# Patient Record
Sex: FEMALE | Race: WHITE | HISPANIC OR LATINO | Employment: UNEMPLOYED | URBAN - METROPOLITAN AREA
[De-identification: names, ages, dates, MRNs, and addresses within clinical notes are randomized per-mention and may not be internally consistent; named-entity substitution may affect disease eponyms.]

---

## 2017-04-26 ENCOUNTER — OFFICE VISIT (OUTPATIENT)
Dept: MEDICAL GROUP | Facility: CLINIC | Age: 32
End: 2017-04-26
Payer: COMMERCIAL

## 2017-04-26 VITALS
HEART RATE: 58 BPM | TEMPERATURE: 99.1 F | BODY MASS INDEX: 18.83 KG/M2 | HEIGHT: 65 IN | DIASTOLIC BLOOD PRESSURE: 56 MMHG | SYSTOLIC BLOOD PRESSURE: 98 MMHG | WEIGHT: 113 LBS | RESPIRATION RATE: 14 BRPM | OXYGEN SATURATION: 95 %

## 2017-04-26 DIAGNOSIS — M79.604 PAIN IN BOTH LOWER EXTREMITIES: ICD-10-CM

## 2017-04-26 DIAGNOSIS — J06.9 VIRAL URI WITH COUGH: ICD-10-CM

## 2017-04-26 DIAGNOSIS — M79.605 PAIN IN BOTH LOWER EXTREMITIES: ICD-10-CM

## 2017-04-26 PROCEDURE — 99214 OFFICE O/P EST MOD 30 MIN: CPT | Performed by: NURSE PRACTITIONER

## 2017-04-26 RX ORDER — CODEINE PHOSPHATE AND GUAIFENESIN 10; 100 MG/5ML; MG/5ML
5 SOLUTION ORAL EVERY 4 HOURS PRN
Qty: 75 ML | Refills: 0 | Status: SHIPPED
Start: 2017-04-26 | End: 2018-04-06

## 2017-04-26 RX ORDER — BENZONATATE 100 MG/1
100 CAPSULE ORAL 3 TIMES DAILY PRN
Qty: 60 CAP | Refills: 0 | Status: SHIPPED | OUTPATIENT
Start: 2017-04-26 | End: 2018-04-06

## 2017-04-26 NOTE — MR AVS SNAPSHOT
"        Umu RizviManuelMaynor   2017 1:40 PM   Office Visit   MRN: [id number]    Department:  [facility]   Dept Phone:  [phone]    Description:  Female : 1985   Provider:  JUAN M Becerril           Reason for Visit     Cough Cough x6 day and fever; feels like she cant talk without cough      Allergies as of 2017     Allergen Noted Reactions    Nkda [No Known Drug Allergy] 2009         You were diagnosed with     Viral URI with cough   [089695]         Vital Signs     Blood Pressure Pulse Temperature Respirations Height Weight    98/56 mmHg 58 37.3 °C (99.1 °F) 14 1.651 m (5' 5\") 51.256 kg (113 lb)    Body Mass Index Oxygen Saturation Last Menstrual Period Breastfeeding? Smoking Status       18.80 kg/m2 95% 2017 No Never Smoker        Basic Information     Date Of Birth Sex Race Ethnicity Preferred Language    1985 Female  or   Origin (Sao Tomean,Mauritian,Iranian,Kameron, etc) [place]      Problem List              ICD-10-CM Priority Class Noted - Resolved    Epilepsy (CMS-HCC) G40.909   2009 - Present    Vitamin d deficiency    2012 - Present      Health Maintenance        Date Due Completion Dates    IMM DTaP/Tdap/Td Vaccine (1 - Tdap) 2003 ---    PAP SMEAR 2005 ---            Current Immunizations     Influenza Vaccine Pediatric 12/10/2009      Below and/or attached are the medications your provider expects you to take. Review all of your home medications and newly ordered medications with your provider and/or pharmacist. Follow medication instructions as directed by your provider and/or pharmacist. Please keep your medication list with you and share with your provider. Update the information when medications are discontinued, doses are changed, or new medications (including over-the-counter products) are added; and carry medication information at all times in the event of emergency situations     Allergies:  NKDA - " (reactions not documented)               Medications  Valid as of: April 26, 2017 -  2:21 PM    Generic Name Brand Name Tablet Size Instructions for use    Benzonatate (Cap) TESSALON 100 MG Take 1 Cap by mouth 3 times a day as needed for Cough.        Divalproex Sodium (Tablet Delayed Response) DEPAKOTE 500 MG Take 500mg po qam and 1000mg po qhs.        Folic Acid (Tab) FOLVITE 1 MG TAKE ONE TABLET BY MOUTH ONCE DAILY        Guaifenesin-Codeine (Solution) ROBITUSSIN -10 mg/5mL Take 5 mL by mouth every four hours as needed.        LevETIRAcetam (Tab) KEPPRA 500 MG Take one in the am and two at night        Norethindrone Acet-Ethinyl Est (Tab) MICROGESTIN 1/20 1-20 MG-MCG Take 1 Tab by mouth every day.        .                 Medicines prescribed today were sent to:     [facility] [address] [place] [address] [address] [facility]    [address] [place] [address]    Phone: [phone] Fax: [phone]    Open 24 Hours?: No      Medication refill instructions:       If your prescription bottle indicates you have medication refills left, it is not necessary to call your provider’s office. Please contact your pharmacy and they will refill your medication.    If your prescription bottle indicates you do not have any refills left, you may request refills at any time through one of the following ways: The online [employer] system (except Urgent Care), by calling your provider’s office, or by asking your pharmacy to contact your provider’s office with a refill request. Medication refills are processed only during regular business hours and may not be available until the next business day. Your provider may request additional information or to have a follow-up visit with you prior to refilling your medication.   *Please Note: Medication refills are assigned a new Rx number when refilled electronically. Your pharmacy may indicate that no refills were authorized even though a new prescription for the same medication is available at the  pharmacy. Please request the medicine by name with the pharmacy before contacting your provider for a refill.           MyChart Status: Patient Declined

## 2017-04-27 NOTE — PROGRESS NOTES
"CC: Cough        HPI:     Umu presents today for the followin. Viral URI with cough  Here today complaining of 5 days with a cough that is mostly dry. She states she's had intermittent fever because his high as 100. Frequent cough she feels was precipitated by talking.  Worse when laying down  Hasn't tried any over-the-counter cough supplements/remedies    2. Pain in both lower extremities  Has no other associated complaints stating that she gets some pain in her leg sometimes. She describes the lateral aspects of her upper thighs and hip area. She just thinks is related to laying down on her side for long amounts of time. She describes herself as fairly inactive    Current Outpatient Prescriptions   Medication Sig Dispense Refill   • benzonatate (TESSALON) 100 MG Cap Take 1 Cap by mouth 3 times a day as needed for Cough. 60 Cap 0   • guaifenesin-codeine (ROBITUSSIN AC) Solution oral solution Take 5 mL by mouth every four hours as needed. 75 mL 0   • norethindrone-ethinyl estradiol (MICROGESTIN ) 1-20 MG-MCG per tablet Take 1 Tab by mouth every day. 84 Tab 3   • folic acid (FOLVITE) 1 MG Tab TAKE ONE TABLET BY MOUTH ONCE DAILY 30 Tab 11   • divalproex (DEPAKOTE) 500 MG Tablet Delayed Response Take 500mg po qam and 1000mg po qhs. 90 Tab 11   • levetiracetam (KEPPRA) 500 MG Tab Take one in the am and two at night 90 Tab 11     No current facility-administered medications for this visit.     Social History   Substance Use Topics   • Smoking status: Never Smoker    • Smokeless tobacco: Never Used   • Alcohol Use: No     I reviewed patients allergies, problem list and medications today in [facility].    ROS: Any/all pertinent positives listed in the HPI, otherwise all others reviewed are negative today.      BP 98/56 mmHg  Pulse 58  Temp(Src) 37.3 °C (99.1 °F)  Resp 14  Ht 1.651 m (5' 5\")  Wt 51.256 kg (113 lb)  BMI 18.80 kg/m2  SpO2 95%  LMP 2017  Breastfeeding? No    Exam:    Gen: Alert and " oriented, No apparent distress. WDWN  Psych: A+Ox3, normal affect and mood  Skin: Warm, dry and intact. Good turgor   No rashes in visible areas.  Eye: Conjunctiva clear, lids normal  ENMT: Lips without lesions, good dentition   Oropharynx clear.  TMs are unremarkable bilaterally. No erythema bogginess of turbinates. No pain with palpation over the frontal or maxillary sinuses bilaterally  Neck: No Lymphadenopathy, Thyromegaly, Bruits.   Trachea midline, no masses  Lungs: Clear to auscultation bilaterally, no rales or rhonchi   Unlabored respiratory effort.  Dry cough noted in the room  CV: Regular rate and rhythm, S1, S2. No murmurs.   No Edema  Negative straight leg raise  DTRs patellar and Achilles 2+, symmetrical bilaterally  Strength lower extremities 5 out of 5 and symmetrical bilaterally  Gait normal  No tenderness or deformity with palpation of the legs    Assessment and Plan.   31 y.o. female with the following issues.    1. Viral URI with cough  Discussed viral versus bacterial, importance of fluids, rest and hand hygiene.  May use over-the-counter anti-pyuretics and/or antitussives as needed.  Return to the office if necessary temperature, symptoms aren't resolving or new symptoms.  Reviewed cough syrup only at night, cautioned for sedation   reviewed from state pharmacy database-Medications found to be medically necessary/appropriate-nothing under her ID  - benzonatate (TESSALON) 100 MG Cap; Take 1 Cap by mouth 3 times a day as needed for Cough.  Dispense: 60 Cap; Refill: 0  - guaifenesin-codeine (ROBITUSSIN AC) Solution oral solution; Take 5 mL by mouth every four hours as needed.  Dispense: 75 mL; Refill: 0    2. Pain in both lower extremities  Stable. Normal exam. Normal strength. Was given exercises to do for stretching and strengthening at home

## 2017-08-22 ENCOUNTER — OFFICE VISIT (OUTPATIENT)
Dept: NEUROLOGY | Facility: MEDICAL CENTER | Age: 32
End: 2017-08-22
Payer: COMMERCIAL

## 2017-08-22 VITALS
BODY MASS INDEX: 19.49 KG/M2 | HEART RATE: 82 BPM | SYSTOLIC BLOOD PRESSURE: 110 MMHG | OXYGEN SATURATION: 98 % | HEIGHT: 65 IN | TEMPERATURE: 98.2 F | RESPIRATION RATE: 16 BRPM | WEIGHT: 117 LBS | DIASTOLIC BLOOD PRESSURE: 66 MMHG

## 2017-08-22 DIAGNOSIS — G40.219 PARTIAL SYMPTOMATIC EPILEPSY WITH COMPLEX PARTIAL SEIZURES, INTRACTABLE, WITHOUT STATUS EPILEPTICUS (HCC): ICD-10-CM

## 2017-08-22 PROCEDURE — 99214 OFFICE O/P EST MOD 30 MIN: CPT | Performed by: PSYCHIATRY & NEUROLOGY

## 2017-08-22 RX ORDER — LEVETIRACETAM 500 MG/1
TABLET ORAL
Qty: 90 TAB | Refills: 11 | Status: SHIPPED | OUTPATIENT
Start: 2017-08-22 | End: 2018-02-27

## 2017-08-22 RX ORDER — ERGOCALCIFEROL 1.25 MG/1
50000 CAPSULE ORAL
Qty: 4 CAP | Refills: 11 | Status: SHIPPED | OUTPATIENT
Start: 2017-08-22 | End: 2018-02-27 | Stop reason: SDUPTHER

## 2017-08-22 RX ORDER — DIVALPROEX SODIUM 500 MG/1
TABLET, DELAYED RELEASE ORAL
Qty: 90 TAB | Refills: 11 | Status: SHIPPED | OUTPATIENT
Start: 2017-08-22 | End: 2018-02-27 | Stop reason: SDUPTHER

## 2017-08-22 RX ORDER — LEVETIRACETAM 1000 MG/1
1000 TABLET ORAL 2 TIMES DAILY
Qty: 60 TAB | Refills: 11 | Status: SHIPPED | OUTPATIENT
Start: 2017-08-22 | End: 2018-04-06

## 2017-08-22 NOTE — MR AVS SNAPSHOT
"        Umu Sapp   2017 4:20 PM   Office Visit   MRN: [id number]    Department:  [facility]   Dept Phone:  [phone]    Description:  Female : 1985   Provider:  Melissa P Bloch, M.D.           Reason for Visit     Follow-Up Partial idiopathic epilepsy with seizures of localized onset, intractable, without status epilepticus      Allergies as of 2017     Allergen Noted Reactions    Nkda [No Known Drug Allergy] 2009         You were diagnosed with     Partial symptomatic epilepsy with complex partial seizures, intractable, without status epilepticus (CMS-HCC)   [[id number]]         Vital Signs     Blood Pressure Pulse Temperature Respirations Height Weight    110/66 mmHg 82 36.8 °C (98.2 °F) 16 1.651 m (5' 5\") 53.071 kg (117 lb)    Body Mass Index Oxygen Saturation Smoking Status             19.47 kg/m2 98% Never Smoker          Basic Information     Date Of Birth Sex Race Ethnicity Preferred Language    1985 Female  or   Origin (Ivorian,Bermudian,Luxembourger,Malagasy, etc) Ivorian      Your appointments     2018  4:00 PM   Follow Up Visit with Melissa P Bloch, M.D.   [facility] (--)    [address] [place] [address], [address]  [facility] [address] [phone]           You will be receiving a confirmation call a few days before your appointment from our automated call confirmation system.              Problem List              ICD-10-CM Priority Class Noted - Resolved    Epilepsy (CMS-HCC) G40.909   2009 - Present    Vitamin d deficiency    2012 - Present      Health Maintenance        Date Due Completion Dates    IMM DTaP/Tdap/Td Vaccine (1 - Tdap) 2003 ---    PAP SMEAR 2005 ---    IMM INFLUENZA (1) 2017 12/10/2009            Current Immunizations     Influenza Vaccine Pediatric 12/10/2009      Below and/or attached are the medications your provider expects you to take. Review all of your home medications and " newly ordered medications with your provider and/or pharmacist. Follow medication instructions as directed by your provider and/or pharmacist. Please keep your medication list with you and share with your provider. Update the information when medications are discontinued, doses are changed, or new medications (including over-the-counter products) are added; and carry medication information at all times in the event of emergency situations     Allergies:  NKDA - (reactions not documented)               Medications  Valid as of: August 22, 2017 -  4:42 PM    Generic Name Brand Name Tablet Size Instructions for use    Benzonatate (Cap) TESSALON 100 MG Take 1 Cap by mouth 3 times a day as needed for Cough.        Divalproex Sodium (Tablet Delayed Response) DEPAKOTE 500 MG Take 500mg po qam and 1000mg po qhs.        Ergocalciferol (Cap) DRISDOL 35286 UNIT Take 1 Cap by mouth every 7 days.        Folic Acid (Tab) FOLVITE 1 MG TAKE ONE TABLET BY MOUTH ONCE DAILY        Guaifenesin-Codeine (Solution) ROBITUSSIN -10 mg/5mL Take 5 mL by mouth every four hours as needed.        LevETIRAcetam (Tab) KEPPRA 500 MG Take one in the am and two at night        LevETIRAcetam (Tab) KEPPRA 1000 MG Take 1 Tab by mouth 2 Times a Day.        Norethindrone Acet-Ethinyl Est (Tab) MICROGESTIN 1/20 1-20 MG-MCG Take 1 Tab by mouth every day.        .                 Medicines prescribed today were sent to:     [facility] [address] [place] [address] [address] [address]    Phone: [phone] Fax: [phone]    Open 24 Hours?: No      Medication refill instructions:       If your prescription bottle indicates you have medication refills left, it is not necessary to call your provider’s office. Please contact your pharmacy and they will refill your medication.    If your prescription bottle indicates you do not have any refills left, you may request refills at any time through one of the following  ways: The online [employer] system (except Urgent Care), by calling your provider’s office, or by asking your pharmacy to contact your provider’s office with a refill request. Medication refills are processed only during regular business hours and may not be available until the next business day. Your provider may request additional information or to have a follow-up visit with you prior to refilling your medication.   *Please Note: Medication refills are assigned a new Rx number when refilled electronically. Your pharmacy may indicate that no refills were authorized even though a new prescription for the same medication is available at the pharmacy. Please request the medicine by name with the pharmacy before contacting your provider for a refill.        Your To Do List     Future Labs/Procedures Complete By Expires    CBC WITH DIFFERENTIAL  As directed 8/22/2018    COMP METABOLIC PANEL  As directed 8/22/2018    VALPROIC ACID  As directed 8/22/2018    VITAMIN D,25 HYDROXY  As directed 8/22/2018         [employer] Status: Patient Declined

## 2017-08-26 NOTE — PROGRESS NOTES
CHIEF COMPLAINT  Chief Complaint   Patient presents with   • Follow-Up     Partial idiopathic epilepsy with seizures of localized onset, intractable, without status epilepticus       HPI  Umu Sapp is a 30 y.o. female who presents for treatment of her refractory epilepsy with questions about treatment options and cause of her seizures. Patient also complaining of muscle pain in her arms. Patient is not currently sexually active. Patient seizure pattern has been catamenial.  No problem-specific Assessment & Plan notes found for this encounter.    REVIEW OF SYSTEMS  Pertinent Positives:occasional headaches, fatigue, memory loss, anxiety, intermittent back pain.   All other systems are negative.     PAST MEDICAL HISTORY  Past Medical History:   Diagnosis Date   • DUB (dysfunctional uterine bleeding)    • Epilepsy (CMS-HCC) 11/4/2009    since infant.  sees Wilfrido/Codey at [facility].  Keppra/depakote.  absence siezures 1x/month-thinks iwth menstruation.   • GERD (gastroesophageal reflux disease)     gastritis-only with spicy foods   • Seizure disorder (CMS-HCC)        SOCIAL HISTORY  Social History     Social History   • Marital status: Single     Spouse name: N/A   • Number of children: N/A   • Years of education: N/A     Occupational History   • Not on file.     Social History Main Topics   • Smoking status: Never Smoker   • Smokeless tobacco: Never Used   • Alcohol use No   • Drug use: No   • Sexual activity: Not Currently      Comment: virginal     Other Topics Concern   • Not on file     Social History Narrative   • No narrative on file       SURGICAL HISTORY  No past surgical history on file.    CURRENT MEDICATIONS  Current Outpatient Prescriptions   Medication Sig Dispense Refill   • divalproex (DEPAKOTE) 500 MG Tablet Delayed Response Take 500mg po qam and 1000mg po qhs. 90 Tab 11   • levetiracetam (KEPPRA) 500 MG Tab Take one in the am and two at night 90 Tab 11   • levetiracetam (KEPPRA) 1000 MG tablet  "Take 1 Tab by mouth 2 Times a Day. 60 Tab 11   • ergocalciferol (DRISDOL) 51412 UNIT capsule Take 1 Cap by mouth every 7 days. 4 Cap 11   • benzonatate (TESSALON) 100 MG Cap Take 1 Cap by mouth 3 times a day as needed for Cough. 60 Cap 0   • guaifenesin-codeine (ROBITUSSIN AC) Solution oral solution Take 5 mL by mouth every four hours as needed. 75 mL 0   • norethindrone-ethinyl estradiol (MICROGESTIN 1/20) 1-20 MG-MCG per tablet Take 1 Tab by mouth every day. 84 Tab 3   • folic acid (FOLVITE) 1 MG Tab TAKE ONE TABLET BY MOUTH ONCE DAILY 30 Tab 11     No current facility-administered medications for this visit.        ALLERGIES  Allergies   Allergen Reactions   • Nkda [No Known Drug Allergy]        PHYSICAL EXAM  VITAL SIGNS: /66   Pulse 82   Temp 36.8 °C (98.2 °F)   Resp 16   Ht 1.651 m (5' 5\")   Wt 53.1 kg (117 lb)   SpO2 98%   BMI 19.47 kg/m²   Constitutional: Well developed, Well nourished, No acute distress, Non-toxic appearance.   HENT: normocephalic, atraumatic  Eyes: fundi-disc sharp, perrl  Neck: Normal range of motion, No tenderness, Supple, No stridor.   Cardiovascular: Normal heart rate, Normal rhythm, No murmurs, No rubs, No gallops.   Thorax & Lungs: Normal breath sounds, No respiratory distress, No wheezing, No chest tenderness.   Abdomen: Bowel sounds normal, Soft, No tenderness, No masses, No pulsatile masses.   Skin: Warm, Dry, No erythema, No rash.   Back: No tenderness, No CVA tenderness.   Extremities: Intact distal pulses, No edema, No tenderness, No cyanosis, No clubbing.   Neurologic: A&Ox3, CN:2-12 intact, motor: normal strength tone and bulk, sensory intact, DTR symmetric and 2+, gait and CB exam intact   Psychiatric: Affect normal, Judgment normal, Mood normal.   Lab: Reviewed with patient in detail and as noted in results.    EEG  Temporal lobe epilepsy    ASSESSMENT AND PLAN  1. Partial symptomatic epilepsy with complex partial seizures, intractable, without status " epilepticus  Plan to add carnitor because of the depakote and counseled on contraception optiond for catamenial seizure and to avoid pregnancy.  - levocarnitine (CARNITOR) 330 MG Tab; Take 1 Tab by mouth 2 times a day.  Dispense: 60 Tab; Refill: 11  - divalproex (DEPAKOTE) 500 MG Tablet Delayed Response; Take 500mg po qam and 1000mg po qhs.  Dispense: 90 Tab; Refill: 11  - levetiracetam (KEPPRA) 500 MG Tab; Take one in the am and two at night  Dispense: 90 Tab; Refill: 11    Patient was given form for laboratory evaluation at [facility].         I spent 30 minutes with this patient and her mother,over fifty percent was spent counseling patient on their condition, best management practices, reviewing test results and risks and benefits of treatment.

## 2017-09-02 ENCOUNTER — HOSPITAL ENCOUNTER (OUTPATIENT)
Dept: LAB | Facility: MEDICAL CENTER | Age: 32
End: 2017-09-02
Attending: PSYCHIATRY & NEUROLOGY
Payer: COMMERCIAL

## 2017-09-02 DIAGNOSIS — G40.219 PARTIAL SYMPTOMATIC EPILEPSY WITH COMPLEX PARTIAL SEIZURES, INTRACTABLE, WITHOUT STATUS EPILEPTICUS (HCC): ICD-10-CM

## 2017-09-02 LAB
25(OH)D3 SERPL-MCNC: 18 NG/ML (ref 30–100)
ALBUMIN SERPL BCP-MCNC: 4 G/DL (ref 3.2–4.9)
ALBUMIN/GLOB SERPL: 1.1 G/DL
ALP SERPL-CCNC: 46 U/L (ref 30–99)
ALT SERPL-CCNC: 12 U/L (ref 2–50)
ANION GAP SERPL CALC-SCNC: 9 MMOL/L (ref 0–11.9)
AST SERPL-CCNC: 17 U/L (ref 12–45)
BASOPHILS # BLD AUTO: 0.5 % (ref 0–1.8)
BASOPHILS # BLD: 0.03 K/UL (ref 0–0.12)
BILIRUB SERPL-MCNC: 0.3 MG/DL (ref 0.1–1.5)
BUN SERPL-MCNC: 12 MG/DL (ref 8–22)
CALCIUM SERPL-MCNC: 9.3 MG/DL (ref 8.5–10.5)
CHLORIDE SERPL-SCNC: 106 MMOL/L (ref 96–112)
CO2 SERPL-SCNC: 23 MMOL/L (ref 20–33)
CREAT SERPL-MCNC: 0.49 MG/DL (ref 0.5–1.4)
EOSINOPHIL # BLD AUTO: 0.13 K/UL (ref 0–0.51)
EOSINOPHIL NFR BLD: 2.3 % (ref 0–6.9)
ERYTHROCYTE [DISTWIDTH] IN BLOOD BY AUTOMATED COUNT: 47.2 FL (ref 35.9–50)
GFR SERPL CREATININE-BSD FRML MDRD: >60 ML/MIN/1.73 M 2
GLOBULIN SER CALC-MCNC: 3.5 G/DL (ref 1.9–3.5)
GLUCOSE SERPL-MCNC: 73 MG/DL (ref 65–99)
HCT VFR BLD AUTO: 36.3 % (ref 37–47)
HGB BLD-MCNC: 11.8 G/DL (ref 12–16)
IMM GRANULOCYTES # BLD AUTO: 0.02 K/UL (ref 0–0.11)
IMM GRANULOCYTES NFR BLD AUTO: 0.3 % (ref 0–0.9)
LYMPHOCYTES # BLD AUTO: 2.06 K/UL (ref 1–4.8)
LYMPHOCYTES NFR BLD: 36 % (ref 22–41)
MCH RBC QN AUTO: 28.3 PG (ref 27–33)
MCHC RBC AUTO-ENTMCNC: 32.5 G/DL (ref 33.6–35)
MCV RBC AUTO: 87.1 FL (ref 81.4–97.8)
MONOCYTES # BLD AUTO: 0.51 K/UL (ref 0–0.85)
MONOCYTES NFR BLD AUTO: 8.9 % (ref 0–13.4)
NEUTROPHILS # BLD AUTO: 2.98 K/UL (ref 2–7.15)
NEUTROPHILS NFR BLD: 52 % (ref 44–72)
NRBC # BLD AUTO: 0 K/UL
NRBC BLD AUTO-RTO: 0 /100 WBC
PLATELET # BLD AUTO: 221 K/UL (ref 164–446)
PMV BLD AUTO: 12.1 FL (ref 9–12.9)
POTASSIUM SERPL-SCNC: 4.2 MMOL/L (ref 3.6–5.5)
PROT SERPL-MCNC: 7.5 G/DL (ref 6–8.2)
RBC # BLD AUTO: 4.17 M/UL (ref 4.2–5.4)
SODIUM SERPL-SCNC: 138 MMOL/L (ref 135–145)
VALPROATE SERPL-MCNC: 32.9 UG/ML (ref 50–100)
WBC # BLD AUTO: 5.7 K/UL (ref 4.8–10.8)

## 2017-09-02 PROCEDURE — 36415 COLL VENOUS BLD VENIPUNCTURE: CPT

## 2017-09-02 PROCEDURE — 80053 COMPREHEN METABOLIC PANEL: CPT

## 2017-09-02 PROCEDURE — 85025 COMPLETE CBC W/AUTO DIFF WBC: CPT

## 2017-09-02 PROCEDURE — 80164 ASSAY DIPROPYLACETIC ACD TOT: CPT

## 2017-09-02 PROCEDURE — 82306 VITAMIN D 25 HYDROXY: CPT

## 2018-02-27 ENCOUNTER — OFFICE VISIT (OUTPATIENT)
Dept: NEUROLOGY | Facility: MEDICAL CENTER | Age: 33
End: 2018-02-27
Payer: COMMERCIAL

## 2018-02-27 VITALS
BODY MASS INDEX: 21.64 KG/M2 | WEIGHT: 110.23 LBS | HEIGHT: 60 IN | SYSTOLIC BLOOD PRESSURE: 96 MMHG | TEMPERATURE: 98.2 F | DIASTOLIC BLOOD PRESSURE: 62 MMHG | OXYGEN SATURATION: 98 % | HEART RATE: 92 BPM

## 2018-02-27 DIAGNOSIS — F32.A ANXIETY AND DEPRESSION: ICD-10-CM

## 2018-02-27 DIAGNOSIS — G40.219 PARTIAL SYMPTOMATIC EPILEPSY WITH COMPLEX PARTIAL SEIZURES, INTRACTABLE, WITHOUT STATUS EPILEPTICUS (HCC): ICD-10-CM

## 2018-02-27 DIAGNOSIS — F41.9 ANXIETY AND DEPRESSION: ICD-10-CM

## 2018-02-27 PROCEDURE — 99214 OFFICE O/P EST MOD 30 MIN: CPT | Performed by: PSYCHIATRY & NEUROLOGY

## 2018-02-27 RX ORDER — ERGOCALCIFEROL 1.25 MG/1
50000 CAPSULE ORAL
Qty: 4 CAP | Refills: 11 | Status: SHIPPED | OUTPATIENT
Start: 2018-02-27 | End: 2019-01-22 | Stop reason: SDUPTHER

## 2018-02-27 RX ORDER — DIVALPROEX SODIUM 500 MG/1
TABLET, DELAYED RELEASE ORAL
Qty: 90 TAB | Refills: 11 | Status: SHIPPED | OUTPATIENT
Start: 2018-02-27 | End: 2018-04-06

## 2018-02-28 NOTE — ASSESSMENT & PLAN NOTE
Patient is very tearful and feels very socially withdrawn. She feels depressed and anxious. She is asking for referral to psychology.

## 2018-02-28 NOTE — PROGRESS NOTES
CHIEF COMPLAINT  Chief Complaint   Patient presents with   • Follow-Up     partial simptomatic epilepsy       HPI  Umu Sapp is a 30 y.o. female who presents for treatment of her refractory epilepsy with questions about treatment options and cause of her seizures. Patient also complaining of muscle pain in her arms. Patient is not currently sexually active. Patient seizure pattern has been catamenial.  Vitamin d deficiency  Pt needs more vitamin D    Epilepsy  Pt with breakthrough seizure with her periods.    Anxiety and depression  Patient is very tearful and feels very socially withdrawn. She feels depressed and anxious. She is asking for referral to psychology.    REVIEW OF SYSTEMS  Pertinent Positives:occasional headaches, fatigue, memory loss, anxiety, intermittent back pain.   All other systems are negative.     PAST MEDICAL HISTORY  Past Medical History:   Diagnosis Date   • DUB (dysfunctional uterine bleeding)    • Epilepsy (CMS-HCC) 11/4/2009    since infant.  sees Wilfrido/Codey at [facility].  Keppra/depakote.  absence siezures 1x/month-thinks iwth menstruation.   • GERD (gastroesophageal reflux disease)     gastritis-only with spicy foods   • Seizure disorder (CMS-HCC)        SOCIAL HISTORY  Social History     Social History   • Marital status: Single     Spouse name: N/A   • Number of children: N/A   • Years of education: N/A     Occupational History   • Not on file.     Social History Main Topics   • Smoking status: Never Smoker   • Smokeless tobacco: Never Used   • Alcohol use No   • Drug use: No   • Sexual activity: Not Currently      Comment: virginal     Other Topics Concern   • Not on file     Social History Narrative   • No narrative on file       SURGICAL HISTORY  No past surgical history on file.    CURRENT MEDICATIONS  Current Outpatient Prescriptions   Medication Sig Dispense Refill   • divalproex (DEPAKOTE) 500 MG Tablet Delayed Response Take 500mg po qam and 1000mg po qhs. 90 Tab 11    • ergocalciferol (DRISDOL) 87649 UNIT capsule Take 1 Cap by mouth every 7 days. 4 Cap 11   • levetiracetam (KEPPRA) 1000 MG tablet Take 1 Tab by mouth 2 Times a Day. 60 Tab 11   • benzonatate (TESSALON) 100 MG Cap Take 1 Cap by mouth 3 times a day as needed for Cough. 60 Cap 0   • norethindrone-ethinyl estradiol (MICROGESTIN 1/20) 1-20 MG-MCG per tablet Take 1 Tab by mouth every day. 84 Tab 3   • folic acid (FOLVITE) 1 MG Tab TAKE ONE TABLET BY MOUTH ONCE DAILY 30 Tab 11   • guaifenesin-codeine (ROBITUSSIN AC) Solution oral solution Take 5 mL by mouth every four hours as needed. 75 mL 0     No current facility-administered medications for this visit.        ALLERGIES  Allergies   Allergen Reactions   • Nkda [No Known Drug Allergy]        PHYSICAL EXAM  VITAL SIGNS: BP (!) 96/62   Pulse 92   Temp 36.8 °C (98.2 °F)   Ht 1.524 m (5')   Wt 50 kg (110 lb 3.7 oz)   SpO2 98%   BMI 21.53 kg/m²   Constitutional: Well developed, Well nourished, No acute distress, Non-toxic appearance.   HENT: normocephalic, atraumatic  Eyes: fundi-disc sharp, perrl  Neck: Normal range of motion, No tenderness, Supple, No stridor.   Cardiovascular: Normal heart rate, Normal rhythm, No murmurs, No rubs, No gallops.   Thorax & Lungs: Normal breath sounds, No respiratory distress, No wheezing, No chest tenderness.   Abdomen: Bowel sounds normal, Soft, No tenderness, No masses, No pulsatile masses.   Skin: Warm, Dry, No erythema, No rash.   Back: No tenderness, No CVA tenderness.   Extremities: Intact distal pulses, No edema, No tenderness, No cyanosis, No clubbing.   Neurologic: A&Ox3, CN:2-12 intact, motor: normal strength tone and bulk, sensory intact, DTR symmetric and 2+, gait and CB exam intact   Psychiatric: Affect normal, Judgment normal, Mood normal.   Lab: Reviewed with patient in detail and as noted in results.    EEG  Temporal lobe epilepsy    ASSESSMENT AND PLAN  1. Partial symptomatic epilepsy with complex partial seizures,  intractable, without status epilepticus  Plan to add carnitor because of the depakote and counseled on contraception optiond for catamenial seizure and to avoid pregnancy.  - levocarnitine (CARNITOR) 330 MG Tab; Take 1 Tab by mouth 2 times a day.  Dispense: 60 Tab; Refill: 11  - divalproex (DEPAKOTE) 500 MG Tablet Delayed Response; Take 500mg po qam and 1000mg po qhs.  Dispense: 90 Tab; Refill: 11  - levetiracetam (KEPPRA) 500 MG Tab; Take one in the am and two at night  Dispense: 90 Tab; Refill: 11    Patient was given form for laboratory evaluation at [facility].    2. Anxiety and depression    Referred to psychology. Patient does not want to try any other medications at this point is she feels like she is on enough medications. I request a South African-speaking female.    3. Vitamin D deficiency    I discussed the importance of taking vitamin D supplementation for bone health and immune health which I instructed her how to do today.     I spent 30 minutes with this patient and her mother,over fifty percent was spent counseling patient on their condition, best management practices, reviewing test results and risks and benefits of treatment.

## 2018-03-27 ENCOUNTER — OFFICE VISIT (OUTPATIENT)
Dept: MEDICAL GROUP | Facility: CLINIC | Age: 33
End: 2018-03-27
Payer: COMMERCIAL

## 2018-03-27 ENCOUNTER — HOSPITAL ENCOUNTER (OUTPATIENT)
Dept: LAB | Facility: MEDICAL CENTER | Age: 33
End: 2018-03-27
Attending: NURSE PRACTITIONER
Payer: COMMERCIAL

## 2018-03-27 VITALS
WEIGHT: 109 LBS | HEART RATE: 68 BPM | HEIGHT: 65 IN | BODY MASS INDEX: 18.16 KG/M2 | TEMPERATURE: 98.2 F | DIASTOLIC BLOOD PRESSURE: 64 MMHG | RESPIRATION RATE: 14 BRPM | OXYGEN SATURATION: 98 % | SYSTOLIC BLOOD PRESSURE: 102 MMHG

## 2018-03-27 DIAGNOSIS — R10.13 EPIGASTRIC PAIN: ICD-10-CM

## 2018-03-27 DIAGNOSIS — D64.9 ANEMIA, UNSPECIFIED TYPE: ICD-10-CM

## 2018-03-27 LAB
FERRITIN SERPL-MCNC: 7.5 NG/ML (ref 10–291)
IRON SATN MFR SERPL: 3 % (ref 15–55)
IRON SERPL-MCNC: 16 UG/DL (ref 40–170)
TIBC SERPL-MCNC: 536 UG/DL (ref 250–450)
VIT B12 SERPL-MCNC: 788 PG/ML (ref 211–911)

## 2018-03-27 PROCEDURE — 99214 OFFICE O/P EST MOD 30 MIN: CPT | Performed by: NURSE PRACTITIONER

## 2018-03-27 PROCEDURE — 83550 IRON BINDING TEST: CPT

## 2018-03-27 PROCEDURE — 82728 ASSAY OF FERRITIN: CPT

## 2018-03-27 PROCEDURE — 83540 ASSAY OF IRON: CPT

## 2018-03-27 PROCEDURE — 36415 COLL VENOUS BLD VENIPUNCTURE: CPT

## 2018-03-27 PROCEDURE — 82607 VITAMIN B-12: CPT

## 2018-03-27 RX ORDER — OMEPRAZOLE 40 MG/1
40 CAPSULE, DELAYED RELEASE ORAL EVERY MORNING
Qty: 90 CAP | Refills: 1 | Status: SHIPPED | OUTPATIENT
Start: 2018-03-27 | End: 2018-04-06

## 2018-03-27 ASSESSMENT — PATIENT HEALTH QUESTIONNAIRE - PHQ9: CLINICAL INTERPRETATION OF PHQ2 SCORE: 0

## 2018-03-27 NOTE — PROGRESS NOTES
"CC: Abdominal Pain (Upper middle constant abdominal pain; tried pepto and teas but no relief.)        HPI:     Umu presents today for the followin. Epigastric pain  Here with her mom, c/o epigastric abd pain x 1 week.  Waxes and wanes but always present for last week.  Yesterday was the worse, awoke with it.  No worse after meals, doesn't radiate/wrap around to back    No BM changes.  No nausea.    2. Anemia, unspecified type  Concerned related to mild anemia las month by neuro's labs.   Period was heavy-now back to normal.    Current Outpatient Prescriptions   Medication Sig Dispense Refill   • omeprazole (PRILOSEC) 40 MG delayed-release capsule Take 1 Cap by mouth every morning. 90 Cap 1   • divalproex (DEPAKOTE) 500 MG Tablet Delayed Response Take 500mg po qam and 1000mg po qhs. 90 Tab 11   • ergocalciferol (DRISDOL) 95726 UNIT capsule Take 1 Cap by mouth every 7 days. 4 Cap 11   • levetiracetam (KEPPRA) 1000 MG tablet Take 1 Tab by mouth 2 Times a Day. 60 Tab 11   • benzonatate (TESSALON) 100 MG Cap Take 1 Cap by mouth 3 times a day as needed for Cough. 60 Cap 0   • guaifenesin-codeine (ROBITUSSIN AC) Solution oral solution Take 5 mL by mouth every four hours as needed. 75 mL 0   • norethindrone-ethinyl estradiol (MICROGESTIN 1/20) 1-20 MG-MCG per tablet Take 1 Tab by mouth every day. 84 Tab 3   • folic acid (FOLVITE) 1 MG Tab TAKE ONE TABLET BY MOUTH ONCE DAILY 30 Tab 11     No current facility-administered medications for this visit.      Social History   Substance Use Topics   • Smoking status: Never Smoker   • Smokeless tobacco: Never Used   • Alcohol use No     I reviewed patients allergies, problem list and medications today in EPIC.    ROS: Any/all pertinent positives listed in the HPI, otherwise all others reviewed are negative today.      /64   Pulse 68   Temp 36.8 °C (98.2 °F)   Resp 14   Ht 1.651 m (5' 5\")   Wt 49.4 kg (109 lb)   LMP 2018   SpO2 98%   Breastfeeding? No   " BMI 18.14 kg/m²     Exam:   Gen: Alert and oriented, No apparent distress. WDWN  Psych: A+Ox3, normal affect and mood  Skin: Warm, dry and intact. Good turgor   No rashes in visible areas.  Eye: Conjunctiva clear, lids normal  ENMT: Lips without lesions, good dentition    Lungs: Unlabored respiratory effort.   Abd: Soft, tenderness worse RUQ, slightly less epigastric-other areas normal, non distended. Normal active bowel sounds.    No Hepatosplenomegaly, No pulsatile masses.          Assessment and Plan.   32 y.o. female with the following issues.    1. Epigastric pain  Suspect gastritis reviewed meds and diet changes.  US ordered and we will try to schedule for her.  - omeprazole (PRILOSEC) 40 MG delayed-release capsule; Take 1 Cap by mouth every morning.  Dispense: 90 Cap; Refill: 1  - H.PYLORI STOOL ANTIGEN; Future  - US-GALLBLADDER; Future    2. Anemia, unspecified type  Stable, very mild, reassurance.  Added below labs to those she has upcoming from neuro (including CBC)  - VITAMIN B12; Future  - FERRITIN; Future  - IRON/TOTAL IRON BIND; Future

## 2018-03-28 ENCOUNTER — TELEPHONE (OUTPATIENT)
Dept: MEDICAL GROUP | Facility: CLINIC | Age: 33
End: 2018-03-28

## 2018-03-28 ENCOUNTER — HOSPITAL ENCOUNTER (OUTPATIENT)
Facility: MEDICAL CENTER | Age: 33
End: 2018-03-28
Attending: NURSE PRACTITIONER
Payer: COMMERCIAL

## 2018-03-28 ENCOUNTER — HOSPITAL ENCOUNTER (OUTPATIENT)
Dept: RADIOLOGY | Facility: MEDICAL CENTER | Age: 33
End: 2018-03-28
Attending: NURSE PRACTITIONER
Payer: COMMERCIAL

## 2018-03-28 DIAGNOSIS — R10.11 PAIN, ABDOMINAL, RUQ: ICD-10-CM

## 2018-03-28 DIAGNOSIS — R10.13 EPIGASTRIC PAIN: ICD-10-CM

## 2018-03-28 DIAGNOSIS — K80.20 CALCULUS OF GALLBLADDER WITHOUT CHOLECYSTITIS WITHOUT OBSTRUCTION: ICD-10-CM

## 2018-03-28 LAB — H PYLORI AG STL QL IA: NOT DETECTED

## 2018-03-28 PROCEDURE — 76705 ECHO EXAM OF ABDOMEN: CPT

## 2018-03-28 PROCEDURE — 87338 HPYLORI STOOL AG IA: CPT

## 2018-03-28 NOTE — TELEPHONE ENCOUNTER
Please call and let her know that she has gallstones.  This is likely the cause.  Have her continue her medication      I placed a referral to see surgeon for gallstones    Mongolian speaker

## 2018-03-29 NOTE — TELEPHONE ENCOUNTER
Pt called this morning for return call from us.  She left a different number:  [phone].  I left a message on that phone.

## 2018-04-02 ENCOUNTER — PATIENT OUTREACH (OUTPATIENT)
Dept: HEALTH INFORMATION MANAGEMENT | Facility: OTHER | Age: 33
End: 2018-04-02

## 2018-04-02 NOTE — TELEPHONE ENCOUNTER
When you get a call back:  -give her gen surg referral information or confirm they already called and that she has an appointment.  Did you try her emergency contact-it's her brother

## 2018-04-02 NOTE — TELEPHONE ENCOUNTER
Called both numbers, no answer. I didn't leave a message. I will try around 6 pm (maybe patient is at work).

## 2018-04-02 NOTE — TELEPHONE ENCOUNTER
Received call from Pt Outreach because pt states she has called out office twice now to get results but no one has called her back. Pt is concerned and still having pain per pt . Rep said pt would be available @ p: [phone]    Called [phone] and left general voicemail requesting call back. Voicemail does not have a name on it. I see a letter was sent out 3/29/18.

## 2018-04-02 NOTE — PROGRESS NOTES
Umu called and said that she had a test last week to see why she is having stomach pain and has being waiting for the results. Pt would like to get a call back from Yvonne's office with info.    Called Yvonne's office, and Marcial said that she will contact pt.

## 2018-04-02 NOTE — TELEPHONE ENCOUNTER
Her Mother is also a patient of mine (#[id number])    Mothers contact # [phone]  Kaden (mother's emergency contact and )  [phone]    Maybe they will answer?

## 2018-04-02 NOTE — TELEPHONE ENCOUNTER
Called pt's emergency contact, Latrell, [phone]. There was no answer and no voicemail set up.   I tried her phone number again but no answer...

## 2018-04-03 NOTE — TELEPHONE ENCOUNTER
Patient notified and given all the details of her surgery ref to Dr Cyndi Clinton, [address]. Phone: [phone]. I also mailed the info for her per request.

## 2018-04-03 NOTE — TELEPHONE ENCOUNTER
Spoke to patient. She said she understood. She hadn't received the H. Pylori letter so I gave the results over the phone. I informed her about her referral that was sent to [facility] and that she can call them to follow up in a couple days (number is on front of her card). Unfortunately I do not show info from [facility] about who pt can see; I think we're waiting on [facility] to notify us /pt on who she can see.     Patient wanted your advice on a medication you had prescribed. She said that the medication she was told to take in the morning (omeprazole) makes her heartburn worse. Should she stop medication or is there a replacement?

## 2018-04-06 ENCOUNTER — RESOLUTE PROFESSIONAL BILLING HOSPITAL PROF FEE (OUTPATIENT)
Dept: HOSPITALIST | Facility: MEDICAL CENTER | Age: 33
End: 2018-04-06
Payer: COMMERCIAL

## 2018-04-06 ENCOUNTER — APPOINTMENT (OUTPATIENT)
Dept: RADIOLOGY | Facility: MEDICAL CENTER | Age: 33
DRG: 853 | End: 2018-04-06
Attending: INTERNAL MEDICINE
Payer: COMMERCIAL

## 2018-04-06 ENCOUNTER — HOSPITAL ENCOUNTER (INPATIENT)
Facility: MEDICAL CENTER | Age: 33
LOS: 3 days | DRG: 853 | End: 2018-04-09
Attending: EMERGENCY MEDICINE | Admitting: HOSPITALIST
Payer: COMMERCIAL

## 2018-04-06 ENCOUNTER — APPOINTMENT (OUTPATIENT)
Dept: RADIOLOGY | Facility: MEDICAL CENTER | Age: 33
DRG: 853 | End: 2018-04-06
Attending: EMERGENCY MEDICINE
Payer: COMMERCIAL

## 2018-04-06 ENCOUNTER — HOSPITAL ENCOUNTER (EMERGENCY)
Facility: MEDICAL CENTER | Age: 33
End: 2018-04-06
Payer: COMMERCIAL

## 2018-04-06 VITALS
OXYGEN SATURATION: 98 % | SYSTOLIC BLOOD PRESSURE: 107 MMHG | HEART RATE: 82 BPM | HEIGHT: 63 IN | WEIGHT: 108.91 LBS | DIASTOLIC BLOOD PRESSURE: 66 MMHG | TEMPERATURE: 97 F | RESPIRATION RATE: 16 BRPM | BODY MASS INDEX: 19.3 KG/M2

## 2018-04-06 DIAGNOSIS — Z90.49 S/P LAPAROSCOPIC CHOLECYSTECTOMY: ICD-10-CM

## 2018-04-06 DIAGNOSIS — K83.09 CHOLANGITIS: ICD-10-CM

## 2018-04-06 DIAGNOSIS — R74.01 TRANSAMINITIS: ICD-10-CM

## 2018-04-06 DIAGNOSIS — K85.10 GALLSTONE PANCREATITIS: ICD-10-CM

## 2018-04-06 DIAGNOSIS — K80.42 CHOLEDOCHOLITHIASIS WITH ACUTE CHOLECYSTITIS: Primary | ICD-10-CM

## 2018-04-06 DIAGNOSIS — E86.0 DEHYDRATION: ICD-10-CM

## 2018-04-06 PROBLEM — E87.6 HYPOKALEMIA: Status: ACTIVE | Noted: 2018-04-06

## 2018-04-06 PROBLEM — A41.9 SEPSIS (HCC): Status: ACTIVE | Noted: 2018-04-06

## 2018-04-06 LAB
ALBUMIN SERPL BCP-MCNC: 3.8 G/DL (ref 3.2–4.9)
ALBUMIN/GLOB SERPL: 0.9 G/DL
ALP SERPL-CCNC: 258 U/L (ref 30–99)
ALT SERPL-CCNC: 518 U/L (ref 2–50)
ANION GAP SERPL CALC-SCNC: 7 MMOL/L (ref 0–11.9)
APPEARANCE UR: CLEAR
APTT PPP: 26.2 SEC (ref 24.7–36)
AST SERPL-CCNC: 327 U/L (ref 12–45)
BASOPHILS # BLD AUTO: 0.2 % (ref 0–1.8)
BASOPHILS # BLD: 0.03 K/UL (ref 0–0.12)
BILIRUB SERPL-MCNC: 2.1 MG/DL (ref 0.1–1.5)
BUN SERPL-MCNC: 7 MG/DL (ref 8–22)
CALCIUM SERPL-MCNC: 9.1 MG/DL (ref 8.4–10.2)
CHLORIDE SERPL-SCNC: 104 MMOL/L (ref 96–112)
CO2 SERPL-SCNC: 24 MMOL/L (ref 20–33)
COLOR UR: YELLOW
CREAT SERPL-MCNC: 0.53 MG/DL (ref 0.5–1.4)
EOSINOPHIL # BLD AUTO: 0.16 K/UL (ref 0–0.51)
EOSINOPHIL NFR BLD: 1.2 % (ref 0–6.9)
ERYTHROCYTE [DISTWIDTH] IN BLOOD BY AUTOMATED COUNT: 47.7 FL (ref 35.9–50)
GLOBULIN SER CALC-MCNC: 4.1 G/DL (ref 1.9–3.5)
GLUCOSE SERPL-MCNC: 116 MG/DL (ref 65–99)
GLUCOSE UR STRIP.AUTO-MCNC: NEGATIVE MG/DL
HCG UR QL: NEGATIVE
HCT VFR BLD AUTO: 37.7 % (ref 37–47)
HGB BLD-MCNC: 12.2 G/DL (ref 12–16)
IMM GRANULOCYTES # BLD AUTO: 0.1 K/UL (ref 0–0.11)
IMM GRANULOCYTES NFR BLD AUTO: 0.7 % (ref 0–0.9)
INR PPP: 0.95 (ref 0.87–1.13)
KETONES UR STRIP.AUTO-MCNC: ABNORMAL MG/DL
LACTATE BLD-SCNC: 1.79 MMOL/L (ref 0.5–2)
LEUKOCYTE ESTERASE UR QL STRIP.AUTO: NEGATIVE
LIPASE SERPL-CCNC: >400 U/L (ref 7–58)
LYMPHOCYTES # BLD AUTO: 1.28 K/UL (ref 1–4.8)
LYMPHOCYTES NFR BLD: 9.6 % (ref 22–41)
MAGNESIUM SERPL-MCNC: 2 MG/DL (ref 1.5–2.5)
MCH RBC QN AUTO: 27.1 PG (ref 27–33)
MCHC RBC AUTO-ENTMCNC: 32.4 G/DL (ref 33.6–35)
MCV RBC AUTO: 83.8 FL (ref 81.4–97.8)
MONOCYTES # BLD AUTO: 1.21 K/UL (ref 0–0.85)
MONOCYTES NFR BLD AUTO: 9.1 % (ref 0–13.4)
NEUTROPHILS # BLD AUTO: 10.56 K/UL (ref 2–7.15)
NEUTROPHILS NFR BLD: 79.2 % (ref 44–72)
NITRITE UR QL STRIP.AUTO: NEGATIVE
NRBC # BLD AUTO: 0 K/UL
NRBC BLD-RTO: 0 /100 WBC
PH UR STRIP.AUTO: 5.5 [PH]
PLATELET # BLD AUTO: 299 K/UL (ref 164–446)
PMV BLD AUTO: 11.1 FL (ref 9–12.9)
POTASSIUM SERPL-SCNC: 3.5 MMOL/L (ref 3.6–5.5)
PROT SERPL-MCNC: 7.9 G/DL (ref 6–8.2)
PROT UR QL STRIP: NEGATIVE MG/DL
PROTHROMBIN TIME: 12.6 SEC (ref 12–14.6)
RBC # BLD AUTO: 4.5 M/UL (ref 4.2–5.4)
RBC UR QL AUTO: ABNORMAL
SODIUM SERPL-SCNC: 135 MMOL/L (ref 135–145)
SP GR UR STRIP.AUTO: 1.01
WBC # BLD AUTO: 13.3 K/UL (ref 4.8–10.8)

## 2018-04-06 PROCEDURE — 87040 BLOOD CULTURE FOR BACTERIA: CPT | Mod: 91

## 2018-04-06 PROCEDURE — 700111 HCHG RX REV CODE 636 W/ 250 OVERRIDE (IP): Performed by: HOSPITALIST

## 2018-04-06 PROCEDURE — BF101ZZ FLUOROSCOPY OF BILE DUCTS USING LOW OSMOLAR CONTRAST: ICD-10-PCS | Performed by: INTERNAL MEDICINE

## 2018-04-06 PROCEDURE — 0FC98ZZ EXTIRPATION OF MATTER FROM COMMON BILE DUCT, VIA NATURAL OR ARTIFICIAL OPENING ENDOSCOPIC: ICD-10-PCS | Performed by: INTERNAL MEDICINE

## 2018-04-06 PROCEDURE — 160002 HCHG RECOVERY MINUTES (STAT): Performed by: INTERNAL MEDICINE

## 2018-04-06 PROCEDURE — 160009 HCHG ANES TIME/MIN: Performed by: INTERNAL MEDICINE

## 2018-04-06 PROCEDURE — 85610 PROTHROMBIN TIME: CPT

## 2018-04-06 PROCEDURE — 85025 COMPLETE CBC W/AUTO DIFF WBC: CPT

## 2018-04-06 PROCEDURE — 76705 ECHO EXAM OF ABDOMEN: CPT

## 2018-04-06 PROCEDURE — 700101 HCHG RX REV CODE 250

## 2018-04-06 PROCEDURE — 36415 COLL VENOUS BLD VENIPUNCTURE: CPT

## 2018-04-06 PROCEDURE — 80053 COMPREHEN METABOLIC PANEL: CPT

## 2018-04-06 PROCEDURE — 81002 URINALYSIS NONAUTO W/O SCOPE: CPT

## 2018-04-06 PROCEDURE — 96361 HYDRATE IV INFUSION ADD-ON: CPT

## 2018-04-06 PROCEDURE — 81025 URINE PREGNANCY TEST: CPT

## 2018-04-06 PROCEDURE — 0DJ08ZZ INSPECTION OF UPPER INTESTINAL TRACT, VIA NATURAL OR ARTIFICIAL OPENING ENDOSCOPIC: ICD-10-PCS | Performed by: INTERNAL MEDICINE

## 2018-04-06 PROCEDURE — 700105 HCHG RX REV CODE 258: Performed by: EMERGENCY MEDICINE

## 2018-04-06 PROCEDURE — 160048 HCHG OR STATISTICAL LEVEL 1-5: Performed by: INTERNAL MEDICINE

## 2018-04-06 PROCEDURE — 110371 HCHG SHELL REV 272: Performed by: INTERNAL MEDICINE

## 2018-04-06 PROCEDURE — 96375 TX/PRO/DX INJ NEW DRUG ADDON: CPT

## 2018-04-06 PROCEDURE — 160203 HCHG ENDO MINUTES - 1ST 30 MINS LEVEL 4: Performed by: INTERNAL MEDICINE

## 2018-04-06 PROCEDURE — 302449 STATCHG TRIAGE ONLY (STATISTIC)

## 2018-04-06 PROCEDURE — 160208 HCHG ENDO MINUTES - EA ADDL 1 MIN LEVEL 4: Performed by: INTERNAL MEDICINE

## 2018-04-06 PROCEDURE — 770006 HCHG ROOM/CARE - MED/SURG/GYN SEMI*

## 2018-04-06 PROCEDURE — 74330 X-RAY BILE/PANC ENDOSCOPY: CPT

## 2018-04-06 PROCEDURE — 96374 THER/PROPH/DIAG INJ IV PUSH: CPT

## 2018-04-06 PROCEDURE — 700105 HCHG RX REV CODE 258: Performed by: HOSPITALIST

## 2018-04-06 PROCEDURE — 99291 CRITICAL CARE FIRST HOUR: CPT

## 2018-04-06 PROCEDURE — 83690 ASSAY OF LIPASE: CPT

## 2018-04-06 PROCEDURE — 85730 THROMBOPLASTIN TIME PARTIAL: CPT

## 2018-04-06 PROCEDURE — 700101 HCHG RX REV CODE 250: Performed by: HOSPITALIST

## 2018-04-06 PROCEDURE — 700111 HCHG RX REV CODE 636 W/ 250 OVERRIDE (IP)

## 2018-04-06 PROCEDURE — 83735 ASSAY OF MAGNESIUM: CPT

## 2018-04-06 PROCEDURE — 99223 1ST HOSP IP/OBS HIGH 75: CPT | Performed by: HOSPITALIST

## 2018-04-06 PROCEDURE — 160035 HCHG PACU - 1ST 60 MINS PHASE I: Performed by: INTERNAL MEDICINE

## 2018-04-06 PROCEDURE — 83605 ASSAY OF LACTIC ACID: CPT

## 2018-04-06 PROCEDURE — 700111 HCHG RX REV CODE 636 W/ 250 OVERRIDE (IP): Performed by: EMERGENCY MEDICINE

## 2018-04-06 RX ORDER — OXYCODONE HYDROCHLORIDE 5 MG/1
5 TABLET ORAL
Status: DISCONTINUED | OUTPATIENT
Start: 2018-04-06 | End: 2018-04-09 | Stop reason: HOSPADM

## 2018-04-06 RX ORDER — MORPHINE SULFATE 4 MG/ML
2 INJECTION, SOLUTION INTRAMUSCULAR; INTRAVENOUS
Status: DISCONTINUED | OUTPATIENT
Start: 2018-04-06 | End: 2018-04-09 | Stop reason: HOSPADM

## 2018-04-06 RX ORDER — LEVETIRACETAM 500 MG/5ML
INJECTION, SOLUTION, CONCENTRATE INTRAVENOUS
Status: COMPLETED
Start: 2018-04-06 | End: 2018-04-06

## 2018-04-06 RX ORDER — BISACODYL 10 MG
10 SUPPOSITORY, RECTAL RECTAL
Status: DISCONTINUED | OUTPATIENT
Start: 2018-04-06 | End: 2018-04-09 | Stop reason: HOSPADM

## 2018-04-06 RX ORDER — LEVETIRACETAM 500 MG/1
500-1000 TABLET ORAL 2 TIMES DAILY
COMMUNITY
End: 2018-05-29 | Stop reason: SDUPTHER

## 2018-04-06 RX ORDER — AMOXICILLIN 250 MG
2 CAPSULE ORAL 2 TIMES DAILY
Status: DISCONTINUED | OUTPATIENT
Start: 2018-04-06 | End: 2018-04-09 | Stop reason: HOSPADM

## 2018-04-06 RX ORDER — POLYETHYLENE GLYCOL 3350 17 G/17G
1 POWDER, FOR SOLUTION ORAL
Status: DISCONTINUED | OUTPATIENT
Start: 2018-04-06 | End: 2018-04-09 | Stop reason: HOSPADM

## 2018-04-06 RX ORDER — ONDANSETRON 2 MG/ML
4 INJECTION INTRAMUSCULAR; INTRAVENOUS ONCE
Status: COMPLETED | OUTPATIENT
Start: 2018-04-06 | End: 2018-04-06

## 2018-04-06 RX ORDER — SODIUM CHLORIDE 9 MG/ML
1000 INJECTION, SOLUTION INTRAVENOUS ONCE
Status: COMPLETED | OUTPATIENT
Start: 2018-04-06 | End: 2018-04-06

## 2018-04-06 RX ORDER — SODIUM CHLORIDE 9 MG/ML
30 INJECTION, SOLUTION INTRAVENOUS
Status: DISCONTINUED | OUTPATIENT
Start: 2018-04-06 | End: 2018-04-09 | Stop reason: HOSPADM

## 2018-04-06 RX ORDER — ACETAMINOPHEN 325 MG/1
650 TABLET ORAL EVERY 6 HOURS PRN
Status: DISCONTINUED | OUTPATIENT
Start: 2018-04-06 | End: 2018-04-09 | Stop reason: HOSPADM

## 2018-04-06 RX ORDER — VALPROATE SODIUM 100 MG/ML
INJECTION, SOLUTION INTRAVENOUS
Status: COMPLETED
Start: 2018-04-06 | End: 2018-04-07

## 2018-04-06 RX ORDER — SODIUM CHLORIDE 9 MG/ML
500 INJECTION, SOLUTION INTRAVENOUS
Status: DISCONTINUED | OUTPATIENT
Start: 2018-04-06 | End: 2018-04-09 | Stop reason: HOSPADM

## 2018-04-06 RX ORDER — SODIUM CHLORIDE, SODIUM LACTATE, POTASSIUM CHLORIDE, CALCIUM CHLORIDE 600; 310; 30; 20 MG/100ML; MG/100ML; MG/100ML; MG/100ML
INJECTION, SOLUTION INTRAVENOUS
Status: DISCONTINUED | OUTPATIENT
Start: 2018-04-06 | End: 2018-04-09 | Stop reason: HOSPADM

## 2018-04-06 RX ORDER — PROMETHAZINE HYDROCHLORIDE 25 MG/1
12.5-25 TABLET ORAL EVERY 4 HOURS PRN
Status: DISCONTINUED | OUTPATIENT
Start: 2018-04-06 | End: 2018-04-09 | Stop reason: HOSPADM

## 2018-04-06 RX ORDER — ONDANSETRON 2 MG/ML
4 INJECTION INTRAMUSCULAR; INTRAVENOUS EVERY 4 HOURS PRN
Status: DISCONTINUED | OUTPATIENT
Start: 2018-04-06 | End: 2018-04-09 | Stop reason: HOSPADM

## 2018-04-06 RX ORDER — ONDANSETRON 4 MG/1
4 TABLET, ORALLY DISINTEGRATING ORAL EVERY 4 HOURS PRN
Status: DISCONTINUED | OUTPATIENT
Start: 2018-04-06 | End: 2018-04-09 | Stop reason: HOSPADM

## 2018-04-06 RX ORDER — OXYCODONE HYDROCHLORIDE 5 MG/1
2.5 TABLET ORAL
Status: DISCONTINUED | OUTPATIENT
Start: 2018-04-06 | End: 2018-04-09 | Stop reason: HOSPADM

## 2018-04-06 RX ORDER — DIVALPROEX SODIUM 500 MG/1
500 TABLET, DELAYED RELEASE ORAL 2 TIMES DAILY
COMMUNITY
End: 2018-05-29 | Stop reason: SDUPTHER

## 2018-04-06 RX ORDER — SODIUM CHLORIDE AND POTASSIUM CHLORIDE 150; 900 MG/100ML; MG/100ML
INJECTION, SOLUTION INTRAVENOUS CONTINUOUS
Status: DISCONTINUED | OUTPATIENT
Start: 2018-04-06 | End: 2018-04-08

## 2018-04-06 RX ORDER — SODIUM CHLORIDE 9 MG/ML
1000 INJECTION, SOLUTION INTRAVENOUS CONTINUOUS
Status: DISCONTINUED | OUTPATIENT
Start: 2018-04-06 | End: 2018-04-08

## 2018-04-06 RX ORDER — PROMETHAZINE HYDROCHLORIDE 25 MG/1
12.5-25 SUPPOSITORY RECTAL EVERY 4 HOURS PRN
Status: DISCONTINUED | OUTPATIENT
Start: 2018-04-06 | End: 2018-04-09 | Stop reason: HOSPADM

## 2018-04-06 RX ADMIN — SODIUM CHLORIDE 1000 ML: 9 INJECTION, SOLUTION INTRAVENOUS at 15:07

## 2018-04-06 RX ADMIN — PIPERACILLIN SODIUM,TAZOBACTAM SODIUM 4.5 G: 4; .5 INJECTION, POWDER, FOR SOLUTION INTRAVENOUS at 18:08

## 2018-04-06 RX ADMIN — FENTANYL CITRATE 50 MCG: 50 INJECTION INTRAMUSCULAR; INTRAVENOUS at 15:07

## 2018-04-06 RX ADMIN — POTASSIUM CHLORIDE AND SODIUM CHLORIDE: 900; 150 INJECTION, SOLUTION INTRAVENOUS at 22:55

## 2018-04-06 RX ADMIN — ONDANSETRON 4 MG: 2 INJECTION INTRAMUSCULAR; INTRAVENOUS at 15:07

## 2018-04-06 RX ADMIN — PIPERACILLIN SODIUM,TAZOBACTAM SODIUM 4.5 G: 4; .5 INJECTION, POWDER, FOR SOLUTION INTRAVENOUS at 22:56

## 2018-04-06 RX ADMIN — LEVETIRACETAM 1000 MG: 100 INJECTION INTRAVENOUS at 23:42

## 2018-04-06 ASSESSMENT — PAIN SCALES - GENERAL
PAINLEVEL_OUTOF10: 1
PAINLEVEL_OUTOF10: 4
PAINLEVEL_OUTOF10: 1
PAINLEVEL_OUTOF10: 5
PAINLEVEL_OUTOF10: 1
PAINLEVEL_OUTOF10: 0

## 2018-04-06 ASSESSMENT — ENCOUNTER SYMPTOMS
ABDOMINAL PAIN: 1
FEVER: 0
COUGH: 0
DIZZINESS: 0
NAUSEA: 1
VOMITING: 0
NECK PAIN: 0
BLURRED VISION: 0
CHILLS: 0
BACK PAIN: 0
DEPRESSION: 0
SORE THROAT: 0
PALPITATIONS: 0
TINGLING: 0
INSOMNIA: 0
HEADACHES: 0
SHORTNESS OF BREATH: 0
EYE PAIN: 0

## 2018-04-06 ASSESSMENT — PATIENT HEALTH QUESTIONNAIRE - PHQ9
1. LITTLE INTEREST OR PLEASURE IN DOING THINGS: NOT AT ALL
SUM OF ALL RESPONSES TO PHQ9 QUESTIONS 1 AND 2: 0
2. FEELING DOWN, DEPRESSED, IRRITABLE, OR HOPELESS: NOT AT ALL

## 2018-04-06 ASSESSMENT — LIFESTYLE VARIABLES
ALCOHOL_USE: NO
EVER_SMOKED: NEVER

## 2018-04-06 NOTE — ED PROVIDER NOTES
ED Provider Note    CHIEF COMPLAINT  Chief Complaint   Patient presents with   • Abdominal Pain       HPI  Umu Mcguire is a 32 y.o. female who presents with intermittent pain to her epigastrium for the last 3 weeks. She had an ultrasound that improved cholelithiasis and she has an appointment with Dr. Clinton on April 18. The pain has been worse for the last several days, and she can't stand it anymore. She has been taking omeprazole but it seemed to make the pain worse, so she stopped taking it. Eating or drinking anything makes the pain worse, so she feels dehydrated. She has not actually been vomiting but she is very nauseated. She denies any fevers or changes in her stools.    REVIEW OF SYSTEMS  See HPI for further details. All other systems are negative.    PAST MEDICAL HISTORY  Past Medical History:   Diagnosis Date   • Epilepsy (CMS-HCC) 11/4/2009    since infant.  sees Wilfrido/Codey at [facility].  Keppra/depakote.  absence siezures 1x/month-thinks iwth menstruation.   • DUB (dysfunctional uterine bleeding)    • GERD (gastroesophageal reflux disease)     gastritis-only with spicy foods   • [place]        FAMILY HISTORY  Family History   Problem Relation Age of Onset   • Cancer Neg Hx    • Diabetes Neg Hx    • Stroke Neg Hx        SOCIAL HISTORY  Social History     Social History   • Marital status: Single     Spouse name: N/A   • Number of children: N/A   • Years of education: N/A     Social History Main Topics   • Smoking status: Never Smoker   • Smokeless tobacco: Never Used   • Alcohol use No   • Drug use: No   • Sexual activity: Not Currently      Comment: virginal     Other Topics Concern   • Not on file     Social History Narrative   • No narrative on file       SURGICAL HISTORY  No past surgical history on file.    CURRENT MEDICATIONS    Current Facility-Administered Medications:   •  fentaNYL (SUBLIMAZE) injection 50 mcg, 50 mcg, Intravenous, Once, Thania Olivia M.D.  •   "ondansetron (ZOFRAN) syringe/vial injection 4 mg, 4 mg, Intravenous, Once, Thania Olivia M.D.  •  NS infusion 1,000 mL, 1,000 mL, Intravenous, Once, Thania Olivia M.D.    Current Outpatient Prescriptions:   •  omeprazole (PRILOSEC) 40 MG delayed-release capsule, Take 1 Cap by mouth every morning., Disp: 90 Cap, Rfl: 1  •  divalproex (DEPAKOTE) 500 MG Tablet Delayed Response, Take 500mg po qam and 1000mg po qhs., Disp: 90 Tab, Rfl: 11  •  ergocalciferol (DRISDOL) 44743 UNIT capsule, Take 1 Cap by mouth every 7 days., Disp: 4 Cap, Rfl: 11  •  levetiracetam (KEPPRA) 1000 MG tablet, Take 1 Tab by mouth 2 Times a Day., Disp: 60 Tab, Rfl: 11  •  benzonatate (TESSALON) 100 MG Cap, Take 1 Cap by mouth 3 times a day as needed for Cough., Disp: 60 Cap, Rfl: 0  •  guaifenesin-codeine (ROBITUSSIN AC) Solution oral solution, Take 5 mL by mouth every four hours as needed., Disp: 75 mL, Rfl: 0  •  norethindrone-ethinyl estradiol (MICROGESTIN 1/20) 1-20 MG-MCG per tablet, Take 1 Tab by mouth every day., Disp: 84 Tab, Rfl: 3  •  folic acid (FOLVITE) 1 MG Tab, TAKE ONE TABLET BY MOUTH ONCE DAILY, Disp: 30 Tab, Rfl: 11      ALLERGIES  Allergies   Allergen Reactions   • Nkda [No Known Drug Allergy]        PHYSICAL EXAM  VITAL SIGNS: /70   Pulse 74   Temp 36.7 °C (98 °F)   Resp 16   Ht 1.6 m (5' 3\") Comment: Stated  Wt 49 kg (108 lb 0.4 oz)   LMP 03/07/2018   SpO2 99%   BMI 19.14 kg/m²  @ARMOND[[id number]::@   Constitutional: Well developed, thin, No acute respiratory distress, Non-toxic appearance.   HENT: Normocephalic, Atraumatic, Bilateral external ears normal, Oropharynx clear, mucous membranes are dry.  Eyes: PERRLA, EOMI, Conjunctiva normal, No discharge. No icterus.  Neck: Normal range of motion. Supple, No stridor.   Lymphatic: No cervical lymphadenopathy noted.   Cardiovascular: Normal heart rate, Normal rhythm, No murmurs, No rubs, No gallops.   Thorax & Lungs: Clear to auscultation bilaterally, No respiratory " distress, No wheezing.  Abdomen: Soft, scaphoid, hyperactive bowel sounds, tender bilateral upper quadrants with positive guarding over the right upper quadrant, positive Pendleton's sign  Skin: Warm, Dry, No erythema, No rash.   Extremities: Intact distal pulses, No edema, No tenderness  Musculoskeletal: Good range of motion in all major joints. No tenderness to palpation or major deformities noted. Normal gait.  Neurologic: Alert & oriented x 3, cranial nerve and cerebellar exams grossly normal. No focal deficits noted.   Psychiatric: Anxious, upset      RADIOLOGY/PROCEDURES  I reviewed the ultrasound results from 3/28/18 which proved cholelithiasis without pericholecystic fluid or signs of obstruction    US-GALLBLADDER   Final Result      1.  Multiple gallstones within the gallbladder. The gallbladder wall is thickened which can be seen with acute cholecystitis.      2.  Dilated common bile duct with a common duct stone present consistent with choledocholithiasis.      3.  Prominent edematous pancreas which may represent presence of pancreatitis.            COURSE & MEDICAL DECISION MAKING  Pertinent Labs & Imaging studies reviewed. (See chart for details)  Differential diagnosis includes cholecystitis, pancreatitis, hepatitis, biliary colic  Patient is given IV fluids because she is nothing by mouth.  Zofran and fentanyl for pain.  Results for orders placed or performed during the hospital encounter of 04/06/18   CBC WITH DIFFERENTIAL   Result Value Ref Range    WBC 13.3 (H) 4.8 - 10.8 K/uL    RBC 4.50 4.20 - 5.40 M/uL    Hemoglobin 12.2 12.0 - 16.0 g/dL    Hematocrit 37.7 37.0 - 47.0 %    MCV 83.8 81.4 - 97.8 fL    MCH 27.1 27.0 - 33.0 pg    MCHC 32.4 (L) 33.6 - 35.0 g/dL    RDW 47.7 35.9 - 50.0 fL    Platelet Count 299 164 - 446 K/uL    MPV 11.1 9.0 - 12.9 fL    Neutrophils-Polys 79.20 (H) 44.00 - 72.00 %    Lymphocytes 9.60 (L) 22.00 - 41.00 %    Monocytes 9.10 0.00 - 13.40 %    Eosinophils 1.20 0.00 - 6.90 %     Basophils 0.20 0.00 - 1.80 %    Immature Granulocytes 0.70 0.00 - 0.90 %    Nucleated RBC 0.00 /100 WBC    Neutrophils (Absolute) 10.56 (H) 2.00 - 7.15 K/uL    Lymphs (Absolute) 1.28 1.00 - 4.80 K/uL    Monos (Absolute) 1.21 (H) 0.00 - 0.85 K/uL    Eos (Absolute) 0.16 0.00 - 0.51 K/uL    Baso (Absolute) 0.03 0.00 - 0.12 K/uL    Immature Granulocytes (abs) 0.10 0.00 - 0.11 K/uL    NRBC (Absolute) 0.00 K/uL   COMP METABOLIC PANEL   Result Value Ref Range    Sodium 135 135 - 145 mmol/L    Potassium 3.5 (L) 3.6 - 5.5 mmol/L    Chloride 104 96 - 112 mmol/L    Co2 24 20 - 33 mmol/L    Anion Gap 7.0 0.0 - 11.9    Glucose 116 (H) 65 - 99 mg/dL    Bun 7 (L) 8 - 22 mg/dL    Creatinine 0.53 0.50 - 1.40 mg/dL    Calcium 9.1 8.4 - 10.2 mg/dL    AST(SGOT) 327 (H) 12 - 45 U/L    Alkaline Phosphatase 258 (H) 30 - 99 U/L    Total Bilirubin 2.1 (H) 0.1 - 1.5 mg/dL    Albumin 3.8 3.2 - 4.9 g/dL    Total Protein 7.9 6.0 - 8.2 g/dL    Globulin 4.1 (H) 1.9 - 3.5 g/dL    A-G Ratio 0.9 g/dL    ALT(SGPT) 518 (H) 2 - 50 U/L   LIPASE   Result Value Ref Range    Lipase >400 (H) 7 - 58 U/L   ESTIMATED GFR   Result Value Ref Range    GFR If African American >60 >60 mL/min/1.73 m 2    GFR If Non African American >60 >60 mL/min/1.73 m 2   POC UA   Result Value Ref Range    POC Color Yellow     POC Appearance Clear     POC Glucose Negative Negative mg/dL    POC Ketones Trace (A) Negative mg/dL    POC Specific Gravity 1.010 1.005 - 1.030    POC Blood Large (A) Negative    POC Urine PH 5.5 5.0 - 8.0    POC Protein Negative Negative mg/dL    POC Nitrites Negative Negative    POC Leukocyte Esterase Negative Negative   POC URINE PREGNANCY   Result Value Ref Range    POC Urine Pregnancy Test Negative     4:42 PM reevaluation of the patient at bedside. She states that her pain is about 30% of what it was, but continues to hit her in waves. Moving or walking makes the pain worse. MRCP has been ordered and a page is out for GI. I spoke with Dr. Freed  Steve about admitting the patient for likely choledocholithiasis.    5:00 I spoke with Dr. KRAUSE from GI. He agrees to consult and likely do an ERCP on the patient. MR CP was canceled. Dr. Wilson has evaluated the patient and admitted her.    5:19 PM Dr. KRAUSE is in the patient's room evaluating her for procedure. Patient got a dose of IV Dilaudid for pain and is feeling somewhat better.    Disposition:  Admitted to Dr. Wilson, hospitalist, in guarded condition    FINAL IMPRESSION  1. Choledocholithiasis with acute cholecystitis    2. Gallstone pancreatitis    3. Dehydration    4. Transaminitis        Electronically signed by: Thania Olivia, 4/6/2018 2:59 PM

## 2018-04-06 NOTE — ED NOTES
Pt reports recurring abdominal pain for the past 3 weeks.  She has been seen recently at [facility] for same symptomatology and discharged on Omeprazole.

## 2018-04-07 LAB
ALBUMIN SERPL BCP-MCNC: 2.8 G/DL (ref 3.2–4.9)
ALBUMIN/GLOB SERPL: 0.8 G/DL
ALP SERPL-CCNC: 207 U/L (ref 30–99)
ALT SERPL-CCNC: 437 U/L (ref 2–50)
ANION GAP SERPL CALC-SCNC: 5 MMOL/L (ref 0–11.9)
AST SERPL-CCNC: 254 U/L (ref 12–45)
BASOPHILS # BLD AUTO: 0.1 % (ref 0–1.8)
BASOPHILS # BLD: 0.01 K/UL (ref 0–0.12)
BILIRUB SERPL-MCNC: 1.1 MG/DL (ref 0.1–1.5)
BUN SERPL-MCNC: 5 MG/DL (ref 8–22)
CALCIUM SERPL-MCNC: 8.5 MG/DL (ref 8.4–10.2)
CHLORIDE SERPL-SCNC: 109 MMOL/L (ref 96–112)
CO2 SERPL-SCNC: 23 MMOL/L (ref 20–33)
CREAT SERPL-MCNC: 0.6 MG/DL (ref 0.5–1.4)
EOSINOPHIL # BLD AUTO: 0 K/UL (ref 0–0.51)
EOSINOPHIL NFR BLD: 0 % (ref 0–6.9)
ERYTHROCYTE [DISTWIDTH] IN BLOOD BY AUTOMATED COUNT: 48.3 FL (ref 35.9–50)
GLOBULIN SER CALC-MCNC: 3.5 G/DL (ref 1.9–3.5)
GLUCOSE SERPL-MCNC: 116 MG/DL (ref 65–99)
HCT VFR BLD AUTO: 33.8 % (ref 37–47)
HGB BLD-MCNC: 10.9 G/DL (ref 12–16)
IMM GRANULOCYTES # BLD AUTO: 0.05 K/UL (ref 0–0.11)
IMM GRANULOCYTES NFR BLD AUTO: 0.5 % (ref 0–0.9)
LACTATE BLD-SCNC: 0.92 MMOL/L (ref 0.5–2)
LIPASE SERPL-CCNC: >400 U/L (ref 7–58)
LYMPHOCYTES # BLD AUTO: 0.97 K/UL (ref 1–4.8)
LYMPHOCYTES NFR BLD: 9.9 % (ref 22–41)
MCH RBC QN AUTO: 27.3 PG (ref 27–33)
MCHC RBC AUTO-ENTMCNC: 32.2 G/DL (ref 33.6–35)
MCV RBC AUTO: 84.7 FL (ref 81.4–97.8)
MONOCYTES # BLD AUTO: 0.11 K/UL (ref 0–0.85)
MONOCYTES NFR BLD AUTO: 1.1 % (ref 0–13.4)
NEUTROPHILS # BLD AUTO: 8.61 K/UL (ref 2–7.15)
NEUTROPHILS NFR BLD: 88.4 % (ref 44–72)
NRBC # BLD AUTO: 0 K/UL
NRBC BLD-RTO: 0 /100 WBC
PLATELET # BLD AUTO: 262 K/UL (ref 164–446)
PMV BLD AUTO: 10.9 FL (ref 9–12.9)
POTASSIUM SERPL-SCNC: 3.5 MMOL/L (ref 3.6–5.5)
PROT SERPL-MCNC: 6.3 G/DL (ref 6–8.2)
RBC # BLD AUTO: 3.99 M/UL (ref 4.2–5.4)
SODIUM SERPL-SCNC: 137 MMOL/L (ref 135–145)
WBC # BLD AUTO: 9.8 K/UL (ref 4.8–10.8)

## 2018-04-07 PROCEDURE — 700111 HCHG RX REV CODE 636 W/ 250 OVERRIDE (IP)

## 2018-04-07 PROCEDURE — 700111 HCHG RX REV CODE 636 W/ 250 OVERRIDE (IP): Performed by: HOSPITALIST

## 2018-04-07 PROCEDURE — 80053 COMPREHEN METABOLIC PANEL: CPT

## 2018-04-07 PROCEDURE — 700101 HCHG RX REV CODE 250

## 2018-04-07 PROCEDURE — 99233 SBSQ HOSP IP/OBS HIGH 50: CPT | Performed by: HOSPITALIST

## 2018-04-07 PROCEDURE — 700105 HCHG RX REV CODE 258: Performed by: HOSPITALIST

## 2018-04-07 PROCEDURE — 85025 COMPLETE CBC W/AUTO DIFF WBC: CPT

## 2018-04-07 PROCEDURE — 83605 ASSAY OF LACTIC ACID: CPT

## 2018-04-07 PROCEDURE — 83690 ASSAY OF LIPASE: CPT

## 2018-04-07 PROCEDURE — 700101 HCHG RX REV CODE 250: Performed by: HOSPITALIST

## 2018-04-07 PROCEDURE — 770006 HCHG ROOM/CARE - MED/SURG/GYN SEMI*

## 2018-04-07 PROCEDURE — 36415 COLL VENOUS BLD VENIPUNCTURE: CPT

## 2018-04-07 RX ADMIN — PIPERACILLIN SODIUM,TAZOBACTAM SODIUM 4.5 G: 4; .5 INJECTION, POWDER, FOR SOLUTION INTRAVENOUS at 13:10

## 2018-04-07 RX ADMIN — VALPROATE SODIUM 1000 MG: 100 INJECTION, SOLUTION INTRAVENOUS at 22:46

## 2018-04-07 RX ADMIN — PIPERACILLIN SODIUM,TAZOBACTAM SODIUM 4.5 G: 4; .5 INJECTION, POWDER, FOR SOLUTION INTRAVENOUS at 04:54

## 2018-04-07 RX ADMIN — SODIUM CHLORIDE 500 MG: 9 INJECTION, SOLUTION INTRAVENOUS at 10:15

## 2018-04-07 RX ADMIN — SODIUM CHLORIDE 1000 MG: 9 INJECTION, SOLUTION INTRAVENOUS at 21:28

## 2018-04-07 RX ADMIN — POTASSIUM CHLORIDE AND SODIUM CHLORIDE: 900; 150 INJECTION, SOLUTION INTRAVENOUS at 23:51

## 2018-04-07 RX ADMIN — VALPROATE SODIUM 1000 MG: 100 INJECTION INTRAVENOUS at 00:01

## 2018-04-07 RX ADMIN — VALPROATE SODIUM 500 MG: 100 INJECTION, SOLUTION INTRAVENOUS at 08:56

## 2018-04-07 RX ADMIN — POTASSIUM CHLORIDE AND SODIUM CHLORIDE: 900; 150 INJECTION, SOLUTION INTRAVENOUS at 04:53

## 2018-04-07 ASSESSMENT — ENCOUNTER SYMPTOMS
MYALGIAS: 0
BLURRED VISION: 0
COUGH: 0
NAUSEA: 1
NERVOUS/ANXIOUS: 1
PALPITATIONS: 0
ABDOMINAL PAIN: 1
BACK PAIN: 0
DEPRESSION: 0
WEIGHT LOSS: 0
SHORTNESS OF BREATH: 0
VOMITING: 0
WEAKNESS: 0
HEADACHES: 0
DIARRHEA: 0
DIZZINESS: 0

## 2018-04-07 ASSESSMENT — PAIN SCALES - GENERAL
PAINLEVEL_OUTOF10: 3
PAINLEVEL_OUTOF10: 1

## 2018-04-07 NOTE — OR NURSING
1810 Patient allergies and NPO status verified, home medication reconciliation completed and belongings secured. Surgical site verified with patient. Patient verbalizes understanding of pain scale, expected course of stay and plan of care; patient and family state verbal understanding at this time. IV access verified.

## 2018-04-07 NOTE — PROGRESS NOTES
[facility] Hospitalist Progress Note    Date of Service: 2018    Chief Complaint  32 y.o. female admitted 2018 with abdominal pain    Interval Problem Update  : patient doing ok , still having some abd pain, unfortunately she had cookies this morning therefore will not be able to get surgery this morning  Discussed in detail about importance of staying NPO for the surgery    Consultants/Specialty  surgery    Disposition  tbd        Review of Systems   Constitutional: Negative for malaise/fatigue and weight loss.   HENT: Negative for hearing loss.    Eyes: Negative for blurred vision.   Respiratory: Negative for cough and shortness of breath.    Cardiovascular: Negative for chest pain and palpitations.   Gastrointestinal: Positive for abdominal pain and nausea. Negative for diarrhea and vomiting.   Genitourinary: Negative for dysuria and urgency.   Musculoskeletal: Negative for back pain and myalgias.   Neurological: Negative for dizziness, weakness and headaches.   Psychiatric/Behavioral: Negative for depression. The patient is nervous/anxious.       Physical Exam  Laboratory/Imaging   Hemodynamics  Temp (24hrs), Av.9 °C (98.4 °F), Min:36.2 °C (97.2 °F), Max:37.4 °C (99.4 °F)   Temperature: 36.4 °C (97.5 °F)  Pulse  Av.8  Min: 64  Max: 106 Heart Rate (Monitored): 82  Blood Pressure: (!) 96/54, NIBP: 116/74      Respiratory      Respiration: 18, Pulse Oximetry: 99 %             Fluids    Intake/Output Summary (Last 24 hours) at 18 1411  Last data filed at 18 0500   Gross per 24 hour   Intake             2950 ml   Output              350 ml   Net             2600 ml       Nutrition  Orders Placed This Encounter   Procedures   • DIET NPO     Standing Status:   Standing     Number of Occurrences:   1     Order Specific Question:   Restrict to:     Answer:   Strict [1]     Physical Exam   Constitutional: She is oriented to person, place, and time. She appears well-developed and well-nourished.    HENT:   Head: Normocephalic.   Mouth/Throat: Oropharynx is clear and moist.   Eyes: Conjunctivae and EOM are normal. Pupils are equal, round, and reactive to light. No scleral icterus.   Neck: No JVD present.   Cardiovascular: Normal rate, regular rhythm and normal heart sounds.    Pulmonary/Chest: Effort normal and breath sounds normal.   Abdominal: Soft. Bowel sounds are normal. She exhibits no distension. There is tenderness. There is no rebound and no guarding.   Musculoskeletal: Normal range of motion. She exhibits no edema.   Lymphadenopathy:     She has no cervical adenopathy.   Neurological: She is alert and oriented to person, place, and time. She exhibits normal muscle tone.   Skin: Skin is warm and dry. No rash noted. No erythema.   Psychiatric: She has a normal mood and affect.       Recent Labs      04/06/18   1450  04/07/18   0010   WBC  13.3*  9.8   RBC  4.50  3.99*   HEMOGLOBIN  12.2  10.9*   HEMATOCRIT  37.7  33.8*   MCV  83.8  84.7   MCH  27.1  27.3   MCHC  32.4*  32.2*   RDW  47.7  48.3   PLATELETCT  299  262   MPV  11.1  10.9     Recent Labs      04/06/18   1450  04/07/18   0010   SODIUM  135  137   POTASSIUM  3.5*  3.5*   CHLORIDE  104  109   CO2  24  23   GLUCOSE  116*  116*   BUN  7*  5*   CREATININE  0.53  0.60   CALCIUM  9.1  8.5     Recent Labs      04/06/18   1415   APTT  26.2   INR  0.95                  Assessment/Plan     * Gallstone pancreatitis   Assessment & Plan      s/p EGD, ERCP, Cholangiogram  Lipase > 400  LFTS elevated  Was scheduled for surgery this morning however patient ate cookies therefore surgery rescheduled for tomorrow  Morning  NPO  IVF  abx                                     Hypokalemia   Assessment & Plan    Trend and replace        Sepsis (CMS-HCC)   Assessment & Plan    This is sepsis (without associated acute organ dysfunction).   Iv fluids  Iv abx  Sepsis protocol          Cholangitis   Assessment & Plan    Mild but she is septic by criteria. Iv abx  started  Sepsis protocol  See above  Cholecystectomy tomorrow          Epilepsy (CMS-[facility])- (present on admission)   Assessment & Plan    Transition to iv meds for now. No events.           Quality-Core Measures   Reviewed items::  Labs reviewed and Medications reviewed  Honeycutt catheter::  No Honeycutt  Antibiotics:  Treating active infection/contamination beyond 24 hours perioperative coverage      I spent a total of 40 minutes of time during this clinical encounter of which > 50% was devoted to counseling and coordinating care including review of records, pertinent lab data and studies, as well as discussing diagnostic evaluation and work up, planned therapeutic interventions and future disposition of care. Where indicated, the assessment and plan reflect discussion of patient with consultants, other healthcare providers, family members, and additional research needed to obtain further information in formulating the plan of care of this patient. This time includes no procedures or overlap with other providers.

## 2018-04-07 NOTE — OR NURSING
" 1858 To PACU from [facility] via [facility], side rails up x 2 for safety, lungs clear bilaterally, scds on patient and machine operational, pt arousable and responds appropriately to RN. Coughing intermittently and reports sore throat. Denies abdominal pain or nausea.   1902 Noted shivering and pt reports \"feeling a little cold\". Denies nausea. Placed warm blankets for comfort.   1910 Call placed to lab to obtain stat labs ordered by hospitalist. Call to Dr Paige to confirm no indomethacin needed; VORB no indomethacin needed. Pt reports abdominal pain with coughing 0.5/10.   1925 Pt continues to intermittently shiver; reports still being cold. Placed jorge hugger with warm blanket for comfort. Lab here and labs drawn for patient. Tolerating sips of water without report of nausea.   1940 Pt reports comfort with jorge hugger; no further shivering noted. Pt reports \"a tiny bit of pain\" and denies nausea.   1955 Pt reports minimal pain and denies nausea. Resting quietly on [facility]. Meets criteria for transfer to U but transfer pending room availability.   2010 No changes  2025 Pt remains awake without stimulation. Calm and cooperative. Pain rated as minimal and denies nausea. Pt reports feeling comfortable; jorge hugger removed.   2040 Pt concerned about seizure medication; assured that medication ordered and will be given.   2050 Pt up to BR with 1 assist; able to void without difficulty. Ambulated back to [facility] 25 feet; slightly unsteady gait. Denies dizziness, pain rated as minimal and denies nausea.   2100 Report to ISRAEL Marc on [facility]. Transferred to U via [facility].   "

## 2018-04-07 NOTE — CARE PLAN
Problem: Communication  Goal: The ability to communicate needs accurately and effectively will improve  Outcome: PROGRESSING AS EXPECTED  Educate patient on expressing needs. Teach use of call light.  Concerns addressed and questions answered regarding plan of care.     Problem: Safety  Goal: Will remain free from falls  Outcome: PROGRESSING AS EXPECTED  Patient educated to call for assisstance, treaded slipper socks, call light w/in reach.

## 2018-04-07 NOTE — ASSESSMENT & PLAN NOTE
Mild but she is septic by criteria on admission.now s/p ERCP, cholangiogram and lap cholecystectomy  Sepsis  resolved  See above

## 2018-04-07 NOTE — ASSESSMENT & PLAN NOTE
s/p EGD, ERCP, Cholangiogram  Lipase > 400  LFTS elevated; repeat  IVF  abx

## 2018-04-07 NOTE — CONSULTS
Date of Service:4/6/18    Consult Requested By: Thania Olivia M.D    Reason for Consultation: choledocholithiasis, abdominal pain,     History of Present Illness:   Umu Mcguire is a 32 y.o. [unfilled] admitted 4/6/2018.   She presents today with 14 days worth of abdominal pain which presented itself fairly abruptly. She's been trying to treat this at home conservatively with very little resolved. The pain is exacerbated by the ingestion of food and associated with nausea vomiting. She developed fevers and chills. She's noticed a dramatic change in the color of her urine becoming more an rupert like discoloration. She denies any hematemesis coffee-ground emesis dysphagia odynophagia. She presently is on her epilepsy medication. The pains character sharp radiating into her back made worse with deep inspiration approximately 10 out of 10 in severity.    Review Of Systems:  She denies headaches visual changes shortness of breath chest pain upper GI issues genitourinary abnormalities except for dark urine, she denies any bowel abnormalities hematochezia rectal bleeding swelling rashes numbness tingling.    PMH:   Past Medical History:   Diagnosis Date   • DUB (dysfunctional uterine bleeding)    • Epilepsy (CMS-[facility]) 11/4/2009    since infant.  sees Wilfrido/Codey at [facility].  Keppra/depakote.  absence siezures 1x/month-thinks iwth menstruation.   • GERD (gastroesophageal reflux disease)     gastritis-only with spicy foods   • Seizure disorder (CMS-HCC)          PSH:  History reviewed. No pertinent surgical history.    FAMILY HX:  Family History   Problem Relation Age of Onset   • Cancer Neg Hx    • Diabetes Neg Hx    • Stroke Neg Hx        SOCIAL HX:  Social History     Social History   • Marital status: Single     Spouse name: N/A   • Number of children: N/A   • Years of education: N/A     Occupational History   • Not on file.     Social History Main Topics   • Smoking status: Never Smoker   • Smokeless tobacco:  "Never Used   • Alcohol use No   • Drug use: No   • Sexual activity: Not Currently      Comment: virginal     Other Topics Concern   • Not on file     Social History Narrative   • No narrative on file     History   Smoking Status   • Never Smoker   Smokeless Tobacco   • Never Used     History   Alcohol Use No       Allergies/Intolerances:  Allergies   Allergen Reactions   • Nkda [No Known Drug Allergy]        History reviewed with the patient    Other Current Medications:    Current Facility-Administered Medications:   •  NS infusion 1,000 mL, 1,000 mL, Intravenous, Continuous, Thania Olivia M.D.  •  HYDROmorphone (DILAUDID) injection 0.5 mg, 0.5 mg, Intravenous, Once, Thania Olivia M.D.    Current Outpatient Prescriptions:   •  divalproex (DEPAKOTE) 500 MG Tablet Delayed Response, Take 500 mg by mouth 2 Times a Day., Disp: , Rfl:   •  levETIRAcetam (KEPPRA) 500 MG Tab, Take 500-1,000 mg by mouth 2 times a day. 500 mg in the AM and 1000 mg at HS, Disp: , Rfl:   •  ergocalciferol (DRISDOL) 86279 UNIT capsule, Take 1 Cap by mouth every 7 days., Disp: 4 Cap, Rfl: 11  [unfilled]    Most Recent Vital Signs:  /70   Pulse 93   Temp 36.7 °C (98 °F)   Resp 19   Ht 1.6 m (5' 3\") Comment: Stated  Wt 49 kg (108 lb 0.4 oz)   LMP 2018   SpO2 98%   BMI 19.14 kg/m²   Temp  Av.7 °C (98 °F)  Min: 36.7 °C (98 °F)  Max: 36.7 °C (98 °F)    Physical Exam:  General: Nontoxic, no acute distress  HEENT: sclera icteric, PERRL, EOMI, MMM, no oral lesions  Neck: supple, no lymphadenopathy  Chest: CTAB, no r/r/w, normal work of breathing.  Cardiac: Regular, no murmurs no gallops heard  Abdomen: + bowel sounds,  Tender to palpation in the RUQ region slight guarding, non-distended, no HSM  Extremities: No edema. No joint swelling.  Skin: no rashes or erythema  Neuro: Alert and oriented times 3, non-focal exam    Pertinent Lab Results:  Recent Labs      18   1450   WBC  13.3*      Recent Labs      " 04/06/18   1450   HEMOGLOBIN  12.2   HEMATOCRIT  37.7   MCV  83.8   MCH  27.1   PLATELETCT  299         Recent Labs      04/06/18   1450   SODIUM  135   POTASSIUM  3.5*   CHLORIDE  104   CO2  24   CREATININE  0.53        Recent Labs      04/06/18   1450   ALBUMIN  3.8      Recent Labs      04/06/18   1450   ASTSGOT  327*   ALTSGPT  518*   TBILIRUBIN  2.1*   ALKPHOSPHAT  258*   GLOBULIN  4.1*       [unfilled]      Pertinent Micro:  Results     ** No results found for the last 168 hours. **        No results found for: BLOODCULTU, BLDCULT, BCHOLD     Studies:                                                                                            IMPRESSION:     Choledocholithiasis   Acute cholangitis        PLAN:   The plan at the present time will be to admitted to the hospital she'll be started on IV antibiotics made nothing by mouth she'll require an ERCP with stone extraction and I had the pleasure of talking to both her and her brother regarding the risks and benefits of the procedure and the plan is we will be performing ERCP. There is dilated ducts appreciated on the ultrasound elevated liver enzymes right upper quadrant pain along with fevers and elevated white count. Surgical consultation should be also requested. Which started on IV fluids with hydration make her nothing by mouth. QUESTIONS were answered.  Discussed with IM. Will continue to follow    Osiel Paige M.D.

## 2018-04-07 NOTE — ASSESSMENT & PLAN NOTE
Resolved  This is sepsis (without associated acute organ dysfunction).   Iv fluids  Iv abx  Sepsis protocol

## 2018-04-07 NOTE — CONSULTS
4/7  ATSP by Dr Wilson for Gallstone Pancreatitis    HPI: 32y F presented through the ED yesterday with new onset abdominal pain, fevers.  She has been having emesis and feels dehydrated.  She has known cholelithiasis with pending OP appointment but was unable to make it to this due to severity of pain.  Pain is severe, constant and radiates to the back.  On admission, she was diagnosed with cholangitis and taken immediately for ERCP.  This was completed yesterday with stone extraction.      This morning she feels better, but still having epigastric pain and some tenderness.  She was given clear liquids this am.      PMHx: Epilepsy, Uterine Bleeding, GERD, Seizure Disorder    PSHx: none    Meds: see Med Rec, no anticoagulation    NKDA    FamHx: no colon/rectal cancers, no other pertinent family history    SocHx: No Tob/Drugs, occasional EtOH      ROS: negative except as above    Consitutional- above  HEENT- no visual changes, no sneezing or runny nose  Skin- no rashes or itching  Cardiovascular- no chest pain or palpatations  Respiratory- no SOB or cough  GI- above  - no dysurea  Neuro- no weakness or syncope  Musculoskeletal- no muscle or joint pain  Heme- no bleeding or bruising  Lymphatic- no enlarged nodes or previous splenectomy  Endocrine- No sweating or heat/cold intolerance  Allergy- No asthma or hives  Psychiatric- no depression or anxiety        Physical Exam:   AFVSS  A@O x3, NAD  NCAT, no scleral icterus  Neck nontender, no lymphadenopathy  Normal respiratory effort, no chest wall masses  RRR, 2+ pulses  Abdomen soft, no peritonitis, no masses, mildly tender in RUQ  Extremities warm and well perfused  No skin rashes or lesions    Labs: WBC 9.8, Hct 34, Plt 262, Malik 88  K 3.5, otherwise lytes wnl  AST//437; tbili 1.1;   LIpase >400    Radiology: US:   1.  Multiple gallstones within the gallbladder. The gallbladder wall is thickened which can be seen with acute cholecystitis.       2.   Dilated common bile duct with a common duct stone present consistent with choledocholithiasis.       3.  Prominent edematous pancreas which may represent presence of pancreatitis.             A/P: 32y F with Cholangitis s/p ERCP.  She has some ongoing apin and may have some element of cholecystitis as well.  She is going to need Lap Terra and I explained risks/benefits/alternatives of surgery to her.  Recommend NPO to minimize pain and continue IVF and Abx for now.      Planned to do surgery this am, but spoke with nurse and patient who after initial interview admitted to eating cookies this morning.  As she is not NPO, will hold surgery for now and plan for tomorrow am.  Reinforced importance of following instructions with patient and family.

## 2018-04-07 NOTE — PROGRESS NOTES
Pt stated to have had a cookie this a.m when going thru pre-op checklist.  Update to pre-op. MD wants to cancel and reschedule sx for tomorrow a.m. Update to pt and family. Pt educated on NPO status

## 2018-04-07 NOTE — H&P
Hospital Medicine History and Physical    Date of Service  4/6/2018    Chief Complaint  Chief Complaint   Patient presents with   • Abdominal Pain       History of Presenting Illness  32 y.o. female who presented 4/6/2018 with abdominal pain in the epigastrum for the last 3-4 weeks. It radiates around to the back and is worse with respiration and meals. It has markedly worsened in the last 2 days. She has an appointment with Dr Clinton on 4/18 after a recent ultrasound revealed cholelithiasis on 3/28/18. She has had some intermittent chills and nausea without vomiting. No change in bowel movement. She has been intermittently jaundiced. Workup in the ED shows evidence for sepsis and cholangitis with choledocholithiasis and associated gallstone pancreatitis.         Primary Care Physician  MUKUND Becerril.    Consultants  GI  Surgery- dr gentile        Code Status  full    Review of Systems  Review of Systems   Constitutional: Negative for chills and fever.   HENT: Negative for sore throat.    Eyes: Negative for blurred vision and pain.   Respiratory: Negative for cough and shortness of breath.    Cardiovascular: Negative for chest pain and palpitations.   Gastrointestinal: Positive for abdominal pain and nausea. Negative for vomiting.   Genitourinary: Negative for dysuria and urgency.   Musculoskeletal: Negative for back pain and neck pain.   Skin: Negative for itching and rash.   Neurological: Negative for dizziness, tingling and headaches.   Psychiatric/Behavioral: Negative for depression. The patient does not have insomnia.    All other systems reviewed and are negative.       Past Medical History  Past Medical History:   Diagnosis Date   • Epilepsy (CMS-[facility]) 11/4/2009    since infant.  sees Wilfrido/Codey at [facility].  Keppra/depakote.  absence siezures 1x/month-thinks iwth menstruation.   • DUB (dysfunctional uterine bleeding)    • GERD (gastroesophageal reflux disease)     gastritis-only with spicy foods   •  Seizure disorder (CMS-HCC)        Surgical History  No past surgical history on file.    Medications  No current facility-administered medications on file prior to encounter.      Current Outpatient Prescriptions on File Prior to Encounter   Medication Sig Dispense Refill   • ergocalciferol (DRISDOL) 37546 UNIT capsule Take 1 Cap by mouth every 7 days. 4 Cap 11       Family History  Family History   Problem Relation Age of Onset   • Cancer Neg Hx    • Diabetes Neg Hx    • Stroke Neg Hx        Social History  Social History   Substance Use Topics   • Smoking status: Never Smoker   • Smokeless tobacco: Never Used   • Alcohol use No       Allergies  Allergies   Allergen Reactions   • Nkda [No Known Drug Allergy]         Physical Exam  Laboratory   Hemodynamics  Temp (24hrs), Av.7 °C (98 °F), Min:36.7 °C (98 °F), Max:36.7 °C (98 °F)   Temperature: 36.7 °C (98 °F)  Pulse  Av  Min: 73  Max: 106 Heart Rate (Monitored): 74  Blood Pressure: 106/70, NIBP: 116/74      Respiratory      Respiration: 19, Pulse Oximetry: 98 %             Physical Exam   Constitutional: She is oriented to person, place, and time. She appears well-developed and well-nourished. No distress.   HENT:   Right Ear: External ear normal.   Left Ear: External ear normal.   Nose: Nose normal.   Eyes: Conjunctivae are normal. Right eye exhibits no discharge. Left eye exhibits no discharge.   Neck: No JVD present.   Cardiovascular: Regular rhythm and normal heart sounds.    No murmur heard.  Cap refill 2sec  Pulses 2+ throughout  Normal skin  Color.    Pulmonary/Chest: Effort normal and breath sounds normal. No stridor. No respiratory distress. She has no wheezes. She has no rales.   Abdominal: Soft. Bowel sounds are normal. She exhibits no distension. There is tenderness.   Pos murphys sign   Musculoskeletal: She exhibits no edema or tenderness.   Neurological: She is alert and oriented to person, place, and time.   Skin: Skin is warm and dry. She is  not diaphoretic. No erythema.   Psychiatric: She has a normal mood and affect. Her behavior is normal.   Nursing note and vitals reviewed.      Recent Labs      04/06/18   1450   WBC  13.3*   RBC  4.50   HEMOGLOBIN  12.2   HEMATOCRIT  37.7   MCV  83.8   MCH  27.1   MCHC  32.4*   RDW  47.7   PLATELETCT  299   MPV  11.1     Recent Labs      04/06/18   1450   SODIUM  135   POTASSIUM  3.5*   CHLORIDE  104   CO2  24   GLUCOSE  116*   BUN  7*   CREATININE  0.53   CALCIUM  9.1     Recent Labs      04/06/18   1450   ALTSGPT  518*   ASTSGOT  327*   ALKPHOSPHAT  258*   TBILIRUBIN  2.1*   LIPASE  >400*   GLUCOSE  116*                 No results found for: TROPONINI  Urinalysis:  No results found for: SPECGRAVITY, GLUCOSEUR, KETONES, NITRITE, WBCURINE, RBCURINE, BACTERIA, EPITHELCELL     Imaging  Us gallbladder  1.  Multiple gallstones within the gallbladder. The gallbladder wall is thickened which can be seen with acute cholecystitis.  2.  Dilated common bile duct with a common duct stone present consistent with choledocholithiasis.  3.  Prominent edematous pancreas which may represent presence of pancreatitis.           Assessment/Plan     I anticipate this patient will require at least two midnights for appropriate medical management, necessitating inpatient admission.    Hypokalemia   Assessment & Plan    Trend and replace        Sepsis (CMS-HCC)   Assessment & Plan    This is sepsis (without associated acute organ dysfunction).   Iv fluids  Iv abx  Sepsis protocol          Cholangitis   Assessment & Plan    Mild but she is septic by criteria. Iv abx started  Sepsis protocol  Gi consulted and likely to ercp today.           Gallstone pancreatitis   Assessment & Plan    Npo  Ercp with GI  Iv fluid  Pain control          Epilepsy (CMS-HCC)- (present on admission)   Assessment & Plan    Transition to iv meds for now. No events.             VTE prophylaxis: heparin .

## 2018-04-07 NOTE — OR SURGEON
Immediate Post OP Note    PreOp Diagnosis: choledocholithiasis, elevated liver enzymes, abdominal pain     PostOp Diagnosis:    EGD   1/ normal appearing esophagus, GEJ at 36 cm   2/ normal appearing stomach, antrum and pylorus    3/ normal appearing duodenal bulb and second portion   4/ normal ampulla of vater     ERCP      1/ selective cannulation of the CBD via a Trucut guidewire with the assistance of fluoroscopy    guidance    2/ Aspiration of Bile was confirmed        Cholangiogram    1/ Common bile duct: appeared dilated to 15 mm in diameter, with a  filling defect in the distal CBD    appreciated    2/ intrahepatics left and right appeared slightly dilated   1/ sphincterotomy 9 mm  In length was performed   2/ exchange performed leaving the guidewire in place   3/ introduction of a 12-15 mm balloon was performed over the guidewire   4/ the stone was removed with the balloon removing a white cholesterol stone   5/ an obstructive cholangiogram was performed after several sweeps of the CBD were performed   demonstration no further filling defects.    Narrative:   Prior to the procedure the patient was informed of the risk and benefits of the procedure, bleeding infection, perforation 3-10% chance of pancreatitis along with cardiopulmonary compromise and possible death.  The patient was agreeable and all questions were answered.    The patient was placed in the prone position once they were sedated and the bite block was placed.  The head was rotated to the right. Introduction of the standard Olympus duodenoscope was performed under indirect visualation.  The entire esophagus appeared normal of what was seen.  The GEJ was at 36 cm appeared normal.  Intubation of the gastric cavity was performed and it appeared normal there was some residual food which was aspirated outcompletely.  The  antrum, pylorus, duodenal bulb and second portion also appeared normal.  The ampulla of vater was brought on faus and it  appeared normal.  Bilious drainage was noted.  Selective cannulation of the Common bile duct was performed under fluoroscopy guidance using a Trucut cannulotome.  Aspiration of bile was performed further confirming position.  Cholangiogram performed demonstrated a CBD of 15 mm in size with a filling defect appreciated in the distal CBD.  The intrahepatic systems appeared slightly dilated.  A 9 mm sphincterotomy was performed and then exchange allowing for the introduction of a 12-15 mm balloon to assist in the extraction of the filling defect.  Extraction of the stone was performed with both endoscopic and radiological guidance.  A white stone was remove.  Afterwards an obstructive cholangiogram was performed with the balloon inflated to 12 mm, no further filling defects were appreciated.  The balloon was deflated and good bilious drainage was noted both endoscopically and radiologically.  The scope was then straightened and the excess air was removed.  The patient tolerated the procedure well.    Recommendations:   1/ clear liquid diet   2/ check labs in am, cbc.cmp and lipase   3/ follow up at [facility] as o/p on discharge [phone]    4/ surgical consultation with         Procedure(s):  ERCP - Wound Class: Contaminated    Surgeon(s):  Osiel Paige M.D.    Anesthesiologist/Type of Anesthesia:  Anesthesiologist: Femi Davis M.D./General    Surgical Staff:  Circulator: Leah Valladares R.N.  Endoscopy Technician: Maranda Quintero  Relief Circulator: Dulce Palmer R.N.  Radiology Technologist: Luciana Brown    Specimens removed if any:  * No specimens in log *    Estimated Blood Loss: none    Complications: none        4/6/2018 6:52 PM Osiel Paige M.D.

## 2018-04-07 NOTE — PROGRESS NOTES
Family and patient voicing frustration on pt's surgery being pushed back until tomorrow. Spouse concerned w/ pt NPO status and being anemic. Concern told to hospitalist. Will address pt and family after rounds..

## 2018-04-08 PROBLEM — Z90.49 S/P LAPAROSCOPIC CHOLECYSTECTOMY: Status: ACTIVE | Noted: 2018-04-08

## 2018-04-08 LAB — PATHOLOGY CONSULT NOTE: NORMAL

## 2018-04-08 PROCEDURE — 700111 HCHG RX REV CODE 636 W/ 250 OVERRIDE (IP)

## 2018-04-08 PROCEDURE — 700111 HCHG RX REV CODE 636 W/ 250 OVERRIDE (IP): Performed by: HOSPITALIST

## 2018-04-08 PROCEDURE — A9270 NON-COVERED ITEM OR SERVICE: HCPCS | Performed by: HOSPITALIST

## 2018-04-08 PROCEDURE — 88304 TISSUE EXAM BY PATHOLOGIST: CPT

## 2018-04-08 PROCEDURE — 501583 HCHG TROCAR, THRD CAN&SEAL 5X100: Performed by: SURGERY

## 2018-04-08 PROCEDURE — 501399 HCHG SPECIMAN BAG, ENDO CATC: Performed by: SURGERY

## 2018-04-08 PROCEDURE — 500516 HCHG ENDOLOOP II 0 VIOLET 18: Performed by: SURGERY

## 2018-04-08 PROCEDURE — 160035 HCHG PACU - 1ST 60 MINS PHASE I: Performed by: SURGERY

## 2018-04-08 PROCEDURE — 99232 SBSQ HOSP IP/OBS MODERATE 35: CPT | Performed by: HOSPITALIST

## 2018-04-08 PROCEDURE — 501570 HCHG TROCAR, SEPARATOR: Performed by: SURGERY

## 2018-04-08 PROCEDURE — 700102 HCHG RX REV CODE 250 W/ 637 OVERRIDE(OP): Performed by: HOSPITALIST

## 2018-04-08 PROCEDURE — 500800 HCHG LAPAROSCOPIC J/L HOOK: Performed by: SURGERY

## 2018-04-08 PROCEDURE — 160048 HCHG OR STATISTICAL LEVEL 1-5: Performed by: SURGERY

## 2018-04-08 PROCEDURE — 160036 HCHG PACU - EA ADDL 30 MINS PHASE I: Performed by: SURGERY

## 2018-04-08 PROCEDURE — 770006 HCHG ROOM/CARE - MED/SURG/GYN SEMI*

## 2018-04-08 PROCEDURE — 0FC84ZZ EXTIRPATION OF MATTER FROM CYSTIC DUCT, PERCUTANEOUS ENDOSCOPIC APPROACH: ICD-10-PCS | Performed by: SURGERY

## 2018-04-08 PROCEDURE — 501838 HCHG SUTURE GENERAL: Performed by: SURGERY

## 2018-04-08 PROCEDURE — 160028 HCHG SURGERY MINUTES - 1ST 30 MINS LEVEL 3: Performed by: SURGERY

## 2018-04-08 PROCEDURE — 160039 HCHG SURGERY MINUTES - EA ADDL 1 MIN LEVEL 3: Performed by: SURGERY

## 2018-04-08 PROCEDURE — 700105 HCHG RX REV CODE 258: Performed by: HOSPITALIST

## 2018-04-08 PROCEDURE — 160002 HCHG RECOVERY MINUTES (STAT): Performed by: SURGERY

## 2018-04-08 PROCEDURE — 501571 HCHG TROCAR, SEPARATOR 12X100: Performed by: SURGERY

## 2018-04-08 PROCEDURE — 502571 HCHG PACK, LAP CHOLE: Performed by: SURGERY

## 2018-04-08 PROCEDURE — 700101 HCHG RX REV CODE 250

## 2018-04-08 PROCEDURE — 500514 HCHG ENDOCLIP: Performed by: SURGERY

## 2018-04-08 PROCEDURE — 160009 HCHG ANES TIME/MIN: Performed by: SURGERY

## 2018-04-08 PROCEDURE — 0FT44ZZ RESECTION OF GALLBLADDER, PERCUTANEOUS ENDOSCOPIC APPROACH: ICD-10-PCS | Performed by: SURGERY

## 2018-04-08 RX ORDER — BUPIVACAINE HYDROCHLORIDE AND EPINEPHRINE 5; 5 MG/ML; UG/ML
INJECTION, SOLUTION EPIDURAL; INTRACAUDAL; PERINEURAL
Status: DISCONTINUED | OUTPATIENT
Start: 2018-04-08 | End: 2018-04-08 | Stop reason: HOSPADM

## 2018-04-08 RX ADMIN — FENTANYL CITRATE 25 MCG: 50 INJECTION, SOLUTION INTRAMUSCULAR; INTRAVENOUS at 09:36

## 2018-04-08 RX ADMIN — VALPROATE SODIUM 500 MG: 100 INJECTION, SOLUTION INTRAVENOUS at 10:24

## 2018-04-08 RX ADMIN — VALPROATE SODIUM 1000 MG: 100 INJECTION, SOLUTION INTRAVENOUS at 20:53

## 2018-04-08 RX ADMIN — PIPERACILLIN SODIUM,TAZOBACTAM SODIUM 4.5 G: 4; .5 INJECTION, POWDER, FOR SOLUTION INTRAVENOUS at 13:34

## 2018-04-08 RX ADMIN — SODIUM CHLORIDE 500 MG: 9 INJECTION, SOLUTION INTRAVENOUS at 11:39

## 2018-04-08 RX ADMIN — OXYCODONE HYDROCHLORIDE 5 MG: 5 TABLET ORAL at 17:14

## 2018-04-08 RX ADMIN — SODIUM CHLORIDE 1000 MG: 9 INJECTION, SOLUTION INTRAVENOUS at 22:11

## 2018-04-08 RX ADMIN — PIPERACILLIN SODIUM,TAZOBACTAM SODIUM 4.5 G: 4; .5 INJECTION, POWDER, FOR SOLUTION INTRAVENOUS at 20:53

## 2018-04-08 RX ADMIN — STANDARDIZED SENNA CONCENTRATE AND DOCUSATE SODIUM 2 TABLET: 8.6; 5 TABLET, FILM COATED ORAL at 20:53

## 2018-04-08 RX ADMIN — PIPERACILLIN SODIUM,TAZOBACTAM SODIUM 4.5 G: 4; .5 INJECTION, POWDER, FOR SOLUTION INTRAVENOUS at 05:40

## 2018-04-08 ASSESSMENT — PAIN SCALES - GENERAL
PAINLEVEL_OUTOF10: 1
PAINLEVEL_OUTOF10: 5
PAINLEVEL_OUTOF10: 4
PAINLEVEL_OUTOF10: ASSUMED PAIN PRESENT
PAINLEVEL_OUTOF10: 2
PAINLEVEL_OUTOF10: 1
PAINLEVEL_OUTOF10: 3
PAINLEVEL_OUTOF10: 3
PAINLEVEL_OUTOF10: 2
PAINLEVEL_OUTOF10: 2
PAINLEVEL_OUTOF10: 3
PAINLEVEL_OUTOF10: 2

## 2018-04-08 ASSESSMENT — ENCOUNTER SYMPTOMS
BACK PAIN: 0
MYALGIAS: 0
NAUSEA: 1
PALPITATIONS: 0
WEIGHT LOSS: 0
COUGH: 0
ABDOMINAL PAIN: 1
SHORTNESS OF BREATH: 0
HEADACHES: 0
WEAKNESS: 0
DIARRHEA: 0
DEPRESSION: 0
DIZZINESS: 0
VOMITING: 0
NERVOUS/ANXIOUS: 1
BLURRED VISION: 0

## 2018-04-08 NOTE — PROGRESS NOTES
4/8  Pt seen and examined, NPO overnight, ready for surgery this am.  Pt still has some upper abdominal pain although this is getting less severe with time.  No peritonitis on exam and no new labs this am.  To OR for Lap Cholecystectomy, pt gives consent.

## 2018-04-08 NOTE — PROGRESS NOTES
"Pt voided post-op. Continues to refuse pain medication. RN encouraging pt to voice feelings and needs. Pt states \"I'm o.k\". Will continue to monitor.    "

## 2018-04-08 NOTE — OP REPORT
DATE OF SERVICE:  04/08/2018    PREOPERATIVE DIAGNOSIS:  Cholecystitis.    POSTOPERATIVE DIAGNOSIS:  Cholecystitis.    PROCEDURE:  Laparoscopic cholecystectomy.    SURGEON:  Chava Busby MD    ASSISTANT:  EMETERIO oLrenzo    ANESTHESIA:  General endotracheal anesthesia.    ANESTHESIOLOGIST:  Eb Tse MD    ESTIMATED BLOOD LOSS:  5 mL    SPECIMENS:  Gallbladder.    COMPLICATIONS:  None.    CONDITION:  Stable.    INDICATIONS FOR PROCEDURE:  This is a 32-year-old female who presents with   right upper quadrant abdominal pain.  She had cholangitis on admission and   received an ERCP Friday night.  Saturday morning, she ate salads and so was   unable to have a cholecystectomy.  The following day, she was n.p.o. and taken   to the OR.    OPERATIVE FINDINGS:  Inflamed gallbladder removed with hemostasis, dilated   cystic duct containing a stone, stone extracted and duct tied off with   hemostasis and critical view achieved.    OPERATIVE TECHNIQUE:  After informed consent was obtained, the patient was   taken to the operating room, placed in supine position.  After adequate   endotracheal anesthesia was achieved, the abdomen was prepped and draped in   sterile fashion.  Operation was begun by placing a 5 mm periumbilical incision   through which 5 mm trocar was introduced into the abdomen using the Optiview   technique.  After pneumoperitoneum was achieved, additional trocars were   placed, 12 mm in the upper epigastric midline and two 5 mm trocars in the   right upper quadrant.  All trocars were placed with 0.5% Marcaine with   epinephrine for local anesthesia.    The abdomen was insufflated and the patient positioned and we began by   grasping the gallbladder and retracted it superiorly and laterally.  We used   cautery to take down the omental adhesions from the front of the gallbladder   and were then able to identify the cystic triangle.  Dissection in this area   was carried out until the cystic duct and  artery were identified.  Once the   critical view was achieved, the artery was clipped and divided.    The cystic duct was grossly dilated and inflamed and there was an obvious   large stone within it.  Therefore, we performed a ductotomy, extracted the   stone and then completed division of the duct and tied it off by using a 0   Vicryl Endoloop.  With this completed, we used cautery to finish removing the   gallbladder from the gallbladder fossa.  There was an additional clips placed   on the posterior artery branch as well.  The gallbladder was placed in the   EndoCatch bag and removed after dilation from the 12 mm trocar site.    Next, we irrigated the right upper quadrant, extensively noted hemostasis.  We   then applied additional cautery to the liver bed.  We then closed the   extraction site with a 0 PDS using an Endoclose device.  Pneumoperitoneum was   reduced and all trocars were removed.  Skin incisions were closed with 4-0   Monocryl.  Dermabond was placed and the patient returned to the PACU in stable   condition.  All instruments counts were correct at the end of the procedure.       ____________________________________     MD FAY Major / BLOSSOM    DD:  04/08/2018 09:09:49  DT:  04/08/2018 09:25:19    D#:  [id number]  Job#:  [id number]

## 2018-04-08 NOTE — PROGRESS NOTES
[facility]ist Progress Note    Date of Service: 2018    Chief Complaint  32 y.o. female admitted 2018 with abdominal pain    Interval Problem Update  : patient doing ok , still having some abd pain, unfortunately she had cookies this morning therefore will not be able to get surgery this morning  Discussed in detail about importance of staying NPO for the surgery    : still having abd pain, much less severe than yesterday, going to the OR today    Consultants/Specialty  surgery    Disposition  tbd        Review of Systems   Constitutional: Negative for malaise/fatigue and weight loss.   HENT: Negative for hearing loss.    Eyes: Negative for blurred vision.   Respiratory: Negative for cough and shortness of breath.    Cardiovascular: Negative for chest pain and palpitations.   Gastrointestinal: Positive for abdominal pain and nausea. Negative for diarrhea and vomiting.   Genitourinary: Negative for dysuria and urgency.   Musculoskeletal: Negative for back pain and myalgias.   Neurological: Negative for dizziness, weakness and headaches.   Psychiatric/Behavioral: Negative for depression. The patient is nervous/anxious.       Physical Exam  Laboratory/Imaging   Hemodynamics  Temp (24hrs), Av.7 °C (98 °F), Min:36.2 °C (97.2 °F), Max:37.3 °C (99.1 °F)   Temperature: 36.7 °C (98 °F)  Pulse  Av.9  Min: 64  Max: 106 Heart Rate (Monitored): 70  Blood Pressure: 100/68      Respiratory      Respiration: 16, Pulse Oximetry: 100 %             Fluids    Intake/Output Summary (Last 24 hours) at 18 1046  Last data filed at 18 0857   Gross per 24 hour   Intake              720 ml   Output               20 ml   Net              700 ml       Nutrition  Orders Placed This Encounter   Procedures   • DIET ORDER     Standing Status:   Standing     Number of Occurrences:   1     Order Specific Question:   Diet:     Answer:   Clear Liquid [10]     Physical Exam   Constitutional: She is oriented to  person, place, and time. She appears well-developed and well-nourished.   HENT:   Head: Normocephalic.   Mouth/Throat: Oropharynx is clear and moist.   Eyes: Conjunctivae and EOM are normal. Pupils are equal, round, and reactive to light. No scleral icterus.   Neck: No JVD present.   Cardiovascular: Normal rate, regular rhythm and normal heart sounds.    Pulmonary/Chest: Effort normal and breath sounds normal.   Abdominal: Soft. Bowel sounds are normal. She exhibits no distension. There is tenderness. There is no rebound and no guarding.   Musculoskeletal: Normal range of motion. She exhibits no edema.   Lymphadenopathy:     She has no cervical adenopathy.   Neurological: She is alert and oriented to person, place, and time. She exhibits normal muscle tone.   Skin: Skin is warm and dry. No rash noted. No erythema.   Psychiatric: She has a normal mood and affect.       Recent Labs      04/06/18   1450  04/07/18   0010   WBC  13.3*  9.8   RBC  4.50  3.99*   HEMOGLOBIN  12.2  10.9*   HEMATOCRIT  37.7  33.8*   MCV  83.8  84.7   MCH  27.1  27.3   MCHC  32.4*  32.2*   RDW  47.7  48.3   PLATELETCT  299  262   MPV  11.1  10.9     Recent Labs      04/06/18   1450  04/07/18   0010   SODIUM  135  137   POTASSIUM  3.5*  3.5*   CHLORIDE  104  109   CO2  24  23   GLUCOSE  116*  116*   BUN  7*  5*   CREATININE  0.53  0.60   CALCIUM  9.1  8.5     Recent Labs      04/06/18   1415   APTT  26.2   INR  0.95                  Assessment/Plan     * Gallstone pancreatitis   Assessment & Plan      s/p EGD, ERCP, Cholangiogram  Lipase > 400  LFTS elevated; repeat  IVF  abx                                     S/P laparoscopic cholecystectomy   Assessment & Plan    Today per surgery  Monitor  Repeat CBC and CMP in the am          Hypokalemia   Assessment & Plan    Trend and replace        Sepsis (CMS-HCC)   Assessment & Plan    Resolved  This is sepsis (without associated acute organ dysfunction).   Iv fluids  Iv abx  Sepsis protocol           Cholangitis   Assessment & Plan    Mild but she is septic by criteria on admission.now s/p ERCP, cholangiogram and lap cholecystectomy  Sepsis  resolved  See above          Epilepsy (CMS-HCC)- (present on admission)   Assessment & Plan    Transition to iv meds for now. No events.           Quality-Core Measures   Reviewed items::  Labs reviewed and Medications reviewed  Honeycutt catheter::  No Honeycutt  Antibiotics:  Treating active infection/contamination beyond 24 hours perioperative coverage        Discussed plan of care with patient, family, nursing, and consults.

## 2018-04-08 NOTE — PROGRESS NOTES
Received report from day shift RN.  Patient alert and oriented, in bed with HOB elevated, family at bedside.  No complaints of pain or discomfort.  Patient respirations regular and unlabored.  Bed rails up X 2, call light and belongings within reach, patient encouraged to call for assistance.  Will continue to monitor.

## 2018-04-08 NOTE — CARE PLAN
Problem: Infection  Goal: Will remain free from infection  Outcome: PROGRESSING AS EXPECTED  Infection precautions in place. Educate patient, family and staff regarding frequent hand washing. Use aseptic technique for all invasive procedures.     Problem: Knowledge Deficit  Goal: Knowledge of the prescribed therapeutic regimen will improve  Outcome: PROGRESSING AS EXPECTED  Patient understands medication regimen and importance of compliance.

## 2018-04-08 NOTE — PROGRESS NOTES
Pt back to room via PACU. A&0x4. VSS. C/O of slight pain to abd but denies pain medication at this time. 4 lap stabs to abd- CDI.  Clear liquid diet ordered per Dr. Busby. Update to pt on family on POC and diet status. Will continue to monitor.

## 2018-04-08 NOTE — PROGRESS NOTES
"0857 To PACU from OR via bed,side rails up x 3 for safety, lungs clear bilaterally, scds on patient and machine operational, 4 abdominal lap stabs CDI and edges approximated with dermabond. Pt arouses to voice and eyes remain open but does not follow commands.   0900 OPA removed by RN as patient is pushing 75% of it out of her mouth. Breathing remains easy and unlabored. Pt denies pain or nausea.   0915 Pt remains awake and asking questions regarding surgery and when she can eat. RN answering questions and reviewed importance of avoiding food until pancreatitis improves \"so you don't get sicker by eating food\". Pt states verbal understanding. Pt reports \"a little bit of pain\" and points to abdomen but declines offer of PRN pain medication stating \"no, it's okay\".   0930 Pt resting quietly on bed. Arouses easily to voice and reports pain as increased but denies nausea. Plan to give IV Fentanyl for 4/10 abdominal pain.   0945 Pt arouses easily to voice; pain rated as 2/10 and \"better\". Denies need for further PRN pain medication. Denies nausea. Resting quietly on bed.   1000 Pt reports pain as tolerable and denies nausea. Meets criteria for transfer to [facility].       "

## 2018-04-09 ENCOUNTER — TELEPHONE (OUTPATIENT)
Dept: MEDICAL GROUP | Facility: CLINIC | Age: 33
End: 2018-04-09

## 2018-04-09 VITALS
HEART RATE: 72 BPM | DIASTOLIC BLOOD PRESSURE: 66 MMHG | HEIGHT: 63 IN | OXYGEN SATURATION: 99 % | RESPIRATION RATE: 18 BRPM | BODY MASS INDEX: 19.14 KG/M2 | WEIGHT: 108.03 LBS | SYSTOLIC BLOOD PRESSURE: 100 MMHG | TEMPERATURE: 98.2 F

## 2018-04-09 PROBLEM — Z90.49 S/P LAPAROSCOPIC CHOLECYSTECTOMY: Status: RESOLVED | Noted: 2018-04-08 | Resolved: 2018-04-09

## 2018-04-09 PROBLEM — E87.6 HYPOKALEMIA: Status: RESOLVED | Noted: 2018-04-06 | Resolved: 2018-04-09

## 2018-04-09 PROBLEM — K83.09 CHOLANGITIS: Status: RESOLVED | Noted: 2018-04-06 | Resolved: 2018-04-09

## 2018-04-09 PROBLEM — K85.10 GALLSTONE PANCREATITIS: Status: RESOLVED | Noted: 2018-04-06 | Resolved: 2018-04-09

## 2018-04-09 PROBLEM — A41.9 SEPSIS (HCC): Status: RESOLVED | Noted: 2018-04-06 | Resolved: 2018-04-09

## 2018-04-09 LAB
ALBUMIN SERPL BCP-MCNC: 2.5 G/DL (ref 3.2–4.9)
ALBUMIN/GLOB SERPL: 0.8 G/DL
ALP SERPL-CCNC: 125 U/L (ref 30–99)
ALT SERPL-CCNC: 239 U/L (ref 2–50)
ANION GAP SERPL CALC-SCNC: 8 MMOL/L (ref 0–11.9)
AST SERPL-CCNC: 83 U/L (ref 12–45)
BASOPHILS # BLD AUTO: 0.2 % (ref 0–1.8)
BASOPHILS # BLD: 0.02 K/UL (ref 0–0.12)
BILIRUB SERPL-MCNC: 0.8 MG/DL (ref 0.1–1.5)
BUN SERPL-MCNC: <5 MG/DL (ref 8–22)
CALCIUM SERPL-MCNC: 8.5 MG/DL (ref 8.4–10.2)
CHLORIDE SERPL-SCNC: 107 MMOL/L (ref 96–112)
CO2 SERPL-SCNC: 23 MMOL/L (ref 20–33)
CREAT SERPL-MCNC: 0.56 MG/DL (ref 0.5–1.4)
EOSINOPHIL # BLD AUTO: 0.02 K/UL (ref 0–0.51)
EOSINOPHIL NFR BLD: 0.2 % (ref 0–6.9)
ERYTHROCYTE [DISTWIDTH] IN BLOOD BY AUTOMATED COUNT: 47.5 FL (ref 35.9–50)
GLOBULIN SER CALC-MCNC: 3.3 G/DL (ref 1.9–3.5)
GLUCOSE SERPL-MCNC: 84 MG/DL (ref 65–99)
HCT VFR BLD AUTO: 30.3 % (ref 37–47)
HGB BLD-MCNC: 9.7 G/DL (ref 12–16)
IMM GRANULOCYTES # BLD AUTO: 0.04 K/UL (ref 0–0.11)
IMM GRANULOCYTES NFR BLD AUTO: 0.4 % (ref 0–0.9)
LIPASE SERPL-CCNC: 80 U/L (ref 7–58)
LYMPHOCYTES # BLD AUTO: 2.85 K/UL (ref 1–4.8)
LYMPHOCYTES NFR BLD: 29 % (ref 22–41)
MCH RBC QN AUTO: 27.2 PG (ref 27–33)
MCHC RBC AUTO-ENTMCNC: 32 G/DL (ref 33.6–35)
MCV RBC AUTO: 84.9 FL (ref 81.4–97.8)
MONOCYTES # BLD AUTO: 0.72 K/UL (ref 0–0.85)
MONOCYTES NFR BLD AUTO: 7.3 % (ref 0–13.4)
NEUTROPHILS # BLD AUTO: 6.19 K/UL (ref 2–7.15)
NEUTROPHILS NFR BLD: 62.9 % (ref 44–72)
NRBC # BLD AUTO: 0 K/UL
NRBC BLD-RTO: 0 /100 WBC
PLATELET # BLD AUTO: 238 K/UL (ref 164–446)
PMV BLD AUTO: 11.7 FL (ref 9–12.9)
POTASSIUM SERPL-SCNC: 3.5 MMOL/L (ref 3.6–5.5)
PROT SERPL-MCNC: 5.8 G/DL (ref 6–8.2)
RBC # BLD AUTO: 3.57 M/UL (ref 4.2–5.4)
SODIUM SERPL-SCNC: 138 MMOL/L (ref 135–145)
WBC # BLD AUTO: 9.8 K/UL (ref 4.8–10.8)

## 2018-04-09 PROCEDURE — 700105 HCHG RX REV CODE 258: Performed by: HOSPITALIST

## 2018-04-09 PROCEDURE — 700111 HCHG RX REV CODE 636 W/ 250 OVERRIDE (IP): Performed by: HOSPITALIST

## 2018-04-09 PROCEDURE — 85025 COMPLETE CBC W/AUTO DIFF WBC: CPT

## 2018-04-09 PROCEDURE — 83690 ASSAY OF LIPASE: CPT

## 2018-04-09 PROCEDURE — 80053 COMPREHEN METABOLIC PANEL: CPT

## 2018-04-09 PROCEDURE — 700102 HCHG RX REV CODE 250 W/ 637 OVERRIDE(OP): Performed by: HOSPITALIST

## 2018-04-09 PROCEDURE — 99239 HOSP IP/OBS DSCHRG MGMT >30: CPT | Performed by: HOSPITALIST

## 2018-04-09 PROCEDURE — 36415 COLL VENOUS BLD VENIPUNCTURE: CPT

## 2018-04-09 PROCEDURE — A9270 NON-COVERED ITEM OR SERVICE: HCPCS | Performed by: HOSPITALIST

## 2018-04-09 RX ORDER — OXYCODONE HYDROCHLORIDE 5 MG/1
2.5 TABLET ORAL EVERY 6 HOURS PRN
Qty: 10 TAB | Refills: 0 | Status: SHIPPED | OUTPATIENT
Start: 2018-04-09 | End: 2018-04-14

## 2018-04-09 RX ORDER — SODIUM CHLORIDE 9 MG/ML
500 INJECTION, SOLUTION INTRAVENOUS ONCE
Status: COMPLETED | OUTPATIENT
Start: 2018-04-09 | End: 2018-04-09

## 2018-04-09 RX ADMIN — VALPROATE SODIUM 500 MG: 100 INJECTION, SOLUTION INTRAVENOUS at 09:05

## 2018-04-09 RX ADMIN — OXYCODONE HYDROCHLORIDE 5 MG: 5 TABLET ORAL at 05:18

## 2018-04-09 RX ADMIN — PIPERACILLIN SODIUM,TAZOBACTAM SODIUM 4.5 G: 4; .5 INJECTION, POWDER, FOR SOLUTION INTRAVENOUS at 05:19

## 2018-04-09 RX ADMIN — OXYCODONE HYDROCHLORIDE 2.5 MG: 5 TABLET ORAL at 14:53

## 2018-04-09 RX ADMIN — SODIUM CHLORIDE 500 ML: 9 INJECTION, SOLUTION INTRAVENOUS at 12:54

## 2018-04-09 RX ADMIN — STANDARDIZED SENNA CONCENTRATE AND DOCUSATE SODIUM 2 TABLET: 8.6; 5 TABLET, FILM COATED ORAL at 08:37

## 2018-04-09 RX ADMIN — FENTANYL CITRATE 25 MCG: 50 INJECTION INTRAMUSCULAR; INTRAVENOUS at 12:52

## 2018-04-09 RX ADMIN — SODIUM CHLORIDE 500 MG: 9 INJECTION, SOLUTION INTRAVENOUS at 08:37

## 2018-04-09 ASSESSMENT — PAIN SCALES - GENERAL
PAINLEVEL_OUTOF10: 5
PAINLEVEL_OUTOF10: 2
PAINLEVEL_OUTOF10: 4

## 2018-04-09 NOTE — PROGRESS NOTES
Call to Dr. Burgos office. Discussed labs and pt status. OK to advance diet as tolerated and discharge from his standpoint.

## 2018-04-09 NOTE — DISCHARGE SUMMARY
CHIEF COMPLAINT ON ADMISSION  Chief Complaint   Patient presents with   • Abdominal Pain       CODE STATUS  Full Code    HPI & HOSPITAL COURSE  32 y.o. female who presented 4/6/2018 with abdominal pain in the epigastrum for the last 3-4 weeks. It radiates around to the back and is worse with respiration and meals. It has markedly worsened in the last 2 days. She has an appointment with Dr Clinton on 4/18 after a recent ultrasound revealed cholelithiasis on 3/28/18. She has had some intermittent chills and nausea without vomiting. No change in bowel movement. She has been intermittently jaundiced. Workup in the ED shows evidence for sepsis and cholangitis with choledocholithiasis and associated gallstone pancreatitis. She was started on antibiotics, GI and general surgery were consulted. Patient underwent the following procedures:  PostOp Diagnosis:               EGD              1/ normal appearing esophagus, GEJ at 36 cm              2/ normal appearing stomach, antrum and pylorus               3/ normal appearing duodenal bulb and second portion              4/ normal ampulla of vater                 ERCP                             1/ selective cannulation of the CBD via a Trucut guidewire with the assistance of fluoroscopy                              guidance                          2/ Aspiration of Bile was confirmed                                         Cholangiogram                          1/ Common bile duct: appeared dilated to 15 mm in diameter, with a  filling defect in the distal CBD                                    appreciated                          2/ intrahepatics left and right appeared slightly dilated              1/ sphincterotomy 9 mm  In length was performed              2/ exchange performed leaving the guidewire in place              3/ introduction of a 12-15 mm balloon was performed over the guidewire              4/ the stone was removed with the balloon removing a white cholesterol  stone              5/ an obstructive cholangiogram was performed after several sweeps of the CBD were performed                      demonstration no further filling defects.    PROCEDURE:  Laparoscopic cholecystectomy.    Post procedures her abdominal pain mostly resolved, she had post op pain now. Otherwise she was started on a CLD--> FLD and tolerated well. Pain control with minimal pain meds. Her LFTS improved on labs. Vitals were stable. Therefore she is medical cleared for discharge with outpatient FU with GI and surgery. Patient understood and agreed with the above plan.    The patient met 2-midnight criteria for an inpatient stay at the time of discharge.    Therefore, she is discharged in good and stable condition with close outpatient follow-up.    SPECIFIC OUTPATIENT FOLLOW-UP  pcp  GI  surgery    DISCHARGE PROBLEM LIST  Principal Problem (Resolved):    Gallstone pancreatitis POA: Unknown  Active Problems:    Epilepsy (CMS-HCC) POA: Yes  Resolved Problems:    Cholangitis POA: Unknown    Sepsis (CMS-HCC) POA: Unknown    Hypokalemia POA: Unknown    S/P laparoscopic cholecystectomy POA: Unknown      FOLLOW UP  Future Appointments  Date Time Provider Department Center   8/28/2018 4:20 PM Melissa P Bloch, M.D. [facility] None     Chava Busby M.D.  [address]  [facility] [address]  [phone]    In 1 week        MEDICATIONS ON DISCHARGE   Umu Hou   Home Medication Instructions [id number]:[id number]    Printed on:04/09/18 1237   Medication Information                      divalproex (DEPAKOTE) 500 MG Tablet Delayed Response  Take 500 mg by mouth 2 Times a Day. Take one tablet by mouth every morning and 2 tables at bedtime             ergocalciferol (DRISDOL) 39093 UNIT capsule  Take 1 Cap by mouth every 7 days.             levETIRAcetam (KEPPRA) 500 MG Tab  Take 500-1,000 mg by mouth 2 times a day. 500 mg in the AM and 1000 mg at HS             oxyCODONE immediate-release (ROXICODONE) 5 MG  Tab  Take 0.5 Tabs by mouth every 6 hours as needed for Severe Pain for up to 5 days.                 DIET  Orders Placed This Encounter   Procedures   • DIET ORDER     Standing Status:   Standing     Number of Occurrences:   1     Order Specific Question:   Diet:     Answer:   Full Liquid [11]       ACTIVITY  As tolerated.  Weight bearing as tolerated      CONSULTATIONS  GI  surgery    PROCEDURES  PROCEDURE:  Laparoscopic cholecystectomy.  EGD  ERCP  Cholangiogram    LABORATORY  Lab Results   Component Value Date/Time    SODIUM 138 04/09/2018 05:12 AM    POTASSIUM 3.5 (L) 04/09/2018 05:12 AM    CHLORIDE 107 04/09/2018 05:12 AM    CO2 23 04/09/2018 05:12 AM    GLUCOSE 84 04/09/2018 05:12 AM    BUN <5 (L) 04/09/2018 05:12 AM    CREATININE 0.56 04/09/2018 05:12 AM    CREATININE 0.6 09/15/2008 09:20 PM        Lab Results   Component Value Date/Time    WBC 9.8 04/09/2018 05:12 AM    HEMOGLOBIN 9.7 (L) 04/09/2018 05:12 AM    HEMATOCRIT 30.3 (L) 04/09/2018 05:12 AM    PLATELETCT 238 04/09/2018 05:12 AM        Total time of the discharge process exceeds 38 minutes

## 2018-04-09 NOTE — PROGRESS NOTES
Pt discharged into care of family. Discussed discharge meds , activity, follow up apps and when to call the MD. Pt states understanding and asks no further questions. Escorted to private transportation by staff.

## 2018-04-09 NOTE — PROGRESS NOTES
4/9  Pt seen and examined, POD from [facility].  Tolerating liquids, going to try solids PO today.  Abdominal exam benign, incisions c/d/i.  WBC normal and LFTs all improved.      Ok for diet as tolerated and d/c home from surgery standpoint.  Follow up with Dr Busby in 1 week. Please call with any questions.

## 2018-04-09 NOTE — DISCHARGE INSTRUCTIONS
Discharge Instructions    Discharged to home by car with relative. Discharged via wheelchair, hospital escort: Yes.  Special equipment needed: Not Applicable    Be sure to schedule a follow-up appointment with your primary care doctor or any specialists as instructed.     Discharge Plan:   Diet Plan: Discussed  Activity Level: Discussed  Confirmed Follow up Appointment: Patient to Call and Schedule Appointment  Confirmed Symptoms Management: Discussed  Medication Reconciliation Updated: Yes  Influenza Vaccine Indication: Patient Refuses    I understand that a diet low in cholesterol, fat, and sodium is recommended for good health. Unless I have been given specific instructions below for another diet, I accept this instruction as my diet prescription.   Other diet: Full liquid diet, Advance as tolerated to a regular diet.     Special Instructions:   Colecistectomía laparoscópica - Cuidados posteriores  (Laparoscopic Cholecystectomy, Care After)  Siga estas instrucciones kandy las próximas semanas. Estas indicaciones le proporcionan información general acerca de cómo deberá cuidarse después del procedimiento. El médico también podrá darle instrucciones más específicas. El tratamiento mitchell sido planificado según las prácticas médicas actuales, samuel en algunos casos pueden ocurrir problemas. Comuníquese con el médico si tiene algún problema o tiene dudas después del procedimiento.  QUÉ ESPERAR DESPUÉS DEL PROCEDIMIENTO  Después del procedimiento, es común tener las siguientes sensaciones:  · Dolor en los lugares de la incisión. Le darán analgésicos para controlar el dolor.  · Náuseas o vómitos leves. Estos síntomas deberían mejorar después de las primeras 24 horas.  · Meteorismo y posiblemente dolor en el hombro debido al gas que se usa kandy el procedimiento. Estos síntomas mejorarán después de las primeras 24 horas.  INSTRUCCIONES PARA EL CUIDADO EN EL HOGAR   · Cambie los apósitos (vendajes) zac aureliano le indicó el  médico.  · Mantenga la herida limpia y seca. Puede tereso la herida suavemente con agua y jabón. Seque dando palmaditas suaves.  · No se bañe en la bañera, no practique natación ni use el jacuzzy kandy 2 semanas o hasta que lo autorice el médico.  · [place] solo medicamentos de venta matteo o recetados, según las indicaciones del médico.  · Siga rodriguez dieta normal según las indicaciones de rodriguez médico.  · No levante ningún objeto que pese más de 10 libras (4,5 kg) hasta que el médico lo autorice.  · No practique deportes de contacto kandy 1 semana o hasta que el médico lo autorice.  SOLICITE ATENCIÓN MÉDICA SI:   · Presenta enrojecimiento, hinchazón o aumento del dolor en la herida.  · Observa mason secreción de color ryan amarillento (pus) en la herida.  · Hay mason secreción en la herida que dura más de 1 día.  · Advierte un olor fétido que proviene de la herida o del vendaje.  · Los hopkins quirúrgicos (incisiones) se abren.  SOLICITE ATENCIÓN MÉDICA DE INMEDIATO SI:   · Le aparece mason erupción cutánea.  · Tiene dificultad para respirar.  · Siente dolor en el pecho.  · Tiene fiebre.  · Nota un incremento del dolor en los hombros (en la dhiraj donde van los breteles).  · Presenta episodios de mareos o se siente débil cuando está de pie.  · Siente un dolor abdominal intenso.  · Tiene malestar estomacal (náuseas) o vomita y esto dura más de 1 día.     Esta información no tiene aureliano fin reemplazar el consejo del médico. Asegúrese de hacerle al médico cualquier pregunta que tenga.     Document Released: 07/30/2012 Document Revised: 10/08/2014  [employer] Interactive Patient Education ©2016 [employer]    · Is patient discharged on Warfarin / Coumadin?   No     Depression / Suicide Risk    As you are discharged from this [facility] facility, it is important to learn how to keep safe from harming yourself.    Recognize the warning signs:  · Abrupt changes in personality, positive or negative- including increase in energy    · Giving away possessions  · Change in eating patterns- significant weight changes-  positive or negative  · Change in sleeping patterns- unable to sleep or sleeping all the time   · Unwillingness or inability to communicate  · Depression  · Unusual sadness, discouragement and loneliness  · Talk of wanting to die  · Neglect of personal appearance   · Rebelliousness- reckless behavior  · Withdrawal from people/activities they love  · Confusion- inability to concentrate     If you or a loved one observes any of these behaviors or has concerns about self-harm, here's what you can do:  · Talk about it- your feelings and reasons for harming yourself  · Remove any means that you might use to hurt yourself (examples: pills, rope, extension cords, firearm)  · Get professional help from the community (Mental Health, Substance Abuse, psychological counseling)  · Do not be alone:Call your Safe Contact- someone whom you trust who will be there for you.  · Call your local CRISIS HOTLINE 859-7177 or 742-085-7387  · Call your local Children's Mobile Crisis Response Team [place] (311) 166-2442 or www.[employer]  · Call the toll free National Suicide Prevention Hotlines   · National Suicide Prevention Lifeline 690-892-ZWJI (1956)  · National Hope Line Network 800-SUICIDE (360-7788)

## 2018-04-09 NOTE — PROGRESS NOTES
Pt awake and alert on AM rounds. No signs of distress noted. VSS. Lap stabs X 4 CDI. BS+. Pain under control. Discussed POC for the day. IV sites appear intact. Will monitor.

## 2018-04-09 NOTE — CARE PLAN
Problem: Infection  Goal: Will remain free from infection  Outcome: PROGRESSING AS EXPECTED    Intervention: Implement standard precautions and perform hand washing before and after patient contact  Hand washing every encounter. IV ports scrubbed with alcohol when hanging medicine. Patient watch for s/s of infection. Patient taught to report s/s of infection, verbalizes understanding.      Problem: Pain Management  Goal: Pain level will decrease to patient's comfort goal  Outcome: PROGRESSING AS EXPECTED    Intervention: Follow pain managment plan developed in collaboration with patient and Interdisciplinary Team  Pain assessment q4h or q2h after medication intervention.  Encourage patient to report pain, verbalize understanding. Medicate PRN

## 2018-04-09 NOTE — TELEPHONE ENCOUNTER
1. Caller Name: Carley @ [facility]  Call Back Number: [phone]    They had gotten pt sched for 4/18/18 and even got them in earlier for 4/11/18 but then received a voicemail that they wanted to cancel the appt. I saw in chart that pt was in ER and had a procedure done for the same ref issue and could be the result of their cancellation. Will notify Myah.

## 2018-04-10 ENCOUNTER — PATIENT OUTREACH (OUTPATIENT)
Dept: HEALTH INFORMATION MANAGEMENT | Facility: OTHER | Age: 33
End: 2018-04-10

## 2018-04-11 LAB
BACTERIA BLD CULT: NORMAL
BACTERIA BLD CULT: NORMAL
SIGNIFICANT IND 70042: NORMAL
SIGNIFICANT IND 70042: NORMAL
SITE SITE: NORMAL
SITE SITE: NORMAL
SOURCE SOURCE: NORMAL
SOURCE SOURCE: NORMAL

## 2018-04-16 ENCOUNTER — OFFICE VISIT (OUTPATIENT)
Dept: MEDICAL GROUP | Facility: CLINIC | Age: 33
End: 2018-04-16
Payer: COMMERCIAL

## 2018-04-16 VITALS
BODY MASS INDEX: 18.59 KG/M2 | DIASTOLIC BLOOD PRESSURE: 80 MMHG | WEIGHT: 104.9 LBS | RESPIRATION RATE: 14 BRPM | TEMPERATURE: 97.5 F | SYSTOLIC BLOOD PRESSURE: 106 MMHG | HEART RATE: 94 BPM | HEIGHT: 63 IN | OXYGEN SATURATION: 96 %

## 2018-04-16 DIAGNOSIS — K85.10 ACUTE GALLSTONE PANCREATITIS: ICD-10-CM

## 2018-04-16 DIAGNOSIS — K83.09 CHOLANGITIS: ICD-10-CM

## 2018-04-16 DIAGNOSIS — R17 JAUNDICE: ICD-10-CM

## 2018-04-16 DIAGNOSIS — Z09 HOSPITAL DISCHARGE FOLLOW-UP: ICD-10-CM

## 2018-04-16 DIAGNOSIS — K80.63 CALCULUS OF GALLBLADDER AND BILE DUCT WITH ACUTE CHOLECYSTITIS, WITH OBSTRUCTION: ICD-10-CM

## 2018-04-16 DIAGNOSIS — Z59.9 FINANCIAL DIFFICULTIES: ICD-10-CM

## 2018-04-16 PROCEDURE — 99214 OFFICE O/P EST MOD 30 MIN: CPT | Performed by: NURSE PRACTITIONER

## 2018-04-16 RX ORDER — LANOLIN ALCOHOL/MO/W.PET/CERES
325 CREAM (GRAM) TOPICAL 2 TIMES DAILY
Qty: 60 TAB | Refills: 2 | Status: SHIPPED | OUTPATIENT
Start: 2018-04-16 | End: 2019-09-13

## 2018-04-16 RX ORDER — DOCUSATE SODIUM 100 MG/1
100 CAPSULE, LIQUID FILLED ORAL 2 TIMES DAILY PRN
Qty: 60 CAP | Refills: 11 | Status: SHIPPED | OUTPATIENT
Start: 2018-04-16 | End: 2019-09-13

## 2018-04-16 SDOH — ECONOMIC STABILITY - INCOME SECURITY: PROBLEM RELATED TO HOUSING AND ECONOMIC CIRCUMSTANCES, UNSPECIFIED: Z59.9

## 2018-04-17 ENCOUNTER — PATIENT OUTREACH (OUTPATIENT)
Dept: HEALTH INFORMATION MANAGEMENT | Facility: OTHER | Age: 33
End: 2018-04-17

## 2018-04-17 NOTE — PROGRESS NOTES
"CC: Hospital Follow-up        HPI:     Umu presents today for the followin. Hospital discharge follow-up/Calculus of gallbladder and bile duct with acute cholecystitis, with obstructio/ Acute gallstone pancreatitis/ Cholangitis/Jaundice  Here today for office hospital follow-up. She was diagnosed with gallstones at the end of last month however had a worsening presentation prior to be able to consult with her general surgeon. She did have bilirubinemia and elevated LFTs, nausea vomiting increased pain when she presented to the ER. She apparently had some jaundice as well as symptoms of bilirubinemia at that time. She was admitted and they did a ERCP. They plan to do a cholecystectomy however unfortunately she was not informed that she can eat and had food the same the day of surgery. It was done the next day. She is feeling much better. Denies any nausea or vomiting. She states she has little \"twinges\" near her incision sites that are improving day-to-day.  Skin color is improving    6. Financial difficulties  Patient is tearful today because she was trying to avoid going the hospital and feels guilty that she had a thin exudate. She is very worried about financial issues that she is unable to work due to epilepsy and her parents support her. They are talking with the billing department about this.    Current Outpatient Prescriptions   Medication Sig Dispense Refill   • ferrous sulfate 325 (65 Fe) MG EC tablet Take 1 Tab by mouth 2 times a day. 60 Tab 2   • docusate sodium (COLACE) 100 MG Cap Take 1 Cap by mouth 2 times a day as needed for Constipation. 60 Cap 11   • divalproex (DEPAKOTE) 500 MG Tablet Delayed Response Take 500 mg by mouth 2 Times a Day. Take one tablet by mouth every morning and 2 tables at bedtime     • levETIRAcetam (KEPPRA) 500 MG Tab Take 500-1,000 mg by mouth 2 times a day. 500 mg in the AM and 1000 mg at HS     • ergocalciferol (DRISDOL) 75130 UNIT capsule Take 1 Cap by mouth every 7 " "days. 4 Cap 11     No current facility-administered medications for this visit.      Social History   Substance Use Topics   • Smoking status: Never Smoker   • Smokeless tobacco: Never Used   • Alcohol use No     I reviewed patients allergies, problem list and medications today in EPIC.    ROS: Any/all pertinent positives listed in the HPI, otherwise all others reviewed are negative today.      /80   Pulse 94   Temp 36.4 °C (97.5 °F)   Resp 14   Ht 1.6 m (5' 3\")   Wt 47.6 kg (104 lb 14.4 oz)   LMP 04/06/2018   SpO2 96%   BMI 18.58 kg/m²     Exam:    Gen: Alert and oriented, No apparent distress. WDWN  Psych: A+Ox3, normal affect and mood  Skin: Warm, dry and intact. Good turgor   No rashes in visible areas.  Mild jaundice  Eye: Conjunctiva clear, lids normal no scleral icterus  ENMT: Lips without lesions, good dentition  Lungs: Clear to auscultation bilaterally, no rales or rhonchi   Unlabored respiratory effort.   CV: Regular rate and rhythm, S1, S2. No murmurs.   No Edema  Abd: Soft limited tenderness to palpation in the epigastric area over her incision. All these incisions appear to be healing well-nourished no signs of infection. All other areas of stomach including right upper quadrant are nontender. Non distended. Normal active bowel sounds.    No Hepatosplenomegaly, No pulsatile masses.         Assessment and Plan.   32 y.o. female with the following issues.    1. Hospital discharge follow-up/ Calculus of gallbladder and bile duct with acute cholecystitis, with obstruction/ Acute gallstone pancreatitis/. Cholangitis/Jaundice  Stable. Has follow-up with her general surgeon on Wednesday. She is tolerating foods and fluids. Jaundice is improving.   - COMP METABOLIC PANEL; Future  - CBC WITH DIFFERENTIAL; Future  - REFERRAL TO COMPLEX CARE MANAGEMENT Services Requested:     6. Financial difficulties  Stable. Referral to  to see if there is anything I can do anything she " qualifies for the help with payment  - REFERRAL TO COMPLEX CARE MANAGEMENT Services Requested:

## 2018-05-29 ENCOUNTER — OFFICE VISIT (OUTPATIENT)
Dept: NEUROLOGY | Facility: MEDICAL CENTER | Age: 33
End: 2018-05-29
Payer: COMMERCIAL

## 2018-05-29 VITALS
DIASTOLIC BLOOD PRESSURE: 78 MMHG | HEART RATE: 66 BPM | HEIGHT: 63 IN | OXYGEN SATURATION: 98 % | WEIGHT: 108.03 LBS | BODY MASS INDEX: 19.14 KG/M2 | SYSTOLIC BLOOD PRESSURE: 116 MMHG | TEMPERATURE: 98.5 F

## 2018-05-29 DIAGNOSIS — G40.009 PARTIAL IDIOPATHIC EPILEPSY WITH SEIZURES OF LOCALIZED ONSET, NOT INTRACTABLE, WITHOUT STATUS EPILEPTICUS (HCC): ICD-10-CM

## 2018-05-29 DIAGNOSIS — F41.9 ANXIETY AND DEPRESSION: ICD-10-CM

## 2018-05-29 DIAGNOSIS — F32.A ANXIETY AND DEPRESSION: ICD-10-CM

## 2018-05-29 PROCEDURE — 99214 OFFICE O/P EST MOD 30 MIN: CPT | Performed by: PSYCHIATRY & NEUROLOGY

## 2018-05-29 RX ORDER — LEVETIRACETAM 500 MG/1
TABLET ORAL
Qty: 90 TAB | Refills: 11 | Status: SHIPPED | OUTPATIENT
Start: 2018-05-29 | End: 2019-01-22 | Stop reason: SDUPTHER

## 2018-05-29 RX ORDER — DIVALPROEX SODIUM 500 MG/1
TABLET, DELAYED RELEASE ORAL
Qty: 90 TAB | Refills: 11 | Status: SHIPPED | OUTPATIENT
Start: 2018-05-29 | End: 2019-01-22 | Stop reason: SDUPTHER

## 2018-05-29 RX ORDER — LORAZEPAM 0.5 MG/1
0.5 TABLET ORAL EVERY 4 HOURS PRN
Qty: 30 TAB | Refills: 1 | Status: SHIPPED | OUTPATIENT
Start: 2018-05-29 | End: 2018-06-29

## 2018-05-29 NOTE — PROGRESS NOTES
CHIEF COMPLAINT  Chief Complaint   Patient presents with   • Follow-Up     partial epilepsy with complx partial seizures       HPI  Umu Sapp is a 30 y.o. female who presents for treatment of her refractory epilepsy with questions about treatment options and cause of her seizures. Patient also complaining of muscle pain in her arms. Patient is not currently sexually active. Patient seizure pattern has been catamenial.  Epilepsy (HCC)  Pt states her seizures have been better recently. Pt had one big seizure when she had surgery. Pt is sleeping better now.    Anxiety and depression  Pt had a recent death in the family from an accident which has caused a lot of sadness.    Vitamin d deficiency  Pt is taking ge her vitamin D.    REVIEW OF SYSTEMS  Pertinent Positives:occasional headaches, fatigue, memory loss, anxiety, intermittent back pain.   All other systems are negative.     PAST MEDICAL HISTORY  Past Medical History:   Diagnosis Date   • DUB (dysfunctional uterine bleeding)    • Epilepsy (HCC) 11/4/2009    since infant.  sees Wilfrido/Codey at [facility].  Keppra/depakote.  absence siezures 1x/month-thinks iwth menstruation.   • GERD (gastroesophageal reflux disease)     gastritis-only with spicy foods   • Seizure disorder (HCC)        SOCIAL HISTORY  Social History     Social History   • Marital status: Single     Spouse name: N/A   • Number of children: N/A   • Years of education: N/A     Occupational History   • Not on file.     Social History Main Topics   • Smoking status: Never Smoker   • Smokeless tobacco: Never Used   • Alcohol use No   • Drug use: No   • Sexual activity: Not Currently      Comment: virginal     Other Topics Concern   • Not on file     Social History Narrative   • No narrative on file       SURGICAL HISTORY  Past Surgical History:   Procedure Laterality Date   • DEONDRE BY LAPAROSCOPY N/A 4/8/2018    Procedure: DEONDRE BY LAPAROSCOPY;  Surgeon: Chava Busby M.D.;  Location: SURGERY  "[facility];  Service: General   • ERCP  4/6/2018    Procedure: ERCP;  Surgeon: Osiel Paige M.D.;  Location: SURGERY [facility];  Service: Gastroenterology       CURRENT MEDICATIONS  Current Outpatient Prescriptions   Medication Sig Dispense Refill   • divalproex (DEPAKOTE) 500 MG Tablet Delayed Response Take one tablet by mouth every morning and 2 tables at bedtime 90 Tab 11   • levETIRAcetam (KEPPRA) 500 MG Tab 500 mg in the AM and 1000 mg at HS 90 Tab 11   • ferrous sulfate 325 (65 Fe) MG EC tablet Take 1 Tab by mouth 2 times a day. 60 Tab 2   • docusate sodium (COLACE) 100 MG Cap Take 1 Cap by mouth 2 times a day as needed for Constipation. 60 Cap 11   • ergocalciferol (DRISDOL) 37265 UNIT capsule Take 1 Cap by mouth every 7 days. 4 Cap 11     No current facility-administered medications for this visit.        ALLERGIES  Allergies   Allergen Reactions   • Nkda [No Known Drug Allergy]        PHYSICAL EXAM  VITAL SIGNS: /78   Pulse 66   Temp 36.9 °C (98.5 °F)   Ht 1.6 m (5' 3\")   Wt 49 kg (108 lb 0.4 oz)   SpO2 98%   BMI 19.14 kg/m²   Constitutional: Well developed, Well nourished, No acute distress, Non-toxic appearance.   HENT: normocephalic, atraumatic  Eyes: fundi-disc sharp, perrl  Neck: Normal range of motion, No tenderness, Supple, No stridor.   Cardiovascular: Normal heart rate, Normal rhythm, No murmurs, No rubs, No gallops.   Thorax & Lungs: Normal breath sounds, No respiratory distress, No wheezing, No chest tenderness.   Abdomen: Bowel sounds normal, Soft, No tenderness, No masses, No pulsatile masses.   Skin: Warm, Dry, No erythema, No rash.   Back: No tenderness, No CVA tenderness.   Extremities: Intact distal pulses, No edema, No tenderness, No cyanosis, No clubbing.   Neurologic: A&Ox3, CN:2-12 intact, motor: normal strength tone and bulk, sensory intact, DTR symmetric and 2+, gait and CB exam intact   Psychiatric: Affect normal, Judgment normal, Mood normal.   Lab: " Reviewed with patient in detail and as noted in results.    EEG  Temporal lobe epilepsy    ASSESSMENT AND PLAN  1. Partial symptomatic epilepsy with complex partial seizures, intractable, without status epilepticus  Plan to add carnitor because of the depakote and counseled on contraception optiond for catamenial seizure and to avoid pregnancy.  - levocarnitine (CARNITOR) 330 MG Tab; Take 1 Tab by mouth 2 times a day.  Dispense: 60 Tab; Refill: 11  - divalproex (DEPAKOTE) 500 MG Tablet Delayed Response; Take 500mg po qam and 1000mg po qhs.  Dispense: 90 Tab; Refill: 11  - levetiracetam (KEPPRA) 500 MG Tab; Take one in the am and two at night  Dispense: 90 Tab; Refill: 11  - Ativan given for breakthrough seizure    2. Anxiety and depression  Pt has had a loss in the family and she is grieving with her family    3. Vitamin D deficiency  I discussed the importance of taking vitamin D supplementation for bone health and immune health which I instructed her how to do today.     I spent 30 minutes with this patient and her mother face to face,over fifty percent was spent counseling patient on their condition, best management practices, reviewing test results and risks and benefits of treatment.

## 2018-05-29 NOTE — ASSESSMENT & PLAN NOTE
Pt states her seizures have been better recently. Pt had one big seizure when she had surgery. Pt is sleeping better now.

## 2018-06-02 ENCOUNTER — HOSPITAL ENCOUNTER (OUTPATIENT)
Dept: LAB | Facility: MEDICAL CENTER | Age: 33
End: 2018-06-02
Attending: PSYCHIATRY & NEUROLOGY
Payer: COMMERCIAL

## 2018-06-02 DIAGNOSIS — G40.009 PARTIAL IDIOPATHIC EPILEPSY WITH SEIZURES OF LOCALIZED ONSET, NOT INTRACTABLE, WITHOUT STATUS EPILEPTICUS (HCC): ICD-10-CM

## 2018-06-02 DIAGNOSIS — G40.219 PARTIAL SYMPTOMATIC EPILEPSY WITH COMPLEX PARTIAL SEIZURES, INTRACTABLE, WITHOUT STATUS EPILEPTICUS (HCC): ICD-10-CM

## 2018-06-02 LAB
ALBUMIN SERPL BCP-MCNC: 4.3 G/DL (ref 3.2–4.9)
ALBUMIN/GLOB SERPL: 1.2 G/DL
ALP SERPL-CCNC: 49 U/L (ref 30–99)
ALT SERPL-CCNC: 14 U/L (ref 2–50)
ANION GAP SERPL CALC-SCNC: 9 MMOL/L (ref 0–11.9)
AST SERPL-CCNC: 18 U/L (ref 12–45)
BASOPHILS # BLD AUTO: 0.7 % (ref 0–1.8)
BASOPHILS # BLD: 0.04 K/UL (ref 0–0.12)
BILIRUB SERPL-MCNC: 0.3 MG/DL (ref 0.1–1.5)
BUN SERPL-MCNC: 12 MG/DL (ref 8–22)
CALCIUM SERPL-MCNC: 9.4 MG/DL (ref 8.5–10.5)
CHLORIDE SERPL-SCNC: 108 MMOL/L (ref 96–112)
CO2 SERPL-SCNC: 23 MMOL/L (ref 20–33)
CREAT SERPL-MCNC: 0.59 MG/DL (ref 0.5–1.4)
EOSINOPHIL # BLD AUTO: 0.13 K/UL (ref 0–0.51)
EOSINOPHIL NFR BLD: 2.1 % (ref 0–6.9)
ERYTHROCYTE [DISTWIDTH] IN BLOOD BY AUTOMATED COUNT: 48.7 FL (ref 35.9–50)
GLOBULIN SER CALC-MCNC: 3.7 G/DL (ref 1.9–3.5)
GLUCOSE SERPL-MCNC: 76 MG/DL (ref 65–99)
HCT VFR BLD AUTO: 39.1 % (ref 37–47)
HGB BLD-MCNC: 12.4 G/DL (ref 12–16)
IMM GRANULOCYTES # BLD AUTO: 0.02 K/UL (ref 0–0.11)
IMM GRANULOCYTES NFR BLD AUTO: 0.3 % (ref 0–0.9)
LYMPHOCYTES # BLD AUTO: 2.57 K/UL (ref 1–4.8)
LYMPHOCYTES NFR BLD: 42.1 % (ref 22–41)
MCH RBC QN AUTO: 27 PG (ref 27–33)
MCHC RBC AUTO-ENTMCNC: 31.7 G/DL (ref 33.6–35)
MCV RBC AUTO: 85.2 FL (ref 81.4–97.8)
MONOCYTES # BLD AUTO: 0.51 K/UL (ref 0–0.85)
MONOCYTES NFR BLD AUTO: 8.3 % (ref 0–13.4)
NEUTROPHILS # BLD AUTO: 2.84 K/UL (ref 2–7.15)
NEUTROPHILS NFR BLD: 46.5 % (ref 44–72)
NRBC # BLD AUTO: 0 K/UL
NRBC BLD-RTO: 0 /100 WBC
PLATELET # BLD AUTO: 269 K/UL (ref 164–446)
PMV BLD AUTO: 12.3 FL (ref 9–12.9)
POTASSIUM SERPL-SCNC: 4.5 MMOL/L (ref 3.6–5.5)
PROT SERPL-MCNC: 8 G/DL (ref 6–8.2)
RBC # BLD AUTO: 4.59 M/UL (ref 4.2–5.4)
SODIUM SERPL-SCNC: 140 MMOL/L (ref 135–145)
VALPROATE SERPL-MCNC: 30.9 UG/ML (ref 50–100)
WBC # BLD AUTO: 6.1 K/UL (ref 4.8–10.8)

## 2018-06-02 PROCEDURE — 36415 COLL VENOUS BLD VENIPUNCTURE: CPT

## 2018-06-02 PROCEDURE — 80053 COMPREHEN METABOLIC PANEL: CPT

## 2018-06-02 PROCEDURE — 85025 COMPLETE CBC W/AUTO DIFF WBC: CPT

## 2018-06-02 PROCEDURE — 80164 ASSAY DIPROPYLACETIC ACD TOT: CPT

## 2019-01-22 ENCOUNTER — OFFICE VISIT (OUTPATIENT)
Dept: NEUROLOGY | Facility: MEDICAL CENTER | Age: 34
End: 2019-01-22
Payer: COMMERCIAL

## 2019-01-22 VITALS
OXYGEN SATURATION: 98 % | HEIGHT: 63 IN | BODY MASS INDEX: 19.84 KG/M2 | WEIGHT: 112 LBS | HEART RATE: 73 BPM | SYSTOLIC BLOOD PRESSURE: 100 MMHG | DIASTOLIC BLOOD PRESSURE: 68 MMHG | TEMPERATURE: 97.3 F

## 2019-01-22 DIAGNOSIS — G40.009 PARTIAL IDIOPATHIC EPILEPSY WITH SEIZURES OF LOCALIZED ONSET, NOT INTRACTABLE, WITHOUT STATUS EPILEPTICUS (HCC): ICD-10-CM

## 2019-01-22 DIAGNOSIS — G40.219 PARTIAL SYMPTOMATIC EPILEPSY WITH COMPLEX PARTIAL SEIZURES, INTRACTABLE, WITHOUT STATUS EPILEPTICUS (HCC): ICD-10-CM

## 2019-01-22 PROCEDURE — 99214 OFFICE O/P EST MOD 30 MIN: CPT | Performed by: PSYCHIATRY & NEUROLOGY

## 2019-01-22 RX ORDER — ERGOCALCIFEROL 1.25 MG/1
50000 CAPSULE ORAL
Qty: 4 CAP | Refills: 11 | Status: SHIPPED
Start: 2019-01-22 | End: 2020-02-05

## 2019-01-22 RX ORDER — LEVETIRACETAM 500 MG/1
TABLET ORAL
Qty: 90 TAB | Refills: 11 | Status: SHIPPED | OUTPATIENT
Start: 2019-01-22 | End: 2019-09-13 | Stop reason: SDUPTHER

## 2019-01-22 RX ORDER — DIVALPROEX SODIUM 500 MG/1
TABLET, DELAYED RELEASE ORAL
Qty: 90 TAB | Refills: 11 | Status: SHIPPED | OUTPATIENT
Start: 2019-01-22 | End: 2019-09-13 | Stop reason: SDUPTHER

## 2019-01-22 ASSESSMENT — PATIENT HEALTH QUESTIONNAIRE - PHQ9
9. THOUGHTS THAT YOU WOULD BE BETTER OFF DEAD, OR OF HURTING YOURSELF: SEVERAL DAYS
7. TROUBLE CONCENTRATING ON THINGS, SUCH AS READING THE NEWSPAPER OR WATCHING TELEVISION: NOT AT ALL
3. TROUBLE FALLING OR STAYING ASLEEP OR SLEEPING TOO MUCH: NOT AT ALL
4. FEELING TIRED OR HAVING LITTLE ENERGY: SEVERAL DAYS
5. POOR APPETITE OR OVEREATING: MORE THAN HALF THE DAYS
1. LITTLE INTEREST OR PLEASURE IN DOING THINGS: NOT AT ALL
SUM OF ALL RESPONSES TO PHQ9 QUESTIONS 1 AND 2: 2
8. MOVING OR SPEAKING SO SLOWLY THAT OTHER PEOPLE COULD HAVE NOTICED. OR THE OPPOSITE, BEING SO FIGETY OR RESTLESS THAT YOU HAVE BEEN MOVING AROUND A LOT MORE THAN USUAL: NOT AT ALL
2. FEELING DOWN, DEPRESSED, IRRITABLE, OR HOPELESS: MORE THAN HALF THE DAYS

## 2019-01-22 NOTE — ASSESSMENT & PLAN NOTE
Pt has seizures more frequently at the time of her period. Pt states that she about 5 focal seizures prior to her menses. Pt questions about pregnancy hypothetically. Pt speaks [place] as her first language so I will have her see Dr. Urbano.

## 2019-01-22 NOTE — PROGRESS NOTES
CHIEF COMPLAINT  Chief Complaint   Patient presents with   • Follow-Up     partial idiopathic epilepsy wiht seizures       HPI  Umu Sapp is a 30 y.o. female who presents for treatment of her refractory epilepsy with questions about treatment options and cause of her seizures. Patient also complaining of muscle pain in her arms. Patient is not currently sexually active. Patient seizure pattern has been catamenial.  Epilepsy (HCC)  Pt has seizures more frequently at the time of her period. Pt states that she about 5 focal seizures prior to her menses. Pt questions about pregnancy hypothetically. Pt speaks Polish as her first language so I will have her see Dr. Urbano.    REVIEW OF SYSTEMS  Pertinent Positives:occasional headaches, fatigue, memory loss, anxiety, intermittent back pain.   All other systems are negative.     PAST MEDICAL HISTORY  Past Medical History:   Diagnosis Date   • DUB (dysfunctional uterine bleeding)    • Epilepsy (HCC) 11/4/2009    since infant.  sees Wilfrido/Codey at [facility].  Keppra/depakote.  absence siezures 1x/month-thinks iwth menstruation.   • GERD (gastroesophageal reflux disease)     gastritis-only with spicy foods   • Seizure disorder (HCC)        SOCIAL HISTORY  Social History     Social History   • Marital status: Single     Spouse name: N/A   • Number of children: N/A   • Years of education: N/A     Occupational History   • Not on file.     Social History Main Topics   • Smoking status: Never Smoker   • Smokeless tobacco: Never Used   • Alcohol use No   • Drug use: No   • Sexual activity: Not Currently      Comment: virginal     Other Topics Concern   • Not on file     Social History Narrative   • No narrative on file       SURGICAL HISTORY  Past Surgical History:   Procedure Laterality Date   • DEONDRE BY LAPAROSCOPY N/A 4/8/2018    Procedure: DEONDRE BY LAPAROSCOPY;  Surgeon: Chvaa Busby M.D.;  Location: SURGERY [facility];  Service: General   • ERCP  4/6/2018  "   Procedure: ERCP;  Surgeon: Osiel Paige M.D.;  Location: SURGERY [facility];  Service: Gastroenterology       CURRENT MEDICATIONS  Current Outpatient Prescriptions   Medication Sig Dispense Refill   • divalproex (DEPAKOTE) 500 MG Tablet Delayed Response Take one tablet by mouth every morning and 2 tables at bedtime 90 Tab 11   • levETIRAcetam (KEPPRA) 500 MG Tab 500 mg in the AM and 1000 mg at HS 90 Tab 11   • ergocalciferol (DRISDOL) 05828 UNIT capsule Take 1 Cap by mouth every 7 days. 4 Cap 11   • ferrous sulfate 325 (65 Fe) MG EC tablet Take 1 Tab by mouth 2 times a day. (Patient not taking: Reported on 1/22/2019) 60 Tab 2   • docusate sodium (COLACE) 100 MG Cap Take 1 Cap by mouth 2 times a day as needed for Constipation. (Patient not taking: Reported on 1/22/2019) 60 Cap 11     No current facility-administered medications for this visit.        ALLERGIES  Allergies   Allergen Reactions   • Nkda [No Known Drug Allergy]        PHYSICAL EXAM  VITAL SIGNS: /68 (BP Location: Left arm, Patient Position: Sitting, BP Cuff Size: Adult)   Pulse 73   Temp 36.3 °C (97.3 °F) (Temporal)   Ht 1.6 m (5' 3\")   Wt 50.8 kg (112 lb)   SpO2 98%   BMI 19.84 kg/m²   Constitutional: Well developed, Well nourished, No acute distress, Non-toxic appearance.   HENT: normocephalic, atraumatic  Eyes: fundi-disc sharp, perrl  Neck: Normal range of motion, No tenderness, Supple, No stridor.   Cardiovascular: Normal heart rate, Normal rhythm, No murmurs, No rubs, No gallops.   Thorax & Lungs: Normal breath sounds, No respiratory distress, No wheezing, No chest tenderness.   Abdomen: Bowel sounds normal, Soft, No tenderness, No masses, No pulsatile masses.   Skin: Warm, Dry, No erythema, No rash.   Back: No tenderness, No CVA tenderness.   Extremities: Intact distal pulses, No edema, No tenderness, No cyanosis, No clubbing.   Neurologic: A&Ox3, CN:2-12 intact, motor: normal strength tone and bulk, sensory intact, DTR " symmetric and 2+, gait and CB exam intact   Psychiatric: Affect normal, Judgment normal, Mood normal.   Lab: Reviewed with patient in detail and as noted in results.    EEG  Temporal lobe epilepsy    ASSESSMENT AND PLAN  1. Partial symptomatic epilepsy with complex partial seizures, intractable, without status epilepticus  Plan to add carnitor because of the depakote and counseled on contraception options for catamenial seizure and to avoid pregnancy.  Although we discussed pregnancy patient states that she does not have any plans to get pregnant at this time or likely in the future and she is not currently sexually active.  - levocarnitine (CARNITOR) 330 MG Tab; Take 1 Tab by mouth 2 times a day.  Dispense: 60 Tab; Refill: 11  - divalproex (DEPAKOTE) 500 MG Tablet Delayed Response; Take 500mg po qam and 1000mg po qhs.  Dispense: 90 Tab; Refill: 11  - levetiracetam (KEPPRA) 500 MG Tab; Take one in the am and two at night  Dispense: 90 Tab; Refill: 11  - Ativan given for breakthrough seizure      I discussed the importance of taking vitamin D supplementation for bone health and immune health which I instructed her how to do today.    Patient will follow up with Dr. Urbano as she is more comfortable speaking [place]olan and English.     I spent 30 minutes with this patient and her mother face to face,over fifty percent was spent counseling patient on their condition, best management practices, reviewing test results and risks and benefits of treatment.

## 2019-01-28 RX ORDER — LEVOCARNITINE 330 MG/1
330 TABLET ORAL 2 TIMES DAILY
Qty: 60 TAB | Refills: 11 | Status: SHIPPED | OUTPATIENT
Start: 2019-01-28 | End: 2019-09-13

## 2019-09-10 ENCOUNTER — HOSPITAL ENCOUNTER (OUTPATIENT)
Facility: MEDICAL CENTER | Age: 34
End: 2019-09-10
Attending: PHYSICIAN ASSISTANT
Payer: COMMERCIAL

## 2019-09-10 ENCOUNTER — OFFICE VISIT (OUTPATIENT)
Dept: URGENT CARE | Facility: PHYSICIAN GROUP | Age: 34
End: 2019-09-10
Payer: COMMERCIAL

## 2019-09-10 VITALS
HEART RATE: 78 BPM | RESPIRATION RATE: 18 BRPM | WEIGHT: 111 LBS | BODY MASS INDEX: 19.67 KG/M2 | HEIGHT: 63 IN | DIASTOLIC BLOOD PRESSURE: 64 MMHG | OXYGEN SATURATION: 98 % | SYSTOLIC BLOOD PRESSURE: 102 MMHG | TEMPERATURE: 97.2 F

## 2019-09-10 DIAGNOSIS — N30.00 ACUTE CYSTITIS WITHOUT HEMATURIA: ICD-10-CM

## 2019-09-10 DIAGNOSIS — K59.00 CONSTIPATION, UNSPECIFIED CONSTIPATION TYPE: ICD-10-CM

## 2019-09-10 LAB
APPEARANCE UR: CLEAR
BILIRUB UR STRIP-MCNC: NORMAL MG/DL
COLOR UR AUTO: NORMAL
GLUCOSE UR STRIP.AUTO-MCNC: NORMAL MG/DL
KETONES UR STRIP.AUTO-MCNC: >160 MG/DL
LEUKOCYTE ESTERASE UR QL STRIP.AUTO: NORMAL
NITRITE UR QL STRIP.AUTO: NORMAL
PH UR STRIP.AUTO: 6 [PH] (ref 5–8)
PROT UR QL STRIP: NORMAL MG/DL
RBC UR QL AUTO: NORMAL
SP GR UR STRIP.AUTO: >1.03
UROBILINOGEN UR STRIP-MCNC: NORMAL MG/DL

## 2019-09-10 PROCEDURE — 81002 URINALYSIS NONAUTO W/O SCOPE: CPT | Performed by: PHYSICIAN ASSISTANT

## 2019-09-10 PROCEDURE — 87086 URINE CULTURE/COLONY COUNT: CPT

## 2019-09-10 PROCEDURE — 99214 OFFICE O/P EST MOD 30 MIN: CPT | Performed by: PHYSICIAN ASSISTANT

## 2019-09-10 RX ORDER — NITROFURANTOIN 25; 75 MG/1; MG/1
100 CAPSULE ORAL EVERY 12 HOURS
Qty: 10 CAP | Refills: 0 | Status: SHIPPED | OUTPATIENT
Start: 2019-09-10 | End: 2019-09-13

## 2019-09-10 ASSESSMENT — ENCOUNTER SYMPTOMS
NAUSEA: 0
FEVER: 0
BACK PAIN: 1
SHORTNESS OF BREATH: 0
WEAKNESS: 1
CHILLS: 0
SINUS PAIN: 0
DIARRHEA: 0
HEADACHES: 1
DIZZINESS: 0
EYE REDNESS: 0
SORE THROAT: 0
CONSTIPATION: 0
COUGH: 0
FLANK PAIN: 1
MYALGIAS: 0
VOMITING: 0
ABDOMINAL PAIN: 1
EYE DISCHARGE: 0

## 2019-09-10 NOTE — PROGRESS NOTES
Subjective:      Umu Mcguire is a 34 y.o. female who presents with Abdominal Pain (abd pain x 1 week )            HPI   34-year-old female presents to urgent care with new problem of diffuse abdominal pain onset 1 week ago.  Abdominal pain worse with deep breath and after eating.  Positive associated constipation, patient's last bowel movement was yesterday.  Denies recent travel or camping   Denies pregnancy   Has not tried any OTC medications for symptoms   Hx of laparoscopic cholecystectomy done 4/8/2018 by Dr. Busby    Patient also complaining of urinary urgency and frequency.  She denies any hematuria or dysuria.  Denies fevers, nausea, or vomiting.    Patient also states she is worried of appendicitis secondary to her sister being treated for appendicitis in the last week.  Patient denies right lower quadrant abdominal pain.  Review of Systems   Constitutional: Negative for chills, fever and malaise/fatigue.   HENT: Negative for congestion, sinus pain and sore throat.    Eyes: Negative for discharge and redness.   Respiratory: Negative for cough and shortness of breath.    Cardiovascular: Negative for chest pain.   Gastrointestinal: Positive for abdominal pain. Negative for constipation, diarrhea, nausea and vomiting.   Genitourinary: Positive for flank pain, frequency and urgency. Negative for dysuria and hematuria.   Musculoskeletal: Positive for back pain. Negative for myalgias.   Neurological: Positive for weakness and headaches. Negative for dizziness.   Endo/Heme/Allergies: Negative for environmental allergies.       Past Medical History:   Diagnosis Date   • DUB (dysfunctional uterine bleeding)    • Epilepsy (HCC) 11/4/2009    since infant.  sees Wilfrido/Codey at [facility].  Keppra/depakote.  absence siezures 1x/month-thinks iwth menstruation.   • GERD (gastroesophageal reflux disease)     gastritis-only with spicy foods   • Seizure disorder (HCC)      Current Outpatient Medications on File Prior  "to Visit   Medication Sig Dispense Refill   • divalproex (DEPAKOTE) 500 MG Tablet Delayed Response Take one tablet by mouth every morning and 2 tables at bedtime 90 Tab 11   • levETIRAcetam (KEPPRA) 500 MG Tab 500 mg in the AM and 1000 mg at HS 90 Tab 11   • ergocalciferol (DRISDOL) 13289 UNIT capsule Take 1 Cap by mouth every 7 days. 4 Cap 11   • levOCARNitine (CARNITOR) 330 MG Tab Take 1 Tab by mouth 2 times a day. 60 Tab 11   • ferrous sulfate 325 (65 Fe) MG EC tablet Take 1 Tab by mouth 2 times a day. (Patient not taking: Reported on 1/22/2019) 60 Tab 2   • docusate sodium (COLACE) 100 MG Cap Take 1 Cap by mouth 2 times a day as needed for Constipation. (Patient not taking: Reported on 1/22/2019) 60 Cap 11     No current facility-administered medications on file prior to visit.      Allergies   Allergen Reactions   • Nkda [No Known Drug Allergy]      Social History     Tobacco Use   • Smoking status: Never Smoker   • Smokeless tobacco: Never Used   Substance Use Topics   • Alcohol use: No      Objective:     /64 (BP Location: Right arm, Patient Position: Sitting, BP Cuff Size: Adult)   Pulse 78   Temp 36.2 °C (97.2 °F) (Temporal)   Resp 18   Ht 1.6 m (5' 3\")   Wt 50.3 kg (111 lb)   LMP 08/10/2018 (Exact Date)   SpO2 98%   BMI 19.66 kg/m²      Physical Exam   Constitutional: She is oriented to person, place, and time. She appears well-developed and well-nourished. No distress.   HENT:   Head: Normocephalic and atraumatic.   Nose: Nose normal.   Mouth/Throat: Oropharynx is clear and moist and mucous membranes are normal.   Eyes: Conjunctivae and EOM are normal.   Neck: Normal range of motion. Neck supple.   Cardiovascular: Normal rate, regular rhythm and normal heart sounds.   Pulmonary/Chest: Effort normal and breath sounds normal. No respiratory distress.   Abdominal: Soft. Bowel sounds are normal. She exhibits no distension. There is tenderness in the suprapubic area. There is CVA tenderness. " There is no rigidity, no rebound, no guarding, no tenderness at McBurney's point and negative Pendleton's sign.   Musculoskeletal: Normal range of motion.   Lymphadenopathy:     She has no cervical adenopathy.   Neurological: She is alert and oriented to person, place, and time.   Skin: Skin is warm and dry.   Psychiatric: She has a normal mood and affect. Her behavior is normal. Judgment and thought content normal.   Vitals reviewed.              Assessment/Plan:     1. Acute cystitis without hematuria  Urine Culture    nitrofurantoin monohyd macro (MACROBID) 100 MG Cap    POCT Urinalysis   2. Constipation, unspecified constipation type       Recommend OTC Metamucil and increase p.o. fluids for symptomatic treatment of constipation.  Urine culture sent, will follow-up pending results for any change in antibiotic treatment indicated.  Recommend that patient follow-up with primary care provider for any persistence or worsening of symptoms.  Discussed with patient at length no indication for abdominal x-ray or further medical evaluation at this time.  Advised patient to return for further evaluation if any development of worsening abdominal pain, fevers, or nausea/vomiting.  Patient denies any right lower quadrant abdominal tenderness.   was used for this visit.

## 2019-09-12 LAB
BACTERIA UR CULT: NORMAL
SIGNIFICANT IND 70042: NORMAL
SITE SITE: NORMAL
SOURCE SOURCE: NORMAL

## 2019-09-13 ENCOUNTER — OFFICE VISIT (OUTPATIENT)
Dept: NEUROLOGY | Facility: MEDICAL CENTER | Age: 34
End: 2019-09-13
Payer: COMMERCIAL

## 2019-09-13 VITALS
RESPIRATION RATE: 14 BRPM | WEIGHT: 112 LBS | DIASTOLIC BLOOD PRESSURE: 62 MMHG | OXYGEN SATURATION: 98 % | BODY MASS INDEX: 20.61 KG/M2 | HEART RATE: 64 BPM | HEIGHT: 62 IN | SYSTOLIC BLOOD PRESSURE: 102 MMHG | TEMPERATURE: 97.3 F

## 2019-09-13 DIAGNOSIS — G40.509 CATAMENIAL EPILEPSY (HCC): ICD-10-CM

## 2019-09-13 DIAGNOSIS — Z13.31 SCREENING FOR DEPRESSION: ICD-10-CM

## 2019-09-13 DIAGNOSIS — Z30.09 ENCOUNTER FOR COUNSELING REGARDING CONTRACEPTION: ICD-10-CM

## 2019-09-13 DIAGNOSIS — R41.3 MEMORY LOSS: ICD-10-CM

## 2019-09-13 DIAGNOSIS — G40.109 LOCALIZATION-RELATED EPILEPSY (HCC): ICD-10-CM

## 2019-09-13 DIAGNOSIS — Z30.09 ENCOUNTER FOR FEMALE FAMILY PLANNING COUNSELING: ICD-10-CM

## 2019-09-13 PROCEDURE — 99215 OFFICE O/P EST HI 40 MIN: CPT | Performed by: NURSE PRACTITIONER

## 2019-09-13 RX ORDER — LORAZEPAM 0.5 MG/1
0.5 TABLET ORAL EVERY 4 HOURS PRN
COMMUNITY
End: 2019-09-26

## 2019-09-13 RX ORDER — LEVETIRACETAM 500 MG/1
TABLET ORAL
Qty: 90 TAB | Refills: 11 | Status: SHIPPED | OUTPATIENT
Start: 2019-09-13 | End: 2019-12-17 | Stop reason: SDUPTHER

## 2019-09-13 RX ORDER — DIVALPROEX SODIUM 500 MG/1
TABLET, DELAYED RELEASE ORAL
Qty: 60 TAB | Refills: 11 | Status: SHIPPED | OUTPATIENT
Start: 2019-09-13 | End: 2019-09-26

## 2019-09-13 NOTE — PROGRESS NOTES
Chief Complaint   Patient presents with   • New Patient     Partial idiopathic epilepsy with seizures of localized onset, not intractable, without status epilepticus        Problem List Items Addressed This Visit     None      Visit Diagnoses     Localization-related epilepsy (HCC)        Relevant Medications    levETIRAcetam (KEPPRA) 500 MG Tab    divalproex (DEPAKOTE) 500 MG Tablet Delayed Response    Other Relevant Orders    REFERRAL TO NEURODIAGNOSTICS (EEG,EP,EMG/NCS/DBS) Modality Requested: EEG (routine)    CBC WITH DIFFERENTIAL    Comp Metabolic Panel    VALPROIC ACID    KEPPRA    AMMONIA    Screening for depression        Encounter for female family planning counseling        Encounter for counseling regarding contraception        Memory loss        Relevant Orders    VITAMIN B12    VITAMIN B6          History of present illness:  Umu Mcguire 34 y.o. female presents today with mom, Yudy,  for seizure follow-up.  Former patient of Dr. Bloch.  She last saw patient in January 2019    Mom reports that pt had a febrile seizure at 9 months. She has not had any spell until 7 years old. Mom reports it was a convulsive spell that lasted for 5-6mins. Pt reports that she was having GTC spells every month and more frequent around her menses. These spells would last for 2-3mins. She also reports having mini spells where she rolls her eyes, lip smacking, and clenches her fists then passes out. These would last for 10sec. She gets confused and tired to the point that she needs to take a nap afterwards. Denies any aura. No specific trigger. Spells are not clustering. Last GTC was on 5/17/2018 and the mini spell was on 9/11/2019.     She is currently taking Depakote 500 mg BID and Keppra 500 mg, 1 tab in a.m. and 2 tabs at night. No side effects. Denies any tremors or mood changes. Compliant. She was not on any other AED's in the past.     Mood is stable. Not suicidal or homicidal.     C/o short term memory loss.  Relies on her mom to provide history.     Had EEG done before. No MRI brain     No family hx of epilepsy. No TBI.    Not drinking alcohol. Not smoking cigarettes. No recreational drug use.    She is not sexually active. Does not want to get pregnant. Not on birth control.     Not driving.     Taking Vit D weekly      Seizure onset: 9months    Seizure semiology: generalized convulsion, rolling eyes, lip smacking, passing out    Seizure types: generalized? Focal? Catamenial?    Last seizure: 9/11/2019    Past AED’s: none    Current AED regimen: Depakote 500 mg, 1 tab in a.m. and 2 tabs at night; Keppra 500 mg, 1 tab in a.m. and 2 tabs at night    Prior neurologists: Dr. Bloch    Prior EEG’s: Yes    Prior brain imaging: YES    Driving status: no    School/work status: no      Past medical history:   Past Medical History:   Diagnosis Date   • DUB (dysfunctional uterine bleeding)    • Epilepsy (HCC) 11/4/2009    since infant.  sees Wilfrido/Codey at [facility].  Keppra/depakote.  absence siezures 1x/month-thinks iwth menstruation.   • GERD (gastroesophageal reflux disease)     gastritis-only with spicy foods   • Seizure disorder (HCC)        Past surgical history:   Past Surgical History:   Procedure Laterality Date   • DEONDRE BY LAPAROSCOPY N/A 4/8/2018    Procedure: DEONDRE BY LAPAROSCOPY;  Surgeon: Chava Busby M.D.;  Location: SURGERY [facility];  Service: General   • ERCP  4/6/2018    Procedure: ERCP;  Surgeon: Osiel Paige M.D.;  Location: SURGERY [facility];  Service: Gastroenterology       Family history:   Family History   Problem Relation Age of Onset   • Cancer Neg Hx    • Diabetes Neg Hx    • Stroke Neg Hx        Social history:   Social History     Socioeconomic History   • Marital status: Single     Spouse name: Not on file   • Number of children: Not on file   • Years of education: Not on file   • Highest education level: Not on file   Occupational History   • Not on file   Social Needs    • Financial resource strain: Not on file   • Food insecurity:     Worry: Not on file     Inability: Not on file   • Transportation needs:     Medical: Not on file     Non-medical: Not on file   Tobacco Use   • Smoking status: Never Smoker   • Smokeless tobacco: Never Used   Substance and Sexual Activity   • Alcohol use: No   • Drug use: No   • Sexual activity: Not Currently     Comment: virginal   Lifestyle   • Physical activity:     Days per week: Not on file     Minutes per session: Not on file   • Stress: Not on file   Relationships   • Social connections:     Talks on phone: Not on file     Gets together: Not on file     Attends [employer] service: Not on file     Active member of club or organization: Not on file     Attends meetings of clubs or organizations: Not on file     Relationship status: Not on file   • Intimate partner violence:     Fear of current or ex partner: Not on file     Emotionally abused: Not on file     Physically abused: Not on file     Forced sexual activity: Not on file   Other Topics Concern   • Not on file   Social History Narrative   • Not on file       Current medications:   Current Outpatient Medications   Medication   • nitrofurantoin monohyd macro (MACROBID) 100 MG Cap   • levOCARNitine (CARNITOR) 330 MG Tab   • divalproex (DEPAKOTE) 500 MG Tablet Delayed Response   • levETIRAcetam (KEPPRA) 500 MG Tab   • ergocalciferol (DRISDOL) 74001 UNIT capsule   • ferrous sulfate 325 (65 Fe) MG EC tablet   • docusate sodium (COLACE) 100 MG Cap     No current facility-administered medications for this visit.        Medication Allergy:  Allergies   Allergen Reactions   • Nkda [No Known Drug Allergy]          Review of systems:     General: Denies fevers or chills, or nightsweats, or generalized fatigue.    Head: Denies headaches or dizziness or lightheadedness  EENT: Denies vision changes, vision loss or pain, nasal secretion, nasal bleeding, difficulty swallowing, hearing loss, tinnitus,  "vertigo, ear pain  Respiratory: Denies shortness of breath, cough, sputum, or wheezing  Cardiac: Denies chest pain, palpitations, edema or syncope  Gastrointestinal: Denies nausea, vomiting, no abdominal pain or change in bowel habits, no melena or hematochezia  Urinary: Denies dysuria, frequency, hesitancy, or incontinence.  Dermatologic:  Denies new rash  Musculoskeletal: Denies muscle pain or swelling, no atrophy, no neck and back pain or stiffness.   Neurologic: Denies facial droopiness, muscle weakness (focal or generalized), paresthesias, ataxia, change in speech or language   Psychiatric: Denies anxiety, depression, mood swings, suicidal or homicidal thoughts       Physical examination:   Vitals:    09/13/19 1030   BP: 102/62   BP Location: Left arm   Patient Position: Sitting   BP Cuff Size: Adult   Pulse: 64   Resp: 14   Temp: 36.3 °C (97.3 °F)   TempSrc: Temporal   SpO2: 98%   Weight: 50.8 kg (112 lb)   Height: 1.575 m (5' 2\")     General: Patient in no acute distress, pleasant and cooperative.  HEENT: Normocephalic, no signs of acute trauma.   Neck: Supple. There is normal range of motion.   Resp: clear to auscultation bilaterally. No wheezes or crackles.   CV: RRR, no murmurs.   Skin: no signs of acute rashes or trauma.   Musculoskeletal: joints exhibit full range of motion, without any pain to palpation. There are no signs of joint or muscle swelling. There is no tenderness to deep palpation of muscles.   Psychiatric: No hallucinatory behavior. No symptoms of depression or suicidal ideation. Mood and affect appear normal on exam.     NEUROLOGICAL EXAM:   Mental status, orientation: Awake, alert and fully oriented.   Speech and language: speech is clear and fluent. The patient is able to name, repeat and comprehend.   Memory: There is deficient recollection of recent and remote events.   Cranial nerve exam:   CN I: Not examined   CN II: PERRL.   CN III, IV, VI: EOMI; no nystagmus   CN V: Facial sensation " intact bilaterally   CN VII: face symmetric   CN VIII: hearing intact to finger rub bilaterally   CN IX, X: palate elevates symmetrically   CN XI: Symmetric shoulder shrug  CN XII: tongue midline. No signs of tongue biting or fasciculations   Motor exam: Strength is 5/5 in all extremities. Tone is normal. No abnormal movements were seen on exam.   Sensory exam reveals normal sense of light touch, in all extremities.   Deep tendon reflexes:  2+ throughout. Plantar responses are flexor. There is no clonus.   Coordination: shows a normal finger-nose-finger. Normal rapidly alternating movements.   Gait: The patient was able to get up from seated position on first attempt without requiring assistance. Found to be steady when walking. Movements were fluid with normal arm swing. The patient was able to turn without difficulties or tendency to fall. Romberg exam mildly swaying      ANCILLARY DATA REVIEWED:   Reviewed Dr. Bloch's note    Lab Data Review:  Reviewed in chart. No recent lab work    Records reviewed:   Reviewed in chart.    Imaging:   CT head 4/25/2013  1. No acute intracranial abnormality.  2. Frontal scalp hematoma.    EEG:  EEG April 2008  IMPRESSION:  Ambulatory electroencephalogram recording is abnormalwith the appearance of rare generalized sharp activity and left temporal sharp wave activity.  Both noted during sleep only.  No definitive epileptogenic discharges are noted, but clinical correlation is warranted for possible focal and generalized seizure disorder.  Again, no definite epileptogenic discharges are noted.    VEEG July 2014  IMPRESSION:  This is an abnormal electroencephalogram  due to the presence of frequent epileptiform potentials in drowsiness and exacerbated by hyperventilation.  No clinical seizures noted.        ASSESSMENT AND PLAN:    1. Localization-related epilepsy (HCC)  - REFERRAL TO NEURODIAGNOSTICS (EEG,EP,EMG/NCS/DBS) Modality Requested: EEG (routine)  - CBC WITH DIFFERENTIAL;  "Future  - Comp Metabolic Panel; Future  - VALPROIC ACID; Future  - KEPPRA; Future  - AMMONIA; Future  - levETIRAcetam (KEPPRA) 500 MG Tab; 500 mg in the AM and 1000 mg at HS  Dispense: 90 Tab; Refill: 11  - divalproex (DEPAKOTE) 500 MG Tablet Delayed Response; Take one tablet by mouth every morning and 2 tables at bedtime  Dispense: 60 Tab; Refill: 11    2. Screening for depression    3. Encounter for female family planning counseling    4. Encounter for counseling regarding contraception    5. Memory loss  - VITAMIN B12; Future  - VITAMIN B6; Future            CLINICAL DISCUSSION:  Hx of febrile seizure in infancy. Has long history of seizures in childhood and spells were occurring monthly since she was 7 years old. She has up to 6 spells around her menses. Catamenial? The convulsion type spell are infrequent since she was started on AED but the \"mini spells\" of lip smacking, eyes rolling, fists clenching and passing out are very frequent. Her previous EEG's were abnormal. CT head was unremarkable. No MRI brain. Last spell was on 2019. Her last convulsive spell was in 2018.      No family hx of epilepsy. No TBI hx.     Not drinking alcohol. Not smoking cigarettes. No recreational drug use.    Mood is good. No suicidal or homicidal thoughts.     C/o memory loss likely related to her seizures. Will obtain lab work.     Pt does not plan on conceiving as of now. Discussed the importance of dual contraception preferably using copper IUD and condom for the partner as AED's can decrease the effectiveness of OCP's. She is aware of the risk of pregnancy complications while taking AED's which include congenital defects, abnormal fetal growth, , etc. She is not currently sexually active and not on birth control. Refused referral to GYN. Refused to take folic acid.       Past AED's: none    Current AED's: Depakote 500 mg BID, Keppra 500 mg, 1 tab in a.m. and 2 tabs at night. No side effects. " Compliant      Plan:  -Routine EEG. May need a longer study in the future to better characterize spells.     -Depending on EEG result, she may need a brain MRI.     -Continue current medications for now, may need adjustment in the future if spells are still happening frequently.    - Discussed avoidance of spell/sz triggers: alcohol, sleep deprivation, and stress.    - Discussed Vit D supplementation.  Currently on weekly supplementation.    - Discussed driving restrictions. Pt does not drive.     -Labs to be checked for next appointment: CBC, CMP, VPA, Keppra, Vit B12, Vit B6, Ammonia              FOLLOW-UP:   Return in about 3 months (around 12/13/2019).           EDUCATION AND COUNSELING:  -Education was provided to the patient and/or family regarding diagnosis and prognosis. The chronic and unpredictable nature of the condition were discussed. There is increased risk for additional events, which may carry potential for significant injuries and death. Discussed frequent seizure triggers: sleep deprivation, medication non-compliance, use of illegal drugs/alcohol, stress, and others.   -We reviewed in detail the current antiepileptic regimen. Potential side effects of antiepileptics were discussed at length, including but no limited to: hypersensitivity reactions (rash and others, some of which can be fatal), visual field changes (some of which may be irreversible), glaucoma, diplopia, kidney stones, osteopenia/osteoporosis/bone fractures, hyperthermia/anhydrosis, hyponatremia, tremors/abnormal movements, ataxia, dizziness, fatigue, increased risk for falls, risk for cardiac arrhythmias/syncope, gastrointestinal side effects(hepatitis, pancreatitis, gastritis, ulcers), gingival hypertrophy/bleeding, drowsiness, sedation, anxiety/nervousness, increased risk for suicide, increased risk for depression, and psychosis.   -We also reviewed drug-drug interactions and their potential effect on seizure control and  medication side effects.    -We also discussed in detail potential effects of seizures, epilepsy, and medications during pregnancy, including but not limited to fetal malformations, child developmental/intellectual disability, fetal/ risk for hemorrhages, stillbirth, maternal death, premature birth, and others. The patient/family aware that pregnancy should be avoided, unless desired, in which case we recommend discussing with us at least a year prior to planned conception. To avoid undesired pregnancy while on antiepileptics, we recommend dual contraception.   -Folic acid 2 mg is recommended for all females in childbearing age (12-44 years of age).   -Recommend chronic vitamin D supplementation and regular exercise (if not contraindicated).   -Patient/family educated on risk for SUDEP (Sudden Death in Epilepsy). Counseling was provided on the importance of strict medication and follow up compliance. The patient/family understand the risks associated with non-adherence with the medical plan as outlined, including but not limited to an increased risk for breakthrough seizures, which may contribute to injuries, disability, status epilepticus, and even death.   -Counseling was also provided on potential effects of alcohol and other drugs, which may lower seizure threshold and/or affect the metabolism of antiepileptic drugs. We recommend avoidance of alcohol and illegal drugs.  -Avoid sleep deprivation.   -We extensively discussed the aspects related to safety in drivers who suffer from epilepsy. The patient is encourage to report to the Division of Motor Vehicles of any condition and/or spells related to confusion, disorientation, and/or loss of awareness and/or loss of consciousness; as these may pose a safety issue if they occur while operating a motor vehicle. The patient and/or family are ultimately responsible for exercising caution and abiding to regulations in place.   -Other seizure precautions were  discussed at length, including no diving, no skydiving, no climbing or exposure to unprotected heights, no unsupervised swimming, no Jacuzzi or bathing in bathtubs or deep bodies of water. The patient/family have been advised about risks for operating any machinery while suffering from seizures / syncope / epilepsy and/or while taking antiepileptic drugs.   -The patient understands and agrees that due to the complexity of his/her diagnosis, results of any testing and further recommendations will typically be discussed/made during a face to face encounter in my office. The patient and/or family further understands it is their responsibility to keep proper follow up.     Patient/family agree with plan, as outlined.         Nakai Gerber, MSN, APRN, FNP-C  [facility]  Office: [phone]  Fax: [phone]

## 2019-09-26 ENCOUNTER — HOSPITAL ENCOUNTER (EMERGENCY)
Facility: MEDICAL CENTER | Age: 34
End: 2019-09-26
Attending: EMERGENCY MEDICINE
Payer: COMMERCIAL

## 2019-09-26 ENCOUNTER — APPOINTMENT (OUTPATIENT)
Dept: RADIOLOGY | Facility: MEDICAL CENTER | Age: 34
End: 2019-09-26
Attending: EMERGENCY MEDICINE
Payer: COMMERCIAL

## 2019-09-26 VITALS
RESPIRATION RATE: 18 BRPM | TEMPERATURE: 96.9 F | WEIGHT: 112 LBS | BODY MASS INDEX: 19.12 KG/M2 | SYSTOLIC BLOOD PRESSURE: 96 MMHG | HEIGHT: 64 IN | HEART RATE: 67 BPM | DIASTOLIC BLOOD PRESSURE: 63 MMHG | OXYGEN SATURATION: 97 %

## 2019-09-26 DIAGNOSIS — R10.13 EPIGASTRIC PAIN: ICD-10-CM

## 2019-09-26 LAB
ALBUMIN SERPL BCP-MCNC: 4.3 G/DL (ref 3.2–4.9)
ALBUMIN/GLOB SERPL: 1.3 G/DL
ALP SERPL-CCNC: 85 U/L (ref 30–99)
ALT SERPL-CCNC: 31 U/L (ref 2–50)
ANION GAP SERPL CALC-SCNC: 19 MMOL/L (ref 0–11.9)
ANISOCYTOSIS BLD QL SMEAR: ABNORMAL
APPEARANCE UR: CLEAR
AST SERPL-CCNC: 62 U/L (ref 12–45)
BASOPHILS # BLD AUTO: 0 % (ref 0–1.8)
BASOPHILS # BLD: 0 K/UL (ref 0–0.12)
BILIRUB SERPL-MCNC: 0.4 MG/DL (ref 0.1–1.5)
BILIRUB UR QL STRIP.AUTO: NEGATIVE
BUN SERPL-MCNC: 9 MG/DL (ref 8–22)
CALCIUM SERPL-MCNC: 9.5 MG/DL (ref 8.4–10.2)
CHLORIDE SERPL-SCNC: 101 MMOL/L (ref 96–112)
CO2 SERPL-SCNC: 19 MMOL/L (ref 20–33)
COLOR UR: YELLOW
CREAT SERPL-MCNC: 0.63 MG/DL (ref 0.5–1.4)
EOSINOPHIL # BLD AUTO: 0.09 K/UL (ref 0–0.51)
EOSINOPHIL NFR BLD: 1 % (ref 0–6.9)
ERYTHROCYTE [DISTWIDTH] IN BLOOD BY AUTOMATED COUNT: 42.5 FL (ref 35.9–50)
GLOBULIN SER CALC-MCNC: 3.4 G/DL (ref 1.9–3.5)
GLUCOSE SERPL-MCNC: 98 MG/DL (ref 65–99)
GLUCOSE UR STRIP.AUTO-MCNC: NEGATIVE MG/DL
HCG SERPL QL: NEGATIVE
HCT VFR BLD AUTO: 35.4 % (ref 37–47)
HGB BLD-MCNC: 11.5 G/DL (ref 12–16)
KETONES UR STRIP.AUTO-MCNC: ABNORMAL MG/DL
LACTATE BLD-SCNC: 1.3 MMOL/L (ref 0.5–2)
LEUKOCYTE ESTERASE UR QL STRIP.AUTO: NEGATIVE
LIPASE SERPL-CCNC: 53 U/L (ref 7–58)
LYMPHOCYTES # BLD AUTO: 3.33 K/UL (ref 1–4.8)
LYMPHOCYTES NFR BLD: 37 % (ref 22–41)
MANUAL DIFF BLD: NORMAL
MCH RBC QN AUTO: 26.9 PG (ref 27–33)
MCHC RBC AUTO-ENTMCNC: 32.5 G/DL (ref 33.6–35)
MCV RBC AUTO: 82.9 FL (ref 81.4–97.8)
MICRO URNS: ABNORMAL
MONOCYTES # BLD AUTO: 0.27 K/UL (ref 0–0.85)
MONOCYTES NFR BLD AUTO: 3 % (ref 0–13.4)
NEUTROPHILS # BLD AUTO: 5.31 K/UL (ref 2–7.15)
NEUTROPHILS NFR BLD: 59 % (ref 44–72)
NITRITE UR QL STRIP.AUTO: NEGATIVE
NRBC # BLD AUTO: 0 K/UL
NRBC BLD-RTO: 0 /100 WBC
PH UR STRIP.AUTO: 7 [PH] (ref 5–8)
PLATELET # BLD AUTO: 362 K/UL (ref 164–446)
PLATELET BLD QL SMEAR: NORMAL
PMV BLD AUTO: 9.9 FL (ref 9–12.9)
POTASSIUM SERPL-SCNC: 3.4 MMOL/L (ref 3.6–5.5)
PROT SERPL-MCNC: 7.7 G/DL (ref 6–8.2)
PROT UR QL STRIP: NEGATIVE MG/DL
RBC # BLD AUTO: 4.27 M/UL (ref 4.2–5.4)
RBC BLD AUTO: PRESENT
RBC UR QL AUTO: NEGATIVE
SODIUM SERPL-SCNC: 139 MMOL/L (ref 135–145)
SP GR UR STRIP.AUTO: 1.01
WBC # BLD AUTO: 9 K/UL (ref 4.8–10.8)

## 2019-09-26 PROCEDURE — 700111 HCHG RX REV CODE 636 W/ 250 OVERRIDE (IP): Performed by: EMERGENCY MEDICINE

## 2019-09-26 PROCEDURE — 80053 COMPREHEN METABOLIC PANEL: CPT

## 2019-09-26 PROCEDURE — A9270 NON-COVERED ITEM OR SERVICE: HCPCS | Performed by: EMERGENCY MEDICINE

## 2019-09-26 PROCEDURE — 96376 TX/PRO/DX INJ SAME DRUG ADON: CPT

## 2019-09-26 PROCEDURE — 700102 HCHG RX REV CODE 250 W/ 637 OVERRIDE(OP): Performed by: EMERGENCY MEDICINE

## 2019-09-26 PROCEDURE — 83605 ASSAY OF LACTIC ACID: CPT

## 2019-09-26 PROCEDURE — 96375 TX/PRO/DX INJ NEW DRUG ADDON: CPT

## 2019-09-26 PROCEDURE — 81003 URINALYSIS AUTO W/O SCOPE: CPT

## 2019-09-26 PROCEDURE — 700105 HCHG RX REV CODE 258: Performed by: EMERGENCY MEDICINE

## 2019-09-26 PROCEDURE — 700117 HCHG RX CONTRAST REV CODE 255: Performed by: EMERGENCY MEDICINE

## 2019-09-26 PROCEDURE — 99285 EMERGENCY DEPT VISIT HI MDM: CPT

## 2019-09-26 PROCEDURE — 83690 ASSAY OF LIPASE: CPT

## 2019-09-26 PROCEDURE — 85027 COMPLETE CBC AUTOMATED: CPT

## 2019-09-26 PROCEDURE — 85007 BL SMEAR W/DIFF WBC COUNT: CPT

## 2019-09-26 PROCEDURE — 96374 THER/PROPH/DIAG INJ IV PUSH: CPT

## 2019-09-26 PROCEDURE — 74177 CT ABD & PELVIS W/CONTRAST: CPT

## 2019-09-26 PROCEDURE — 84703 CHORIONIC GONADOTROPIN ASSAY: CPT

## 2019-09-26 RX ORDER — MORPHINE SULFATE 4 MG/ML
4 INJECTION, SOLUTION INTRAMUSCULAR; INTRAVENOUS ONCE
Status: COMPLETED | OUTPATIENT
Start: 2019-09-26 | End: 2019-09-26

## 2019-09-26 RX ORDER — ONDANSETRON 2 MG/ML
4 INJECTION INTRAMUSCULAR; INTRAVENOUS ONCE
Status: COMPLETED | OUTPATIENT
Start: 2019-09-26 | End: 2019-09-26

## 2019-09-26 RX ORDER — POLYETHYLENE GLYCOL 3350 17 G/17G
17 POWDER, FOR SOLUTION ORAL DAILY
Qty: 3 EACH | Refills: 0 | Status: SHIPPED
Start: 2019-09-26 | End: 2020-02-05

## 2019-09-26 RX ORDER — SODIUM CHLORIDE 9 MG/ML
1000 INJECTION, SOLUTION INTRAVENOUS ONCE
Status: COMPLETED | OUTPATIENT
Start: 2019-09-26 | End: 2019-09-26

## 2019-09-26 RX ORDER — DIVALPROEX SODIUM 500 MG/1
500 TABLET, DELAYED RELEASE ORAL 2 TIMES DAILY
Status: SHIPPED | COMMUNITY
End: 2020-06-02 | Stop reason: SDUPTHER

## 2019-09-26 RX ORDER — FAMOTIDINE 20 MG/1
20 TABLET, FILM COATED ORAL 2 TIMES DAILY
Qty: 30 TAB | Refills: 0 | Status: SHIPPED
Start: 2019-09-26 | End: 2020-02-05

## 2019-09-26 RX ORDER — IODIXANOL 270 MG/ML
100 INJECTION, SOLUTION INTRAVASCULAR ONCE
Status: DISCONTINUED | OUTPATIENT
Start: 2019-09-26 | End: 2019-09-26 | Stop reason: HOSPADM

## 2019-09-26 RX ADMIN — LIDOCAINE HYDROCHLORIDE 30 ML: 20 SOLUTION OROPHARYNGEAL at 06:30

## 2019-09-26 RX ADMIN — ONDANSETRON 4 MG: 2 INJECTION INTRAMUSCULAR; INTRAVENOUS at 07:00

## 2019-09-26 RX ADMIN — SODIUM CHLORIDE 1000 ML: 9 INJECTION, SOLUTION INTRAVENOUS at 07:00

## 2019-09-26 RX ADMIN — IOHEXOL 100 ML: 350 INJECTION, SOLUTION INTRAVENOUS at 06:14

## 2019-09-26 RX ADMIN — MORPHINE SULFATE 4 MG: 4 INJECTION INTRAVENOUS at 05:42

## 2019-09-26 RX ADMIN — ONDANSETRON 4 MG: 2 INJECTION INTRAMUSCULAR; INTRAVENOUS at 05:42

## 2019-09-26 ASSESSMENT — PAIN DESCRIPTION - DESCRIPTORS: DESCRIPTORS: ACHING;THROBBING

## 2019-09-26 NOTE — ED NOTES
Med Rec completed per patient with medication bottles (returned)  Allergies reviewed  No ORAL antibiotics in last 14 days

## 2019-09-26 NOTE — ED NOTES
Rounded on pt. Updated on POC. Pt with call light in reach. Pt instructed to call for assistance. No needs at this time. WCTM.

## 2019-09-26 NOTE — ED NOTES
Pt ready to be dc. Upon giving pt dc instructions, pt requesting .  used. Pt with questions regarding results and requesting to speak with ERP. ERP notified and awaiting ERP to bedside.

## 2019-09-26 NOTE — ED NOTES
D/c pt home,2 rx given directly to pharmacy  . Pt aware of f/u instructions , aware to return for any changes or concerns. No further questions upon d/c home from ed

## 2019-09-26 NOTE — ED NOTES
Rounded on pt. Pt states she had some nausea 15 min ago, but nausea has passed. ERP at bedside and aware.

## 2019-09-26 NOTE — ED PROVIDER NOTES
ED Provider Note    This patient was signed out to me at 6 AM.  Patient has had severe abdominal pain intermittently overnight as well as some constipation intermittently.  She is had a cholecystectomy.  Patient had blood work obtained which was unremarkable.  CT scan was done which is pending at 6 AM.    CT-ABDOMEN-PELVIS WITH   Final Result      1.  Negative contrast-enhanced CT of the abdomen and pelvis.      2.  Cholecystectomy.        CT scan did not show any acute abnormality.  Patient was reassessed.    Results for orders placed or performed during the hospital encounter of 09/26/19   CBC WITH DIFFERENTIAL   Result Value Ref Range    WBC 9.0 4.8 - 10.8 K/uL    RBC 4.27 4.20 - 5.40 M/uL    Hemoglobin 11.5 (L) 12.0 - 16.0 g/dL    Hematocrit 35.4 (L) 37.0 - 47.0 %    MCV 82.9 81.4 - 97.8 fL    MCH 26.9 (L) 27.0 - 33.0 pg    MCHC 32.5 (L) 33.6 - 35.0 g/dL    RDW 42.5 35.9 - 50.0 fL    Platelet Count 362 164 - 446 K/uL    MPV 9.9 9.0 - 12.9 fL    Neutrophils-Polys 59.00 44.00 - 72.00 %    Lymphocytes 37.00 22.00 - 41.00 %    Monocytes 3.00 0.00 - 13.40 %    Eosinophils 1.00 0.00 - 6.90 %    Basophils 0.00 0.00 - 1.80 %    Nucleated RBC 0.00 /100 WBC    Neutrophils (Absolute) 5.31 2.00 - 7.15 K/uL    Lymphs (Absolute) 3.33 1.00 - 4.80 K/uL    Monos (Absolute) 0.27 0.00 - 0.85 K/uL    Eos (Absolute) 0.09 0.00 - 0.51 K/uL    Baso (Absolute) 0.00 0.00 - 0.12 K/uL    NRBC (Absolute) 0.00 K/uL    Anisocytosis 1+    COMP METABOLIC PANEL   Result Value Ref Range    Sodium 139 135 - 145 mmol/L    Potassium 3.4 (L) 3.6 - 5.5 mmol/L    Chloride 101 96 - 112 mmol/L    Co2 19 (L) 20 - 33 mmol/L    Anion Gap 19.0 (H) 0.0 - 11.9    Glucose 98 65 - 99 mg/dL    Bun 9 8 - 22 mg/dL    Creatinine 0.63 0.50 - 1.40 mg/dL    Calcium 9.5 8.4 - 10.2 mg/dL    AST(SGOT) 62 (H) 12 - 45 U/L    ALT(SGPT) 31 2 - 50 U/L    Alkaline Phosphatase 85 30 - 99 U/L    Total Bilirubin 0.4 0.1 - 1.5 mg/dL    Albumin 4.3 3.2 - 4.9 g/dL    Total Protein  7.7 6.0 - 8.2 g/dL    Globulin 3.4 1.9 - 3.5 g/dL    A-G Ratio 1.3 g/dL   LIPASE   Result Value Ref Range    Lipase 53 7 - 58 U/L   URINALYSIS,CULTURE IF INDICATED   Result Value Ref Range    Color Yellow     Character Clear     Specific Gravity 1.010 <1.035    Ph 7.0 5.0 - 8.0    Glucose Negative Negative mg/dL    Ketones Trace (A) Negative mg/dL    Protein Negative Negative mg/dL    Bilirubin Negative Negative    Nitrite Negative Negative    Leukocyte Esterase Negative Negative    Occult Blood Negative Negative    Micro Urine Req see below    HCG QUAL SERUM   Result Value Ref Range    Beta-Hcg Qualitative Serum Negative Negative   LACTIC ACID   Result Value Ref Range    Lactic Acid 1.3 0.5 - 2.0 mmol/L   ESTIMATED GFR   Result Value Ref Range    GFR If African American >60 >60 mL/min/1.73 m 2    GFR If Non African American >60 >60 mL/min/1.73 m 2   MORPHOLOGY   Result Value Ref Range    RBC Morphology Present    DIFFERENTIAL MANUAL   Result Value Ref Range    Manual Diff Status PERFORMED    PLATELET ESTIMATE   Result Value Ref Range    Plt Estimation Normal        Patient was still having some mild abdominal pain.  She was given a GI cocktail which is relieved her pain but made her feel weak dizzy and nauseated.  She was given a liter of fluid as well as some Zofran and she is feeling better.       HYDRATION: Based on the patient's presentation of GI Bleed / Upset the patient was given IV fluids. IV Hydration was used because oral hydration was not adequate alone. Upon recheck following hydration, the patient was improved.     The patient has a nonsurgical abdomen although she is tenderness in the epigastrium and left upper quadrant.  It is difficult to know whether or not the patient has gastritis and/or constipation as her CT scan showed signs of increased stool without her: Which she has had before.  Patient will be started on H2 blocker as well as MiraLAX and asked to return to the emergency department for  recheck in 24 hours.

## 2019-09-26 NOTE — ED TRIAGE NOTES
Chief Complaint   Patient presents with   • Abdominal Pain     pt complains of mid upper badominal pain. pt states it started 15 days ago, went away and came back today at 0340. pt states she has taken tramdol a liana around 0340 with no releif.

## 2019-09-26 NOTE — ED PROVIDER NOTES
ED Provider Note    CHIEF COMPLAINT  Chief Complaint   Patient presents with   • Abdominal Pain     pt complains of mid upper badominal pain. pt states it started 15 days ago, went away and came back today at 0340. pt states she has taken tramdol a liana around 0340 with no releif.       HPI  Uum Mcguire is a 34 y.o. female who presents presents for evaluation of upper abdominal pain which which started upon waking around 340 in the morning.  Patient notes she has had an episode of this pain over 2 weeks ago however it did not last very long and had not recurred until this morning.  Patient notes no nausea but the pain is severe.  She notes she has a history of gallbladder removal in the past.  She notes that the pain sometimes radiates to her back.    REVIEW OF SYSTEMS  Constitutional: No fevers, weakness, or chills  Skin: No rashes  HEENT:No sore throat, runny nose, sores, trouble swallowing, trouble speaking.  Neck: No neck pain, stiffness, or masses.  Chest: No pain or rashes  Pulm: No shortness of breath, cough, wheezing, stridor, or pain with inspiration/expiration  Gastrointestinal: No nausea, vomiting, diarrhea, constipation, bloating, melena, hematochezia  Genitourinary: No dysuria or hematuria  Musculoskeletal: No recent trauma, pain, swelling, weakness  Neurologic: No sensory or motor changes. No confusion or disorientation.  Immuno: No hx of recurrent infections      PAST MEDICAL HISTORY   has a past medical history of DUB (dysfunctional uterine bleeding), Epilepsy ([facility]) (11/4/2009), GERD (gastroesophageal reflux disease), and Seizure disorder ([facility]).    SOCIAL HISTORY  Social History     Tobacco Use   • Smoking status: Never Smoker   • Smokeless tobacco: Never Used   Substance and Sexual Activity   • Alcohol use: No   • Drug use: No   • Sexual activity: Not Currently     Comment: virginal       SURGICAL HISTORY   has a past surgical history that includes ercp (4/6/2018) and jessica by laparoscopy  "(N/A, 4/8/2018).    CURRENT MEDICATIONS  Home Medications    **Home medications have not yet been reviewed for this encounter**         ALLERGIES  Allergies   Allergen Reactions   • Nkda [No Known Drug Allergy]        PHYSICAL EXAM  VITAL SIGNS: BP (!) 98/64   Pulse 78   Temp 36.3 °C (97.3 °F) (Temporal)   Resp 19   Ht 1.626 m (5' 4\")   Wt 50.8 kg (112 lb)   LMP 09/19/2019 (Approximate)   SpO2 100%   Breastfeeding? No   BMI 19.22 kg/m²    Gen: Alert, moderate distress, grimacing  HEENT: No signs of trauma, Bilateral external ears normal, Nose normal. Conjunctiva normal, Non-icteric.   Neck:  No tenderness, Supple, No masses  Lymphatic: No cervical lymphadenopathy noted.   Cardiovascular: Regular rate and rhythm, no murmurs.   Thorax & Lungs: Normal breath sounds, No respiratory distress, No wheezing bilateral chest rise  Abdomen: Bowel sounds normal, moderate epigastric and right upper quadrant tenderness.  No masses, mild voluntary guarding noted skin: Warm, Dry, No erythema, No rash.   Extremities: Intact distal pulses, No edema  Neurologic: Alert , no facial droop, grossly normal coordination and strength      INITIAL IMPRESSION  Patient has a findings suggestive of pancreatitis however other pathology such as gastritis and perforated ulcer on the differential diagnosis.  Patient is known to have had her gallbladder removed in the past however stones are still possible.  Will initiate screening labs, and pain control and likely she will need CT imaging to rule out emergent pathology.    LABS  Results for orders placed or performed during the hospital encounter of 09/26/19   CBC WITH DIFFERENTIAL   Result Value Ref Range    WBC 9.0 4.8 - 10.8 K/uL    RBC 4.27 4.20 - 5.40 M/uL    Hemoglobin 11.5 (L) 12.0 - 16.0 g/dL    Hematocrit 35.4 (L) 37.0 - 47.0 %    MCV 82.9 81.4 - 97.8 fL    MCH 26.9 (L) 27.0 - 33.0 pg    MCHC 32.5 (L) 33.6 - 35.0 g/dL    RDW 42.5 35.9 - 50.0 fL    Platelet Count 362 164 - 446 K/uL "    MPV 9.9 9.0 - 12.9 fL    Neutrophils-Polys 59.00 44.00 - 72.00 %    Lymphocytes 37.00 22.00 - 41.00 %    Monocytes 3.00 0.00 - 13.40 %    Eosinophils 1.00 0.00 - 6.90 %    Basophils 0.00 0.00 - 1.80 %    Nucleated RBC 0.00 /100 WBC    Neutrophils (Absolute) 5.31 2.00 - 7.15 K/uL    Lymphs (Absolute) 3.33 1.00 - 4.80 K/uL    Monos (Absolute) 0.27 0.00 - 0.85 K/uL    Eos (Absolute) 0.09 0.00 - 0.51 K/uL    Baso (Absolute) 0.00 0.00 - 0.12 K/uL    NRBC (Absolute) 0.00 K/uL    Anisocytosis 1+    COMP METABOLIC PANEL   Result Value Ref Range    Sodium 139 135 - 145 mmol/L    Potassium 3.4 (L) 3.6 - 5.5 mmol/L    Chloride 101 96 - 112 mmol/L    Co2 19 (L) 20 - 33 mmol/L    Anion Gap 19.0 (H) 0.0 - 11.9    Glucose 98 65 - 99 mg/dL    Bun 9 8 - 22 mg/dL    Creatinine 0.63 0.50 - 1.40 mg/dL    Calcium 9.5 8.4 - 10.2 mg/dL    AST(SGOT) 62 (H) 12 - 45 U/L    ALT(SGPT) 31 2 - 50 U/L    Alkaline Phosphatase 85 30 - 99 U/L    Total Bilirubin 0.4 0.1 - 1.5 mg/dL    Albumin 4.3 3.2 - 4.9 g/dL    Total Protein 7.7 6.0 - 8.2 g/dL    Globulin 3.4 1.9 - 3.5 g/dL    A-G Ratio 1.3 g/dL   LIPASE   Result Value Ref Range    Lipase 53 7 - 58 U/L   URINALYSIS,CULTURE IF INDICATED   Result Value Ref Range    Color Yellow     Character Clear     Specific Gravity 1.010 <1.035    Ph 7.0 5.0 - 8.0    Glucose Negative Negative mg/dL    Ketones Trace (A) Negative mg/dL    Protein Negative Negative mg/dL    Bilirubin Negative Negative    Nitrite Negative Negative    Leukocyte Esterase Negative Negative    Occult Blood Negative Negative    Micro Urine Req see below    HCG QUAL SERUM   Result Value Ref Range    Beta-Hcg Qualitative Serum Negative Negative   ESTIMATED GFR   Result Value Ref Range    GFR If African American >60 >60 mL/min/1.73 m 2    GFR If Non African American >60 >60 mL/min/1.73 m 2   MORPHOLOGY   Result Value Ref Range    RBC Morphology Present    DIFFERENTIAL MANUAL   Result Value Ref Range    Manual Diff Status PERFORMED     PLATELET ESTIMATE   Result Value Ref Range    Plt Estimation Normal        RADIOLOGY  CT-ABDOMEN-PELVIS WITH    (Results Pending)         COURSE & MEDICAL DECISION MAKING  Pertinent Labs & Imaging studies reviewed. (See chart for details)  Patient arrives for evaluation of epigastric pain of unclear etiology.  Her laboratory evaluation is reassuring however CT imaging is still pending.  Patient will be turned over to Dr. Cortez pending this CT and disposition.    FINAL IMPRESSION  1.  Epigastric pain  Electronically signed by: Terrell Gonzalez, 9/26/2019 5:35 AM

## 2019-09-28 ENCOUNTER — APPOINTMENT (OUTPATIENT)
Dept: RADIOLOGY | Facility: MEDICAL CENTER | Age: 34
DRG: 439 | End: 2019-09-28
Attending: EMERGENCY MEDICINE
Payer: COMMERCIAL

## 2019-09-28 ENCOUNTER — HOSPITAL ENCOUNTER (INPATIENT)
Facility: MEDICAL CENTER | Age: 34
LOS: 4 days | DRG: 439 | End: 2019-10-02
Attending: EMERGENCY MEDICINE | Admitting: INTERNAL MEDICINE
Payer: COMMERCIAL

## 2019-09-28 DIAGNOSIS — R10.11 RIGHT UPPER QUADRANT ABDOMINAL PAIN: ICD-10-CM

## 2019-09-28 DIAGNOSIS — R07.81 PLEURITIC CHEST PAIN: ICD-10-CM

## 2019-09-28 DIAGNOSIS — R74.01 TRANSAMINITIS: ICD-10-CM

## 2019-09-28 DIAGNOSIS — R74.8 ELEVATED LIPASE: ICD-10-CM

## 2019-09-28 PROBLEM — R79.89 LFT ELEVATION: Status: ACTIVE | Noted: 2019-09-28

## 2019-09-28 LAB
ALBUMIN SERPL BCP-MCNC: 4.3 G/DL (ref 3.2–4.9)
ALBUMIN/GLOB SERPL: 1 G/DL
ALP SERPL-CCNC: 275 U/L (ref 30–99)
ALT SERPL-CCNC: 804 U/L (ref 2–50)
ANION GAP SERPL CALC-SCNC: 17 MMOL/L (ref 0–11.9)
APPEARANCE UR: CLEAR
AST SERPL-CCNC: 492 U/L (ref 12–45)
BACTERIA #/AREA URNS HPF: ABNORMAL /HPF
BASOPHILS # BLD AUTO: 0.6 % (ref 0–1.8)
BASOPHILS # BLD: 0.04 K/UL (ref 0–0.12)
BILIRUB SERPL-MCNC: 1.1 MG/DL (ref 0.1–1.5)
BILIRUB UR QL STRIP.AUTO: ABNORMAL
BUN SERPL-MCNC: 9 MG/DL (ref 8–22)
CALCIUM SERPL-MCNC: 9.9 MG/DL (ref 8.4–10.2)
CHLORIDE SERPL-SCNC: 99 MMOL/L (ref 96–112)
CO2 SERPL-SCNC: 24 MMOL/L (ref 20–33)
COLOR UR: YELLOW
CREAT SERPL-MCNC: 0.57 MG/DL (ref 0.5–1.4)
EOSINOPHIL # BLD AUTO: 0.07 K/UL (ref 0–0.51)
EOSINOPHIL NFR BLD: 1.1 % (ref 0–6.9)
EPI CELLS #/AREA URNS HPF: ABNORMAL /HPF
ERYTHROCYTE [DISTWIDTH] IN BLOOD BY AUTOMATED COUNT: 43.8 FL (ref 35.9–50)
GLOBULIN SER CALC-MCNC: 4.4 G/DL (ref 1.9–3.5)
GLUCOSE SERPL-MCNC: 97 MG/DL (ref 65–99)
GLUCOSE UR STRIP.AUTO-MCNC: NEGATIVE MG/DL
HAV IGM SERPL QL IA: NEGATIVE
HBV CORE IGM SER QL: NEGATIVE
HBV SURFACE AG SER QL: NEGATIVE
HCT VFR BLD AUTO: 39.3 % (ref 37–47)
HCV AB SER QL: NEGATIVE
HGB BLD-MCNC: 12.6 G/DL (ref 12–16)
IMM GRANULOCYTES # BLD AUTO: 0.06 K/UL (ref 0–0.11)
IMM GRANULOCYTES NFR BLD AUTO: 0.9 % (ref 0–0.9)
KETONES UR STRIP.AUTO-MCNC: >=80 MG/DL
LEUKOCYTE ESTERASE UR QL STRIP.AUTO: ABNORMAL
LIPASE SERPL-CCNC: 78 U/L (ref 7–58)
LYMPHOCYTES # BLD AUTO: 1.14 K/UL (ref 1–4.8)
LYMPHOCYTES NFR BLD: 17.4 % (ref 22–41)
MAGNESIUM SERPL-MCNC: 2.1 MG/DL (ref 1.5–2.5)
MCH RBC QN AUTO: 26.9 PG (ref 27–33)
MCHC RBC AUTO-ENTMCNC: 32.1 G/DL (ref 33.6–35)
MCV RBC AUTO: 83.8 FL (ref 81.4–97.8)
MICRO URNS: ABNORMAL
MONOCYTES # BLD AUTO: 0.8 K/UL (ref 0–0.85)
MONOCYTES NFR BLD AUTO: 12.2 % (ref 0–13.4)
MUCOUS THREADS #/AREA URNS HPF: ABNORMAL /HPF
NEUTROPHILS # BLD AUTO: 4.46 K/UL (ref 2–7.15)
NEUTROPHILS NFR BLD: 67.8 % (ref 44–72)
NITRITE UR QL STRIP.AUTO: NEGATIVE
NRBC # BLD AUTO: 0 K/UL
NRBC BLD-RTO: 0 /100 WBC
PH UR STRIP.AUTO: 7.5 [PH] (ref 5–8)
PLATELET # BLD AUTO: 324 K/UL (ref 164–446)
PMV BLD AUTO: 10.4 FL (ref 9–12.9)
POTASSIUM SERPL-SCNC: 3.6 MMOL/L (ref 3.6–5.5)
PROT SERPL-MCNC: 8.7 G/DL (ref 6–8.2)
PROT UR QL STRIP: NEGATIVE MG/DL
RBC # BLD AUTO: 4.69 M/UL (ref 4.2–5.4)
RBC # URNS HPF: ABNORMAL /HPF
RBC UR QL AUTO: ABNORMAL
SODIUM SERPL-SCNC: 140 MMOL/L (ref 135–145)
SP GR UR STRIP.AUTO: 1.01
TROPONIN T SERPL-MCNC: <6 NG/L (ref 6–19)
UNIDENT CRYS URNS QL MICRO: ABNORMAL /HPF
WBC # BLD AUTO: 6.6 K/UL (ref 4.8–10.8)
WBC #/AREA URNS HPF: ABNORMAL /HPF

## 2019-09-28 PROCEDURE — 700111 HCHG RX REV CODE 636 W/ 250 OVERRIDE (IP): Performed by: INTERNAL MEDICINE

## 2019-09-28 PROCEDURE — 71275 CT ANGIOGRAPHY CHEST: CPT

## 2019-09-28 PROCEDURE — 700117 HCHG RX CONTRAST REV CODE 255: Performed by: EMERGENCY MEDICINE

## 2019-09-28 PROCEDURE — 83735 ASSAY OF MAGNESIUM: CPT

## 2019-09-28 PROCEDURE — 80074 ACUTE HEPATITIS PANEL: CPT

## 2019-09-28 PROCEDURE — 700102 HCHG RX REV CODE 250 W/ 637 OVERRIDE(OP): Performed by: EMERGENCY MEDICINE

## 2019-09-28 PROCEDURE — 80053 COMPREHEN METABOLIC PANEL: CPT

## 2019-09-28 PROCEDURE — 36415 COLL VENOUS BLD VENIPUNCTURE: CPT

## 2019-09-28 PROCEDURE — 99285 EMERGENCY DEPT VISIT HI MDM: CPT

## 2019-09-28 PROCEDURE — 770006 HCHG ROOM/CARE - MED/SURG/GYN SEMI*

## 2019-09-28 PROCEDURE — 81001 URINALYSIS AUTO W/SCOPE: CPT

## 2019-09-28 PROCEDURE — 96374 THER/PROPH/DIAG INJ IV PUSH: CPT

## 2019-09-28 PROCEDURE — 84484 ASSAY OF TROPONIN QUANT: CPT

## 2019-09-28 PROCEDURE — A9270 NON-COVERED ITEM OR SERVICE: HCPCS | Performed by: EMERGENCY MEDICINE

## 2019-09-28 PROCEDURE — 74177 CT ABD & PELVIS W/CONTRAST: CPT

## 2019-09-28 PROCEDURE — 700111 HCHG RX REV CODE 636 W/ 250 OVERRIDE (IP): Performed by: EMERGENCY MEDICINE

## 2019-09-28 PROCEDURE — 83690 ASSAY OF LIPASE: CPT

## 2019-09-28 PROCEDURE — 85025 COMPLETE CBC W/AUTO DIFF WBC: CPT

## 2019-09-28 PROCEDURE — A9270 NON-COVERED ITEM OR SERVICE: HCPCS | Performed by: INTERNAL MEDICINE

## 2019-09-28 PROCEDURE — 96375 TX/PRO/DX INJ NEW DRUG ADDON: CPT

## 2019-09-28 PROCEDURE — 700101 HCHG RX REV CODE 250: Performed by: INTERNAL MEDICINE

## 2019-09-28 PROCEDURE — 99222 1ST HOSP IP/OBS MODERATE 55: CPT | Performed by: INTERNAL MEDICINE

## 2019-09-28 PROCEDURE — 700111 HCHG RX REV CODE 636 W/ 250 OVERRIDE (IP)

## 2019-09-28 PROCEDURE — 700102 HCHG RX REV CODE 250 W/ 637 OVERRIDE(OP): Performed by: INTERNAL MEDICINE

## 2019-09-28 RX ORDER — ONDANSETRON 2 MG/ML
4 INJECTION INTRAMUSCULAR; INTRAVENOUS EVERY 4 HOURS PRN
Status: DISCONTINUED | OUTPATIENT
Start: 2019-09-28 | End: 2019-10-02 | Stop reason: HOSPADM

## 2019-09-28 RX ORDER — PROMETHAZINE HYDROCHLORIDE 25 MG/1
12.5-25 TABLET ORAL EVERY 4 HOURS PRN
Status: DISCONTINUED | OUTPATIENT
Start: 2019-09-28 | End: 2019-10-02 | Stop reason: HOSPADM

## 2019-09-28 RX ORDER — PROCHLORPERAZINE EDISYLATE 5 MG/ML
5-10 INJECTION INTRAMUSCULAR; INTRAVENOUS EVERY 4 HOURS PRN
Status: DISCONTINUED | OUTPATIENT
Start: 2019-09-28 | End: 2019-10-02 | Stop reason: HOSPADM

## 2019-09-28 RX ORDER — KETOROLAC TROMETHAMINE 30 MG/ML
30 INJECTION, SOLUTION INTRAMUSCULAR; INTRAVENOUS ONCE
Status: COMPLETED | OUTPATIENT
Start: 2019-09-28 | End: 2019-09-28

## 2019-09-28 RX ORDER — OXYCODONE HYDROCHLORIDE 10 MG/1
10 TABLET ORAL
Status: DISCONTINUED | OUTPATIENT
Start: 2019-09-28 | End: 2019-10-02 | Stop reason: HOSPADM

## 2019-09-28 RX ORDER — SODIUM CHLORIDE AND POTASSIUM CHLORIDE 150; 900 MG/100ML; MG/100ML
INJECTION, SOLUTION INTRAVENOUS CONTINUOUS
Status: DISCONTINUED | OUTPATIENT
Start: 2019-09-28 | End: 2019-10-02 | Stop reason: HOSPADM

## 2019-09-28 RX ORDER — ACETAMINOPHEN 500 MG
1000 TABLET ORAL EVERY 6 HOURS
Status: DISCONTINUED | OUTPATIENT
Start: 2019-09-28 | End: 2019-10-02 | Stop reason: HOSPADM

## 2019-09-28 RX ORDER — OXYCODONE HYDROCHLORIDE 5 MG/1
5 TABLET ORAL
Status: DISCONTINUED | OUTPATIENT
Start: 2019-09-28 | End: 2019-10-02 | Stop reason: HOSPADM

## 2019-09-28 RX ORDER — DIVALPROEX SODIUM 500 MG/1
500 TABLET, DELAYED RELEASE ORAL 2 TIMES DAILY
Status: DISCONTINUED | OUTPATIENT
Start: 2019-09-28 | End: 2019-09-29

## 2019-09-28 RX ORDER — MORPHINE SULFATE 4 MG/ML
4 INJECTION, SOLUTION INTRAMUSCULAR; INTRAVENOUS
Status: DISCONTINUED | OUTPATIENT
Start: 2019-09-28 | End: 2019-10-02 | Stop reason: HOSPADM

## 2019-09-28 RX ORDER — FAMOTIDINE 20 MG/1
20 TABLET, FILM COATED ORAL 2 TIMES DAILY
Status: DISCONTINUED | OUTPATIENT
Start: 2019-09-28 | End: 2019-10-02 | Stop reason: HOSPADM

## 2019-09-28 RX ORDER — LORAZEPAM 1 MG/1
1 TABLET ORAL ONCE
Status: COMPLETED | OUTPATIENT
Start: 2019-09-28 | End: 2019-09-28

## 2019-09-28 RX ORDER — LORAZEPAM 2 MG/ML
1 INJECTION INTRAMUSCULAR
Status: DISCONTINUED | OUTPATIENT
Start: 2019-09-28 | End: 2019-10-02 | Stop reason: HOSPADM

## 2019-09-28 RX ORDER — ACETAMINOPHEN 325 MG/1
650 TABLET ORAL EVERY 6 HOURS PRN
Status: DISCONTINUED | OUTPATIENT
Start: 2019-09-28 | End: 2019-10-02 | Stop reason: HOSPADM

## 2019-09-28 RX ORDER — LEVETIRACETAM 500 MG/1
500-1000 TABLET ORAL EVERY 12 HOURS
Status: DISCONTINUED | OUTPATIENT
Start: 2019-09-28 | End: 2019-10-02 | Stop reason: HOSPADM

## 2019-09-28 RX ORDER — CELECOXIB 200 MG/1
200 CAPSULE ORAL 2 TIMES DAILY
Status: DISCONTINUED | OUTPATIENT
Start: 2019-09-29 | End: 2019-10-02 | Stop reason: HOSPADM

## 2019-09-28 RX ORDER — POLYETHYLENE GLYCOL 3350 17 G/17G
1 POWDER, FOR SOLUTION ORAL DAILY
Status: DISCONTINUED | OUTPATIENT
Start: 2019-09-29 | End: 2019-10-02 | Stop reason: HOSPADM

## 2019-09-28 RX ORDER — PROMETHAZINE HYDROCHLORIDE 25 MG/1
12.5-25 SUPPOSITORY RECTAL EVERY 4 HOURS PRN
Status: DISCONTINUED | OUTPATIENT
Start: 2019-09-28 | End: 2019-10-02 | Stop reason: HOSPADM

## 2019-09-28 RX ORDER — MORPHINE SULFATE 10 MG/ML
INJECTION, SOLUTION INTRAMUSCULAR; INTRAVENOUS
Status: COMPLETED
Start: 2019-09-28 | End: 2019-09-28

## 2019-09-28 RX ORDER — KETOROLAC TROMETHAMINE 30 MG/ML
30 INJECTION, SOLUTION INTRAMUSCULAR; INTRAVENOUS EVERY 6 HOURS
Status: DISPENSED | OUTPATIENT
Start: 2019-09-28 | End: 2019-09-29

## 2019-09-28 RX ORDER — ONDANSETRON 4 MG/1
4 TABLET, ORALLY DISINTEGRATING ORAL EVERY 4 HOURS PRN
Status: DISCONTINUED | OUTPATIENT
Start: 2019-09-28 | End: 2019-10-02 | Stop reason: HOSPADM

## 2019-09-28 RX ORDER — MORPHINE SULFATE 10 MG/ML
8 INJECTION, SOLUTION INTRAMUSCULAR; INTRAVENOUS ONCE
Status: COMPLETED | OUTPATIENT
Start: 2019-09-28 | End: 2019-09-28

## 2019-09-28 RX ADMIN — LORAZEPAM 1 MG: 1 TABLET ORAL at 14:58

## 2019-09-28 RX ADMIN — ACETAMINOPHEN 1000 MG: 500 TABLET, FILM COATED ORAL at 21:09

## 2019-09-28 RX ADMIN — LEVETIRACETAM 1000 MG: 500 TABLET, FILM COATED ORAL at 21:09

## 2019-09-28 RX ADMIN — KETOROLAC TROMETHAMINE 30 MG: 30 INJECTION, SOLUTION INTRAMUSCULAR; INTRAVENOUS at 21:10

## 2019-09-28 RX ADMIN — KETOROLAC TROMETHAMINE 30 MG: 30 INJECTION, SOLUTION INTRAMUSCULAR; INTRAVENOUS at 14:58

## 2019-09-28 RX ADMIN — FAMOTIDINE 20 MG: 20 TABLET ORAL at 21:11

## 2019-09-28 RX ADMIN — SODIUM CHLORIDE AND POTASSIUM CHLORIDE: 9; 1.49 INJECTION, SOLUTION INTRAVENOUS at 19:00

## 2019-09-28 RX ADMIN — MORPHINE SULFATE 8 MG: 10 INJECTION, SOLUTION INTRAMUSCULAR; INTRAVENOUS at 17:23

## 2019-09-28 RX ADMIN — IOHEXOL 100 ML: 350 INJECTION, SOLUTION INTRAVENOUS at 17:15

## 2019-09-28 RX ADMIN — MORPHINE SULFATE 8 MG: 10 INJECTION INTRAVENOUS at 17:23

## 2019-09-28 ASSESSMENT — COGNITIVE AND FUNCTIONAL STATUS - GENERAL
SUGGESTED CMS G CODE MODIFIER DAILY ACTIVITY: CH
DAILY ACTIVITIY SCORE: 24
MOBILITY SCORE: 24
SUGGESTED CMS G CODE MODIFIER MOBILITY: CH

## 2019-09-28 ASSESSMENT — LIFESTYLE VARIABLES
HAVE PEOPLE ANNOYED YOU BY CRITICIZING YOUR DRINKING: NO
HOW MANY TIMES IN THE PAST YEAR HAVE YOU HAD 5 OR MORE DRINKS IN A DAY: 0
TOTAL SCORE: 0
ON A TYPICAL DAY WHEN YOU DRINK ALCOHOL HOW MANY DRINKS DO YOU HAVE: 0
EVER HAD A DRINK FIRST THING IN THE MORNING TO STEADY YOUR NERVES TO GET RID OF A HANGOVER: NO
TOTAL SCORE: 0
CONSUMPTION TOTAL: NEGATIVE
EVER_SMOKED: NEVER
ALCOHOL_USE: NO
HAVE YOU EVER FELT YOU SHOULD CUT DOWN ON YOUR DRINKING: NO
EVER FELT BAD OR GUILTY ABOUT YOUR DRINKING: NO
TOTAL SCORE: 0
AVERAGE NUMBER OF DAYS PER WEEK YOU HAVE A DRINK CONTAINING ALCOHOL: 0

## 2019-09-28 ASSESSMENT — ENCOUNTER SYMPTOMS
VOMITING: 1
HEADACHES: 0
NERVOUS/ANXIOUS: 0
WEIGHT LOSS: 0
CHILLS: 1
DIAPHORESIS: 1
BACK PAIN: 1
SORE THROAT: 0
DIARRHEA: 0
HEARTBURN: 1
EYE REDNESS: 0
SEIZURES: 0
FEVER: 0
EYE DISCHARGE: 0
DIZZINESS: 0
NAUSEA: 1
COUGH: 0
CONSTIPATION: 0
ABDOMINAL PAIN: 1
SHORTNESS OF BREATH: 0

## 2019-09-28 ASSESSMENT — PAIN DESCRIPTION - DESCRIPTORS: DESCRIPTORS: ACHING

## 2019-09-28 ASSESSMENT — PATIENT HEALTH QUESTIONNAIRE - PHQ9
2. FEELING DOWN, DEPRESSED, IRRITABLE, OR HOPELESS: NOT AT ALL
1. LITTLE INTEREST OR PLEASURE IN DOING THINGS: NOT AT ALL
SUM OF ALL RESPONSES TO PHQ9 QUESTIONS 1 AND 2: 0

## 2019-09-28 NOTE — ED TRIAGE NOTES
"Presents with a hx of cholecystectomy performed this past Wednesday.  She was seen in our department 2 days ago to address complaints of diffuse abdominal pain with recurring episodes of N/V, and constipation.  She returns today with unresolved symptomatology.  Chief Complaint   Patient presents with   • Abdominal Pain   • N/V     /85   Pulse 95   Temp 36.6 °C (97.9 °F) (Temporal)   Resp 18   Ht 1.626 m (5' 4\")   Wt 50 kg (110 lb 3.7 oz)   LMP 09/19/2019 (Approximate)   SpO2 98%   BMI 18.92 kg/m²     "

## 2019-09-28 NOTE — ED NOTES
" used. Pt now states her pain is 1/10 and she began to sleep during the interview. Morphine placed on hold. Parents and pt were updated on plan of care. Both were upset over \"no one coming in to tell us anything.\" Pt and family were updated and pt is no longer upset. Will continue to monitor.  #[id number]  "

## 2019-09-29 ENCOUNTER — APPOINTMENT (OUTPATIENT)
Dept: RADIOLOGY | Facility: MEDICAL CENTER | Age: 34
DRG: 439 | End: 2019-09-29
Attending: INTERNAL MEDICINE
Payer: COMMERCIAL

## 2019-09-29 PROBLEM — K85.10 ACUTE GALLSTONE PANCREATITIS: Status: ACTIVE | Noted: 2019-09-29

## 2019-09-29 LAB
ALBUMIN SERPL BCP-MCNC: 3.5 G/DL (ref 3.2–4.9)
ALBUMIN/GLOB SERPL: 1.1 G/DL
ALP SERPL-CCNC: 239 U/L (ref 30–99)
ALT SERPL-CCNC: 537 U/L (ref 2–50)
ANION GAP SERPL CALC-SCNC: 12 MMOL/L (ref 0–11.9)
AST SERPL-CCNC: 234 U/L (ref 12–45)
BASOPHILS # BLD AUTO: 0.3 % (ref 0–1.8)
BASOPHILS # BLD: 0.03 K/UL (ref 0–0.12)
BILIRUB SERPL-MCNC: 1.5 MG/DL (ref 0.1–1.5)
BUN SERPL-MCNC: 10 MG/DL (ref 8–22)
CALCIUM SERPL-MCNC: 8.7 MG/DL (ref 8.4–10.2)
CHLORIDE SERPL-SCNC: 106 MMOL/L (ref 96–112)
CO2 SERPL-SCNC: 22 MMOL/L (ref 20–33)
CREAT SERPL-MCNC: 0.38 MG/DL (ref 0.5–1.4)
EOSINOPHIL # BLD AUTO: 0.06 K/UL (ref 0–0.51)
EOSINOPHIL NFR BLD: 0.6 % (ref 0–6.9)
ERYTHROCYTE [DISTWIDTH] IN BLOOD BY AUTOMATED COUNT: 44.6 FL (ref 35.9–50)
GLOBULIN SER CALC-MCNC: 3.2 G/DL (ref 1.9–3.5)
GLUCOSE SERPL-MCNC: 82 MG/DL (ref 65–99)
HCT VFR BLD AUTO: 33.2 % (ref 37–47)
HGB BLD-MCNC: 10.6 G/DL (ref 12–16)
IMM GRANULOCYTES # BLD AUTO: 0.05 K/UL (ref 0–0.11)
IMM GRANULOCYTES NFR BLD AUTO: 0.5 % (ref 0–0.9)
LYMPHOCYTES # BLD AUTO: 0.8 K/UL (ref 1–4.8)
LYMPHOCYTES NFR BLD: 7.5 % (ref 22–41)
MCH RBC QN AUTO: 26.8 PG (ref 27–33)
MCHC RBC AUTO-ENTMCNC: 31.9 G/DL (ref 33.6–35)
MCV RBC AUTO: 84.1 FL (ref 81.4–97.8)
MONOCYTES # BLD AUTO: 1.08 K/UL (ref 0–0.85)
MONOCYTES NFR BLD AUTO: 10.1 % (ref 0–13.4)
NEUTROPHILS # BLD AUTO: 8.64 K/UL (ref 2–7.15)
NEUTROPHILS NFR BLD: 81 % (ref 44–72)
NRBC # BLD AUTO: 0 K/UL
NRBC BLD-RTO: 0 /100 WBC
PLATELET # BLD AUTO: 238 K/UL (ref 164–446)
PMV BLD AUTO: 10.7 FL (ref 9–12.9)
POTASSIUM SERPL-SCNC: 4 MMOL/L (ref 3.6–5.5)
PROT SERPL-MCNC: 6.7 G/DL (ref 6–8.2)
RBC # BLD AUTO: 3.95 M/UL (ref 4.2–5.4)
SODIUM SERPL-SCNC: 140 MMOL/L (ref 135–145)
WBC # BLD AUTO: 10.7 K/UL (ref 4.8–10.8)

## 2019-09-29 PROCEDURE — 770006 HCHG ROOM/CARE - MED/SURG/GYN SEMI*

## 2019-09-29 PROCEDURE — 700102 HCHG RX REV CODE 250 W/ 637 OVERRIDE(OP): Performed by: INTERNAL MEDICINE

## 2019-09-29 PROCEDURE — 99232 SBSQ HOSP IP/OBS MODERATE 35: CPT | Performed by: HOSPITALIST

## 2019-09-29 PROCEDURE — 700102 HCHG RX REV CODE 250 W/ 637 OVERRIDE(OP): Performed by: HOSPITALIST

## 2019-09-29 PROCEDURE — 85025 COMPLETE CBC W/AUTO DIFF WBC: CPT

## 2019-09-29 PROCEDURE — A9270 NON-COVERED ITEM OR SERVICE: HCPCS | Performed by: INTERNAL MEDICINE

## 2019-09-29 PROCEDURE — 74181 MRI ABDOMEN W/O CONTRAST: CPT

## 2019-09-29 PROCEDURE — A9270 NON-COVERED ITEM OR SERVICE: HCPCS | Performed by: HOSPITALIST

## 2019-09-29 PROCEDURE — 36415 COLL VENOUS BLD VENIPUNCTURE: CPT

## 2019-09-29 PROCEDURE — 80053 COMPREHEN METABOLIC PANEL: CPT

## 2019-09-29 PROCEDURE — 700111 HCHG RX REV CODE 636 W/ 250 OVERRIDE (IP): Performed by: INTERNAL MEDICINE

## 2019-09-29 RX ORDER — DIVALPROEX SODIUM 250 MG/1
500 TABLET, DELAYED RELEASE ORAL 2 TIMES DAILY
Status: DISCONTINUED | OUTPATIENT
Start: 2019-09-29 | End: 2019-10-02 | Stop reason: HOSPADM

## 2019-09-29 RX ADMIN — KETOROLAC TROMETHAMINE 30 MG: 30 INJECTION, SOLUTION INTRAMUSCULAR; INTRAVENOUS at 11:46

## 2019-09-29 RX ADMIN — LEVETIRACETAM 1000 MG: 500 TABLET, FILM COATED ORAL at 21:00

## 2019-09-29 RX ADMIN — ACETAMINOPHEN 1000 MG: 500 TABLET, FILM COATED ORAL at 17:36

## 2019-09-29 RX ADMIN — ONDANSETRON 4 MG: 2 INJECTION INTRAMUSCULAR; INTRAVENOUS at 17:41

## 2019-09-29 RX ADMIN — KETOROLAC TROMETHAMINE 30 MG: 30 INJECTION, SOLUTION INTRAMUSCULAR; INTRAVENOUS at 05:10

## 2019-09-29 RX ADMIN — FAMOTIDINE 20 MG: 20 TABLET ORAL at 05:11

## 2019-09-29 RX ADMIN — FAMOTIDINE 20 MG: 20 TABLET ORAL at 17:36

## 2019-09-29 RX ADMIN — CELECOXIB 200 MG: 200 CAPSULE ORAL at 17:37

## 2019-09-29 RX ADMIN — DIVALPROEX SODIUM 500 MG: 250 TABLET, DELAYED RELEASE ORAL at 15:43

## 2019-09-29 RX ADMIN — ACETAMINOPHEN 1000 MG: 500 TABLET, FILM COATED ORAL at 11:46

## 2019-09-29 RX ADMIN — ACETAMINOPHEN 1000 MG: 500 TABLET, FILM COATED ORAL at 05:11

## 2019-09-29 RX ADMIN — DIVALPROEX SODIUM 500 MG: 250 TABLET, DELAYED RELEASE ORAL at 11:45

## 2019-09-29 RX ADMIN — LEVETIRACETAM 1000 MG: 500 TABLET, FILM COATED ORAL at 05:11

## 2019-09-29 ASSESSMENT — ENCOUNTER SYMPTOMS
NECK PAIN: 0
HEMOPTYSIS: 0
DOUBLE VISION: 0
SENSORY CHANGE: 0
FEVER: 0
MYALGIAS: 0
BLOOD IN STOOL: 0
VOMITING: 0
HEADACHES: 0
WEAKNESS: 0
TREMORS: 0
NAUSEA: 0
MEMORY LOSS: 0
CHILLS: 0
HEARTBURN: 0
SPEECH CHANGE: 0
CLAUDICATION: 0
BLURRED VISION: 0
PHOTOPHOBIA: 0
STRIDOR: 0
CONSTIPATION: 0
NERVOUS/ANXIOUS: 1
ABDOMINAL PAIN: 1
DEPRESSION: 0
COUGH: 0
PALPITATIONS: 0
DIZZINESS: 0
SHORTNESS OF BREATH: 0
TINGLING: 0
ORTHOPNEA: 0
SORE THROAT: 0
BACK PAIN: 0
EYE PAIN: 0
PND: 0
SPUTUM PRODUCTION: 0

## 2019-09-29 NOTE — CONSULTS
Gastroenterology Consult Note     Date of Consult: 09/29/2019  Referring Physician: Jhoan     Reason for consult: choledocholithiasis        HPI: This is a 33yo female with history of epilepsy and GERD who presented to the hospital for complaints of RUQ pain. She says that her symptoms started 15 days ago. She was having pain in her RUQ and right flank. This moved and encompassed the right breast and right upper back. She has had waxing and waning pain increasing in intensity to 10/10 pain at times. She denies pain at this time. She reports that this pain was similar to pain she had with gallstones last year. She denies constipation or diarrhea. She reports no blood in the stool. She has no shortness of breath. She presented to the ER for evaluation and labs/imaging were initially normal so she was sent home. She returned because of worsening pain. LFTs have increased and MRCP shows choledocholithiasis. We are consulted for repeat ERCP at this time.     PMHX:  Past Medical History:   Diagnosis Date   • DUB (dysfunctional uterine bleeding)    • Epilepsy (HCC) 11/4/2009    since infant.  sees Wilfrido/Codey at [facility].  Keppra/depakote.  absence siezures 1x/month-thinks iwth menstruation.   • GERD (gastroesophageal reflux disease)     gastritis-only with spicy foods   • Seizure disorder (HCC)           PSurgHx:   Past Surgical History:   Procedure Laterality Date   • DEONDRE BY LAPAROSCOPY N/A 4/8/2018    Procedure: DEONDRE BY LAPAROSCOPY;  Surgeon: Chava Busby M.D.;  Location: SURGERY [facility];  Service: General   • ERCP  4/6/2018    Procedure: ERCP;  Surgeon: Osiel Paige M.D.;  Location: SURGERY [facility];  Service: Gastroenterology        ALLERGIES:Nkda [no known drug allergy]     SocHx:   Social History     Socioeconomic History   • Marital status: Single     Spouse name: Not on file   • Number of children: Not on file   • Years of  education: Not on file   • Highest education level: Not on file   Occupational History   • Not on file   Social Needs   • Financial resource strain: Not on file   • Food insecurity:     Worry: Not on file     Inability: Not on file   • Transportation needs:     Medical: Not on file     Non-medical: Not on file   Tobacco Use   • Smoking status: Never Smoker   • Smokeless tobacco: Never Used   Substance and Sexual Activity   • Alcohol use: No   • Drug use: No   • Sexual activity: Not Currently     Comment: virginal   Lifestyle   • Physical activity:     Days per week: Not on file     Minutes per session: Not on file   • Stress: Not on file   Relationships   • Social connections:     Talks on phone: Not on file     Gets together: Not on file     Attends Hoahaoism service: Not on file     Active member of club or organization: Not on file     Attends meetings of clubs or organizations: Not on file     Relationship status: Not on file   • Intimate partner violence:     Fear of current or ex partner: Not on file     Emotionally abused: Not on file     Physically abused: Not on file     Forced sexual activity: Not on file   Other Topics Concern   • Not on file   Social History Narrative   • Not on file        FAMHx:   Family History   Problem Relation Age of Onset   • Cancer Neg Hx    • Diabetes Neg Hx    • Stroke Neg Hx         ROS:  Constitutional: No fevers, chills, no night sweats, no weight changes  HEENT: no vision or hearing changes, no dry mouth, no change in smell  CARDIO: no palpitations, no orthopnea, no chest pain  PULM: no cough, no shortness of breath  NEURO: no Seizures, no memory impairment, no change in sensation  GI: as above  : no dysuria, no hematuria  HEME: no anemia, no easy brusing  MUSCULOSKELETAL: no muscle aches, no back pain, no arthritis  PSYCH: no anxiety or depression  SKIN: no rashes     PE:  Vitals:    09/28/19 2139 09/29/19 0100 09/29/19 0658 09/29/19 1413   BP:  102/60 (!) 95/64 102/73    Pulse:  78 69 81   Resp:  18 17 17   Temp:  36.7 °C (98.1 °F) 36.5 °C (97.7 °F)    TempSrc:  Oral Oral    SpO2:  97% 96% 99%   Weight: 50 kg (110 lb 3.7 oz)      Height:         Gen: AAOx3, NAD, lying in bed  HEENT: PERRL, EOMI, nares patent, Mucous membranes moist  Neck: supple, no cervical or supraclavicular adenopathy  CVS: regular rhythm, normal rate, no MRG  Pulm: CTAB, no crackles  Abd: soft, Nd, diffusely TTP with guarding, no rebound  Ext: no edema, normal sensation  NEURO: grossly normal, no weakness  Skin: warm, no rash  Psych: normal Affect, no anxiety     LABS:  Lab Results   Component Value Date/Time    SODIUM 140 09/29/2019 03:38 AM    POTASSIUM 4.0 09/29/2019 03:38 AM    CHLORIDE 106 09/29/2019 03:38 AM    CO2 22 09/29/2019 03:38 AM    GLUCOSE 82 09/29/2019 03:38 AM    BUN 10 09/29/2019 03:38 AM    CREATININE 0.38 (L) 09/29/2019 03:38 AM    CREATININE 0.6 09/15/2008 09:20 PM      Lab Results   Component Value Date/Time    WBC 10.7 09/29/2019 03:38 AM    RBC 3.95 (L) 09/29/2019 03:38 AM    HEMOGLOBIN 10.6 (L) 09/29/2019 03:38 AM    HEMATOCRIT 33.2 (L) 09/29/2019 03:38 AM    MCV 84.1 09/29/2019 03:38 AM    MCH 26.8 (L) 09/29/2019 03:38 AM    MCHC 31.9 (L) 09/29/2019 03:38 AM    MPV 10.7 09/29/2019 03:38 AM    NEUTSPOLYS 81.00 (H) 09/29/2019 03:38 AM    LYMPHOCYTES 7.50 (L) 09/29/2019 03:38 AM    MONOCYTES 10.10 09/29/2019 03:38 AM    EOSINOPHILS 0.60 09/29/2019 03:38 AM    BASOPHILS 0.30 09/29/2019 03:38 AM    ANISOCYTOSIS 1+ 09/26/2019 05:11 AM        Lab Results   Component Value Date/Time    PROTHROMBTM 12.6 04/06/2018 02:15 PM    INR 0.95 04/06/2018 02:15 PM      Recent Labs     09/26/19  0511 09/28/19  1500 09/29/19  0338   ASTSGOT 62* 492* 234*   ALTSGPT 31 804* 537*   TBILIRUBIN 0.4 1.1 1.5   GLOBULIN 3.4 4.4* 3.2          Problem List Items Addressed This Visit     Elevated lipase     As above,            Other Visit Diagnoses     Right upper quadrant abdominal pain        Pleuritic chest pain         Transaminitis               ASSESSMENT: 35 yo female with prior history of cholelithiasis s/p cholecystectomy and ERCP for choledocholithiasis last year who developed RUQ pain. She had new elevation in LFTs and MRCP suggests choledocholithiasis. Repeat ERCP is warranted     PLAN:   1) clear liquid diet only for now  2) NPO after midnight  3) plan for ERCP in AM  4) Ok for patient to take anti-epileptics in AM      Thank you for this consult.     Kaushal Lopes MD

## 2019-09-29 NOTE — ED NOTES
MRI screen form completed. Pt and family were updated. Pain is still 1/10. Will continue to monitor.

## 2019-09-29 NOTE — ED NOTES
Pt is up to the restroom with assist and a steady gait. Pain is now 1/10.  Will continue to monitor.

## 2019-09-29 NOTE — ED NOTES
Pt is back from CT. Chairs provided to pts family members and questions answered. Will continue to monitor.

## 2019-09-29 NOTE — PROGRESS NOTES
Pt admitted to rm215 2 from er. Pleasant ,alert and oriented x4. Able to make needs known. Primary language is Sami but does speak english. Oriented to room and call light. Family at bedside. Skin check performed via 2 rns. No open areas noted. Pt reports her last seizure was last Tuesday.

## 2019-09-29 NOTE — CARE PLAN
Problem: Bowel/Gastric:  Goal: Normal bowel function is maintained or improved  Outcome: PROGRESSING AS EXPECTED    Pt is on a clear liquid diet, pt is tolerating it well. Pt has mild pain to the RUQ. MRCP showed 6mm stone in bile duct.  GI consult in place and pending schedule for a ERCP tomorrow.     Problem: Knowledge Deficit  Goal: Knowledge of disease process/condition, treatment plan, diagnostic tests, and medications will improve  Outcome: PROGRESSING AS EXPECTED    Pt and family speaks mostly Colombian and wanted more education on pt's condition. Printed out gallbladder/gallstones information in Colombian for pt.      Problem: Pain Management  Goal: Pain level will decrease to patient's comfort goal  Outcome: PROGRESSING AS EXPECTED     Pt reports mild abdominal pain. Pt refused any prn pain meds at this time.

## 2019-09-29 NOTE — PROGRESS NOTES
Pt resting quietly. Up to bathroom x1.steady on her feet. Denies pain at this time.    Anus position and patency/Anal wink/Rectal-cutaneous fistula absent

## 2019-09-29 NOTE — PROGRESS NOTES
[facility] Medicine Daily Progress Note    Date of Service  9/29/2019    Chief Complaint  34 y.o. female admitted 9/28/2019 with abdominal pain.    Hospital Course    per HPI      Interval Problem Update  Patient is complaining epigastric and right upper quadrant discomfort, dull sensation, 5 out of 10 in severity, exacerbated by certain movements and by food.    Consultants/Specialty  Gastroenterology    Code Status  Full code    Disposition  TBD    Review of Systems  Review of Systems   Constitutional: Negative for chills, fever and malaise/fatigue.   HENT: Negative for congestion, hearing loss, sore throat and tinnitus.    Eyes: Negative for blurred vision, double vision, photophobia and pain.   Respiratory: Negative for cough, hemoptysis, sputum production, shortness of breath and stridor.    Cardiovascular: Negative for chest pain, palpitations, orthopnea, claudication and PND.   Gastrointestinal: Positive for abdominal pain. Negative for blood in stool, constipation, heartburn, melena, nausea and vomiting.   Genitourinary: Negative for dysuria, frequency and urgency.   Musculoskeletal: Negative for back pain, myalgias and neck pain.   Neurological: Negative for dizziness, tingling, tremors, sensory change, speech change, weakness and headaches.   Psychiatric/Behavioral: Negative for depression, memory loss and suicidal ideas. The patient is nervous/anxious.    All other systems reviewed and are negative.       Physical Exam  Temp:  [36.5 °C (97.7 °F)-36.9 °C (98.4 °F)] 36.5 °C (97.7 °F)  Pulse:  [] 69  Resp:  [16-18] 17  BP: ()/(57-87) 95/64  SpO2:  [93 %-99 %] 96 %    Physical Exam   Constitutional: She is oriented to person, place, and time. She appears well-developed and well-nourished. No distress.   HENT:   Head: Normocephalic and atraumatic.   Mouth/Throat: No oropharyngeal exudate.   Eyes: Pupils are equal, round, and reactive to light. Conjunctivae are normal. Right eye exhibits no discharge.  No scleral icterus.   Neck: Neck supple. No JVD present. No thyromegaly present.   Cardiovascular: Normal rate and intact distal pulses.   No murmur heard.  Pulmonary/Chest: Effort normal and breath sounds normal. No stridor. No respiratory distress. She has no wheezes. She has no rales.   Abdominal: Soft. Bowel sounds are normal. She exhibits no distension and no mass. There is tenderness (epigastric/RUQ on deep palpation). There is no rebound and no guarding.   Musculoskeletal: Normal range of motion. She exhibits no edema.   Neurological: She is alert and oriented to person, place, and time. No cranial nerve deficit.   Skin: Skin is warm. She is not diaphoretic. No erythema.   Psychiatric: She has a normal mood and affect. Her behavior is normal. Thought content normal.   Nursing note and vitals reviewed.      Fluids  No intake or output data in the 24 hours ending 09/29/19 1240    Laboratory  Recent Labs     09/28/19  1500 09/29/19  0338   WBC 6.6 10.7   RBC 4.69 3.95*   HEMOGLOBIN 12.6 10.6*   HEMATOCRIT 39.3 33.2*   MCV 83.8 84.1   MCH 26.9* 26.8*   MCHC 32.1* 31.9*   RDW 43.8 44.6   PLATELETCT 324 238   MPV 10.4 10.7     Recent Labs     09/28/19  1500 09/29/19  0338   SODIUM 140 140   POTASSIUM 3.6 4.0   CHLORIDE 99 106   CO2 24 22   GLUCOSE 97 82   BUN 9 10   CREATININE 0.57 0.38*   CALCIUM 9.9 8.7                   Imaging  [id number]   Final Result      There is an approximately 6 mm sized stone in the distal portion of the common bile duct causing abrupt obstruction. There is mild dilatation of the intrahepatic biliary ducts.      CT-ABDOMEN-PELVIS WITH   Final Result      1.  Negative contrast-enhanced CT of the abdomen and pelvis.      2.  Stable minor intrahepatic and extrahepatic biliary dilatation consistent with postcholecystectomy status.      3.  Small amount of free fluid in the pelvis which may be physiologic.      4.  Increased volume of stool within the colon.      5.  Normal appearance of  the appendix in the right lower quadrant.      CT-CTA CHEST PULMONARY ARTERY W/ RECONS   Final Result      1.  No CT evidence of pulmonary embolism.      2.  No incidental abnormalities are identified in the chest.                 Assessment/Plan  Acute gallstone pancreatitis  Assessment & Plan  Elevated lipase levels, AST/ALT and alkaline phosphatase  MRCP shows evidence of a 6 mm distal common bile duct stone with abrupt obstruction  At this point gastroneurology was consulted awaiting for further recommendations such as ERCP    Elevated lipase  Assessment & Plan  As above,     RUQ pain  Assessment & Plan  2/2 to pancreatitis from CBD obstruction.  Pain control  IV fluids  GI consult    LFT elevation  Assessment & Plan  Unclear etiology she has point tenderness in the right upper quadrant, LFTs were not elevated the other day when she is seen and CT scan is unremarkable  We will check hepatitis serologies, check MRCP  Clear liquid diet for now    Epilepsy (HCC)- (present on admission)  Assessment & Plan  Controlled per patient  Resume home dose of Depakote, Keppra       VTE prophylaxis: SCDs

## 2019-09-29 NOTE — DISCHARGE PLANNING
FLOR met with patient's sibling,Hamzah per Hospitalist request.  Hamzah had questions about patient insurance and the cost of this patient's stay.  FLOR provided Hamzah with PFA number to contact her tomorrow to see if this patient can apply for anything. Per Hamzah patient has Access to Health Care.

## 2019-09-29 NOTE — H&P
Hospital Medicine History & Physical Note    Date of Service  9/28/2019    Primary Care Physician  JUAN M Becerril    Consultants  none    Code Status  Full    Chief Complaint  Right-sided abdominal pain    History of Presenting Illness patient is Latvian-speaking and translation pad was used.  34 y.o. female who presented 9/28/2019 with abdominal pain.  She was originally seen here 2 nights ago for same, CT of the abdomen was unremarkable and laboratory studies were unremarkable.  She was discharged home but returns with similar pain but now it is radiating up into her chest her shoulder and her right breast.    She states that earlier this month around the ninth through the 10th she was having some abdominal pain was seen at urgent care and placed on antibiotics for possibly a urinary tract infection.  This did not help her pain so she took some leftover antibiotics that were her sisters and she did have improvement of her pain after that until the other day when it recurred.  She had nausea and vomiting over the last couple of days but none today, the vomiting seems to help her pain some but it then comes back.     Patient had a cholecystectomy back in April, she says the symptoms are not similar to that.  Following her cholecystectomy she has been doing well other than frequent belching.    She said the other night she felt like she was very cold and had to bundle up in the blankets, her eyes felt like they were burning but she did not measure her temperature.  After the chills resolved she discarded the blankets and found that she was diaphoretic.    She denies headache or dizziness.  She has not found any exacerbating factors the pain is been relatively constant.  On presentation to the emergency room it was 8 out of 10, after morphine it is 1 out of 10.    Review of Systems  Review of Systems   Constitutional: Positive for chills and diaphoresis. Negative for fever (?), malaise/fatigue and weight  loss.   HENT: Negative for congestion and sore throat.    Eyes: Negative for discharge and redness.   Respiratory: Negative for cough and shortness of breath.    Cardiovascular: Positive for chest pain.   Gastrointestinal: Positive for abdominal pain, heartburn, nausea and vomiting. Negative for constipation and diarrhea.   Genitourinary: Positive for hematuria (slight). Negative for dysuria.   Musculoskeletal: Positive for back pain.   Skin: Negative for itching and rash.   Neurological: Negative for dizziness, seizures and headaches.   Psychiatric/Behavioral: Depression: R chest. The patient is not nervous/anxious.        Past Medical History   has a past medical history of DUB (dysfunctional uterine bleeding), Epilepsy (HCC) (11/4/2009), GERD (gastroesophageal reflux disease), and Seizure disorder ([facility]). She also has no past medical history of Anemia, Arrhythmia, Asthma, Blood transfusion without reported diagnosis, Cancer ([facility]), CHF (congestive heart failure) ([facility]), Clotting disorder (HCC), COPD (chronic obstructive pulmonary disease) ([facility]), Diabetic neuropathy ([facility]), Goiter, Heart attack (HCC), Heart murmur, Hyperlipidemia, Hypertension, Kidney disease, Migraine, Muscle disorder, Osteoporosis, Pulmonary emphysema (HCC), Stroke (HCC), Thyroid disease, Tuberculosis, or Urinary tract infection, site not specified.    Surgical History   has a past surgical history that includes ercp (4/6/2018) and jessica by laparoscopy (N/A, 4/8/2018).     Family History  Denies family history of heart disease diabetes or cancer    Social History   reports that she has never smoked. She has never used smokeless tobacco. She reports that she does not drink alcohol or use drugs.  She lives with her parents she does not have any children, she does not work outside the home.  She has not recently traveled outside the country.  She has not been exposed to others who are ill.    Allergies  Allergies   Allergen Reactions   • Nkda [No Known  Drug Allergy]        Medications  Prior to Admission Medications   Prescriptions Last Dose Informant Patient Reported? Taking?   divalproex (DEPAKOTE) 500 MG Tablet Delayed Response 2019 at AM Rx Bottle (For Med Information) Yes No   Sig: Take 500 mg by mouth 2 Times a Day. AM  1200   ergocalciferol (DRISDOL) 61195 UNIT capsule 2019 at AM Rx Bottle (For Med Information) No No   Sig: Take 1 Cap by mouth every 7 days.   famotidine (PEPCID) 20 MG Tab 2019 at AM Rx Bottle (For Med Information) No No   Sig: Take 1 Tab by mouth 2 times a day.   levETIRAcetam (KEPPRA) 500 MG Tab 2019 at AM Rx Bottle (For Med Information) No No   Si mg in the AM and 1000 mg at HS   polyethylene glycol/lytes (MIRALAX) Pack 2019 at AM Rx Bottle (For Med Information) No No   Sig: Take 1 Packet by mouth every day.      Facility-Administered Medications: None       Physical Exam  Temp:  [36.6 °C (97.9 °F)-36.7 °C (98 °F)] 36.7 °C (98 °F)  Pulse:  [] 90  Resp:  [16-46] 46  BP: ()/(57-87) 92/58  SpO2:  [93 %-99 %] 94 %    Physical Exam   Constitutional: She is oriented to person, place, and time. She appears well-developed and well-nourished. No distress.   Non toxic   HENT:   Head: Normocephalic and atraumatic.   Mouth/Throat: Oropharynx is clear and moist.   Eyes: Pupils are equal, round, and reactive to light. Conjunctivae and EOM are normal. Right eye exhibits no discharge. Left eye exhibits no discharge. No scleral icterus.   Neck: Neck supple.   Cardiovascular: Normal rate and regular rhythm.   Pulmonary/Chest: Effort normal and breath sounds normal. No respiratory distress. She has no wheezes. She exhibits no tenderness.   Abdominal: Soft. Bowel sounds are normal. She exhibits no distension. There is tenderness (She has marked tenderness in the right upper quadrant without rebound or guarding or mass, to a lesser extent she has tenderness throughout the remainder of her abdomen).   Musculoskeletal:  She exhibits no edema or tenderness.   Neurological: She is alert and oriented to person, place, and time. No cranial nerve deficit.   Skin: Skin is warm and dry. She is not diaphoretic.   Psychiatric: She has a normal mood and affect.   Nursing note and vitals reviewed.      Laboratory:  Recent Labs     09/26/19  0511 09/28/19  1500   WBC 9.0 6.6   RBC 4.27 4.69   HEMOGLOBIN 11.5* 12.6   HEMATOCRIT 35.4* 39.3   MCV 82.9 83.8   MCH 26.9* 26.9*   MCHC 32.5* 32.1*   RDW 42.5 43.8   PLATELETCT 362 324   MPV 9.9 10.4     Recent Labs     09/26/19  0511 09/28/19  1500   SODIUM 139 140   POTASSIUM 3.4* 3.6   CHLORIDE 101 99   CO2 19* 24   GLUCOSE 98 97   BUN 9 9   CREATININE 0.63 0.57   CALCIUM 9.5 9.9     Recent Labs     09/26/19  0511 09/28/19  1500   ALTSGPT 31 804*   ASTSGOT 62* 492*   ALKPHOSPHAT 85 275*   TBILIRUBIN 0.4 1.1   LIPASE 53 78*   GLUCOSE 98 97         No results for input(s): NTPROBNP in the last 72 hours.      Recent Labs     09/28/19  1500   TROPONINT <6       Urinalysis:    Recent Labs     09/28/19  1500   SPECGRAVITY 1.015   GLUCOSEUR Negative   KETONES >=80*   NITRITE Negative   LEUKESTERAS Trace*   WBCURINE 5-10*   RBCURINE 2-5*   BACTERIA Moderate*   EPITHELCELL Few        Imaging:  CT-ABDOMEN-PELVIS WITH   Final Result      1.  Negative contrast-enhanced CT of the abdomen and pelvis.      2.  Stable minor intrahepatic and extrahepatic biliary dilatation consistent with postcholecystectomy status.      3.  Small amount of free fluid in the pelvis which may be physiologic.      4.  Increased volume of stool within the colon.      5.  Normal appearance of the appendix in the right lower quadrant.      CT-CTA CHEST PULMONARY ARTERY W/ RECONS   Final Result      1.  No CT evidence of pulmonary embolism.      2.  No incidental abnormalities are identified in the chest.            [id number]    (Results Pending)         Assessment/Plan:  I anticipate this patient will require at least two midnights  for appropriate medical management, necessitating inpatient admission.    Elevated lipase  Assessment & Plan  mild    RUQ pain  Assessment & Plan  Plan as noted    LFT elevation  Assessment & Plan  Unclear etiology she has point tenderness in the right upper quadrant, LFTs were not elevated the other day when she is seen and CT scan is unremarkable  We will check hepatitis serologies, check MRCP  Clear liquid diet for now    Epilepsy (HCC)- (present on admission)  Assessment & Plan  Continue Depakote, Keppra      VTE prophylaxis: SCDs

## 2019-09-29 NOTE — ASSESSMENT & PLAN NOTE
2/2 to CBD obstruction  ERCP done but patient with pain with eating and unable to eat adequately today  Will continue symptom control, monitor labs  Check lipase

## 2019-09-29 NOTE — ASSESSMENT & PLAN NOTE
Elevated lipase levels, AST/ALT and alkaline phosphatase  MRCP shows evidence of a 6 mm distal common bile duct stone with abrupt obstruction  At this point gastroneurology was consulted awaiting for further recommendations such as ERCP pending  AST/ALT : 146/381<234/537

## 2019-09-30 ENCOUNTER — APPOINTMENT (OUTPATIENT)
Dept: RADIOLOGY | Facility: MEDICAL CENTER | Age: 34
DRG: 439 | End: 2019-09-30
Attending: INTERNAL MEDICINE
Payer: COMMERCIAL

## 2019-09-30 ENCOUNTER — ANESTHESIA EVENT (OUTPATIENT)
Dept: SURGERY | Facility: MEDICAL CENTER | Age: 34
DRG: 439 | End: 2019-09-30
Payer: COMMERCIAL

## 2019-09-30 ENCOUNTER — ANESTHESIA (OUTPATIENT)
Dept: SURGERY | Facility: MEDICAL CENTER | Age: 34
DRG: 439 | End: 2019-09-30
Payer: COMMERCIAL

## 2019-09-30 LAB
ALBUMIN SERPL BCP-MCNC: 3.4 G/DL (ref 3.2–4.9)
ALBUMIN/GLOB SERPL: 1.1 G/DL
ALP SERPL-CCNC: 199 U/L (ref 30–99)
ALT SERPL-CCNC: 381 U/L (ref 2–50)
ANION GAP SERPL CALC-SCNC: 10 MMOL/L (ref 0–11.9)
AST SERPL-CCNC: 146 U/L (ref 12–45)
BASOPHILS # BLD AUTO: 0.6 % (ref 0–1.8)
BASOPHILS # BLD: 0.04 K/UL (ref 0–0.12)
BILIRUB SERPL-MCNC: 1.1 MG/DL (ref 0.1–1.5)
BUN SERPL-MCNC: 5 MG/DL (ref 8–22)
CALCIUM SERPL-MCNC: 8.6 MG/DL (ref 8.4–10.2)
CHLORIDE SERPL-SCNC: 106 MMOL/L (ref 96–112)
CO2 SERPL-SCNC: 23 MMOL/L (ref 20–33)
CREAT SERPL-MCNC: 0.42 MG/DL (ref 0.5–1.4)
EOSINOPHIL # BLD AUTO: 0.2 K/UL (ref 0–0.51)
EOSINOPHIL NFR BLD: 2.9 % (ref 0–6.9)
ERYTHROCYTE [DISTWIDTH] IN BLOOD BY AUTOMATED COUNT: 45.9 FL (ref 35.9–50)
FERRITIN SERPL-MCNC: 46 NG/ML (ref 10–291)
GLOBULIN SER CALC-MCNC: 3 G/DL (ref 1.9–3.5)
GLUCOSE SERPL-MCNC: 72 MG/DL (ref 65–99)
HCG SERPL QL: NEGATIVE
HCT VFR BLD AUTO: 32.5 % (ref 37–47)
HGB BLD-MCNC: 10.1 G/DL (ref 12–16)
IMM GRANULOCYTES # BLD AUTO: 0.04 K/UL (ref 0–0.11)
IMM GRANULOCYTES NFR BLD AUTO: 0.6 % (ref 0–0.9)
IRON SATN MFR SERPL: 8 % (ref 15–55)
IRON SERPL-MCNC: 23 UG/DL (ref 40–170)
LYMPHOCYTES # BLD AUTO: 1.14 K/UL (ref 1–4.8)
LYMPHOCYTES NFR BLD: 16.4 % (ref 22–41)
MCH RBC QN AUTO: 26.7 PG (ref 27–33)
MCHC RBC AUTO-ENTMCNC: 31.1 G/DL (ref 33.6–35)
MCV RBC AUTO: 86 FL (ref 81.4–97.8)
MONOCYTES # BLD AUTO: 0.92 K/UL (ref 0–0.85)
MONOCYTES NFR BLD AUTO: 13.2 % (ref 0–13.4)
NEUTROPHILS # BLD AUTO: 4.63 K/UL (ref 2–7.15)
NEUTROPHILS NFR BLD: 66.3 % (ref 44–72)
NRBC # BLD AUTO: 0 K/UL
NRBC BLD-RTO: 0 /100 WBC
PLATELET # BLD AUTO: 228 K/UL (ref 164–446)
PMV BLD AUTO: 11 FL (ref 9–12.9)
POTASSIUM SERPL-SCNC: 4.4 MMOL/L (ref 3.6–5.5)
PROT SERPL-MCNC: 6.4 G/DL (ref 6–8.2)
RBC # BLD AUTO: 3.78 M/UL (ref 4.2–5.4)
SODIUM SERPL-SCNC: 139 MMOL/L (ref 135–145)
TIBC SERPL-MCNC: 298 UG/DL (ref 250–450)
WBC # BLD AUTO: 7 K/UL (ref 4.8–10.8)

## 2019-09-30 PROCEDURE — 160009 HCHG ANES TIME/MIN: Performed by: INTERNAL MEDICINE

## 2019-09-30 PROCEDURE — 36415 COLL VENOUS BLD VENIPUNCTURE: CPT

## 2019-09-30 PROCEDURE — 85025 COMPLETE CBC W/AUTO DIFF WBC: CPT

## 2019-09-30 PROCEDURE — 700105 HCHG RX REV CODE 258: Performed by: INTERNAL MEDICINE

## 2019-09-30 PROCEDURE — 84703 CHORIONIC GONADOTROPIN ASSAY: CPT

## 2019-09-30 PROCEDURE — 160048 HCHG OR STATISTICAL LEVEL 1-5: Performed by: INTERNAL MEDICINE

## 2019-09-30 PROCEDURE — 160203 HCHG ENDO MINUTES - 1ST 30 MINS LEVEL 4: Performed by: INTERNAL MEDICINE

## 2019-09-30 PROCEDURE — 700101 HCHG RX REV CODE 250: Performed by: INTERNAL MEDICINE

## 2019-09-30 PROCEDURE — 700111 HCHG RX REV CODE 636 W/ 250 OVERRIDE (IP): Performed by: ANESTHESIOLOGY

## 2019-09-30 PROCEDURE — 700102 HCHG RX REV CODE 250 W/ 637 OVERRIDE(OP): Performed by: HOSPITALIST

## 2019-09-30 PROCEDURE — A9270 NON-COVERED ITEM OR SERVICE: HCPCS | Performed by: INTERNAL MEDICINE

## 2019-09-30 PROCEDURE — 82728 ASSAY OF FERRITIN: CPT

## 2019-09-30 PROCEDURE — 160002 HCHG RECOVERY MINUTES (STAT): Performed by: INTERNAL MEDICINE

## 2019-09-30 PROCEDURE — 0FC98ZZ EXTIRPATION OF MATTER FROM COMMON BILE DUCT, VIA NATURAL OR ARTIFICIAL OPENING ENDOSCOPIC: ICD-10-PCS | Performed by: INTERNAL MEDICINE

## 2019-09-30 PROCEDURE — 502240 HCHG MISC OR SUPPLY RC 0272: Performed by: INTERNAL MEDICINE

## 2019-09-30 PROCEDURE — 160035 HCHG PACU - 1ST 60 MINS PHASE I: Performed by: INTERNAL MEDICINE

## 2019-09-30 PROCEDURE — 160208 HCHG ENDO MINUTES - EA ADDL 1 MIN LEVEL 4: Performed by: INTERNAL MEDICINE

## 2019-09-30 PROCEDURE — 99232 SBSQ HOSP IP/OBS MODERATE 35: CPT | Performed by: HOSPITALIST

## 2019-09-30 PROCEDURE — 700102 HCHG RX REV CODE 250 W/ 637 OVERRIDE(OP): Performed by: INTERNAL MEDICINE

## 2019-09-30 PROCEDURE — 80053 COMPREHEN METABOLIC PANEL: CPT

## 2019-09-30 PROCEDURE — 700111 HCHG RX REV CODE 636 W/ 250 OVERRIDE (IP): Performed by: INTERNAL MEDICINE

## 2019-09-30 PROCEDURE — 110371 HCHG SHELL REV 272: Performed by: INTERNAL MEDICINE

## 2019-09-30 PROCEDURE — 83550 IRON BINDING TEST: CPT

## 2019-09-30 PROCEDURE — 500066 HCHG BITE BLOCK, ECT: Performed by: INTERNAL MEDICINE

## 2019-09-30 PROCEDURE — 700101 HCHG RX REV CODE 250: Performed by: ANESTHESIOLOGY

## 2019-09-30 PROCEDURE — 770006 HCHG ROOM/CARE - MED/SURG/GYN SEMI*

## 2019-09-30 PROCEDURE — 83540 ASSAY OF IRON: CPT

## 2019-09-30 PROCEDURE — A9270 NON-COVERED ITEM OR SERVICE: HCPCS | Performed by: HOSPITALIST

## 2019-09-30 RX ORDER — HYDRALAZINE HYDROCHLORIDE 20 MG/ML
5 INJECTION INTRAMUSCULAR; INTRAVENOUS
Status: DISCONTINUED | OUTPATIENT
Start: 2019-09-30 | End: 2019-09-30 | Stop reason: HOSPADM

## 2019-09-30 RX ORDER — HALOPERIDOL 5 MG/ML
1 INJECTION INTRAMUSCULAR
Status: DISCONTINUED | OUTPATIENT
Start: 2019-09-30 | End: 2019-09-30 | Stop reason: HOSPADM

## 2019-09-30 RX ORDER — ROCURONIUM BROMIDE 10 MG/ML
INJECTION, SOLUTION INTRAVENOUS PRN
Status: DISCONTINUED | OUTPATIENT
Start: 2019-09-30 | End: 2019-09-30 | Stop reason: SURG

## 2019-09-30 RX ORDER — DIPHENHYDRAMINE HYDROCHLORIDE 50 MG/ML
12.5 INJECTION INTRAMUSCULAR; INTRAVENOUS
Status: DISCONTINUED | OUTPATIENT
Start: 2019-09-30 | End: 2019-09-30 | Stop reason: HOSPADM

## 2019-09-30 RX ORDER — OXYCODONE HCL 5 MG/5 ML
10 SOLUTION, ORAL ORAL
Status: DISCONTINUED | OUTPATIENT
Start: 2019-09-30 | End: 2019-09-30 | Stop reason: HOSPADM

## 2019-09-30 RX ORDER — MIDAZOLAM HYDROCHLORIDE 1 MG/ML
INJECTION INTRAMUSCULAR; INTRAVENOUS PRN
Status: DISCONTINUED | OUTPATIENT
Start: 2019-09-30 | End: 2019-09-30 | Stop reason: SURG

## 2019-09-30 RX ORDER — DIVALPROEX SODIUM 250 MG/1
TABLET, DELAYED RELEASE ORAL
Status: COMPLETED
Start: 2019-09-30 | End: 2019-09-30

## 2019-09-30 RX ORDER — LIDOCAINE HYDROCHLORIDE 20 MG/ML
INJECTION, SOLUTION EPIDURAL; INFILTRATION; INTRACAUDAL; PERINEURAL PRN
Status: DISCONTINUED | OUTPATIENT
Start: 2019-09-30 | End: 2019-09-30 | Stop reason: SURG

## 2019-09-30 RX ORDER — OXYCODONE HCL 5 MG/5 ML
5 SOLUTION, ORAL ORAL
Status: DISCONTINUED | OUTPATIENT
Start: 2019-09-30 | End: 2019-09-30 | Stop reason: HOSPADM

## 2019-09-30 RX ORDER — HYDROMORPHONE HYDROCHLORIDE 1 MG/ML
0.2 INJECTION, SOLUTION INTRAMUSCULAR; INTRAVENOUS; SUBCUTANEOUS
Status: DISCONTINUED | OUTPATIENT
Start: 2019-09-30 | End: 2019-09-30 | Stop reason: HOSPADM

## 2019-09-30 RX ORDER — ONDANSETRON 2 MG/ML
4 INJECTION INTRAMUSCULAR; INTRAVENOUS
Status: DISCONTINUED | OUTPATIENT
Start: 2019-09-30 | End: 2019-09-30 | Stop reason: HOSPADM

## 2019-09-30 RX ORDER — SODIUM CHLORIDE, SODIUM LACTATE, POTASSIUM CHLORIDE, CALCIUM CHLORIDE 600; 310; 30; 20 MG/100ML; MG/100ML; MG/100ML; MG/100ML
INJECTION, SOLUTION INTRAVENOUS CONTINUOUS
Status: DISCONTINUED | OUTPATIENT
Start: 2019-09-30 | End: 2019-09-30 | Stop reason: HOSPADM

## 2019-09-30 RX ORDER — ONDANSETRON 2 MG/ML
INJECTION INTRAMUSCULAR; INTRAVENOUS PRN
Status: DISCONTINUED | OUTPATIENT
Start: 2019-09-30 | End: 2019-09-30 | Stop reason: SURG

## 2019-09-30 RX ORDER — HYDROMORPHONE HYDROCHLORIDE 1 MG/ML
0.4 INJECTION, SOLUTION INTRAMUSCULAR; INTRAVENOUS; SUBCUTANEOUS
Status: DISCONTINUED | OUTPATIENT
Start: 2019-09-30 | End: 2019-09-30 | Stop reason: HOSPADM

## 2019-09-30 RX ORDER — HYDROMORPHONE HYDROCHLORIDE 1 MG/ML
0.1 INJECTION, SOLUTION INTRAMUSCULAR; INTRAVENOUS; SUBCUTANEOUS
Status: DISCONTINUED | OUTPATIENT
Start: 2019-09-30 | End: 2019-09-30 | Stop reason: HOSPADM

## 2019-09-30 RX ORDER — LABETALOL HYDROCHLORIDE 5 MG/ML
5 INJECTION, SOLUTION INTRAVENOUS
Status: DISCONTINUED | OUTPATIENT
Start: 2019-09-30 | End: 2019-09-30 | Stop reason: HOSPADM

## 2019-09-30 RX ORDER — MEPERIDINE HYDROCHLORIDE 25 MG/ML
25 INJECTION INTRAMUSCULAR; INTRAVENOUS; SUBCUTANEOUS
Status: DISCONTINUED | OUTPATIENT
Start: 2019-09-30 | End: 2019-09-30 | Stop reason: HOSPADM

## 2019-09-30 RX ORDER — LORAZEPAM 2 MG/ML
0.5 INJECTION INTRAMUSCULAR
Status: DISCONTINUED | OUTPATIENT
Start: 2019-09-30 | End: 2019-09-30 | Stop reason: HOSPADM

## 2019-09-30 RX ORDER — SODIUM CHLORIDE, SODIUM LACTATE, POTASSIUM CHLORIDE, CALCIUM CHLORIDE 600; 310; 30; 20 MG/100ML; MG/100ML; MG/100ML; MG/100ML
INJECTION, SOLUTION INTRAVENOUS CONTINUOUS
Status: ACTIVE | OUTPATIENT
Start: 2019-09-30 | End: 2019-10-01

## 2019-09-30 RX ORDER — DEXAMETHASONE SODIUM PHOSPHATE 4 MG/ML
INJECTION, SOLUTION INTRA-ARTICULAR; INTRALESIONAL; INTRAMUSCULAR; INTRAVENOUS; SOFT TISSUE PRN
Status: DISCONTINUED | OUTPATIENT
Start: 2019-09-30 | End: 2019-09-30 | Stop reason: SURG

## 2019-09-30 RX ORDER — KETOROLAC TROMETHAMINE 30 MG/ML
INJECTION, SOLUTION INTRAMUSCULAR; INTRAVENOUS PRN
Status: DISCONTINUED | OUTPATIENT
Start: 2019-09-30 | End: 2019-09-30 | Stop reason: SURG

## 2019-09-30 RX ADMIN — LIDOCAINE HYDROCHLORIDE 50 MG: 20 INJECTION, SOLUTION EPIDURAL; INFILTRATION; INTRACAUDAL; PERINEURAL at 16:28

## 2019-09-30 RX ADMIN — DEXAMETHASONE SODIUM PHOSPHATE 8 MG: 4 INJECTION, SOLUTION INTRAMUSCULAR; INTRAVENOUS at 16:40

## 2019-09-30 RX ADMIN — FENTANYL CITRATE 100 MCG: 50 INJECTION, SOLUTION INTRAMUSCULAR; INTRAVENOUS at 16:28

## 2019-09-30 RX ADMIN — MORPHINE SULFATE 4 MG: 4 INJECTION INTRAVENOUS at 00:38

## 2019-09-30 RX ADMIN — PROPOFOL 150 MG: 10 INJECTION, EMULSION INTRAVENOUS at 16:28

## 2019-09-30 RX ADMIN — FAMOTIDINE 20 MG: 20 TABLET ORAL at 05:52

## 2019-09-30 RX ADMIN — SODIUM CHLORIDE, POTASSIUM CHLORIDE, SODIUM LACTATE AND CALCIUM CHLORIDE: 600; 310; 30; 20 INJECTION, SOLUTION INTRAVENOUS at 15:30

## 2019-09-30 RX ADMIN — ACETAMINOPHEN 1000 MG: 500 TABLET, FILM COATED ORAL at 22:36

## 2019-09-30 RX ADMIN — SODIUM CHLORIDE AND POTASSIUM CHLORIDE: 9; 1.49 INJECTION, SOLUTION INTRAVENOUS at 18:58

## 2019-09-30 RX ADMIN — ONDANSETRON 4 MG: 2 INJECTION INTRAMUSCULAR; INTRAVENOUS at 16:40

## 2019-09-30 RX ADMIN — ONDANSETRON 4 MG: 2 INJECTION INTRAMUSCULAR; INTRAVENOUS at 00:38

## 2019-09-30 RX ADMIN — MIDAZOLAM HYDROCHLORIDE 2 MG: 1 INJECTION, SOLUTION INTRAMUSCULAR; INTRAVENOUS at 16:24

## 2019-09-30 RX ADMIN — DIVALPROEX SODIUM 500 MG: 250 TABLET, DELAYED RELEASE ORAL at 18:51

## 2019-09-30 RX ADMIN — LEVETIRACETAM 1000 MG: 500 TABLET, FILM COATED ORAL at 22:36

## 2019-09-30 RX ADMIN — KETOROLAC TROMETHAMINE 30 MG: 30 INJECTION, SOLUTION INTRAMUSCULAR at 16:40

## 2019-09-30 RX ADMIN — ROCURONIUM BROMIDE 30 MG: 10 INJECTION, SOLUTION INTRAVENOUS at 16:28

## 2019-09-30 RX ADMIN — CELECOXIB 200 MG: 200 CAPSULE ORAL at 05:52

## 2019-09-30 RX ADMIN — SUGAMMADEX 200 MG: 100 INJECTION, SOLUTION INTRAVENOUS at 16:50

## 2019-09-30 RX ADMIN — LEVETIRACETAM 1000 MG: 500 TABLET, FILM COATED ORAL at 05:52

## 2019-09-30 RX ADMIN — SODIUM CHLORIDE AND POTASSIUM CHLORIDE: 9; 1.49 INJECTION, SOLUTION INTRAVENOUS at 00:00

## 2019-09-30 RX ADMIN — ACETAMINOPHEN 1000 MG: 500 TABLET, FILM COATED ORAL at 05:52

## 2019-09-30 ASSESSMENT — ENCOUNTER SYMPTOMS
WEAKNESS: 0
SPUTUM PRODUCTION: 0
HEMOPTYSIS: 0
STRIDOR: 0
DIZZINESS: 0
ORTHOPNEA: 0
PALPITATIONS: 0
COUGH: 0
SHORTNESS OF BREATH: 0
PND: 0
CLAUDICATION: 0
TINGLING: 0
FEVER: 0
MEMORY LOSS: 0
NECK PAIN: 0
DOUBLE VISION: 0
CONSTIPATION: 0
ABDOMINAL PAIN: 1
NERVOUS/ANXIOUS: 1
BLOOD IN STOOL: 0
NAUSEA: 1
SPEECH CHANGE: 0
BACK PAIN: 0
HEARTBURN: 0
VOMITING: 0
BLURRED VISION: 0
MYALGIAS: 0
DEPRESSION: 0
HEADACHES: 0
TREMORS: 0
SENSORY CHANGE: 0
EYE PAIN: 0
CHILLS: 0
PHOTOPHOBIA: 0
SORE THROAT: 0

## 2019-09-30 ASSESSMENT — PAIN SCALES - WONG BAKER: WONGBAKER_NUMERICALRESPONSE: DOESN'T HURT AT ALL

## 2019-09-30 ASSESSMENT — PAIN SCALES - GENERAL: PAIN_LEVEL: 1

## 2019-09-30 NOTE — PROGRESS NOTES
[facility] Medicine Daily Progress Note    Date of Service  9/30/2019    Chief Complaint  34 y.o. female admitted 9/28/2019 with abdominal pain.    Hospital Course    per HPI      Interval Problem Update  Patient is complaining epigastric and right upper quadrant discomfort, dull sensation, 5 out of 10 in severity, exacerbated by certain movements and by food.    9/30  Patient said that she had right upper quadrant pain that was 10 out of 10 last night as a result she woke up, and has been mildly nauseous, however her abdominal pain has subsided this morning.  Denies nausea, denies fevers chills  Awaiting for ERCP today    Consultants/Specialty  Gastroenterology    Code Status  Full code    Disposition  TBD    Review of Systems  Review of Systems   Constitutional: Negative for chills, fever and malaise/fatigue.   HENT: Negative for congestion, hearing loss, sore throat and tinnitus.    Eyes: Negative for blurred vision, double vision, photophobia and pain.   Respiratory: Negative for cough, hemoptysis, sputum production, shortness of breath and stridor.    Cardiovascular: Negative for chest pain, palpitations, orthopnea, claudication and PND.   Gastrointestinal: Positive for abdominal pain and nausea. Negative for blood in stool, constipation, heartburn, melena and vomiting.   Genitourinary: Negative for dysuria, frequency and urgency.   Musculoskeletal: Negative for back pain, myalgias and neck pain.   Neurological: Negative for dizziness, tingling, tremors, sensory change, speech change, weakness and headaches.   Psychiatric/Behavioral: Negative for depression, memory loss and suicidal ideas. The patient is nervous/anxious.    All other systems reviewed and are negative.       Physical Exam  Temp:  [36.6 °C (97.9 °F)-36.9 °C (98.4 °F)] 36.9 °C (98.4 °F)  Pulse:  [61-81] 70  Resp:  [17-18] 18  BP: (102-118)/(41-80) 105/80  SpO2:  [97 %-100 %] 100 %    Physical Exam   Constitutional: She is oriented to person, place,  and time. She appears well-developed and well-nourished. No distress.   HENT:   Head: Normocephalic and atraumatic.   Mouth/Throat: No oropharyngeal exudate.   Mucous membranes dry   Eyes: Pupils are equal, round, and reactive to light. Conjunctivae are normal. Right eye exhibits no discharge. No scleral icterus.   Neck: Neck supple. No JVD present. No thyromegaly present.   Cardiovascular: Normal rate and intact distal pulses.   No murmur heard.  Pulmonary/Chest: Effort normal and breath sounds normal. No stridor. No respiratory distress. She has no wheezes. She has no rales.   Abdominal: Soft. Bowel sounds are normal. She exhibits no distension and no mass. There is tenderness (RUQ pain on deep palpation). There is no rebound and no guarding.   Musculoskeletal: Normal range of motion. She exhibits no edema.   Neurological: She is alert and oriented to person, place, and time. No cranial nerve deficit.   Skin: Skin is warm. She is not diaphoretic. No erythema.   Psychiatric: She has a normal mood and affect. Her behavior is normal. Thought content normal.   Nursing note and vitals reviewed.      Fluids    Intake/Output Summary (Last 24 hours) at 9/30/2019 1257  Last data filed at 9/30/2019 0800  Gross per 24 hour   Intake 3940 ml   Output --   Net 3940 ml       Laboratory  Recent Labs     09/28/19  1500 09/29/19  0338 09/30/19  0412   WBC 6.6 10.7 7.0   RBC 4.69 3.95* 3.78*   HEMOGLOBIN 12.6 10.6* 10.1*   HEMATOCRIT 39.3 33.2* 32.5*   MCV 83.8 84.1 86.0   MCH 26.9* 26.8* 26.7*   MCHC 32.1* 31.9* 31.1*   RDW 43.8 44.6 45.9   PLATELETCT 324 238 228   MPV 10.4 10.7 11.0     Recent Labs     09/28/19  1500 09/29/19  0338 09/30/19  0412   SODIUM 140 140 139   POTASSIUM 3.6 4.0 4.4   CHLORIDE 99 106 106   CO2 24 22 23   GLUCOSE 97 82 72   BUN 9 10 5*   CREATININE 0.57 0.38* 0.42*   CALCIUM 9.9 8.7 8.6                   Imaging  [id number]   Final Result      There is an approximately 6 mm sized stone in the distal  portion of the common bile duct causing abrupt obstruction. There is mild dilatation of the intrahepatic biliary ducts.      CT-ABDOMEN-PELVIS WITH   Final Result      1.  Negative contrast-enhanced CT of the abdomen and pelvis.      2.  Stable minor intrahepatic and extrahepatic biliary dilatation consistent with postcholecystectomy status.      3.  Small amount of free fluid in the pelvis which may be physiologic.      4.  Increased volume of stool within the colon.      5.  Normal appearance of the appendix in the right lower quadrant.      CT-CTA CHEST PULMONARY ARTERY W/ RECONS   Final Result      1.  No CT evidence of pulmonary embolism.      2.  No incidental abnormalities are identified in the chest.                 Assessment/Plan  Acute gallstone pancreatitis  Assessment & Plan  Elevated lipase levels, AST/ALT and alkaline phosphatase  MRCP shows evidence of a 6 mm distal common bile duct stone with abrupt obstruction  At this point gastroneurology was consulted awaiting for further recommendations such as ERCP pending  AST/ALT : 146/381<234/537    Elevated lipase  Assessment & Plan  As above, 2/2 to CBD obstruction  CTM closely     RUQ pain  Assessment & Plan  2/2 to pancreatitis from CBD obstruction.  Pain control  IV fluids  Pending ERCP today    LFT elevation- (present on admission)  Assessment & Plan  2/2 to CBD obstruction  Still elevated but improving slowly  Repeat cmp in the am after ERCP    Epilepsy (HCC)- (present on admission)  Assessment & Plan  Controlled per patient, no acute issues  Resume home dose of Depakote, Keppra    Patient plan of care discussed at multidisplinary team rounds and with patient and R.N at beside.       VTE prophylaxis: SCDs

## 2019-09-30 NOTE — ANESTHESIA PROCEDURE NOTES
Airway  Date/Time: 9/30/2019 4:30 PM  Performed by: Oswaldo Middleton M.D.  Authorized by: Oswaldo Middleton M.D.     Location:  OR  Urgency:  Elective  Indications for Airway Management:  Anesthesia  Spontaneous Ventilation: absent    Sedation Level:  Deep  Preoxygenated: Yes    Patient Position:  Sniffing  Final Airway Type:  Endotracheal airway  Final Endotracheal Airway:  ETT  Cuffed: Yes    Technique Used for Successful ETT Placement:  Direct laryngoscopy  Insertion Site:  Oral  Blade Type:  Pedro  Laryngoscope Blade/Videolaryngoscope Blade Size:  3  ETT Size (mm):  6.5  Measured from:  Teeth  ETT to Teeth (cm):  21  Placement Verified by: auscultation and capnometry    Cormack-Lehane Classification:  Grade I - full view of glottis  Number of Attempts at Approach:  1

## 2019-09-30 NOTE — PROGRESS NOTES
0645:  Bedside report completed w/ Becki/ISRAEL.  Assumed pt care. Patient up to restroom, in no apparent distress.  Safety precautions in place. Call light & personal belongings within reach.  Plan of care discussed.  No reports of pain at this time.  Patient aware of NPO status and ERCP at 1250 today.    0828:  Patient depakote moved to 0800 on MAR, overrode medselect to pull.  Spoke with patient and she said she already had her depakote today.  Returned to [facility] and noted on MAR.      1245:  Updated patient and father on surgery time.  Father is upset that the time keeps getting moved back.      1830:  Patient returned from ERCP, reports no pain at this time, eating jello and juice, educated to take it slow and advance diet as tolerated, ambulated to restroom independently without issue.    1919:  BEdside report w/ Mindy/ISRAEL

## 2019-09-30 NOTE — PROGRESS NOTES
Pt reports she woke up in extreme pain in right lower quad. She reports the pain woke her from a deep sleep. Pt tense, and crying. Medicated x1 iv morphine and zofran for nausea. Pt reported almost instant relieve from pain med. Able to settle in bed.cool cloth to head, warm compress to abdominal area. Pt appears to be resting quietly. Will continue to monitor.

## 2019-09-30 NOTE — ANESTHESIA PREPROCEDURE EVALUATION
Relevant Problems   NEURO   (+) Epilepsy (HCC)       Physical Exam    Airway   Mallampati: II  TM distance: >3 FB  Neck ROM: full       Cardiovascular - normal exam  Rhythm: regular  Rate: normal  (-) murmur     Dental - normal exam         Pulmonary - normal exam  Breath sounds clear to auscultation     Abdominal    Neurological - normal exam                 Anesthesia Plan    ASA 3       Plan - general       Airway plan will be ETT        Induction: intravenous    Postoperative Plan: Postoperative administration of opioids is intended.    Pertinent diagnostic labs and testing reviewed    Informed Consent:    Anesthetic plan and risks discussed with patient.    Use of blood products discussed with: patient whom consented to blood products.

## 2019-09-30 NOTE — CARE PLAN
Problem: Safety  Goal: Will remain free from falls  Outcome: PROGRESSING AS EXPECTED  Intervention: Implement fall precautions  Flowsheets (Taken 9/30/2019 0800)  Environmental Precautions: Treaded Slipper Socks on Patient;Personal Belongings, Wastebasket, Call Bell etc. in Easy Reach;Report Given to Other Health Care Providers Regarding Fall Risk;Bed in Low Position;Communication Sign for Patients & Families;Mobility Assessed & Appropriate Sign Placed  Note:   Patient educated and understands the fall precautions in place to prevent falls.  Bed alarm is off, patient calls appropriately, IV pole closest to bathroom, treaded slipper socks on, and bedrails closest to bathroom down.  Patient also educated and understands the use of the call button for any assistance with mobility.      Problem: Pain Management  Goal: Pain level will decrease to patient's comfort goal  Outcome: PROGRESSING AS EXPECTED  Flowsheets  Taken 9/30/2019 0800 by Jennifer Gilligan, R.N.  Comfort Goal: Comfort at Rest;Comfort with Movement;Sleep Comfortably  Pain Rating Scale (NPRS): 1  Taken 9/30/2019 0238 by Petra Loving R.N.  Non Verbal Scale : Calm  Denise-Edgar Scale : 0   Intervention: Follow pain managment plan developed in collaboration with patient and Interdisciplinary Team  Note:   Patient's pain is controlled at this time, discussed with her medications and nonpharmacological pain control methods.

## 2019-10-01 PROBLEM — R74.8 ELEVATED LIPASE: Status: RESOLVED | Noted: 2019-09-28 | Resolved: 2019-10-01

## 2019-10-01 PROBLEM — K85.10 ACUTE GALLSTONE PANCREATITIS: Status: RESOLVED | Noted: 2019-09-29 | Resolved: 2019-10-01

## 2019-10-01 PROBLEM — R10.11 RUQ PAIN: Status: RESOLVED | Noted: 2019-09-28 | Resolved: 2019-10-01

## 2019-10-01 LAB
ALBUMIN SERPL BCP-MCNC: 3.3 G/DL (ref 3.2–4.9)
ALBUMIN/GLOB SERPL: 1.1 G/DL
ALP SERPL-CCNC: 207 U/L (ref 30–99)
ALT SERPL-CCNC: 276 U/L (ref 2–50)
ANION GAP SERPL CALC-SCNC: 10 MMOL/L (ref 0–11.9)
AST SERPL-CCNC: 56 U/L (ref 12–45)
BASOPHILS # BLD AUTO: 0.2 % (ref 0–1.8)
BASOPHILS # BLD: 0.02 K/UL (ref 0–0.12)
BILIRUB SERPL-MCNC: 0.5 MG/DL (ref 0.1–1.5)
BUN SERPL-MCNC: <5 MG/DL (ref 8–22)
CALCIUM SERPL-MCNC: 8.8 MG/DL (ref 8.4–10.2)
CHLORIDE SERPL-SCNC: 106 MMOL/L (ref 96–112)
CO2 SERPL-SCNC: 22 MMOL/L (ref 20–33)
CREAT SERPL-MCNC: 0.49 MG/DL (ref 0.5–1.4)
EOSINOPHIL # BLD AUTO: 0 K/UL (ref 0–0.51)
EOSINOPHIL NFR BLD: 0 % (ref 0–6.9)
ERYTHROCYTE [DISTWIDTH] IN BLOOD BY AUTOMATED COUNT: 44.6 FL (ref 35.9–50)
GLOBULIN SER CALC-MCNC: 3.1 G/DL (ref 1.9–3.5)
GLUCOSE SERPL-MCNC: 172 MG/DL (ref 65–99)
HCT VFR BLD AUTO: 35.6 % (ref 37–47)
HGB BLD-MCNC: 11.1 G/DL (ref 12–16)
IMM GRANULOCYTES # BLD AUTO: 0.05 K/UL (ref 0–0.11)
IMM GRANULOCYTES NFR BLD AUTO: 0.5 % (ref 0–0.9)
LIPASE SERPL-CCNC: 48 U/L (ref 7–58)
LYMPHOCYTES # BLD AUTO: 1 K/UL (ref 1–4.8)
LYMPHOCYTES NFR BLD: 10.5 % (ref 22–41)
MCH RBC QN AUTO: 26.4 PG (ref 27–33)
MCHC RBC AUTO-ENTMCNC: 31.2 G/DL (ref 33.6–35)
MCV RBC AUTO: 84.6 FL (ref 81.4–97.8)
MONOCYTES # BLD AUTO: 0.58 K/UL (ref 0–0.85)
MONOCYTES NFR BLD AUTO: 6.1 % (ref 0–13.4)
NEUTROPHILS # BLD AUTO: 7.9 K/UL (ref 2–7.15)
NEUTROPHILS NFR BLD: 82.7 % (ref 44–72)
NRBC # BLD AUTO: 0 K/UL
NRBC BLD-RTO: 0 /100 WBC
PLATELET # BLD AUTO: 284 K/UL (ref 164–446)
PMV BLD AUTO: 11.1 FL (ref 9–12.9)
POTASSIUM SERPL-SCNC: 4.5 MMOL/L (ref 3.6–5.5)
PROT SERPL-MCNC: 6.4 G/DL (ref 6–8.2)
RBC # BLD AUTO: 4.21 M/UL (ref 4.2–5.4)
SODIUM SERPL-SCNC: 138 MMOL/L (ref 135–145)
WBC # BLD AUTO: 9.6 K/UL (ref 4.8–10.8)

## 2019-10-01 PROCEDURE — 700102 HCHG RX REV CODE 250 W/ 637 OVERRIDE(OP)

## 2019-10-01 PROCEDURE — A9270 NON-COVERED ITEM OR SERVICE: HCPCS | Performed by: INTERNAL MEDICINE

## 2019-10-01 PROCEDURE — 700102 HCHG RX REV CODE 250 W/ 637 OVERRIDE(OP): Performed by: INTERNAL MEDICINE

## 2019-10-01 PROCEDURE — 99232 SBSQ HOSP IP/OBS MODERATE 35: CPT | Performed by: INTERNAL MEDICINE

## 2019-10-01 PROCEDURE — 700111 HCHG RX REV CODE 636 W/ 250 OVERRIDE (IP): Performed by: INTERNAL MEDICINE

## 2019-10-01 PROCEDURE — 85025 COMPLETE CBC W/AUTO DIFF WBC: CPT

## 2019-10-01 PROCEDURE — 36415 COLL VENOUS BLD VENIPUNCTURE: CPT

## 2019-10-01 PROCEDURE — 80053 COMPREHEN METABOLIC PANEL: CPT

## 2019-10-01 PROCEDURE — 83690 ASSAY OF LIPASE: CPT

## 2019-10-01 PROCEDURE — 770001 HCHG ROOM/CARE - MED/SURG/GYN PRIV*

## 2019-10-01 PROCEDURE — A9270 NON-COVERED ITEM OR SERVICE: HCPCS | Performed by: HOSPITALIST

## 2019-10-01 PROCEDURE — 700101 HCHG RX REV CODE 250: Performed by: INTERNAL MEDICINE

## 2019-10-01 PROCEDURE — A9270 NON-COVERED ITEM OR SERVICE: HCPCS

## 2019-10-01 PROCEDURE — 700102 HCHG RX REV CODE 250 W/ 637 OVERRIDE(OP): Performed by: HOSPITALIST

## 2019-10-01 RX ORDER — LEVETIRACETAM 500 MG/1
TABLET ORAL
Status: COMPLETED
Start: 2019-10-01 | End: 2019-10-01

## 2019-10-01 RX ORDER — FERROUS SULFATE 325(65) MG
325 TABLET ORAL DAILY
Qty: 30 TAB | Refills: 2 | Status: SHIPPED
Start: 2019-10-01 | End: 2020-02-05

## 2019-10-01 RX ADMIN — DIVALPROEX SODIUM 500 MG: 250 TABLET, DELAYED RELEASE ORAL at 14:09

## 2019-10-01 RX ADMIN — LEVETIRACETAM 500 MG: 500 TABLET, FILM COATED ORAL at 09:39

## 2019-10-01 RX ADMIN — ACETAMINOPHEN 1000 MG: 500 TABLET, FILM COATED ORAL at 12:07

## 2019-10-01 RX ADMIN — ACETAMINOPHEN 1000 MG: 500 TABLET, FILM COATED ORAL at 04:22

## 2019-10-01 RX ADMIN — SODIUM CHLORIDE AND POTASSIUM CHLORIDE: 9; 1.49 INJECTION, SOLUTION INTRAVENOUS at 04:27

## 2019-10-01 RX ADMIN — FAMOTIDINE 20 MG: 20 TABLET ORAL at 04:24

## 2019-10-01 RX ADMIN — OXYCODONE HYDROCHLORIDE 5 MG: 5 TABLET ORAL at 09:39

## 2019-10-01 RX ADMIN — MORPHINE SULFATE 4 MG: 4 INJECTION INTRAVENOUS at 06:37

## 2019-10-01 RX ADMIN — OXYCODONE HYDROCHLORIDE 5 MG: 5 TABLET ORAL at 05:56

## 2019-10-01 RX ADMIN — LEVETIRACETAM 500 MG: 500 TABLET, FILM COATED ORAL at 23:14

## 2019-10-01 RX ADMIN — DIVALPROEX SODIUM 500 MG: 250 TABLET, DELAYED RELEASE ORAL at 04:24

## 2019-10-01 RX ADMIN — FAMOTIDINE 20 MG: 20 TABLET ORAL at 20:11

## 2019-10-01 ASSESSMENT — ENCOUNTER SYMPTOMS
DIAPHORESIS: 0
CARDIOVASCULAR NEGATIVE: 1
FEVER: 0
ABDOMINAL PAIN: 1
RESPIRATORY NEGATIVE: 1
NEUROLOGICAL NEGATIVE: 1
CHILLS: 0

## 2019-10-01 NOTE — ANESTHESIA POSTPROCEDURE EVALUATION
Patient: Umu Mcguire    Procedure Summary     Date:  09/30/19 Room / Location:   ENDOSCOPIC ULTRASOUND ROOM / SURGERY [facility]    Anesthesia Start:  1624 Anesthesia Stop:  1717    Procedure:  ERCP (ENDOSCOPIC RETROGRADE CHOLANGIOPANCREATOGRAPHY) Diagnosis:       Choledocholithiasis      (choledocholithiasis)    Surgeon:  Kaushal Lopes M.D. Responsible Provider:  Oswaldo Middleton M.D.    Anesthesia Type:  general ASA Status:  3          Final Anesthesia Type: general  Last vitals  BP   Blood Pressure: 105/80, NIBP: (!) 90/69    Temp   36.4 °C (97.5 °F)    Pulse   Pulse: 70, Heart Rate (Monitored): 91   Resp   12    SpO2   99 %      Anesthesia Post Evaluation    Patient location during evaluation: PACU  Patient participation: complete - patient participated  Level of consciousness: awake and alert  Pain score: 1    Airway patency: patent  Anesthetic complications: no  Cardiovascular status: hemodynamically stable  Respiratory status: acceptable  Hydration status: euvolemic    PONV: none           Nurse Pain Score: 0 (NPRS)

## 2019-10-01 NOTE — CARE PLAN
Problem: Pain Management  Goal: Pain level will decrease to patient's comfort goal  Outcome: PROGRESSING AS EXPECTED  Patient's pain rated as a 2/10. No interventions requested except for rest. Food given. Educated patient that pain may occur after intake of food. Pain level has remained consistent. No N/V noted. Will continue to monitor pain level.     Problem: Discharge Barriers/Planning  Goal: Patient's continuum of care needs will be met  Outcome: PROGRESSING AS EXPECTED  Plan of care in regards to patient staying over night discussed with patient and family. All questions answered at this time. Ride to be arranged for patient in the morning if patient medically cleared.

## 2019-10-01 NOTE — OR NURSING
1706: To PACU from [facility] via gurney, respirations spontaneous and non-labored. Pt has a cough, able to bring up a little amount of phlem, no blood. No pain, no nausea, awake and alert.  1716: No pain or nausea, cough improving, tolerating room air, sitting up in the gurney, tolerating it well.  1726: No pain or nausea, remains awake and alert, asking questions about procedure, occasional cough, unable to given fluids yet.  1736: Still no pain or nausea, cough has improved. BP lower, fluid rate increased. No dizziness, altered level of conscious nor shortness of breath.  1746: Pain noticeable, but is tolerable. No nausea, awake and alert, tolerating room air. BP is improved.   1755: Pt meets criteria to transfer back to the floor, pain is tolerable, no nausea, awake and alert, tolerating room air. RN is busy and unable to take report, she will call as soon as she is available.  1805: Pain remains tolerable, no nausea, awake and alert, tolerating sips of water.  1815: Pain is tolerable, no nausea, awake and alert, tolerating room air, and sips of water, cough is resolved. BP with normal limits.   1817: Report called to ISRAEL Valdez and pt readied for transfer. Pain is tolerable, no nausea, awake and alert, BP remains improved.

## 2019-10-01 NOTE — ANESTHESIA TIME REPORT
Anesthesia Start and Stop Event Times     Date Time Event    9/30/2019 1612 Ready for Procedure     1624 Anesthesia Start     1717 Anesthesia Stop        Responsible Staff  09/30/19    Name Role Begin End    Oswaldo Middleton M.D. Anesth 1624 1717        Preop Diagnosis (Free Text):  Pre-op Diagnosis     choledocholithiasis        Preop Diagnosis (Codes):  Diagnosis Information     Diagnosis Code(s): Choledocholithiasis [K80.50]        Post op Diagnosis  Gallstones, common bile duct      Premium Reason  K. Alert    Comments:

## 2019-10-01 NOTE — PROGRESS NOTES
Gastroenterology Progress Note     Author: Lam Vogel   Date & Time Created: 10/1/2019 11:14 AM    Chief Complaint:  Abd pain    Interval History:  S/P ERCP and stone removal    Review of Systems:  Review of Systems   Constitutional: Positive for malaise/fatigue. Negative for chills, diaphoresis and fever.   Respiratory: Negative.    Cardiovascular: Negative.    Gastrointestinal: Positive for abdominal pain.   Neurological: Negative.        Physical Exam:  Physical Exam   Constitutional: She is oriented to person, place, and time. She appears well-developed and well-nourished. No distress.   HENT:   Head: Normocephalic and atraumatic.   Eyes: No scleral icterus.   Neck: No tracheal deviation present.   Cardiovascular: Normal rate.   Pulmonary/Chest: Effort normal. No stridor. No respiratory distress.   Abdominal: Soft. She exhibits no distension. There is tenderness. There is no rebound and no guarding.   Neurological: She is alert and oriented to person, place, and time.   Skin: Skin is warm and dry. She is not diaphoretic.       Labs:          Recent Labs     09/28/19  1500 09/29/19  0338 09/30/19  0412 10/01/19  0427   SODIUM 140 140 139 138   POTASSIUM 3.6 4.0 4.4 4.5   CHLORIDE 99 106 106 106   CO2 24 22 23 22   BUN 9 10 5* <5*   CREATININE 0.57 0.38* 0.42* 0.49*   MAGNESIUM 2.1  --   --   --    CALCIUM 9.9 8.7 8.6 8.8     Recent Labs     09/28/19  1500 09/29/19  0338 09/30/19  0412 10/01/19  0427   ALTSGPT 804* 537* 381* 276*   ASTSGOT 492* 234* 146* 56*   ALKPHOSPHAT 275* 239* 199* 207*   TBILIRUBIN 1.1 1.5 1.1 0.5   LIPASE 78*  --   --  48   GLUCOSE 97 82 72 172*     Recent Labs     09/29/19 0338 09/30/19  0412 10/01/19  0427   RBC 3.95* 3.78* 4.21   HEMOGLOBIN 10.6* 10.1* 11.1*   HEMATOCRIT 33.2* 32.5* 35.6*   PLATELETCT 238 228 284   IRON  --  23*  --    FERRITIN  --  46.0  --    TOTIRONBC  --  298  --      Recent Labs     09/29/19  0338 09/30/19  0412 10/01/19  0427   WBC 10.7 7.0 9.6    NEUTSPOLYS 81.00* 66.30 82.70*   LYMPHOCYTES 7.50* 16.40* 10.50*   MONOCYTES 10.10 13.20 6.10   EOSINOPHILS 0.60 2.90 0.00   BASOPHILS 0.30 0.60 0.20   ASTSGOT 234* 146* 56*   ALTSGPT 537* 381* 276*   ALKPHOSPHAT 239* 199* 207*   TBILIRUBIN 1.5 1.1 0.5     Hemodynamics:  Temp (24hrs), Av.6 °C (97.9 °F), Min:36.4 °C (97.5 °F), Max:37 °C (98.6 °F)  Temperature: 36.5 °C (97.7 °F)  Pulse  Av.4  Min: 61  Max: 129Heart Rate (Monitored): 66  Blood Pressure: 114/82, NIBP: (!) 90/69     Respiratory:    Respiration: 18, Pulse Oximetry: 100 %        RUL Breath Sounds: Clear, RML Breath Sounds: Clear, RLL Breath Sounds: Clear, CECIL Breath Sounds: Clear, LLL Breath Sounds: Clear  Fluids:    Intake/Output Summary (Last 24 hours) at 10/1/2019 1114  Last data filed at 10/1/2019 0400  Gross per 24 hour   Intake 2762.17 ml   Output --   Net 2762.17 ml        GI/Nutrition:  Orders Placed This Encounter   Procedures   • Diet Order Regular     Standing Status:   Standing     Number of Occurrences:   1     Order Specific Question:   Diet:     Answer:   Regular [1]     Medical Decision Making, by Problem:  Active Hospital Problems    Diagnosis   • LFT elevation [R94.5]   • Epilepsy (HCC) [G40.909]       Plan:  All LFTs improved  S/p ECP and stone extraction  S/p jessica ~> one year ago  LELA      RE: Follow-up with [facility] in 6 weeks to address:  1.  resolution residual slight abnl LFTS    2. LELA (likely menstrual losses) but start iron in outpatient setting in a few days.    Thank you will sign off.    Quality-Core Measures

## 2019-10-01 NOTE — ANESTHESIA QCDR
2019 [facility] Clinical Data Registry (for Quality Improvement)     Postoperative nausea/vomiting risk protocol (Adult = 18 yrs and Pediatric 3-17 yrs)- (430 and 463)  General inhalation anesthetic (NOT TIVA) with PONV risk factors: Yes  Provision of anti-emetic therapy with at least 2 different classes of agents: Yes   Patient DID NOT receive anti-emetic therapy and reason is documented in Medical Record:  N/A    Multimodal Pain Management- (AQI59)  Patient undergoing Elective Surgery (i.e. Outpatient, or ASC, or Prescheduled Surgery prior to Hospital Admission): Yes  Use of Multimodal Pain Management, two or more drugs and/or interventions, NOT including systemic opioids: Yes   Exception: Documented allergy to multiple classes of analgesics:  N/A    PACU assessment of acute postoperative pain prior to Anesthesia Care End- Applies to Patients Age = 18- (ABG7)  Initial PACU pain score is which of the following: < 7/10  Patient unable to report pain score: N/A    Post-anesthetic transfer of care checklist/protocol to PACU/ICU- (426 and 427)  Upon conclusion of case, patient transferred to which of the following locations: PACU/Non-ICU  Use of transfer checklist/protocol: Yes  Exclusion: Service Performed in Patient Hospital Room (and thus did not require transfer): N/A    PACU Reintubation- (AQI31)  General anesthesia requiring endotracheal intubation (ETT) along with subsequent extubation in OR or PACU: Yes  Required reintubation in the PACU: No   Extubation was a planned trial documented in the medical record prior to removal of the original airway device:  N/A    Unplanned admission to ICU related to anesthesia service up through end of PACU care- (MD51)  Unplanned admission to ICU (not initially anticipated at anesthesia start time): No

## 2019-10-01 NOTE — PROGRESS NOTES
Received report from day shift RN. Assumed care of patient. Patient is currently sitting up in bed requesting more food. Tryon and fruit provided. No N/V noted throughout the day and none reported thus far. Will continue to monitor GI status. 2/10 pain reported to RUQ. No interventions requested. Plan of care discussed with patient and family. Patient and family verbalize understanding. Bed is locked and In lowest position with call light within reach.

## 2019-10-01 NOTE — DISCHARGE SUMMARY
Discharge Summary    CHIEF COMPLAINT ON ADMISSION  Chief Complaint   Patient presents with   • Abdominal Pain   • N/V       Reason for Admission  Abdominal pain     Admission Date  9/28/2019    CODE STATUS  Full Code    HPI & HOSPITAL COURSE  This is a 34 y.o. female here with abdominal pain and liver transaminase and alkaline phosphatase elevation after cholecystectomy in April 2019. She had common bile duct dilation with a common bile duct stone on MRCP. Gastroenterology removed the stone with ERCP and the patients pain improved and labs trended toward normal. A CT scan of the abdomen and pelvis was unremarkable.  A certified medical  for [place] was used to explain all findings, treatment and follow up prior to discharge.       Therefore, she is discharged in good and stable condition to home with close outpatient follow-up.    The patient met 2-midnight criteria for an inpatient stay at the time of discharge.    Discharge Date  10/2/2019    FOLLOW UP ITEMS POST DISCHARGE  Primary care provider follow up    DISCHARGE DIAGNOSES  Active Problems:    Epilepsy (HCC) POA: Yes    LFT elevation POA: Yes  Resolved Problems:    RUQ pain POA: Yes    Elevated lipase POA: Yes    Acute gallstone pancreatitis POA: Yes      FOLLOW UP  Future Appointments   Date Time Provider Department Center   10/8/2019 11:00 AM JUAN M Becerril [facility]   12/13/2019  3:00 PM JUAN M Bolden [facility] None     JUAN M Becerril  [address]  [address]  [place] [address]  [phone]    Schedule an appointment as soon as possible for a visit        MEDICATIONS ON DISCHARGE     Medication List      START taking these medications      Instructions   ferrous sulfate 325 (65 Fe) MG tablet   Take 1 Tab by mouth every day.  Dose:  325 mg     omeprazole 20 MG delayed-release capsule  Commonly known as:  PRILOSEC   Take 1 Cap by mouth every day.  Dose:  20 mg        CONTINUE taking these medications       Instructions   divalproex 500 MG Tbec  Commonly known as:  DEPAKOTE   Take 500 mg by mouth 2 Times a Day. AM  1200  Dose:  500 mg     ergocalciferol 59782 UNIT capsule  Commonly known as:  DRISDOL   Take 1 Cap by mouth every 7 days.  Dose:  50,000 Units     famotidine 20 MG Tabs  Commonly known as:  PEPCID   Take 1 Tab by mouth 2 times a day.  Dose:  20 mg     levETIRAcetam 500 MG Tabs  Commonly known as:  KEPPRA   500 mg in the AM and 1000 mg at HS     polyethylene glycol/lytes Pack  Commonly known as:  MIRALAX   Take 1 Packet by mouth every day.  Dose:  17 g            Allergies  Allergies   Allergen Reactions   • Nkda [No Known Drug Allergy]        DIET  Orders Placed This Encounter   Procedures   • Diet Order Regular     Standing Status:   Standing     Number of Occurrences:   1     Order Specific Question:   Diet:     Answer:   Regular [1]       ACTIVITY  As tolerated.  Weight bearing as tolerated    CONSULTATIONS  GI Dr. Lopes    PROCEDURES  Endoscopy for common bile duct stone removal  MRCP showed an approximately 6 mm sized stone in the distal portion of the common bile duct causing abrupt obstruction. There is mild dilatation of the intrahepatic biliary ducts.    LABORATORY  Lab Results   Component Value Date    SODIUM 138 10/01/2019    POTASSIUM 4.5 10/01/2019    CHLORIDE 106 10/01/2019    CO2 22 10/01/2019    GLUCOSE 172 (H) 10/01/2019    BUN <5 (L) 10/01/2019    CREATININE 0.49 (L) 10/01/2019    CREATININE 0.6 09/15/2008        Lab Results   Component Value Date    WBC 9.6 10/01/2019    HEMOGLOBIN 11.1 (L) 10/01/2019    HEMATOCRIT 35.6 (L) 10/01/2019    PLATELETCT 284 10/01/2019        Total time of the discharge process exceeds 44 minutes.

## 2019-10-01 NOTE — OP REPORT
DATE OF SERVICE:  9/30/2019     PROCEDURE PERFORMED:  Endoscopic retrograde cholangiography with stone extraction     CONSENT:  Procedure risks and benefits reviewed thoroughly with the patient, risks including but not limited to bleeding, perforation, side effects of medication were informed.  Patient voiced understanding and agreed to proceed. Additional risks inherent to ERCP that being mild, moderate, severe pancreatitis that could lead to postprocedural pain, prolonged   hospitalization, intensive care unit stay, and/or death were reviewed with the patient who voiced understanding and agreed to proceed.     PREPROCEDURE DIAGNOSIS:  choledocholithiasis     POSTPROCEDURE DIAGNOSES:  Choledocholithasis     PHYSICIAN: Kaushal Lopes MD        DESCRIPTION OF PROCEDURE:  The patient was placed in a prone position after intubation and sedation.  A bite block was inserted in the mouth and a side-viewing duodenoscope was passed carefully and easily under semi-direct visualization into the esophagus and passed through the stomach to the duodenum. Upon reaching the second portion of the duodenum, the scope was withdrawn to the short-position.The ampulla was identified. The ampulla had signs of prior sphincterotomy.    Using a [employer] Rx44 sphincterotome preloaded with a 0.035 wire the CBD was cannulated. The wire was advanced into the CBD under fluoroscopy. The sphincterotome was advanced over the wire into the duct. Bile was aspirated and then contrast was injected. A cholangiogram was obtained. The cholangiogram was interpreted entirely by myself during the exam. The bile duct was mildly dilated to 7mm and there was a stone in the distal bile duct measuring approximately 6mm in diameter. The sphincterotome was then withdrawn from the duct and was exchanged for a 9-12mm biliary extraction balloon. The balloon was loaded over the wire and advanced into the duct. Multiple balloon dredges were performed with  extraction of a single stone. An occlusion cholangiogram revealed a patent duct with no residual filling defects. All instruments were then removed and the procedure was completed.        COMPLICATIONS:  None.     BLOOD LOSS:  None.     SPECIMENS:  None.    SUMMARY:  1.) Major papilla with evidence of prior sphincterotomy  2.) Biliary cannulation  3.) Cholangiogram revealing one distal biliary filling defect  4.) Successful stone extraction and duct clearance     RECOMMENDATIONS:   1.) restart diet  2.) likely ok to discharge home tomorrow if no post procedure pain  3.) f/u with [facility] as needed

## 2019-10-02 VITALS
BODY MASS INDEX: 18.82 KG/M2 | HEART RATE: 73 BPM | RESPIRATION RATE: 18 BRPM | OXYGEN SATURATION: 100 % | DIASTOLIC BLOOD PRESSURE: 84 MMHG | WEIGHT: 110.23 LBS | HEIGHT: 64 IN | TEMPERATURE: 98.3 F | SYSTOLIC BLOOD PRESSURE: 116 MMHG

## 2019-10-02 LAB
ALBUMIN SERPL BCP-MCNC: 3.1 G/DL (ref 3.2–4.9)
ALBUMIN/GLOB SERPL: 1 G/DL
ALP SERPL-CCNC: 179 U/L (ref 30–99)
ALT SERPL-CCNC: 246 U/L (ref 2–50)
ANION GAP SERPL CALC-SCNC: 10 MMOL/L (ref 0–11.9)
AST SERPL-CCNC: 126 U/L (ref 12–45)
BILIRUB SERPL-MCNC: 0.4 MG/DL (ref 0.1–1.5)
BUN SERPL-MCNC: <5 MG/DL (ref 8–22)
CALCIUM SERPL-MCNC: 8.6 MG/DL (ref 8.4–10.2)
CHLORIDE SERPL-SCNC: 107 MMOL/L (ref 96–112)
CO2 SERPL-SCNC: 24 MMOL/L (ref 20–33)
CREAT SERPL-MCNC: 0.43 MG/DL (ref 0.5–1.4)
GLOBULIN SER CALC-MCNC: 3 G/DL (ref 1.9–3.5)
GLUCOSE SERPL-MCNC: 80 MG/DL (ref 65–99)
POTASSIUM SERPL-SCNC: 4.7 MMOL/L (ref 3.6–5.5)
PROT SERPL-MCNC: 6.1 G/DL (ref 6–8.2)
SODIUM SERPL-SCNC: 141 MMOL/L (ref 135–145)

## 2019-10-02 PROCEDURE — 99239 HOSP IP/OBS DSCHRG MGMT >30: CPT | Performed by: INTERNAL MEDICINE

## 2019-10-02 PROCEDURE — 700101 HCHG RX REV CODE 250: Performed by: INTERNAL MEDICINE

## 2019-10-02 PROCEDURE — 700102 HCHG RX REV CODE 250 W/ 637 OVERRIDE(OP): Performed by: HOSPITALIST

## 2019-10-02 PROCEDURE — A9270 NON-COVERED ITEM OR SERVICE: HCPCS | Performed by: HOSPITALIST

## 2019-10-02 PROCEDURE — 36415 COLL VENOUS BLD VENIPUNCTURE: CPT

## 2019-10-02 PROCEDURE — 80053 COMPREHEN METABOLIC PANEL: CPT

## 2019-10-02 PROCEDURE — 700102 HCHG RX REV CODE 250 W/ 637 OVERRIDE(OP): Performed by: INTERNAL MEDICINE

## 2019-10-02 PROCEDURE — A9270 NON-COVERED ITEM OR SERVICE: HCPCS | Performed by: INTERNAL MEDICINE

## 2019-10-02 RX ORDER — OMEPRAZOLE 20 MG/1
20 CAPSULE, DELAYED RELEASE ORAL DAILY
Qty: 30 CAP | Refills: 0 | Status: SHIPPED
Start: 2019-10-02 | End: 2020-02-05

## 2019-10-02 RX ADMIN — FAMOTIDINE 20 MG: 20 TABLET ORAL at 06:17

## 2019-10-02 RX ADMIN — SODIUM CHLORIDE AND POTASSIUM CHLORIDE: 9; 1.49 INJECTION, SOLUTION INTRAVENOUS at 04:00

## 2019-10-02 RX ADMIN — DIVALPROEX SODIUM 500 MG: 250 TABLET, DELAYED RELEASE ORAL at 06:16

## 2019-10-02 RX ADMIN — LEVETIRACETAM 500 MG: 500 TABLET, FILM COATED ORAL at 06:17

## 2019-10-02 ASSESSMENT — ENCOUNTER SYMPTOMS
CHILLS: 0
SHORTNESS OF BREATH: 0
COUGH: 0
NAUSEA: 1
ABDOMINAL PAIN: 1
CONSTIPATION: 0
DIARRHEA: 0
FEVER: 0
HEADACHES: 0
PALPITATIONS: 0
DIZZINESS: 0
MYALGIAS: 0

## 2019-10-02 NOTE — PROGRESS NOTES
"Pt AAOx4. Denies any pain, SOB, chills, nausea, numbness or tingling. Due med given. Reports \"a little tender\" in the abd. POC discussed with pt, and encouraged to ambulate. SCD in place. Safety and comfort measures in place. No additional needs at this time.  "

## 2019-10-02 NOTE — PROGRESS NOTES
AOx4  VSS  Denies pain  Reports shooting midepigastric pain last evening  States the pain happens after food and medications  Tolerated morning medications however  Ambulating independently  Denies further needs  Callbell within reach  Bed locked, ashleyails up x2     Discharge instructions reviewed with pt. Denies any further questions at this time. Pt aware her medications were sent through to her [facility] pharmacy.

## 2019-10-02 NOTE — DISCHARGE INSTRUCTIONS
Discharge Instructions    Discharged to home by car with relative. Discharged via walking, hospital escort: Yes.  Special equipment needed: Not Applicable    Be sure to schedule a follow-up appointment with your primary care doctor or any specialists as instructed.     Discharge Plan:   Diet Plan: Discussed  Activity Level: Discussed  Confirmed Follow up Appointment: Patient to Call and Schedule Appointment  Confirmed Symptoms Management: Discussed  Medication Reconciliation Updated: Yes  Influenza Vaccine Indication: Patient Refuses    I understand that a diet low in cholesterol, fat, and sodium is recommended for good health. Unless I have been given specific instructions below for another diet, I accept this instruction as my diet prescription.   Other diet: regular    Special Instructions: None    · Is patient discharged on Warfarin / Coumadin?   No     Depression / Suicide Risk    As you are discharged from this [facility] facility, it is important to learn how to keep safe from harming yourself.    Recognize the warning signs:  · Abrupt changes in personality, positive or negative- including increase in energy   · Giving away possessions  · Change in eating patterns- significant weight changes-  positive or negative  · Change in sleeping patterns- unable to sleep or sleeping all the time   · Unwillingness or inability to communicate  · Depression  · Unusual sadness, discouragement and loneliness  · Talk of wanting to die  · Neglect of personal appearance   · Rebelliousness- reckless behavior  · Withdrawal from people/activities they love  · Confusion- inability to concentrate     If you or a loved one observes any of these behaviors or has concerns about self-harm, here's what you can do:  · Talk about it- your feelings and reasons for harming yourself  · Remove any means that you might use to hurt yourself (examples: pills, rope, extension cords, firearm)  · Get professional help from the community (Mental  Health, Substance Abuse, psychological counseling)  · Do not be alone:Call your Safe Contact- someone whom you trust who will be there for you.  · Call your local CRISIS HOTLINE 571-1598 or 401-441-0742  · Call your local Children's Mobile Crisis Response Team [place] (435) 187-3146 or www.KidStart  · Call the toll free National Suicide Prevention Hotlines   · National Suicide Prevention Lifeline 161-607-BJYD (3147)  · [employer] Line Network 800-SUICIDE (226-6568)      Discharge Instructions per Aurelia Miller M.D.    Follow up with primary care provider as soon as possible    DIET: regular    ACTIVITY: as tolerated    DIAGNOSIS: common bile duct stone    Return to ER if pain worsens or unable to keep food down

## 2019-10-02 NOTE — CARE PLAN
Problem: Communication  Goal: The ability to communicate needs accurately and effectively will improve  Outcome: PROGRESSING AS EXPECTED   Pt communicates needs to staff. Whiteboard updated. POC discussed.    Problem: Safety  Goal: Will remain free from injury  Outcome: PROGRESSING AS EXPECTED   Pt educated to call when in need. Call light and personal belongings w/n reach. Room free of clutters. Bed locked and in lowest position. Answer call light immediately.

## 2019-10-02 NOTE — PROGRESS NOTES
Pt reassessed, VS taken; no signs of discomfort or distress. Pt denies any SOB, dizziness. Went up to the bathroom and voided; brushed teeth, washed face at the sink. Went back to bed and now resting.

## 2019-10-02 NOTE — PROGRESS NOTES
Hospital Medicine Daily Progress Note    Date of Service  10/1/2019    Chief Complaint  34 y.o. female admitted 9/28/2019 with abdominal pain    Hospital Course    33 yo female with abdominal pain was noted to have elevated AST and ALT with a common bile duct stone. GI removed the stone with endoscopy and the patient's liver tests improved and pain improved.      Interval Problem Update  The patient continues to have pain after eating    Consultants/Specialty  GI    Code Status  full    Disposition  home    Review of Systems  Review of Systems   Constitutional: Negative for chills, fever and malaise/fatigue.   Respiratory: Negative for cough and shortness of breath.    Cardiovascular: Negative for chest pain and palpitations.   Gastrointestinal: Positive for abdominal pain and nausea. Negative for constipation and diarrhea.   Genitourinary: Negative for dysuria and hematuria.   Musculoskeletal: Negative for myalgias.   Skin: Negative for itching and rash.   Neurological: Negative for dizziness and headaches.        Physical Exam  Temp:  [36.6 °C (97.9 °F)-37.1 °C (98.7 °F)] 36.8 °C (98.3 °F)  Pulse:  [] 73  Resp:  [18] 18  BP: ()/(55-84) 116/84  SpO2:  [97 %-100 %] 100 %    Physical Exam   Constitutional: She is oriented to person, place, and time. No distress.   HENT:   Mouth/Throat: Oropharynx is clear and moist. No oropharyngeal exudate.   Eyes: Conjunctivae are normal. No scleral icterus.   Cardiovascular: Normal rate and regular rhythm.   No murmur heard.  Pulmonary/Chest: Effort normal and breath sounds normal.   Abdominal: Soft. Bowel sounds are normal. She exhibits no distension. There is tenderness. There is no rebound and no guarding.   Musculoskeletal: She exhibits no edema.   Neurological: She is alert and oriented to person, place, and time.   Skin: Skin is warm and dry.   Nursing note and vitals reviewed.      Fluids    Intake/Output Summary (Last 24 hours) at 10/2/2019 0840  Last data  filed at 10/2/2019 0730  Gross per 24 hour   Intake 1631 ml   Output 1880 ml   Net -249 ml       Laboratory  Recent Labs     09/30/19  0412 10/01/19  0427   WBC 7.0 9.6   RBC 3.78* 4.21   HEMOGLOBIN 10.1* 11.1*   HEMATOCRIT 32.5* 35.6*   MCV 86.0 84.6   MCH 26.7* 26.4*   MCHC 31.1* 31.2*   RDW 45.9 44.6   PLATELETCT 228 284   MPV 11.0 11.1     Recent Labs     09/30/19  0412 10/01/19  0427 10/02/19  0416   SODIUM 139 138 141   POTASSIUM 4.4 4.5 4.7   CHLORIDE 106 106 107   CO2 23 22 24   GLUCOSE 72 172* 80   BUN 5* <5* <5*   CREATININE 0.42* 0.49* 0.43*   CALCIUM 8.6 8.8 8.6                   Imaging  DX-PORTABLE FLUOROSCOPY < 1 HOUR Is the patient pregnant? No   Final Result      Portable fluoroscopy utilized for 48 seconds.         INTERPRETING LOCATION: [address] [place] [address], [facility]      [id number]   Final Result      There is an approximately 6 mm sized stone in the distal portion of the common bile duct causing abrupt obstruction. There is mild dilatation of the intrahepatic biliary ducts.      CT-ABDOMEN-PELVIS WITH   Final Result      1.  Negative contrast-enhanced CT of the abdomen and pelvis.      2.  Stable minor intrahepatic and extrahepatic biliary dilatation consistent with postcholecystectomy status.      3.  Small amount of free fluid in the pelvis which may be physiologic.      4.  Increased volume of stool within the colon.      5.  Normal appearance of the appendix in the right lower quadrant.      CT-CTA CHEST PULMONARY ARTERY W/ RECONS   Final Result      1.  No CT evidence of pulmonary embolism.      2.  No incidental abnormalities are identified in the chest.                 Assessment/Plan  LFT elevation- (present on admission)  Assessment & Plan  2/2 to CBD obstruction  ERCP done but patient with pain with eating and unable to eat adequately today  Will continue symptom control, monitor labs  Check lipase    Epilepsy (HCC)- (present on admission)  Assessment & Plan  Controlled on Depakote,  Keppra       VTE prophylaxis: ambulatory

## 2019-10-08 ENCOUNTER — HOSPITAL ENCOUNTER (OUTPATIENT)
Facility: MEDICAL CENTER | Age: 34
End: 2019-10-08
Attending: NURSE PRACTITIONER
Payer: COMMERCIAL

## 2019-10-08 ENCOUNTER — OFFICE VISIT (OUTPATIENT)
Dept: MEDICAL GROUP | Facility: MEDICAL CENTER | Age: 34
End: 2019-10-08
Payer: COMMERCIAL

## 2019-10-08 VITALS
OXYGEN SATURATION: 97 % | DIASTOLIC BLOOD PRESSURE: 70 MMHG | TEMPERATURE: 96.5 F | WEIGHT: 110 LBS | HEIGHT: 62 IN | BODY MASS INDEX: 20.24 KG/M2 | HEART RATE: 72 BPM | SYSTOLIC BLOOD PRESSURE: 110 MMHG | RESPIRATION RATE: 14 BRPM

## 2019-10-08 DIAGNOSIS — Z11.51 ENCOUNTER FOR SCREENING FOR HUMAN PAPILLOMAVIRUS (HPV): ICD-10-CM

## 2019-10-08 DIAGNOSIS — K85.10 ACUTE GALLSTONE PANCREATITIS: ICD-10-CM

## 2019-10-08 DIAGNOSIS — Z23 NEED FOR VACCINATION: ICD-10-CM

## 2019-10-08 DIAGNOSIS — Z12.4 CERVICAL CANCER SCREENING: ICD-10-CM

## 2019-10-08 DIAGNOSIS — Z01.419 WELL WOMAN EXAM: ICD-10-CM

## 2019-10-08 DIAGNOSIS — D64.9 ANEMIA, UNSPECIFIED TYPE: ICD-10-CM

## 2019-10-08 DIAGNOSIS — Z09 HOSPITAL DISCHARGE FOLLOW-UP: ICD-10-CM

## 2019-10-08 PROCEDURE — 88175 CYTOPATH C/V AUTO FLUID REDO: CPT

## 2019-10-08 PROCEDURE — 99213 OFFICE O/P EST LOW 20 MIN: CPT | Mod: 25 | Performed by: NURSE PRACTITIONER

## 2019-10-08 PROCEDURE — 87624 HPV HI-RISK TYP POOLED RSLT: CPT

## 2019-10-08 PROCEDURE — 99395 PREV VISIT EST AGE 18-39: CPT | Performed by: NURSE PRACTITIONER

## 2019-10-08 NOTE — PROGRESS NOTES
CC: Annual Exam (PAP)        HPI:   Umu presents today for the followin. Hospital discharge follow-up/. Acute gallstone pancreatitis/Anemia, unspecified type  Here with her mother.  She is actually here for a Pap smear but she was discharged from hospital on 10/1 for acute gallstone pancreatitis.  She had an ERCP with stone removal and subsequent improvement in her LFTs however they still remained elevated at discharge.  She was told to take iron once a day related to what they thought was menstrual related iron deficiency anemia.  She apparently never understood this message or picked up the medication.  She was told she did not require follow-up with GI  She had no fever or pain.  Feels back to normal    4. Well woman exam/Encounter for screening for human papillomavirus (HPV)/Cervical cancer screening  First Pap.  Abstinent.  Lives with parents    7. Need for vaccination      Current Outpatient Medications   Medication Sig Dispense Refill   • omeprazole (PRILOSEC) 20 MG delayed-release capsule Take 1 Cap by mouth every day. 30 Cap 0   • ferrous sulfate 325 (65 Fe) MG tablet Take 1 Tab by mouth every day. 30 Tab 2   • divalproex (DEPAKOTE) 500 MG Tablet Delayed Response Take 500 mg by mouth 2 Times a Day. AM  1200     • famotidine (PEPCID) 20 MG Tab Take 1 Tab by mouth 2 times a day. 30 Tab 0   • polyethylene glycol/lytes (MIRALAX) Pack Take 1 Packet by mouth every day. 3 Each 0   • levETIRAcetam (KEPPRA) 500 MG Tab 500 mg in the AM and 1000 mg at HS 90 Tab 11   • ergocalciferol (DRISDOL) 75046 UNIT capsule Take 1 Cap by mouth every 7 days. 4 Cap 11     No current facility-administered medications for this visit.      Social History     Tobacco Use   • Smoking status: Never Smoker   • Smokeless tobacco: Never Used   Substance Use Topics   • Alcohol use: No   • Drug use: No     I reviewed patients allergies, problem list and medications today in EPIC.    ROS: Any/all pertinent positives listed in the HPI,  "otherwise all others reviewed are negative today.        /70 (BP Location: Left arm, Patient Position: Sitting, BP Cuff Size: Adult)   Pulse 72   Temp 35.8 °C (96.5 °F) (Temporal)   Resp 14   Ht 1.575 m (5' 2\")   Wt 49.9 kg (110 lb)   LMP 2019 (Approximate)   SpO2 97%   BMI 20.12 kg/m²   Physical Exam:    Gen:         Alert and oriented, No apparent distress. WDWN  Psych:     A+Ox3, normal affect and mood  Skin:     Warm, dry and intact. Good turgor       No rashes in visible areas.  Eye:        Conjunctiva clear, lids normal  ENMT:     Lips without lesions, good dentition  Neck:       No Lymphadenopathy, Thyromegaly, Bruits.       Trachea midline, no masses  Lungs:     Clear to auscultation bilaterally, no rales or rhonchi       Unlabored respiratory effort  CV:          Regular rate and rhythm, S1, S2. No murmurs.       No Edema  Abd:         Soft NONtender, non distended. Normal active bowel sounds.         No  Hepatosplenomegaly, No pulsatile masses.                   Breasts:  No dimpling/nipple changes/masses bilaterally.  Reviewed SBEs  Vulva:       No lesions/erythema Vault: No erythema/odor/discharge  Cervix:     No lesions/ No CMT      Uterus: 6 cm; midline; nontender to bimanual; no adnexal masses or tenderness        Assessment and Plan.   34 y.o. female here for the followin. Hospital discharge follow-up/Acute gallstone pancreatitis  Stable.  Completely asymptomatic.  Will check labs to monitor her LFTs.  She currently has no pain or no fevers  - Comp Metabolic Panel; Future  - CBC WITH DIFFERENTIAL; Future  - LIPASE; Future    3. Anemia, unspecified type  Encouraged to  the iron from [facility] and take once daily  - CBC WITH DIFFERENTIAL; Future    4. Well woman exam/ Encounter for screening for human papillomavirus (HPV)/Cervical cancer screening  Routine Pap completed.  Will repeat in 5 years if normal    7. Need for vaccination  I have placed the below orders " and discussed them with an approved delegating provider. The MA is performing the below orders under the direction of an office MD.  -Given today.  - Tdap Vaccine =>8YO IM

## 2019-10-10 LAB
CYTOLOGY REG CYTOL: ABNORMAL
HPV HR 12 DNA CVX QL NAA+PROBE: POSITIVE
HPV16 DNA SPEC QL NAA+PROBE: POSITIVE
HPV18 DNA SPEC QL NAA+PROBE: NEGATIVE
SPECIMEN SOURCE: ABNORMAL

## 2019-10-11 ENCOUNTER — TELEPHONE (OUTPATIENT)
Dept: MEDICAL GROUP | Facility: MEDICAL CENTER | Age: 34
End: 2019-10-11

## 2019-10-11 DIAGNOSIS — R87.618 PAP SMEAR ABNORMALITY OF CERVIX/HUMAN PAPILLOMAVIRUS (HPV) POSITIVE: ICD-10-CM

## 2019-10-11 DIAGNOSIS — N72 HIGH RISK HUMAN PAPILLOMA VIRUS (HPV) INFECTION OF CERVIX: ICD-10-CM

## 2019-10-11 DIAGNOSIS — R87.612 LGSIL ON PAP SMEAR OF CERVIX: ICD-10-CM

## 2019-10-11 DIAGNOSIS — B97.7 HIGH RISK HUMAN PAPILLOMA VIRUS (HPV) INFECTION OF CERVIX: ICD-10-CM

## 2019-10-11 NOTE — TELEPHONE ENCOUNTER
services used, Moe ID [id number], we called patient's phone and her brother answered. Her brother, Latrell, is on her HIPAA as approved to speak with regarding treatment and billing. The message was relayed to Latrell who said he will let her know. They will let me know if they don't hear from someone within a week (either from GYN or our referral dept).

## 2019-10-11 NOTE — TELEPHONE ENCOUNTER
Please call Umu.  Her pap smear showed some abnormal cell.  She needs to have further eval with GYN.  Referral placed    She speaks Korean

## 2019-10-15 ENCOUNTER — HOSPITAL ENCOUNTER (OUTPATIENT)
Dept: LAB | Facility: MEDICAL CENTER | Age: 34
End: 2019-10-15
Attending: NURSE PRACTITIONER
Payer: COMMERCIAL

## 2019-10-15 ENCOUNTER — NON-PROVIDER VISIT (OUTPATIENT)
Dept: NEUROLOGY | Facility: MEDICAL CENTER | Age: 34
End: 2019-10-15
Payer: COMMERCIAL

## 2019-10-15 DIAGNOSIS — D64.9 ANEMIA, UNSPECIFIED TYPE: ICD-10-CM

## 2019-10-15 DIAGNOSIS — G40.109 LOCALIZATION-RELATED EPILEPSY (HCC): ICD-10-CM

## 2019-10-15 DIAGNOSIS — K85.10 ACUTE GALLSTONE PANCREATITIS: ICD-10-CM

## 2019-10-15 DIAGNOSIS — Z09 HOSPITAL DISCHARGE FOLLOW-UP: ICD-10-CM

## 2019-10-15 LAB
ALBUMIN SERPL BCP-MCNC: 4.1 G/DL (ref 3.2–4.9)
ALBUMIN/GLOB SERPL: 1.2 G/DL
ALP SERPL-CCNC: 73 U/L (ref 30–99)
ALT SERPL-CCNC: 18 U/L (ref 2–50)
ANION GAP SERPL CALC-SCNC: 7 MMOL/L (ref 0–11.9)
AST SERPL-CCNC: 14 U/L (ref 12–45)
BASOPHILS # BLD AUTO: 0.7 % (ref 0–1.8)
BASOPHILS # BLD: 0.04 K/UL (ref 0–0.12)
BILIRUB SERPL-MCNC: 0.3 MG/DL (ref 0.1–1.5)
BUN SERPL-MCNC: 15 MG/DL (ref 8–22)
CALCIUM SERPL-MCNC: 9.2 MG/DL (ref 8.5–10.5)
CHLORIDE SERPL-SCNC: 109 MMOL/L (ref 96–112)
CO2 SERPL-SCNC: 23 MMOL/L (ref 20–33)
CREAT SERPL-MCNC: 0.59 MG/DL (ref 0.5–1.4)
EOSINOPHIL # BLD AUTO: 0.11 K/UL (ref 0–0.51)
EOSINOPHIL NFR BLD: 1.9 % (ref 0–6.9)
ERYTHROCYTE [DISTWIDTH] IN BLOOD BY AUTOMATED COUNT: 48.9 FL (ref 35.9–50)
FASTING STATUS PATIENT QL REPORTED: NORMAL
GLOBULIN SER CALC-MCNC: 3.4 G/DL (ref 1.9–3.5)
GLUCOSE SERPL-MCNC: 92 MG/DL (ref 65–99)
HCT VFR BLD AUTO: 37.5 % (ref 37–47)
HGB BLD-MCNC: 11.8 G/DL (ref 12–16)
IMM GRANULOCYTES # BLD AUTO: 0.02 K/UL (ref 0–0.11)
IMM GRANULOCYTES NFR BLD AUTO: 0.3 % (ref 0–0.9)
LIPASE SERPL-CCNC: 78 U/L (ref 11–82)
LYMPHOCYTES # BLD AUTO: 1.72 K/UL (ref 1–4.8)
LYMPHOCYTES NFR BLD: 29.2 % (ref 22–41)
MCH RBC QN AUTO: 27.6 PG (ref 27–33)
MCHC RBC AUTO-ENTMCNC: 31.5 G/DL (ref 33.6–35)
MCV RBC AUTO: 87.6 FL (ref 81.4–97.8)
MONOCYTES # BLD AUTO: 0.78 K/UL (ref 0–0.85)
MONOCYTES NFR BLD AUTO: 13.2 % (ref 0–13.4)
NEUTROPHILS # BLD AUTO: 3.22 K/UL (ref 2–7.15)
NEUTROPHILS NFR BLD: 54.7 % (ref 44–72)
NRBC # BLD AUTO: 0 K/UL
NRBC BLD-RTO: 0 /100 WBC
PLATELET # BLD AUTO: 240 K/UL (ref 164–446)
PMV BLD AUTO: 11.5 FL (ref 9–12.9)
POTASSIUM SERPL-SCNC: 4.5 MMOL/L (ref 3.6–5.5)
PROT SERPL-MCNC: 7.5 G/DL (ref 6–8.2)
RBC # BLD AUTO: 4.28 M/UL (ref 4.2–5.4)
SODIUM SERPL-SCNC: 139 MMOL/L (ref 135–145)
WBC # BLD AUTO: 5.9 K/UL (ref 4.8–10.8)

## 2019-10-15 PROCEDURE — 95819 EEG AWAKE AND ASLEEP: CPT | Performed by: PSYCHIATRY & NEUROLOGY

## 2019-10-15 PROCEDURE — 80053 COMPREHEN METABOLIC PANEL: CPT

## 2019-10-15 PROCEDURE — 85025 COMPLETE CBC W/AUTO DIFF WBC: CPT

## 2019-10-15 PROCEDURE — 36415 COLL VENOUS BLD VENIPUNCTURE: CPT

## 2019-10-15 PROCEDURE — 83690 ASSAY OF LIPASE: CPT

## 2019-10-15 NOTE — PROCEDURES
ROUTINE ELECTROENCEPHALOGRAM REPORT      Referring provider: ROSALES Bolden.    DOS: 10/15/2019  (total recording of 28 minutes)    INDICATION:  Umu Mcguire 34 y.o. female presenting with seizures.     CURRENT ANTIEPILEPTIC REGIMEN: Valproic Acid, Levetiracetam.     TECHNIQUE: 30 channel routine electroencephalogram (EEG) was performed in accordance with the international 10-20 system. The study was reviewed in bipolar and referential montages. The recording examined the patient during wakeful and drowsy/sleep state(s).     DESCRIPTION OF THE RECORD:  During the wakefulness, the background showed a symmetrical 8 Hz alpha activity posteriorly with amplitude of 70 mV.  There was reactivity to eye closure/opening.  A normal anterior-posterior gradient was noted with faster beta frequencies seen anteriorly.  During drowsiness, theta/delta frequencies were seen.    During the sleep state, background shows diffuse high-amplitude 4-5 Hz delta activity.  Symmetrical high-amplitude sleep spindles and vertex sharps were seen in the leads over the central regions.     ACTIVATION PROCEDURES:   Hyperventilation was performed by the patient for a total of 3 minutes. The technician performing the test noted good effort. This technique produced asymmetric slowing In the left frontotemporal region.     Intermittent Photic stimulation was performed in a stepwise fashion from 1 to 30 Hz and elicited a normal response (photic driving), most noticeable in the posterior leads.      ICTAL AND/OR INTERICTAL FINDINGS:   There is slowing in the left frontotemporal region and frequent / brief runs of rhythmic delta / theta activity were noted in this region.     EKG: sampling of the EKG recording demonstrated sinus rhythm.       INTERPRETATION:  This is an abnormal routine EEG recording in the awake, drowsy, and sleep state(s). There is slowing in the left frontotemporal region, as well intermittent and brief runs of rhythmic  delta / theta activity were captured in this region. The findings suggest underlying area of cortical irritability and structural abnormality. The patient is at increased risk for seizures, however no seizures captured during this study.  Clinical and radiological correlation is recommended.    Joseph Urbano MD   Epilepsy and Neurodiagnostics.   Clinical  of Neurology [facility] [place] of Medicine.   Diplomate in Neurology, Epilepsy, and Electrodiagnostic Medicine.   Office: [phone]  Fax: [phone]

## 2019-12-17 ENCOUNTER — OFFICE VISIT (OUTPATIENT)
Dept: NEUROLOGY | Facility: MEDICAL CENTER | Age: 34
End: 2019-12-17
Payer: COMMERCIAL

## 2019-12-17 VITALS
OXYGEN SATURATION: 100 % | BODY MASS INDEX: 20.02 KG/M2 | WEIGHT: 113 LBS | TEMPERATURE: 85.7 F | SYSTOLIC BLOOD PRESSURE: 100 MMHG | HEART RATE: 76 BPM | RESPIRATION RATE: 14 BRPM | HEIGHT: 63 IN | DIASTOLIC BLOOD PRESSURE: 62 MMHG

## 2019-12-17 DIAGNOSIS — Z30.09 ENCOUNTER FOR FEMALE FAMILY PLANNING COUNSELING: ICD-10-CM

## 2019-12-17 DIAGNOSIS — R41.3 MEMORY LOSS: ICD-10-CM

## 2019-12-17 DIAGNOSIS — G40.509 CATAMENIAL EPILEPSY (HCC): ICD-10-CM

## 2019-12-17 DIAGNOSIS — Z30.09 ENCOUNTER FOR COUNSELING REGARDING CONTRACEPTION: ICD-10-CM

## 2019-12-17 DIAGNOSIS — Z13.31 SCREENING FOR DEPRESSION: ICD-10-CM

## 2019-12-17 DIAGNOSIS — G40.109 LOCALIZATION-RELATED EPILEPSY (HCC): ICD-10-CM

## 2019-12-17 PROCEDURE — 99215 OFFICE O/P EST HI 40 MIN: CPT | Performed by: NURSE PRACTITIONER

## 2019-12-17 RX ORDER — LEVETIRACETAM 500 MG/1
1000 TABLET ORAL 2 TIMES DAILY
Qty: 120 TAB | Refills: 11 | Status: SHIPPED | OUTPATIENT
Start: 2019-12-17 | End: 2020-02-05 | Stop reason: SDUPTHER

## 2019-12-17 NOTE — PROGRESS NOTES
CERTIFICATE OF SCHOOL    January 5, 2017      Re: Edgard Diaz  [address] [facility] [address]      This is to certify that Edgard Diaz has been under my care from 1/5/2017     RESTRICTIONS:       REMARKS: seen today.Has been sick since yesterday        SIGNATURE:___________________________________________,   1/5/2017          Pediatrics  [facility]  [address] [place]  [address]  [phone]           Chief Complaint   Patient presents with   • Follow-Up     Localization-related epilepsy (HCC)       Problem List Items Addressed This Visit     None      Visit Diagnoses     Localization-related epilepsy (HCC)        Catamenial epilepsy (HCC)        Screening for depression        Encounter for female family planning counseling        Encounter for counseling regarding contraception        Memory loss              Interim History:  Umu Mcguire 34 y.o. female presents today for f/u.    Pt is Nepali speaking but understands English. She, however, wants a  to help her and her mom understand the result of her EEG well.      Pt reports she continues to have spells. She had one today while waiting for the bus. Mom reports that she passed out for about 10 seconds and her hands were clenched and there was lip smacking. Per mom, she is having a spell or 2 every week. No convulsion. She is on Depakote 500 mg BID and Keppra 500 mg, 1 tab in a.m. and 2 tabs at night. No side effects. Compliant.     Mood is stable.She reports being down and depressed at times but not suicidal or homicidal.     She is not driving.     She is taking Vit D.     Forgot to do lab work.     Mom had mentioned that she was given another seizure medication in the past that made her very hyperactive and uncontrollable. They don't remember the name of the med.         Past medical history:   Past Medical History:   Diagnosis Date   • DUB (dysfunctional uterine bleeding)    • Epilepsy (HCC) 11/4/2009    since infant.  sees Wilfrido/Codey at [facility].  Keppra/depakote.  absence siezures 1x/month-thinks iwth menstruation.   • GERD (gastroesophageal reflux disease)     gastritis-only with spicy foods   • Seizure disorder (HCC)        Past surgical history:   Past Surgical History:   Procedure Laterality Date   • ERCP  9/30/2019    Procedure: ERCP (ENDOSCOPIC RETROGRADE CHOLANGIOPANCREATOGRAPHY);  Surgeon: Kaushal Lopes M.D.;  Location:  SURGERY [facility];  Service: Gastroenterology   • DEONDRE BY LAPAROSCOPY N/A 4/8/2018    Procedure: DEONDRE BY LAPAROSCOPY;  Surgeon: Chava Busby M.D.;  Location: [facility];  Service: General   • ERCP  4/6/2018    Procedure: ERCP;  Surgeon: Osiel Paige M.D.;  Location: [facility];  Service: Gastroenterology       Family history:   Family History   Problem Relation Age of Onset   • Cancer Neg Hx    • Diabetes Neg Hx    • Stroke Neg Hx        Social history:   Social History     Socioeconomic History   • Marital status: Single     Spouse name: Not on file   • Number of children: Not on file   • Years of education: Not on file   • Highest education level: Not on file   Occupational History   • Not on file   Social Needs   • Financial resource strain: Not on file   • Food insecurity:     Worry: Not on file     Inability: Not on file   • Transportation needs:     Medical: Not on file     Non-medical: Not on file   Tobacco Use   • Smoking status: Never Smoker   • Smokeless tobacco: Never Used   Substance and Sexual Activity   • Alcohol use: No   • Drug use: No   • Sexual activity: Not Currently     Comment: virginal   Lifestyle   • Physical activity:     Days per week: Not on file     Minutes per session: Not on file   • Stress: Not on file   Relationships   • Social connections:     Talks on phone: Not on file     Gets together: Not on file     Attends Christianity service: Not on file     Active member of club or organization: Not on file     Attends meetings of clubs or organizations: Not on file     Relationship status: Not on file   • Intimate partner violence:     Fear of current or ex partner: Not on file     Emotionally abused: Not on file     Physically abused: Not on file     Forced sexual activity: Not on file   Other Topics Concern   • Not on file   Social History Narrative   • Not on file       Current medications:   Current Outpatient Medications   Medication   •  "omeprazole (PRILOSEC) 20 MG delayed-release capsule   • ferrous sulfate 325 (65 Fe) MG tablet   • divalproex (DEPAKOTE) 500 MG Tablet Delayed Response   • famotidine (PEPCID) 20 MG Tab   • polyethylene glycol/lytes (MIRALAX) Pack   • levETIRAcetam (KEPPRA) 500 MG Tab   • ergocalciferol (DRISDOL) 18241 UNIT capsule     No current facility-administered medications for this visit.        Medication Allergy:  Allergies   Allergen Reactions   • Nkda [No Known Drug Allergy]          Review of systems:     General: Denies fevers or chills, or nightsweats, or generalized fatigue.    Head: Denies headaches or dizziness or lightheadedness  EENT: Denies vision changes, vision loss or pain, nasal secretion, nasal bleeding, difficulty swallowing, hearing loss, tinnitus, vertigo, ear pain  Respiratory: Denies shortness of breath, cough, sputum, or wheezing  Cardiac: Denies chest pain, palpitations, edema or syncope  Gastrointestinal: Denies nausea, vomiting, no abdominal pain or change in bowel habits, no melena or hematochezia  Urinary: Denies dysuria, frequency, hesitancy, or incontinence.  Dermatologic:  Denies new rash  Musculoskeletal: Denies muscle pain or swelling, no atrophy, no neck and back pain or stiffness.   Neurologic: Denies facial droopiness, muscle weakness (focal or generalized), paresthesias, ataxia, change in speech or language, memory loss, abnormal movements  Psychiatric: Denies anxiety, mood swings, suicidal or homicidal thoughts       Physical examination:   Vitals:    12/17/19 1242   BP: 100/62   BP Location: Right arm   Patient Position: Sitting   BP Cuff Size: Adult   Pulse: 76   Resp: 14   Temp: (!) 29.8 °C (85.7 °F)   TempSrc: Temporal   SpO2: 100%   Weight: 51.3 kg (113 lb)   Height: 1.6 m (5' 3\")     General: Patient in no acute distress, pleasant and cooperative.  HEENT: Normocephalic, no signs of acute trauma.   Neck: Supple. There is normal range of motion.   Resp: clear to auscultation " bilaterally. No wheezes or crackles.   CV: RRR, no murmurs.   Skin: no signs of acute rashes or trauma.   Musculoskeletal: joints exhibit full range of motion, without any pain to palpation. There are no signs of joint or muscle swelling. There is no tenderness to deep palpation of muscles.   Psychiatric: No hallucinatory behavior. No symptoms of depression or suicidal ideation. Mood and affect appear normal on exam.      NEUROLOGICAL EXAM:   Mental status, orientation: Awake, alert and fully oriented.   Speech and language: speech is clear and fluent. The patient is able to name, repeat and comprehend.   Memory: There is deficient recollection of recent and remote events.   Cranial nerve exam:   CN I: Not examined   CN II: PERRL.   CN III, IV, VI: EOMI; no nystagmus   CN V: Facial sensation intact bilaterally   CN VII: face symmetric   CN VIII: hearing intact to finger rub bilaterally   CN IX, X: palate elevates symmetrically   CN XI: Symmetric shoulder shrug  CN XII: tongue midline. No signs of tongue biting or fasciculations   Motor exam: Strength is 5/5 in all extremities. Tone is normal. No abnormal movements were seen on exam.   Sensory exam reveals normal sense of light touch in all extremities.   Deep tendon reflexes:  2+ throughout.  Coordination: shows a normal finger-nose-finger. Normal rapidly alternating movements.   Gait: The patient was able to get up from seated position on first attempt without requiring assistance. Found to be steady when walking. Movements were fluid with normal arm swing. The patient was able to turn without difficulties or tendency to fall. Romberg exam mildly swaying      ANCILLARY DATA REVIEWED:       Lab Data Review:  Reviewed in chart. CMP, CBC    Records reviewed:   Reviewed in chart.    Imaging:   CT head 4/25/2013  1. No acute intracranial abnormality.  2. Frontal scalp hematoma.     EEG:  EEG April 2008  IMPRESSION:  Ambulatory electroencephalogram recording is  "abnormalwith the appearance of rare generalized sharp activity and left temporal sharp wave activity.  Both noted during sleep only.  No definitive epileptogenic discharges are noted, but clinical correlation is warranted for possible focal and generalized seizure disorder.  Again, no definite epileptogenic discharges are noted.     VEEG July 2014  IMPRESSION:  This is an abnormal electroencephalogram  due to the presence of frequent epileptiform potentials in drowsiness and exacerbated by hyperventilation.  No clinical seizures noted.    Routine EEG 10/15/2019  INTERPRETATION:  This is an abnormal routine EEG recording in the awake, drowsy,   and sleep state(s). There is slowing in the left frontotemporal   region, as well intermittent and brief runs of rhythmic delta /   theta activity were captured in this region. The findings suggest   underlying area of cortical irritability and structural   abnormality. The patient is at increased risk for seizures,   however no seizures captured during this study.  Clinical and   radiological correlation is recommended.      ASSESSMENT AND PLAN:      1. Localization-related epilepsy (HCC)    2. Catamenial epilepsy (HCC)    3. Screening for depression    4. Encounter for female family planning counseling    5. Encounter for counseling regarding contraception    6. Memory loss                  CLINICAL DISCUSSION:  Hx of febrile seizure in infancy. Has long history of seizures in childhood and spells were occurring monthly since she was 7 years old. She has up to 6 spells around her menses. Catamenial? The convulsion type spell are infrequent since she was started on AED but the \"mini spells\" of lip smacking, eyes rolling, fists clenching and passing out are very frequent. Her previous EEG's were abnormal. CT head was unremarkable. No MRI brain. Last spell was today, 12/17/2019. Her last convulsive spell was in 2018. Her recent routine EEG continues to be abnormal with slowing " in the left frontotemporal   region, as well intermittent and brief runs of rhythmic delta /   theta activity were captured in this region.     Apparently, pt has tried taking a different ASM in the which gave her a side effect of hyperactivity. I saw Cat's note in  that she was planning on weaning pt off the depakote and switch to Vimpat. However, she transitioned care to Dr. Bloch and there was no documentation about what happened to med adjustment.     No family hx of epilepsy. No TBI hx.      Not drinking alcohol. Not smoking cigarettes. No recreational drug use.     Mood is good. No suicidal or homicidal thoughts.      C/o memory loss likely related to her seizures. Will obtain lab work.      Pt does not plan on conceiving as of now. Discussed the importance of dual contraception preferably using copper IUD and condom for the partner as AED's can decrease the effectiveness of OCP's. She is aware of the risk of pregnancy complications while taking AED's which include congenital defects, abnormal fetal growth, , etc. She is not currently sexually active and not on birth control. Refused referral to GYN. Refused to take folic acid.     Past AED's: unknown     Current AED's: Depakote 500 mg BID, Keppra 500 mg, 2 bid. No side effects. Compliant        Plan:  -24hour amb EEG to hopefully capture her spells.     -MRI brain with contrast to r/o structural abnormality.     -Pt forgot lab work. Will increase keppra dose for now. I am not sure if she will tolerate higher dose as she is already having depression. She used to be on 2000mg BID based on Cat Alegre's note in . May potentially switch one of her ASM to lamictal as she potentially can get pregnant.      - Discussed avoidance of spell/sz triggers: alcohol, sleep deprivation, and stress.     - Discussed Vit D supplementation.  Currently on weekly supplementation.     - Discussed driving restrictions. Pt does not drive.      -Labs to be  checked for next appointment: VPA, Keppra, Vit B12, Vit B6, Ammonia             FOLLOW-UP:   Return in about 3 months (around 3/17/2020).        EDUCATION AND COUNSELING:  -Education was provided to the patient and/or family regarding diagnosis and prognosis. The chronic and unpredictable nature of the condition were discussed. There is increased risk for additional events, which may carry potential for significant injuries and death. Discussed frequent seizure triggers: sleep deprivation, medication non-compliance, use of illegal drugs/alcohol, stress, and others.   -We reviewed in detail the current antiepileptic regimen. Potential side effects of antiepileptics were discussed at length, including but no limited to: hypersensitivity reactions (rash and others, some of which can be fatal), visual field changes (some of which may be irreversible), glaucoma, diplopia, kidney stones, osteopenia/osteoporosis/bone fractures, hyperthermia/anhydrosis, hyponatremia, tremors/abnormal movements, ataxia, dizziness, fatigue, increased risk for falls, risk for cardiac arrhythmias/syncope, gastrointestinal side effects(hepatitis, pancreatitis, gastritis, ulcers), gingival hypertrophy/bleeding, drowsiness, sedation, anxiety/nervousness, increased risk for suicide, increased risk for depression, and psychosis.   -We also reviewed drug-drug interactions and their potential effect on seizure control and medication side effects.    -We also discussed in detail potential effects of seizures, epilepsy, and medications during pregnancy, including but not limited to fetal malformations, child developmental/intellectual disability, fetal/ risk for hemorrhages, stillbirth, maternal death, premature birth, and others. The patient/family aware that pregnancy should be avoided, unless desired, in which case we recommend discussing with us at least a year prior to planned conception. To avoid undesired pregnancy while on antiepileptics,  we recommend dual contraception.   -Folic acid 2 mg is recommended for all females in childbearing age (12-44 years of age).   -Recommend chronic vitamin D supplementation and regular exercise (if not contraindicated).   -Patient/family educated on risk for SUDEP (Sudden Death in Epilepsy). Counseling was provided on the importance of strict medication and follow up compliance. The patient/family understand the risks associated with non-adherence with the medical plan as outlined, including but not limited to an increased risk for breakthrough seizures, which may contribute to injuries, disability, status epilepticus, and even death.   -Counseling was also provided on potential effects of alcohol and other drugs, which may lower seizure threshold and/or affect the metabolism of antiepileptic drugs. We recommend avoidance of alcohol and illegal drugs.  -Avoid sleep deprivation.   -We extensively discussed the aspects related to safety in drivers who suffer from epilepsy. The patient is encourage to report to the Division of Motor Vehicles of any condition and/or spells related to confusion, disorientation, and/or loss of awareness and/or loss of consciousness; as these may pose a safety issue if they occur while operating a motor vehicle. The patient and/or family are ultimately responsible for exercising caution and abiding to regulations in place.   -Other seizure precautions were discussed at length, including no diving, no skydiving, no climbing or exposure to unprotected heights, no unsupervised swimming, no Jacuzzi or bathing in bathtubs or deep bodies of water. The patient/family have been advised about risks for operating any machinery while suffering from seizures / syncope / epilepsy and/or while taking antiepileptic drugs.   -The patient understands and agrees that due to the complexity of his/her diagnosis, results of any testing and further recommendations will typically be discussed/made during a face  to face encounter in my office. The patient and/or family further understands it is their responsibility to keep proper follow up.     Patient/family agree with plan, as outlined.         Nakia Gerber, MSN, APRN, FNP-C  [facility]  Office: [phone]  Fax: [phone]

## 2019-12-21 ENCOUNTER — HOSPITAL ENCOUNTER (OUTPATIENT)
Dept: LAB | Facility: MEDICAL CENTER | Age: 34
End: 2019-12-21
Attending: NURSE PRACTITIONER
Payer: COMMERCIAL

## 2019-12-21 DIAGNOSIS — G40.109 LOCALIZATION-RELATED EPILEPSY (HCC): ICD-10-CM

## 2019-12-21 DIAGNOSIS — R41.3 MEMORY LOSS: ICD-10-CM

## 2019-12-21 LAB
ALBUMIN SERPL BCP-MCNC: 4.3 G/DL (ref 3.2–4.9)
ALBUMIN/GLOB SERPL: 1.2 G/DL
ALP SERPL-CCNC: 42 U/L (ref 30–99)
ALT SERPL-CCNC: 8 U/L (ref 2–50)
AMMONIA PLAS-SCNC: 38 UMOL/L (ref 11–45)
ANION GAP SERPL CALC-SCNC: 9 MMOL/L (ref 0–11.9)
AST SERPL-CCNC: 15 U/L (ref 12–45)
BASOPHILS # BLD AUTO: 0.7 % (ref 0–1.8)
BASOPHILS # BLD: 0.04 K/UL (ref 0–0.12)
BILIRUB SERPL-MCNC: 0.3 MG/DL (ref 0.1–1.5)
BUN SERPL-MCNC: 15 MG/DL (ref 8–22)
CALCIUM SERPL-MCNC: 9.4 MG/DL (ref 8.5–10.5)
CHLORIDE SERPL-SCNC: 106 MMOL/L (ref 96–112)
CO2 SERPL-SCNC: 26 MMOL/L (ref 20–33)
CREAT SERPL-MCNC: 0.63 MG/DL (ref 0.5–1.4)
EOSINOPHIL # BLD AUTO: 0.1 K/UL (ref 0–0.51)
EOSINOPHIL NFR BLD: 1.9 % (ref 0–6.9)
ERYTHROCYTE [DISTWIDTH] IN BLOOD BY AUTOMATED COUNT: 46.7 FL (ref 35.9–50)
GLOBULIN SER CALC-MCNC: 3.5 G/DL (ref 1.9–3.5)
GLUCOSE SERPL-MCNC: 84 MG/DL (ref 65–99)
HCT VFR BLD AUTO: 37.6 % (ref 37–47)
HGB BLD-MCNC: 11.9 G/DL (ref 12–16)
IMM GRANULOCYTES # BLD AUTO: 0.02 K/UL (ref 0–0.11)
IMM GRANULOCYTES NFR BLD AUTO: 0.4 % (ref 0–0.9)
LYMPHOCYTES # BLD AUTO: 2.22 K/UL (ref 1–4.8)
LYMPHOCYTES NFR BLD: 41.3 % (ref 22–41)
MCH RBC QN AUTO: 27.1 PG (ref 27–33)
MCHC RBC AUTO-ENTMCNC: 31.6 G/DL (ref 33.6–35)
MCV RBC AUTO: 85.6 FL (ref 81.4–97.8)
MONOCYTES # BLD AUTO: 0.51 K/UL (ref 0–0.85)
MONOCYTES NFR BLD AUTO: 9.5 % (ref 0–13.4)
NEUTROPHILS # BLD AUTO: 2.49 K/UL (ref 2–7.15)
NEUTROPHILS NFR BLD: 46.2 % (ref 44–72)
NRBC # BLD AUTO: 0 K/UL
NRBC BLD-RTO: 0 /100 WBC
PLATELET # BLD AUTO: 292 K/UL (ref 164–446)
PMV BLD AUTO: 12.4 FL (ref 9–12.9)
POTASSIUM SERPL-SCNC: 4.2 MMOL/L (ref 3.6–5.5)
PROT SERPL-MCNC: 7.8 G/DL (ref 6–8.2)
RBC # BLD AUTO: 4.39 M/UL (ref 4.2–5.4)
SODIUM SERPL-SCNC: 141 MMOL/L (ref 135–145)
VIT B12 SERPL-MCNC: 484 PG/ML (ref 211–911)
WBC # BLD AUTO: 5.4 K/UL (ref 4.8–10.8)

## 2019-12-21 PROCEDURE — 85025 COMPLETE CBC W/AUTO DIFF WBC: CPT

## 2019-12-21 PROCEDURE — 82140 ASSAY OF AMMONIA: CPT

## 2019-12-21 PROCEDURE — 80053 COMPREHEN METABOLIC PANEL: CPT

## 2019-12-21 PROCEDURE — 84207 ASSAY OF VITAMIN B-6: CPT

## 2019-12-21 PROCEDURE — 82607 VITAMIN B-12: CPT

## 2019-12-21 PROCEDURE — 36415 COLL VENOUS BLD VENIPUNCTURE: CPT

## 2019-12-21 PROCEDURE — 80177 DRUG SCRN QUAN LEVETIRACETAM: CPT

## 2019-12-23 LAB — LEVETIRACETAM SERPL-MCNC: 12 UG/ML (ref 12–46)

## 2019-12-25 LAB — VIT B6 SERPL-MCNC: 46.4 NMOL/L (ref 20–125)

## 2019-12-26 ENCOUNTER — TELEPHONE (OUTPATIENT)
Dept: NEUROLOGY | Facility: MEDICAL CENTER | Age: 34
End: 2019-12-26

## 2019-12-26 LAB — TEST NAME 95000: ABNORMAL

## 2019-12-26 NOTE — TELEPHONE ENCOUNTER
Per Nakia I called pt, spoke with brother and informed him about the research program Nakia had discussed with pt in last visit and that a consent form was mailed to pt and if pt was still interested Nakia can see her in a follow up visit to give her more information. Pt's brother verbally understood and stated he will let pt know.

## 2020-01-25 DIAGNOSIS — G40.219 PARTIAL SYMPTOMATIC EPILEPSY WITH COMPLEX PARTIAL SEIZURES, INTRACTABLE, WITHOUT STATUS EPILEPTICUS (HCC): ICD-10-CM

## 2020-01-27 RX ORDER — ERGOCALCIFEROL 1.25 MG/1
CAPSULE ORAL
Qty: 8 CAP | Refills: 10 | OUTPATIENT
Start: 2020-01-27

## 2020-01-27 NOTE — TELEPHONE ENCOUNTER
Was the patient seen in the last year in this department? Yes    Does patient have an active prescription for medications requested? Yes    Received Request Via: Pharmacy     Pt sees Nakia Gerber

## 2020-01-29 DIAGNOSIS — G40.219 PARTIAL SYMPTOMATIC EPILEPSY WITH COMPLEX PARTIAL SEIZURES, INTRACTABLE, WITHOUT STATUS EPILEPTICUS (HCC): ICD-10-CM

## 2020-01-30 RX ORDER — ERGOCALCIFEROL 1.25 MG/1
CAPSULE ORAL
Qty: 8 CAP | Refills: 10 | OUTPATIENT
Start: 2020-01-30

## 2020-01-30 NOTE — TELEPHONE ENCOUNTER
Received request via: Patient    Was the patient seen in the last year in this department? Yes    Does the patient have an active prescription (recently filled or refills available) for medication(s) requested? Yes.   Pt sees Nakia Gerber

## 2020-01-31 NOTE — PROGRESS NOTES
Chief Complaint   Patient presents with   • Follow-Up     epilepsy       Problem List Items Addressed This Visit     Anxiety and depression      Other Visit Diagnoses     Localization-related epilepsy (HCC)        Catamenial epilepsy (HCC)        Screening for depression        Encounter for female family planning counseling        Encounter for counseling regarding contraception        Memory loss        Vitamin D deficiency        Relevant Orders    VITAMIN D,25 HYDROXY          Interim History:  Umu Mcguire 34 y.o. female presents today for f/u.     Pt reports she continues to have spells. She's noticed improvement with increased in keppra dose to 500mg, 2 tabs BID. She is still depakote 500mg BID. No side effects. No tremors. Compliant. Her last seizure was yesterday (2/4/2020) and seizure frequency is now every week or 4x a month compared to 8-9x a month. No tongue biting or incontinence.      Mood is stable. She reports being down and depressed at times but not suicidal or homicidal.      She is not driving.      She is not taking Vit D anymore.     Had lab work done    She is still interested in enrolling in the epilepsy research. She hasn't scheduled her 24hr EEG and MRI brain d/t financial reason.       Past medical history:   Past Medical History:   Diagnosis Date   • DUB (dysfunctional uterine bleeding)    • Epilepsy (HCC) 11/4/2009    since infant.  sees Wilfrido/Codey at [facility].  Keppra/depakote.  absence siezures 1x/month-thinks iwth menstruation.   • GERD (gastroesophageal reflux disease)     gastritis-only with spicy foods   • Seizure disorder (HCC)        Past surgical history:   Past Surgical History:   Procedure Laterality Date   • ERCP  9/30/2019    Procedure: ERCP (ENDOSCOPIC RETROGRADE CHOLANGIOPANCREATOGRAPHY);  Surgeon: Kaushal Lopes M.D.;  Location: SURGERY [facility];  Service: Gastroenterology   • DEONDRE BY LAPAROSCOPY N/A 4/8/2018    Procedure: DEONDRE BY LAPAROSCOPY;   Surgeon: Chava Busby M.D.;  Location: SURGERY [facility];  Service: General   • ERCP  4/6/2018    Procedure: ERCP;  Surgeon: Osiel Paige M.D.;  Location: SURGERY [facility];  Service: Gastroenterology       Family history:   Family History   Problem Relation Age of Onset   • Cancer Neg Hx    • Diabetes Neg Hx    • Stroke Neg Hx        Social history:   Social History     Socioeconomic History   • Marital status: Single     Spouse name: Not on file   • Number of children: Not on file   • Years of education: Not on file   • Highest education level: Not on file   Occupational History   • Not on file   Social Needs   • Financial resource strain: Not on file   • Food insecurity:     Worry: Not on file     Inability: Not on file   • Transportation needs:     Medical: Not on file     Non-medical: Not on file   Tobacco Use   • Smoking status: Never Smoker   • Smokeless tobacco: Never Used   Substance and Sexual Activity   • Alcohol use: No   • Drug use: No   • Sexual activity: Not Currently     Comment: virginal   Lifestyle   • Physical activity:     Days per week: Not on file     Minutes per session: Not on file   • Stress: Not on file   Relationships   • Social connections:     Talks on phone: Not on file     Gets together: Not on file     Attends Rastafari service: Not on file     Active member of club or organization: Not on file     Attends meetings of clubs or organizations: Not on file     Relationship status: Not on file   • Intimate partner violence:     Fear of current or ex partner: Not on file     Emotionally abused: Not on file     Physically abused: Not on file     Forced sexual activity: Not on file   Other Topics Concern   • Not on file   Social History Narrative   • Not on file       Current medications:   Current Outpatient Medications   Medication   • levETIRAcetam (KEPPRA) 500 MG Tab   • omeprazole (PRILOSEC) 20 MG delayed-release capsule   • ferrous sulfate 325 (65 Fe) MG  "tablet   • divalproex (DEPAKOTE) 500 MG Tablet Delayed Response   • famotidine (PEPCID) 20 MG Tab   • polyethylene glycol/lytes (MIRALAX) Pack   • ergocalciferol (DRISDOL) 17954 UNIT capsule     No current facility-administered medications for this visit.        Medication Allergy:  Allergies   Allergen Reactions   • Nkda [No Known Drug Allergy]          Review of systems:     General: Denies fevers or chills, or nightsweats, or generalized fatigue.    Head: Denies headaches or dizziness or lightheadedness  EENT: Denies vision changes, vision loss or pain, nasal secretion, nasal bleeding, difficulty swallowing, hearing loss, tinnitus, vertigo, ear pain  Respiratory: Denies shortness of breath, cough, sputum, or wheezing  Cardiac: Denies chest pain, palpitations, edema or syncope  Gastrointestinal: Denies nausea, vomiting, no abdominal pain or change in bowel habits, no melena or hematochezia  Urinary: Denies dysuria, frequency, hesitancy, or incontinence.  Dermatologic:  Denies new rash  Musculoskeletal: Denies muscle pain or swelling, no atrophy, no neck and back pain or stiffness.   Neurologic: Denies facial droopiness, muscle weakness (focal or generalized), paresthesias, ataxia, change in speech or language,   Psychiatric: Denies suicidal or homicidal thoughts       Physical examination:   Vitals:    02/05/20 1503   BP: (!) 90/62   BP Location: Left arm   Patient Position: Sitting   BP Cuff Size: Adult   Pulse: 89   Temp: 37.1 °C (98.8 °F)   TempSrc: Temporal   SpO2: 98%   Weight: 49.9 kg (110 lb)   Height: 1.6 m (5' 3\")     General: Patient in no acute distress, pleasant and cooperative.  HEENT: Normocephalic, no signs of acute trauma.   Neck: Supple. There is normal range of motion.   Resp: clear to auscultation bilaterally. No wheezes or crackles.   CV: RRR, no murmurs.   Skin: no signs of acute rashes or trauma.   Musculoskeletal: joints exhibit full range of motion, without any pain to palpation. There are " no signs of joint or muscle swelling. There is no tenderness to deep palpation of muscles.   Psychiatric: No hallucinatory behavior. No symptoms of depression or suicidal ideation. Mood and affect appear normal on exam.      NEUROLOGICAL EXAM:   Mental status, orientation: Awake, alert and fully oriented.   Speech and language: speech is clear and fluent. The patient is able to name, repeat and comprehend.   Memory: There is deficient recollection of recent and remote events.   Cranial nerve exam:   CN I: Not examined   CN II: PERRL.   CN III, IV, VI: EOMI; no nystagmus   CN V: Facial sensation intact bilaterally   CN VII: face symmetric   CN VIII: hearing intact to finger rub bilaterally   CN IX, X: palate elevates symmetrically   CN XI: Symmetric shoulder shrug  CN XII: tongue midline. No signs of tongue biting or fasciculations   Motor exam: Strength is 5/5 in all extremities. Tone is normal. No abnormal movements were seen on exam.   Sensory exam reveals normal sense of light touch in all extremities.   Deep tendon reflexes:  2+ throughout.  Coordination: shows a normal finger-nose-finger. Normal rapidly alternating movements.   Gait: The patient was able to get up from seated position on first attempt without requiring assistance. Found to be steady when walking. Movements were fluid with normal arm swing. The patient was able to turn without difficulties or tendency to fall. Romberg exam mildly swaying    ANCILLARY DATA REVIEWED:       Lab Data Review:  Reviewed in chart.  B6, B12, CBC, CMP, VPA, Keppra    Records reviewed:   Reviewed in chart.    Imaging:   CT head 4/25/2013  1. No acute intracranial abnormality.  2. Frontal scalp hematoma.     EEG:  EEG April 2008  IMPRESSION:  Ambulatory electroencephalogram recording is abnormalwith the appearance of rare generalized sharp activity and left temporal sharp wave activity.  Both noted during sleep only.  No definitive epileptogenic discharges are noted, but  "clinical correlation is warranted for possible focal and generalized seizure disorder.  Again, no definite epileptogenic discharges are noted.     VEEG July 2014  IMPRESSION:  This is an abnormal electroencephalogram  due to the presence of frequent epileptiform potentials in drowsiness and exacerbated by hyperventilation.  No clinical seizures noted.     Routine EEG 10/15/2019  INTERPRETATION:  This is an abnormal routine EEG recording in the awake, drowsy,   and sleep state(s). There is slowing in the left frontotemporal   region, as well intermittent and brief runs of rhythmic delta /   theta activity were captured in this region. The findings suggest   underlying area of cortical irritability and structural   abnormality. The patient is at increased risk for seizures,   however no seizures captured during this study.  Clinical and   radiological correlation is recommended.           ASSESSMENT AND PLAN:    1. Localization-related epilepsy (HCC)    2. Catamenial epilepsy (HCC)    3. Screening for depression    4. Encounter for female family planning counseling    5. Encounter for counseling regarding contraception    6. Memory loss    7. Anxiety and depression    8. Vitamin D deficiency  - VITAMIN D,25 HYDROXY; Future        CLINICAL DISCUSSION:  Hx of febrile seizure in infancy. Has long history of seizures in childhood and spells were occurring monthly since she was 7 years old. She has up to 6 spells around her menses. Catamenial? The convulsion type spell are infrequent since she was started on AED but the \"mini spells\" of lip smacking, eyes rolling, fists clenching and passing out are very frequent. Her previous EEG's were abnormal. CT head was unremarkable. No MRI brain. She has seen improvement with increased dose of keppra. She's having 4 seizures in a month compared to 8-9x. Last spell was today, 2/4/2020. Her last convulsive spell was in 2018. Her recent routine EEG continues to be abnormal with slowing " in the left frontotemporal region, as well intermittent and brief runs of rhythmic delta / theta activity were captured in this region.       Apparently, pt has tried taking a different ASM in the which gave her a side effect of hyperactivity. I saw Cat's note in  that she was planning on weaning pt off the depakote and switch to Vimpat. However, she transitioned care to Dr. Bloch and there was no documentation about what happened to med adjustment.      No family hx of epilepsy. No TBI hx.      Not drinking alcohol. Not smoking cigarettes. No recreational drug use.     Mood is good. No suicidal or homicidal thoughts.      C/o memory loss likely related to her seizures.      Pt does not plan on conceiving as of now. Discussed the importance of dual contraception preferably using copper IUD and condom for the partner as AED's can decrease the effectiveness of OCP's. She is aware of the risk of pregnancy complications while taking AED's which include congenital defects, abnormal fetal growth, , etc. She is not currently sexually active and not on birth control. Refused referral to GYN. Refused to take folic acid.      Past AED's: unknown     Current AED's: Depakote 500 mg BID, Keppra 500 mg, 2 tabs bid. No side effects. Compliant    VPA level subtherapeutic.       Plan:  -24hour amb EEG to hopefully capture her spells but d/t financial reason she wants to hold off for now. Same with MRI brain with contrast to r/o structural abnormality.      -Will increase keppra dose to 500mg, 2 tabs in am and 3 tabs at night. Aware of side effects.       - Discussed avoidance of spell/sz triggers: alcohol, sleep deprivation, and stress.     - Discussed Vit D supplementation.  hx of vit D deficiency. Finished supplementaion. Recommended 2000-5000u daily.      - Discussed driving restrictions. Pt does not drive.      -Labs to be checked for next appointment: vit d in 3 months           FOLLOW-UP:   Return in about 3  months (around 2020).         EDUCATION AND COUNSELING:  -Education was provided to the patient and/or family regarding diagnosis and prognosis. The chronic and unpredictable nature of the condition were discussed. There is increased risk for additional events, which may carry potential for significant injuries and death. Discussed frequent seizure triggers: sleep deprivation, medication non-compliance, use of illegal drugs/alcohol, stress, and others.   -We reviewed in detail the current antiepileptic regimen. Potential side effects of antiepileptics were discussed at length, including but no limited to: hypersensitivity reactions (rash and others, some of which can be fatal), visual field changes (some of which may be irreversible), glaucoma, diplopia, kidney stones, osteopenia/osteoporosis/bone fractures, hyperthermia/anhydrosis, hyponatremia, tremors/abnormal movements, ataxia, dizziness, fatigue, increased risk for falls, risk for cardiac arrhythmias/syncope, gastrointestinal side effects(hepatitis, pancreatitis, gastritis, ulcers), gingival hypertrophy/bleeding, drowsiness, sedation, anxiety/nervousness, increased risk for suicide, increased risk for depression, and psychosis.   -We also reviewed drug-drug interactions and their potential effect on seizure control and medication side effects.    -We also discussed in detail potential effects of seizures, epilepsy, and medications during pregnancy, including but not limited to fetal malformations, child developmental/intellectual disability, fetal/ risk for hemorrhages, stillbirth, maternal death, premature birth, and others. The patient/family aware that pregnancy should be avoided, unless desired, in which case we recommend discussing with us at least a year prior to planned conception. To avoid undesired pregnancy while on antiepileptics, we recommend dual contraception.   -Folic acid 2 mg is recommended for all females in childbearing age (12-44  years of age).   -Recommend chronic vitamin D supplementation and regular exercise (if not contraindicated).   -Patient/family educated on risk for SUDEP (Sudden Death in Epilepsy). Counseling was provided on the importance of strict medication and follow up compliance. The patient/family understand the risks associated with non-adherence with the medical plan as outlined, including but not limited to an increased risk for breakthrough seizures, which may contribute to injuries, disability, status epilepticus, and even death.   -Counseling was also provided on potential effects of alcohol and other drugs, which may lower seizure threshold and/or affect the metabolism of antiepileptic drugs. We recommend avoidance of alcohol and illegal drugs.  -Avoid sleep deprivation.   -We extensively discussed the aspects related to safety in drivers who suffer from epilepsy. The patient is encourage to report to the Division of Motor Vehicles of any condition and/or spells related to confusion, disorientation, and/or loss of awareness and/or loss of consciousness; as these may pose a safety issue if they occur while operating a motor vehicle. The patient and/or family are ultimately responsible for exercising caution and abiding to regulations in place.   -Other seizure precautions were discussed at length, including no diving, no skydiving, no climbing or exposure to unprotected heights, no unsupervised swimming, no Jacuzzi or bathing in bathtubs or deep bodies of water. The patient/family have been advised about risks for operating any machinery while suffering from seizures / syncope / epilepsy and/or while taking antiepileptic drugs.   -The patient understands and agrees that due to the complexity of his/her diagnosis, results of any testing and further recommendations will typically be discussed/made during a face to face encounter in my office. The patient and/or family further understands it is their responsibility to  keep proper follow up.     Patient/family agree with plan, as outlined.         Nakia Gerber, MSN, APRN, FNP-C  [facility]  Office: [phone]  Fax: [phone]   IV

## 2020-02-05 ENCOUNTER — OFFICE VISIT (OUTPATIENT)
Dept: NEUROLOGY | Facility: MEDICAL CENTER | Age: 35
End: 2020-02-05
Payer: COMMERCIAL

## 2020-02-05 ENCOUNTER — TELEPHONE (OUTPATIENT)
Dept: NEUROLOGY | Facility: MEDICAL CENTER | Age: 35
End: 2020-02-05

## 2020-02-05 VITALS
WEIGHT: 110 LBS | HEART RATE: 89 BPM | HEIGHT: 63 IN | TEMPERATURE: 98.8 F | OXYGEN SATURATION: 98 % | SYSTOLIC BLOOD PRESSURE: 90 MMHG | DIASTOLIC BLOOD PRESSURE: 62 MMHG | BODY MASS INDEX: 19.49 KG/M2

## 2020-02-05 DIAGNOSIS — G40.109 LOCALIZATION-RELATED EPILEPSY (HCC): ICD-10-CM

## 2020-02-05 DIAGNOSIS — Z30.09 ENCOUNTER FOR COUNSELING REGARDING CONTRACEPTION: ICD-10-CM

## 2020-02-05 DIAGNOSIS — Z30.09 ENCOUNTER FOR FEMALE FAMILY PLANNING COUNSELING: ICD-10-CM

## 2020-02-05 DIAGNOSIS — Z13.31 SCREENING FOR DEPRESSION: ICD-10-CM

## 2020-02-05 DIAGNOSIS — F32.A ANXIETY AND DEPRESSION: ICD-10-CM

## 2020-02-05 DIAGNOSIS — G40.509 CATAMENIAL EPILEPSY (HCC): ICD-10-CM

## 2020-02-05 DIAGNOSIS — R41.3 MEMORY LOSS: ICD-10-CM

## 2020-02-05 DIAGNOSIS — F41.9 ANXIETY AND DEPRESSION: ICD-10-CM

## 2020-02-05 DIAGNOSIS — E55.9 VITAMIN D DEFICIENCY: ICD-10-CM

## 2020-02-05 PROCEDURE — 99215 OFFICE O/P EST HI 40 MIN: CPT | Performed by: NURSE PRACTITIONER

## 2020-02-05 RX ORDER — LEVETIRACETAM 500 MG/1
TABLET ORAL
Qty: 150 TAB | Refills: 11 | Status: SHIPPED | OUTPATIENT
Start: 2020-02-05 | End: 2020-06-02 | Stop reason: SDUPTHER

## 2020-02-05 NOTE — TELEPHONE ENCOUNTER
Per Nakia I called pt and informed her that Nakia increased pt's keppra 500 mg dosage at night from 2 tabs to 3 tabs and still continue with 2 tabs in the morning and hopefully see more improvement in sz. Pt verbally understood and will  medication.

## 2020-06-01 NOTE — PROGRESS NOTES
Chief Complaint   Patient presents with   • Follow-Up       Localization-related epilepsy         Problem List Items Addressed This Visit     None      Visit Diagnoses     Localization-related epilepsy (HCC)        Relevant Medications    levETIRAcetam (KEPPRA) 500 MG Tab    divalproex (DEPAKOTE) 500 MG Tablet Delayed Response          Interim History:  Umu Mcguire 34 y.o. female presents today with her mom to discuss consent form for research and to f/u for seizures.     We are using the office  via I-pad for translation. Quin from the research team is joining us today.     Mom and pt have reviewed the consent form in Pashto and they worry of the side effects of the research drug and the need for birth control. All of their questions were answered. They are aware that this is voluntary and if she participates, she can withdraw at any time. Quin will be in contact with them on a regular basis if needed.     Pt reports she continues to have spells about 8x a month happening more during her period. She is taking Keppra and depakote. She states she didn't increase the dose of keppra to 2 tabs in am and 3 tabs at night but rather decreased her dose to 1 tab in am and 2 tabs at night  because she didn't see any difference in her seizure frequency. She is also on depakote 500mg BID. No side effects. Compliant. No tremors.     She is not sexually active. Not taking folic acid. She does not want to.     Mood is stable. No suicidal or homicidal thoughts.      She is taking vit D weekly.     She is not driving.     She forgot to do her lab work as they are scared of covid.       Past medical history:   Past Medical History:   Diagnosis Date   • DUB (dysfunctional uterine bleeding)    • Epilepsy (HCC) 11/4/2009    since infant.  sees Wilfrido/Codey at [facility].  Keppra/depakote.  absence siezures 1x/month-thinks iwth menstruation.   • GERD (gastroesophageal reflux disease)     gastritis-only with spicy  foods   • Seizure disorder (HCC)        Past surgical history:   Past Surgical History:   Procedure Laterality Date   • ERCP  9/30/2019    Procedure: ERCP (ENDOSCOPIC RETROGRADE CHOLANGIOPANCREATOGRAPHY);  Surgeon: Kaushal Lopes M.D.;  Location: [facility];  Service: Gastroenterology   • DEONDRE BY LAPAROSCOPY N/A 4/8/2018    Procedure: DEONDRE BY LAPAROSCOPY;  Surgeon: Chava Bubsy M.D.;  Location: [facility];  Service: General   • ERCP  4/6/2018    Procedure: ERCP;  Surgeon: Osiel Paige M.D.;  Location: [facility];  Service: Gastroenterology       Family history:   Family History   Problem Relation Age of Onset   • Cancer Neg Hx    • Diabetes Neg Hx    • Stroke Neg Hx        Social history:   Social History     Socioeconomic History   • Marital status: Single     Spouse name: Not on file   • Number of children: Not on file   • Years of education: Not on file   • Highest education level: Not on file   Occupational History   • Not on file   Social Needs   • Financial resource strain: Not on file   • Food insecurity     Worry: Not on file     Inability: Not on file   • Transportation needs     Medical: Not on file     Non-medical: Not on file   Tobacco Use   • Smoking status: Never Smoker   • Smokeless tobacco: Never Used   Substance and Sexual Activity   • Alcohol use: No   • Drug use: No   • Sexual activity: Not Currently     Comment: virginal   Lifestyle   • Physical activity     Days per week: Not on file     Minutes per session: Not on file   • Stress: Not on file   Relationships   • Social connections     Talks on phone: Not on file     Gets together: Not on file     Attends Nondenominational service: Not on file     Active member of club or organization: Not on file     Attends meetings of clubs or organizations: Not on file     Relationship status: Not on file   • Intimate partner violence     Fear of current or ex partner: Not on file     Emotionally abused:  "Not on file     Physically abused: Not on file     Forced sexual activity: Not on file   Other Topics Concern   • Not on file   Social History Narrative   • Not on file       Current medications:   Current Outpatient Medications   Medication   • levETIRAcetam (KEPPRA) 500 MG Tab   • divalproex (DEPAKOTE) 500 MG Tablet Delayed Response     No current facility-administered medications for this visit.        Medication Allergy:  Allergies   Allergen Reactions   • Nkda [No Known Drug Allergy]          Review of systems:     General: Denies fevers or chills, or nightsweats, or generalized fatigue.    Head: Denies headaches or dizziness or lightheadedness  EENT: Denies vision changes, vision loss or pain, nasal secretion, nasal bleeding, difficulty swallowing, hearing loss, tinnitus, vertigo, ear pain  Musculoskeletal: Denies muscle pain or swelling, no atrophy, no neck and back pain or stiffness.   Neurologic: Denies facial droopiness, muscle weakness (focal or generalized), paresthesias, ataxia, change in speech or language, memory loss, abnormal movements, seizures, loss of consciousness, or episodes of confusion.   Psychiatric: Denies anxiety, depression, mood swings, suicidal or homicidal thoughts       Physical examination:   Vitals:    06/02/20 1057   BP: (!) 90/60   BP Location: Left arm   Patient Position: Sitting   BP Cuff Size: Adult   Pulse: 80   Resp: 16   Temp: 36.6 °C (97.8 °F)   TempSrc: Temporal   SpO2: 98%   Weight: 54 kg (119 lb 0.8 oz)   Height: 1.6 m (5' 3\")     General: Patient in no acute distress, pleasant and cooperative.  HEENT: Normocephalic, no signs of acute trauma.   Musculoskeletal: joints exhibit full range of motion, without any pain to palpation. There are no signs of joint or muscle swelling. There is no tenderness to deep palpation of muscles.   Psychiatric: No hallucinatory behavior. No symptoms of depression or suicidal ideation. Mood and affect appear normal on exam.     NEUROLOGICAL " EXAM:   Mental status, orientation: Awake, alert and fully oriented.   Speech and language: speech is clear and fluent. The patient is able to name, repeat and comprehend.   Memory: There is intact recollection of recent and remote events.   Cranial nerve exam:   CN I: Not examined   CN II: PERRL.   CN III, IV, VI: EOMI; no nystagmus   CN V: Facial sensation intact bilaterally   CN VII: face symmetric   CN VIII: hearing intact to finger rub bilaterally   CN IX, X: palate elevates symmetrically   CN XI: Symmetric shoulder shrug  CN XII: tongue midline. No signs of tongue biting or fasciculations   Motor exam: Strength is 5/5 in all extremities. Tone is normal. No abnormal movements were seen on exam.       ANCILLARY DATA REVIEWED:       Lab Data Review:  Reviewed in chart.     Records reviewed:   Reviewed in chart.    Imaging:   CT head 4/25/2013  1. No acute intracranial abnormality.  2. Frontal scalp hematoma.     EEG:  EEG April 2008  IMPRESSION:  Ambulatory electroencephalogram recording is abnormalwith the appearance of rare generalized sharp activity and left temporal sharp wave activity.  Both noted during sleep only.  No definitive epileptogenic discharges are noted, but clinical correlation is warranted for possible focal and generalized seizure disorder.  Again, no definite epileptogenic discharges are noted.     VEEG July 2014  IMPRESSION:  This is an abnormal electroencephalogram  due to the presence of frequent epileptiform potentials in drowsiness and exacerbated by hyperventilation.  No clinical seizures noted.     Routine EEG 10/15/2019  INTERPRETATION:  This is an abnormal routine EEG recording in the awake, drowsy,   and sleep state(s). There is slowing in the left frontotemporal   region, as well intermittent and brief runs of rhythmic delta /   theta activity were captured in this region. The findings suggest   underlying area of cortical irritability and structural   abnormality. The patient is  "at increased risk for seizures,   however no seizures captured during this study.  Clinical and   radiological correlation is recommended.          ASSESSMENT AND PLAN:    1. Localization-related epilepsy (HCC)  - levETIRAcetam (KEPPRA) 500 MG Tab; Take 2 tabs in am and 3 tabs at night  Dispense: 150 Tab; Refill: 11    2. Catamenial epilepsy (HCC)    3. Screening for depression    4. Encounter for female family planning counseling    5. Encounter for counseling regarding contraception    6. Memory loss          CLINICAL DISCUSSION:  Hx of febrile seizure in infancy. Has long history of seizures in childhood and spells were occurring monthly since she was 7 years old. She has up to 6 spells around her menses. Catamenial? The convulsion type spell are infrequent since she was started on AED but the \"mini spells\" of lip smacking, eyes rolling, fists clenching and passing out are very frequent. Her previous EEG's were abnormal. CT head was unremarkable. No MRI brain. She has seen improvement with increased dose of keppra. She's having 4 seizures in a month compared to 8-9x. Her last convulsive spell was in 2018. Her recent routine EEG continues to be abnormal with slowing in the left frontotemporal region, as well intermittent and brief runs of rhythmic delta / theta activity were captured in this region. She continues to have spells 8x a month but she also decreased her keppra dose instead of increasing it.      Apparently, pt has tried taking a different ASM in the which gave her a side effect of hyperactivity. I saw Cat's note in 2014 that she was planning on weaning pt off the depakote and switch to Vimpat. However, she transitioned care to Dr. Bloch and there was no documentation about what happened to med adjustment. Pt does not remember taking the Vimpat.     Pt is interested in participating in the [employer] epilepsy research. Quin and I answered all their questions after review of consent form. Quin will " f/u with them in 2 weeks if she wants to proceed.      No family hx of epilepsy. No TBI hx.      Not drinking alcohol. Not smoking cigarettes. No recreational drug use.     Mood is good. No suicidal or homicidal thoughts.      C/o memory loss likely related to her seizures.      Pt does not plan on conceiving as of now. Discussed the importance of dual contraception preferably using copper IUD and condom for the partner as ASM's can decrease the effectiveness of OCP's. She is aware of the risk of pregnancy complications while taking ASM's which include congenital defects, abnormal fetal growth, , etc. She is not currently sexually active and not on birth control. Refused referral to GYN. Refused to take folic acid.      Past AED's: unknown     Current AED's: Depakote 500 mg BID, Keppra 500 mg, 2 tabs in am and 3 tabs qhs. No side effects. Compliant          Plan:  -24hour amb EEG to hopefully capture her spells but d/t financial reason she wants to hold off for now. Same with MRI brain with contrast to r/o structural abnormality.      -Will increase keppra dose to 500mg, 2 tabs in am and 3 tabs at night. Aware of side effects.  She will start taking 2 tabs bid for a week then take 2 tabs in am and 3 tabs at night.      - Discussed avoidance of spell/sz triggers: alcohol, sleep deprivation, and stress.     - Discussed Vit D supplementation.  Recommended 2000-5000u daily.      - Discussed driving restrictions. Pt does not drive.      -Labs to be checked for next appointment: vit d- Pt forgot.           FOLLOW-UP:   Return in about 4 weeks (around 2020).      EDUCATION AND COUNSELING:  -Education was provided to the patient and/or family regarding diagnosis and prognosis. The chronic and unpredictable nature of the condition were discussed. There is increased risk for additional events, which may carry potential for significant injuries and death. Discussed frequent seizure triggers: sleep deprivation,  medication non-compliance, use of illegal drugs/alcohol, stress, and others.   -We reviewed in detail the current antiepileptic regimen. Potential side effects of antiepileptics were discussed at length, including but no limited to: hypersensitivity reactions (rash and others, some of which can be fatal), visual field changes (some of which may be irreversible), glaucoma, diplopia, kidney stones, osteopenia/osteoporosis/bone fractures, hyperthermia/anhydrosis, hyponatremia, tremors/abnormal movements, ataxia, dizziness, fatigue, increased risk for falls, risk for cardiac arrhythmias/syncope, gastrointestinal side effects(hepatitis, pancreatitis, gastritis, ulcers), gingival hypertrophy/bleeding, drowsiness, sedation, anxiety/nervousness, increased risk for suicide, increased risk for depression, and psychosis.   -We also reviewed drug-drug interactions and their potential effect on seizure control and medication side effects.    -We also discussed in detail potential effects of seizures, epilepsy, and medications during pregnancy, including but not limited to fetal malformations, child developmental/intellectual disability, fetal/ risk for hemorrhages, stillbirth, maternal death, premature birth, and others. The patient/family aware that pregnancy should be avoided, unless desired, in which case we recommend discussing with us at least a year prior to planned conception. To avoid undesired pregnancy while on antiepileptics, we recommend dual contraception.   -Folic acid 2 mg is recommended for all females in childbearing age (12-44 years of age).   -Recommend chronic vitamin D supplementation and regular exercise (if not contraindicated).   -Patient/family educated on risk for SUDEP (Sudden Death in Epilepsy). Counseling was provided on the importance of strict medication and follow up compliance. The patient/family understand the risks associated with non-adherence with the medical plan as outlined,  including but not limited to an increased risk for breakthrough seizures, which may contribute to injuries, disability, status epilepticus, and even death.   -Counseling was also provided on potential effects of alcohol and other drugs, which may lower seizure threshold and/or affect the metabolism of antiepileptic drugs. We recommend avoidance of alcohol and illegal drugs.  -Avoid sleep deprivation.   -We extensively discussed the aspects related to safety in drivers who suffer from epilepsy. The patient is encourage to report to the Division of Motor Vehicles of any condition and/or spells related to confusion, disorientation, and/or loss of awareness and/or loss of consciousness; as these may pose a safety issue if they occur while operating a motor vehicle. The patient and/or family are ultimately responsible for exercising caution and abiding to regulations in place.   -Other seizure precautions were discussed at length, including no diving, no skydiving, no climbing or exposure to unprotected heights, no unsupervised swimming, no Jacuzzi or bathing in bathtubs or deep bodies of water. The patient/family have been advised about risks for operating any machinery while suffering from seizures / syncope / epilepsy and/or while taking antiepileptic drugs.   -The patient understands and agrees that due to the complexity of his/her diagnosis, results of any testing and further recommendations will typically be discussed/made during a face to face encounter in my office. The patient and/or family further understands it is their responsibility to keep proper follow up.     Patient/family agree with plan, as outlined.         Nakia Gerber, MSN, APRN, FNP-C  [facility]  Office: [phone]  Fax: [phone]    BILLING DOCUMENTATION:     Counseling:  I spent greater than 50% time face-to-face time of a total of 65 minutes visit. Over 50% of the time of the visit today was spent on counseling and  or coordination of care wtih the patient and/or family, with greater than 50% of the total discussing my assessment and plan as stated above.

## 2020-06-02 ENCOUNTER — OFFICE VISIT (OUTPATIENT)
Dept: NEUROLOGY | Facility: MEDICAL CENTER | Age: 35
End: 2020-06-02
Payer: COMMERCIAL

## 2020-06-02 VITALS
HEART RATE: 80 BPM | WEIGHT: 119.05 LBS | DIASTOLIC BLOOD PRESSURE: 60 MMHG | BODY MASS INDEX: 21.09 KG/M2 | SYSTOLIC BLOOD PRESSURE: 90 MMHG | HEIGHT: 63 IN | TEMPERATURE: 97.8 F | RESPIRATION RATE: 16 BRPM | OXYGEN SATURATION: 98 %

## 2020-06-02 DIAGNOSIS — G40.109 LOCALIZATION-RELATED EPILEPSY (HCC): ICD-10-CM

## 2020-06-02 DIAGNOSIS — Z30.09 ENCOUNTER FOR COUNSELING REGARDING CONTRACEPTION: ICD-10-CM

## 2020-06-02 DIAGNOSIS — R41.3 MEMORY LOSS: ICD-10-CM

## 2020-06-02 DIAGNOSIS — G40.509 CATAMENIAL EPILEPSY (HCC): ICD-10-CM

## 2020-06-02 DIAGNOSIS — Z30.09 ENCOUNTER FOR FEMALE FAMILY PLANNING COUNSELING: ICD-10-CM

## 2020-06-02 DIAGNOSIS — Z13.31 SCREENING FOR DEPRESSION: ICD-10-CM

## 2020-06-02 PROCEDURE — 99215 OFFICE O/P EST HI 40 MIN: CPT | Performed by: NURSE PRACTITIONER

## 2020-06-02 RX ORDER — ERGOCALCIFEROL 1.25 MG/1
CAPSULE ORAL
COMMUNITY
End: 2020-07-14 | Stop reason: SDUPTHER

## 2020-06-02 RX ORDER — LEVETIRACETAM 500 MG/1
TABLET ORAL
Qty: 150 TAB | Refills: 11 | Status: SHIPPED | OUTPATIENT
Start: 2020-06-02 | End: 2020-07-14 | Stop reason: SDUPTHER

## 2020-06-02 RX ORDER — DIVALPROEX SODIUM 500 MG/1
500 TABLET, DELAYED RELEASE ORAL 2 TIMES DAILY
Qty: 60 TAB | Refills: 11 | Status: SHIPPED | OUTPATIENT
Start: 2020-06-02 | End: 2021-05-11 | Stop reason: SDUPTHER

## 2020-06-02 ASSESSMENT — FIBROSIS 4 INDEX: FIB4 SCORE: 0.64

## 2020-06-27 ENCOUNTER — HOSPITAL ENCOUNTER (OUTPATIENT)
Dept: LAB | Facility: MEDICAL CENTER | Age: 35
End: 2020-06-27
Attending: NURSE PRACTITIONER
Payer: COMMERCIAL

## 2020-06-27 DIAGNOSIS — E55.9 VITAMIN D DEFICIENCY: ICD-10-CM

## 2020-06-27 LAB — 25(OH)D3 SERPL-MCNC: 22 NG/ML (ref 30–100)

## 2020-06-27 PROCEDURE — 36415 COLL VENOUS BLD VENIPUNCTURE: CPT

## 2020-06-27 PROCEDURE — 82306 VITAMIN D 25 HYDROXY: CPT

## 2020-07-08 NOTE — PROGRESS NOTES
Chief Complaint   Patient presents with   • Follow-Up     Localization-related epilepsy        Problem List Items Addressed This Visit     None      Visit Diagnoses     Localization-related epilepsy (HCC)        Relevant Medications    levETIRAcetam (KEPPRA) 500 MG Tab    Other Relevant Orders    KEPPRA    VALPROIC ACID    AMMONIA    CBC WITH DIFFERENTIAL    Comp Metabolic Panel    Vitamin D deficiency        Relevant Medications    vitamin D, Ergocalciferol, (DRISDOL) 1.25 MG (17827 UT) Cap capsule          Interim History:  Umu Mcguire 35 y.o. female presents today with mom for seizure f/u. Dr. Urbano joined today's visit to also talk about the research and potentially answer any questions that they have.     Pt reports she had 7 seizures since the last visit. The last one was on 7/4/2020. All of her spells were complex partial where she stares off and makes a gurgling sound.  She hasn't had any convulsion as confirmed by Dr. Urbano. There was post- ictal confusion and fatigue. She has been compliant with her medications this time. She is taking depakote 500mg BID and keppra, 500mg, 2 tabs in am and 3 tabs at night. No side effects.     She had another question about pregnancy and she continues to refuse referral to GYN and using contraception. She states she does not have a boyfriend for now. She is not sexually active.     Mood is good. Not suicidal or homicidal.     She is not driving.      She is taking vit D weekly.     Past medical history:   Past Medical History:   Diagnosis Date   • DUB (dysfunctional uterine bleeding)    • Epilepsy (HCC) 11/4/2009    since infant.  sees Wilfrido/Codey at [facility].  Keppra/depakote.  absence siezures 1x/month-thinks iwth menstruation.   • GERD (gastroesophageal reflux disease)     gastritis-only with spicy foods   • Seizure disorder (HCC)        Past surgical history:   Past Surgical History:   Procedure Laterality Date   • ERCP  9/30/2019    Procedure: ERCP (ENDOSCOPIC  RETROGRADE CHOLANGIOPANCREATOGRAPHY);  Surgeon: Kaushal Lopes M.D.;  Location: SURGERY [facility];  Service: Gastroenterology   • DEONDRE BY LAPAROSCOPY N/A 4/8/2018    Procedure: DEONDRE BY LAPAROSCOPY;  Surgeon: Chava Busby M.D.;  Location: SURGERY [facility];  Service: General   • ERCP  4/6/2018    Procedure: ERCP;  Surgeon: Osiel Paige M.D.;  Location: SURGERY [facility];  Service: Gastroenterology       Family history:   Family History   Problem Relation Age of Onset   • Cancer Neg Hx    • Diabetes Neg Hx    • Stroke Neg Hx        Social history:   Social History     Socioeconomic History   • Marital status: Single     Spouse name: Not on file   • Number of children: Not on file   • Years of education: Not on file   • Highest education level: Not on file   Occupational History   • Not on file   Social Needs   • Financial resource strain: Not on file   • Food insecurity     Worry: Not on file     Inability: Not on file   • Transportation needs     Medical: Not on file     Non-medical: Not on file   Tobacco Use   • Smoking status: Never Smoker   • Smokeless tobacco: Never Used   Substance and Sexual Activity   • Alcohol use: No   • Drug use: No   • Sexual activity: Not Currently     Comment: virginal   Lifestyle   • Physical activity     Days per week: Not on file     Minutes per session: Not on file   • Stress: Not on file   Relationships   • Social connections     Talks on phone: Not on file     Gets together: Not on file     Attends [employer] service: Not on file     Active member of club or organization: Not on file     Attends meetings of clubs or organizations: Not on file     Relationship status: Not on file   • Intimate partner violence     Fear of current or ex partner: Not on file     Emotionally abused: Not on file     Physically abused: Not on file     Forced sexual activity: Not on file   Other Topics Concern   • Not on file   Social History Narrative   • Not on file  "      Current medications:   Current Outpatient Medications   Medication   • vitamin D, Ergocalciferol, (DRISDOL) 1.25 MG (51847 UT) Cap capsule   • levETIRAcetam (KEPPRA) 500 MG Tab   • divalproex (DEPAKOTE) 500 MG Tablet Delayed Response     No current facility-administered medications for this visit.        Medication Allergy:  Allergies   Allergen Reactions   • Nkda [No Known Drug Allergy]          Review of systems:     General: Denies fevers or chills, or nightsweats, or generalized fatigue.    Head: Denies headaches or dizziness or lightheadedness  Neurologic: Denies facial droopiness, muscle weakness (focal or generalized), paresthesias, ataxia, change in speech or language, memory loss, abnormal movements. +seizures, loss of consciousness, or episodes of confusion.   Psychiatric: Denies anxiety, depression, mood swings, suicidal or homicidal thoughts       Physical examination:   Vitals:    07/14/20 1050   BP: (!) 95/60   BP Location: Right arm   Patient Position: Sitting   BP Cuff Size: Adult   Pulse: 77   Resp: 16   Temp: 36.3 °C (97.3 °F)   TempSrc: Temporal   SpO2: 98%   Weight: 53 kg (116 lb 13.5 oz)   Height: 1.6 m (5' 3\")     General: Patient in no acute distress, pleasant and cooperative.  HEENT: Normocephalic, no signs of acute trauma.   Psychiatric: No hallucinatory behavior. No symptoms of depression or suicidal ideation. Mood and affect appear normal on exam.     NEUROLOGICAL EXAM:   Mental status, orientation: Awake, alert and fully oriented.   Speech and language: speech is clear and fluent. The patient is able to name, repeat and comprehend.   Memory: There is intact recollection of recent and remote events.   Cranial nerve exam:   CN I: Not examined   CN II: PERRL.   CN III, IV, VI: EOMI; no nystagmus   CN V: Facial sensation intact bilaterally   CN VII: face symmetric   CN VIII: hearing intact to finger rub bilaterally   CN IX, X: palate elevates symmetrically   CN XI: Symmetric shoulder " shrug  CN XII: tongue midline. No signs of tongue biting or fasciculations   Motor exam: Strength is 5/5 in all extremities. Tone is normal. No abnormal movements were seen on exam.       ANCILLARY DATA REVIEWED:       Lab Data Review:  Reviewed in chart. Vit D    Records reviewed:   Reviewed in chart.    Imaging:   CT head 4/25/2013  1. No acute intracranial abnormality.  2. Frontal scalp hematoma.     EEG:  EEG April 2008  IMPRESSION:  Ambulatory electroencephalogram recording is abnormalwith the appearance of rare generalized sharp activity and left temporal sharp wave activity.  Both noted during sleep only.  No definitive epileptogenic discharges are noted, but clinical correlation is warranted for possible focal and generalized seizure disorder.  Again, no definite epileptogenic discharges are noted.     VEEG July 2014  IMPRESSION:  This is an abnormal electroencephalogram  due to the presence of frequent epileptiform potentials in drowsiness and exacerbated by hyperventilation.  No clinical seizures noted.     Routine EEG 10/15/2019  INTERPRETATION:  This is an abnormal routine EEG recording in the awake, drowsy,   and sleep state(s). There is slowing in the left frontotemporal   region, as well intermittent and brief runs of rhythmic delta /   theta activity were captured in this region. The findings suggest   underlying area of cortical irritability and structural   abnormality. The patient is at increased risk for seizures,   however no seizures captured during this study.  Clinical and   radiological correlation is recommended.      ASSESSMENT AND PLAN:    1. Localization-related epilepsy (HCC)  - levETIRAcetam (KEPPRA) 500 MG Tab; Take 2 tabs in am and 3 tabs at night  Dispense: 180 Tab; Refill: 11  - KEPPRA; Future  - VALPROIC ACID; Future  - AMMONIA; Future  - CBC WITH DIFFERENTIAL; Future  - Comp Metabolic Panel; Future    2. Vitamin D deficiency  - vitamin D, Ergocalciferol, (DRISDOL) 1.25 MG (30638  "UT) Cap capsule; Take 1 Cap by mouth every 7 days.  Dispense: 4 Cap; Refill: 5    3. Encounter for female family planning counseling    4. Encounter for counseling regarding contraception    5. Screening for depression            CLINICAL DISCUSSION:  Hx of febrile seizure in infancy. Has long history of seizures in childhood and spells were occurring monthly since she was 7 years old. She has up to 6 spells around her menses. Catamenial? The convulsion type spell are infrequent since she was started on ASM but the \"mini spells\" of lip smacking, eyes rolling, fists clenching and passing out are very frequent. Her previous EEG's were abnormal. CT head was unremarkable. No MRI brain. She has seen improvement with increased dose of keppra. Her last convulsive spell was in 2018. Her recent routine EEG continues to be abnormal with slowing in the left frontotemporal region, as well intermittent and brief runs of rhythmic delta / theta activity were captured in this region. She continued to have 8 seizures a month despite being compliant with the increased dose of keppra.      Apparently, pt has tried taking a different ASM in the which gave her a side effect of hyperactivity. I saw Cat's note in 2014 that she was planning on weaning pt off the depakote and switch to Vimpat. However, she transitioned care to Dr. Bloch and there was no documentation about what happened to med adjustment. Pt does not remember taking the Vimpat.      Pt was interested in participating in the xenon epilepsy research. Dr. Urbano talked to pt and mom and answered all their questions again. They have decided to not participate.      No family hx of epilepsy. No TBI hx.      Not drinking alcohol. Not smoking cigarettes. No recreational drug use.     Mood is good. No suicidal or homicidal thoughts.      C/o memory loss likely related to her seizures.      Pt does not plan on conceiving as of now. Discussed the importance of dual contraception " preferably using copper IUD and condom for the partner as ASM's can decrease the effectiveness of OCP's. She is aware of the risk of pregnancy complications while taking ASM's which include congenital defects, abnormal fetal growth, , etc. She is not currently sexually active and not on birth control. Refused referral to GYN. Refused to take folic acid.      Past AED's: unknown, Vimpat?     Current AED's: Depakote 500 mg BID, Keppra 500 mg, 3 tabs in am and 3 tabs qhs. No side effects. Compliant           Plan:  -24hour amb EEG to hopefully capture her spells but d/t financial reason she wants to hold off for now. Same with MRI brain with contrast to r/o structural abnormality.      -She finds benefit from taking keppra. Will increase it to 3 tabs bid.      - Discussed avoidance of spell/sz triggers: alcohol, sleep deprivation, and stress.     - Discussed Vit D supplementation.  Recommended 2000-5000u daily.      - Discussed driving restrictions. Pt does not drive.      -Labs to be checked for next appointment: CMP,CBC, keppra, VPA, ammonia           FOLLOW-UP:   Return in about 3 months (around 10/14/2020).      EDUCATION AND COUNSELING:  -Education was provided to the patient and/or family regarding diagnosis and prognosis. The chronic and unpredictable nature of the condition were discussed. There is increased risk for additional events, which may carry potential for significant injuries and death. Discussed frequent seizure triggers: sleep deprivation, medication non-compliance, use of illegal drugs/alcohol, stress, and others.   -We reviewed in detail the current antiepileptic regimen. Potential side effects of antiepileptics were discussed at length, including but no limited to: hypersensitivity reactions (rash and others, some of which can be fatal), visual field changes (some of which may be irreversible), glaucoma, diplopia, kidney stones, osteopenia/osteoporosis/bone fractures,  hyperthermia/anhydrosis, hyponatremia, tremors/abnormal movements, ataxia, dizziness, fatigue, increased risk for falls, risk for cardiac arrhythmias/syncope, gastrointestinal side effects(hepatitis, pancreatitis, gastritis, ulcers), gingival hypertrophy/bleeding, drowsiness, sedation, anxiety/nervousness, increased risk for suicide, increased risk for depression, and psychosis.   -We also reviewed drug-drug interactions and their potential effect on seizure control and medication side effects.    -We also discussed in detail potential effects of seizures, epilepsy, and medications during pregnancy, including but not limited to fetal malformations, child developmental/intellectual disability, fetal/ risk for hemorrhages, stillbirth, maternal death, premature birth, and others. The patient/family aware that pregnancy should be avoided, unless desired, in which case we recommend discussing with us at least a year prior to planned conception. To avoid undesired pregnancy while on antiepileptics, we recommend dual contraception.   -Folic acid 2 mg is recommended for all females in childbearing age (12-44 years of age).   -Recommend chronic vitamin D supplementation and regular exercise (if not contraindicated).   -Patient/family educated on risk for SUDEP (Sudden Death in Epilepsy). Counseling was provided on the importance of strict medication and follow up compliance. The patient/family understand the risks associated with non-adherence with the medical plan as outlined, including but not limited to an increased risk for breakthrough seizures, which may contribute to injuries, disability, status epilepticus, and even death.   -Counseling was also provided on potential effects of alcohol and other drugs, which may lower seizure threshold and/or affect the metabolism of antiepileptic drugs. We recommend avoidance of alcohol and illegal drugs.  -Avoid sleep deprivation.   -We extensively discussed the aspects  related to safety in drivers who suffer from epilepsy. The patient is encourage to report to the Division of Motor Vehicles of any condition and/or spells related to confusion, disorientation, and/or loss of awareness and/or loss of consciousness; as these may pose a safety issue if they occur while operating a motor vehicle. The patient and/or family are ultimately responsible for exercising caution and abiding to regulations in place.   -Other seizure precautions were discussed at length, including no diving, no skydiving, no climbing or exposure to unprotected heights, no unsupervised swimming, no Jacuzzi or bathing in bathtubs or deep bodies of water. The patient/family have been advised about risks for operating any machinery while suffering from seizures / syncope / epilepsy and/or while taking antiepileptic drugs.   -The patient understands and agrees that due to the complexity of his/her diagnosis, results of any testing and further recommendations will typically be discussed/made during a face to face encounter in my office. The patient and/or family further understands it is their responsibility to keep proper follow up.     Patient/family agree with plan, as outlined.         Nakia Gerber, MSN, APRN, FNP-C  [facility]  Office: [phone]  Fax: [phone]    BILLING DOCUMENTATION:     Counseling:  I spent greater than 50% time face-to-face time of a total of 42 minutes visit. Over 50% of the time of the visit today was spent on counseling and or coordination of care wtih the patient and/or family, with greater than 50% of the total discussing my assessment and plan as stated above.

## 2020-07-14 ENCOUNTER — OFFICE VISIT (OUTPATIENT)
Dept: NEUROLOGY | Facility: MEDICAL CENTER | Age: 35
End: 2020-07-14
Payer: COMMERCIAL

## 2020-07-14 VITALS
DIASTOLIC BLOOD PRESSURE: 60 MMHG | HEART RATE: 77 BPM | TEMPERATURE: 97.3 F | RESPIRATION RATE: 16 BRPM | HEIGHT: 63 IN | WEIGHT: 116.84 LBS | BODY MASS INDEX: 20.7 KG/M2 | SYSTOLIC BLOOD PRESSURE: 95 MMHG | OXYGEN SATURATION: 98 %

## 2020-07-14 DIAGNOSIS — Z13.31 SCREENING FOR DEPRESSION: ICD-10-CM

## 2020-07-14 DIAGNOSIS — Z30.09 ENCOUNTER FOR FEMALE FAMILY PLANNING COUNSELING: ICD-10-CM

## 2020-07-14 DIAGNOSIS — G40.109 LOCALIZATION-RELATED EPILEPSY (HCC): ICD-10-CM

## 2020-07-14 DIAGNOSIS — Z30.09 ENCOUNTER FOR COUNSELING REGARDING CONTRACEPTION: ICD-10-CM

## 2020-07-14 DIAGNOSIS — E55.9 VITAMIN D DEFICIENCY: ICD-10-CM

## 2020-07-14 PROCEDURE — 99215 OFFICE O/P EST HI 40 MIN: CPT | Performed by: NURSE PRACTITIONER

## 2020-07-14 RX ORDER — LEVETIRACETAM 500 MG/1
TABLET ORAL
Qty: 180 TAB | Refills: 11 | Status: SHIPPED | OUTPATIENT
Start: 2020-07-14 | End: 2020-07-14 | Stop reason: SDUPTHER

## 2020-07-14 RX ORDER — ERGOCALCIFEROL 1.25 MG/1
50000 CAPSULE ORAL
Qty: 4 CAP | Refills: 5 | Status: SHIPPED
Start: 2020-07-14 | End: 2021-04-05

## 2020-07-14 RX ORDER — LEVETIRACETAM 500 MG/1
1500 TABLET ORAL 2 TIMES DAILY
Qty: 180 TAB | Refills: 11 | Status: SHIPPED | OUTPATIENT
Start: 2020-07-14 | End: 2021-05-11 | Stop reason: SDUPTHER

## 2020-07-14 ASSESSMENT — FIBROSIS 4 INDEX: FIB4 SCORE: 0.65

## 2020-09-06 NOTE — ED NOTES
Pt amb to br to void; ua sent to lab   Pt continuing to refuse monitoring devices and PIV placement.  MD aware.

## 2020-09-26 ENCOUNTER — HOSPITAL ENCOUNTER (OUTPATIENT)
Dept: LAB | Facility: MEDICAL CENTER | Age: 35
End: 2020-09-26
Attending: NURSE PRACTITIONER
Payer: COMMERCIAL

## 2020-09-26 DIAGNOSIS — G40.109 LOCALIZATION-RELATED EPILEPSY (HCC): ICD-10-CM

## 2020-09-26 LAB
ALBUMIN SERPL BCP-MCNC: 4.6 G/DL (ref 3.2–4.9)
ALBUMIN/GLOB SERPL: 1.3 G/DL
ALP SERPL-CCNC: 59 U/L (ref 30–99)
ALT SERPL-CCNC: 9 U/L (ref 2–50)
AMMONIA PLAS-SCNC: 24 UMOL/L (ref 11–45)
ANION GAP SERPL CALC-SCNC: 14 MMOL/L (ref 7–16)
AST SERPL-CCNC: 13 U/L (ref 12–45)
BASOPHILS # BLD AUTO: 0.5 % (ref 0–1.8)
BASOPHILS # BLD: 0.03 K/UL (ref 0–0.12)
BILIRUB SERPL-MCNC: 0.3 MG/DL (ref 0.1–1.5)
BUN SERPL-MCNC: 11 MG/DL (ref 8–22)
CALCIUM SERPL-MCNC: 9.7 MG/DL (ref 8.5–10.5)
CHLORIDE SERPL-SCNC: 100 MMOL/L (ref 96–112)
CO2 SERPL-SCNC: 21 MMOL/L (ref 20–33)
CREAT SERPL-MCNC: 0.68 MG/DL (ref 0.5–1.4)
EOSINOPHIL # BLD AUTO: 0.05 K/UL (ref 0–0.51)
EOSINOPHIL NFR BLD: 0.8 % (ref 0–6.9)
ERYTHROCYTE [DISTWIDTH] IN BLOOD BY AUTOMATED COUNT: 47.4 FL (ref 35.9–50)
GLOBULIN SER CALC-MCNC: 3.5 G/DL (ref 1.9–3.5)
GLUCOSE SERPL-MCNC: 85 MG/DL (ref 65–99)
HCT VFR BLD AUTO: 38.9 % (ref 37–47)
HGB BLD-MCNC: 12.2 G/DL (ref 12–16)
IMM GRANULOCYTES # BLD AUTO: 0.04 K/UL (ref 0–0.11)
IMM GRANULOCYTES NFR BLD AUTO: 0.7 % (ref 0–0.9)
LYMPHOCYTES # BLD AUTO: 2.05 K/UL (ref 1–4.8)
LYMPHOCYTES NFR BLD: 34.8 % (ref 22–41)
MCH RBC QN AUTO: 26.9 PG (ref 27–33)
MCHC RBC AUTO-ENTMCNC: 31.4 G/DL (ref 33.6–35)
MCV RBC AUTO: 85.7 FL (ref 81.4–97.8)
MONOCYTES # BLD AUTO: 0.6 K/UL (ref 0–0.85)
MONOCYTES NFR BLD AUTO: 10.2 % (ref 0–13.4)
NEUTROPHILS # BLD AUTO: 3.12 K/UL (ref 2–7.15)
NEUTROPHILS NFR BLD: 53 % (ref 44–72)
NRBC # BLD AUTO: 0 K/UL
NRBC BLD-RTO: 0 /100 WBC
PLATELET # BLD AUTO: 297 K/UL (ref 164–446)
PMV BLD AUTO: 12.1 FL (ref 9–12.9)
POTASSIUM SERPL-SCNC: 4.2 MMOL/L (ref 3.6–5.5)
PROT SERPL-MCNC: 8.1 G/DL (ref 6–8.2)
RBC # BLD AUTO: 4.54 M/UL (ref 4.2–5.4)
SODIUM SERPL-SCNC: 135 MMOL/L (ref 135–145)
VALPROATE SERPL-MCNC: 30.5 UG/ML (ref 50–100)
WBC # BLD AUTO: 5.9 K/UL (ref 4.8–10.8)

## 2020-09-26 PROCEDURE — 80177 DRUG SCRN QUAN LEVETIRACETAM: CPT

## 2020-09-26 PROCEDURE — 82140 ASSAY OF AMMONIA: CPT

## 2020-09-26 PROCEDURE — 85025 COMPLETE CBC W/AUTO DIFF WBC: CPT

## 2020-09-26 PROCEDURE — 80164 ASSAY DIPROPYLACETIC ACD TOT: CPT

## 2020-09-26 PROCEDURE — 36415 COLL VENOUS BLD VENIPUNCTURE: CPT

## 2020-09-26 PROCEDURE — 80053 COMPREHEN METABOLIC PANEL: CPT

## 2020-09-29 LAB — LEVETIRACETAM SERPL-MCNC: 34 UG/ML (ref 12–46)

## 2020-10-05 NOTE — PROGRESS NOTES
Chief Complaint   Patient presents with   • Follow-Up     Localization-related epilepsy (HCC)       Problem List Items Addressed This Visit     None      Visit Diagnoses     Pharmacoresistant intractable epilepsy (HCC)        Encounter for female family planning counseling        Encounter for counseling regarding contraception        Screening for depression        Memory loss              Interim History:  Umu Mcguire 35 y.o. female presents today with mom for seizure f/u    Pt reports she is having 8-9 seizures a month. No convulsion but still has impaired awareness. Mom reports her hands are stiff that if she is holding an object in her hands she can't let go and it's hard to pull it out of her hands. Pt has no recollection of events. Last one was on 10/3/2020. She reports she is taking her depakote and keppra as directed. Not missing a dose. No side effects. They are not interested in further work-up and medication adjustment. Mom reports she is still paying off the EEG she had done last year.     She is taking Vit D weekly. She is not driving. Mood is good. No suicidal or homicidal thoughts.     Pt is not sexually active but is meeting someone right now. She is not interested in using contraception and continues to refuse folic acid.       Past medical history:   Past Medical History:   Diagnosis Date   • DUB (dysfunctional uterine bleeding)    • Epilepsy (HCC) 11/4/2009    since infant.  sees Wilfrido/Codey at [facility].  Keppra/depakote.  absence siezures 1x/month-thinks iwth menstruation.   • GERD (gastroesophageal reflux disease)     gastritis-only with spicy foods   • Seizure disorder (HCC)        Past surgical history:   Past Surgical History:   Procedure Laterality Date   • ERCP  9/30/2019    Procedure: ERCP (ENDOSCOPIC RETROGRADE CHOLANGIOPANCREATOGRAPHY);  Surgeon: Kaushal Lopes M.D.;  Location: SURGERY [facility];  Service: Gastroenterology   • DEONDRE BY LAPAROSCOPY N/A 4/8/2018     Procedure: DEONDRE BY LAPAROSCOPY;  Surgeon: Chava Busby M.D.;  Location: SURGERY [facility];  Service: General   • ERCP  4/6/2018    Procedure: ERCP;  Surgeon: Osiel Paige M.D.;  Location: SURGERY [facility];  Service: Gastroenterology       Family history:   Family History   Problem Relation Age of Onset   • Cancer Neg Hx    • Diabetes Neg Hx    • Stroke Neg Hx        Social history:   Social History     Socioeconomic History   • Marital status: Single     Spouse name: Not on file   • Number of children: Not on file   • Years of education: Not on file   • Highest education level: Not on file   Occupational History   • Not on file   Social Needs   • Financial resource strain: Not on file   • Food insecurity     Worry: Not on file     Inability: Not on file   • Transportation needs     Medical: Not on file     Non-medical: Not on file   Tobacco Use   • Smoking status: Never Smoker   • Smokeless tobacco: Never Used   Substance and Sexual Activity   • Alcohol use: No   • Drug use: No   • Sexual activity: Not Currently     Comment: virginal   Lifestyle   • Physical activity     Days per week: Not on file     Minutes per session: Not on file   • Stress: Not on file   Relationships   • Social connections     Talks on phone: Not on file     Gets together: Not on file     Attends Taoist service: Not on file     Active member of club or organization: Not on file     Attends meetings of clubs or organizations: Not on file     Relationship status: Not on file   • Intimate partner violence     Fear of current or ex partner: Not on file     Emotionally abused: Not on file     Physically abused: Not on file     Forced sexual activity: Not on file   Other Topics Concern   • Not on file   Social History Narrative   • Not on file       Current medications:   Current Outpatient Medications   Medication   • vitamin D, Ergocalciferol, (DRISDOL) 1.25 MG (78898 UT) Cap capsule   • levETIRAcetam (KEPPRA) 500  MG Tab   • divalproex (DEPAKOTE) 500 MG Tablet Delayed Response     No current facility-administered medications for this visit.        Medication Allergy:  Allergies   Allergen Reactions   • Nkda [No Known Drug Allergy]          Review of systems:     General: Denies fevers or chills, or nightsweats, or generalized fatigue.    Head: Denies headaches or dizziness or lightheadedness  EENT: Denies vision changes, vision loss or pain, nasal secretion, nasal bleeding, difficulty swallowing, hearing loss, tinnitus, vertigo, ear pain  Musculoskeletal: Denies muscle pain or swelling, no atrophy, no neck and back pain or stiffness.   Neurologic: Denies facial droopiness, muscle weakness (focal or generalized), paresthesias, ataxia, change in speech or language, memory loss, abnormal movements.+ seizures, loss of consciousness  Psychiatric: Denies anxiety, depression, mood swings, suicidal or homicidal thoughts       Physical examination:   Vitals:    10/07/20 1107   BP: (!) 98/68   BP Location: Right arm   Patient Position: Sitting   BP Cuff Size: Adult   Pulse: 78   Resp: 14   Temp: (!) 35.6 °C (96.1 °F)   TempSrc: Temporal   SpO2: 98%   Weight: 55.3 kg (121 lb 14.6 oz)     General: Patient in no acute distress, pleasant and cooperative.  HEENT: Normocephalic, no signs of acute trauma.   Neck: Supple. There is normal range of motion.   Musculoskeletal: joints exhibit full range of motion  Psychiatric: No hallucinatory behavior. No symptoms of depression or suicidal ideation. Mood and affect appear normal on exam.     NEUROLOGICAL EXAM:   Mental status, orientation: Awake, alert and fully oriented.   Speech and language: speech is clear and fluent. The patient is able to name, repeat and comprehend.   Memory: There is intact recollection of recent and remote events.   Cranial nerve exam:   CN I: Not examined   CN II: PERRL.   CN III, IV, VI: EOMI; no nystagmus   CN V: Facial sensation intact bilaterally   CN VII: face  symmetric   CN VIII: hearing intact to finger rub bilaterally   CN IX, X: palate elevates symmetrically   CN XI: Symmetric shoulder shrug  CN XII: tongue midline. No signs of tongue biting or fasciculations   Motor exam: Strength is 5/5 in all extremities. Tone is normal. No abnormal movements were seen on exam.   Sensory exam reveals normal sense of light touchin all extremities.   Gait: The patient was able to get up from seated position on first attempt without requiring assistance. Found to be steady when walking. Movements were fluid with normal arm swing. The patient was able to turn without difficulties or tendency to fall.       ANCILLARY DATA REVIEWED:       Lab Data Review:  Reviewed in chart. CBC, CMP, ammonia, VPA and keppra    Records reviewed:   Reviewed in chart.    Imaging:   CT head 4/25/2013  1. No acute intracranial abnormality.  2. Frontal scalp hematoma.     EEG:  EEG April 2008  IMPRESSION:  Ambulatory electroencephalogram recording is abnormalwith the appearance of rare generalized sharp activity and left temporal sharp wave activity.  Both noted during sleep only.  No definitive epileptogenic discharges are noted, but clinical correlation is warranted for possible focal and generalized seizure disorder.  Again, no definite epileptogenic discharges are noted.     VEEG July 2014  IMPRESSION:  This is an abnormal electroencephalogram  due to the presence of frequent epileptiform potentials in drowsiness and exacerbated by hyperventilation.  No clinical seizures noted.     Routine EEG 10/15/2019  INTERPRETATION:  This is an abnormal routine EEG recording in the awake, drowsy,   and sleep state(s). There is slowing in the left frontotemporal   region, as well intermittent and brief runs of rhythmic delta /   theta activity were captured in this region. The findings suggest   underlying area of cortical irritability and structural   abnormality. The patient is at increased risk for seizures,  "  however no seizures captured during this study.  Clinical and   radiological correlation is recommended.      ASSESSMENT AND PLAN:    1. Pharmacoresistant intractable epilepsy (HCC)    2. Encounter for female family planning counseling    3. Encounter for counseling regarding contraception    4. Screening for depression    5. Memory loss        CLINICAL DISCUSSION:  Phamacoresistant epilepsy. Hx of febrile seizure in infancy. Has long history of seizures in childhood and spells were occurring monthly since she was 7 years old. She has up to 6 spells around her menses. Catamenial? The convulsion type spell are infrequent since she was started on ASM but the \"mini spells\" of lip smacking, eyes rolling, fists clenching and passing out are very frequent. Her previous EEG's were abnormal. CT head was unremarkable. No MRI brain. Her last convulsive spell was in 2018. Her recent routine EEG continues to be abnormal with slowing in the left frontotemporal region, as well intermittent and brief runs of rhythmic delta / theta activity were captured in this region. She continues to have 8-9 seizures a month on keppra and depakote. They do want further work-up d/t financial reasons. They also don't want further medication adjustment. They are contented with this and pt states \"I'm used to this\". Discussed again the risk of SUDEP and they verbalized understanding. She is a good candidate for VNS but they are afraid of the cost. Will try to talk to them about VNS again during their next visit.      Apparently, pt has tried taking a different ASM in the which gave her a side effect of hyperactivity. I saw Cat's note in 2014 that she was planning on weaning pt off the depakote and switch to Vimpat. However, she transitioned care to Dr. Bloch and there was no documentation about what happened to med adjustment. Pt does not remember taking the Vimpat.      Pt was interested in participating in the xenon epilepsy research. Dr." Yolie talked to pt and mom and answered all their questions again. They have decided to not participate.      No family hx of epilepsy. No TBI hx.      Not drinking alcohol. Not smoking cigarettes. No recreational drug use.     Mood is good. No suicidal or homicidal thoughts.      C/o memory loss likely related to her seizures.      Pt does not plan on conceiving as of now. Discussed the importance of dual contraception preferably using copper IUD and condom for the partner as ASM's can decrease the effectiveness of OCP's. She is aware of the risk of pregnancy complications while taking ASM's which include congenital defects, abnormal fetal growth, , etc. She is not currently sexually active and not on birth control.  Continues to refuse referral to GYN and take folic acid.     Past AED's: unknown, Vimpat?     Current AED's: Depakote 500 mg BID, Keppra 500 mg, 3 tabs in am and 3 tabs qhs. No side effects. Compliant    Keppra level therapeutic.  Depakote level subtherapeutic (30.5).  Ammonia level normal.         Plan:  -24hour amb EEG to hopefully capture her spells but d/t financial reason she wants to hold off for now. Same with MRI brain with contrast to r/o structural abnormality.      - Discussed avoidance of spell/sz triggers: alcohol, sleep deprivation, and stress.     - Discussed Vit D supplementation.  Recommended 2000-5000u daily.      - Discussed driving restrictions. Pt does not drive.      -Labs to be checked for next appointment: none           FOLLOW-UP:   Return in about 3 months (around 2021). Pt wants to f/u in 6 months.       EDUCATION AND COUNSELING:  -Education was provided to the patient and/or family regarding diagnosis and prognosis. The chronic and unpredictable nature of the condition were discussed. There is increased risk for additional events, which may carry potential for significant injuries and death. Discussed frequent seizure triggers: sleep deprivation, medication  non-compliance, use of illegal drugs/alcohol, stress, and others.   -We reviewed in detail the current antiepileptic regimen. Potential side effects of antiepileptics were discussed at length, including but no limited to: hypersensitivity reactions (rash and others, some of which can be fatal), visual field changes (some of which may be irreversible), glaucoma, diplopia, kidney stones, osteopenia/osteoporosis/bone fractures, hyperthermia/anhydrosis, hyponatremia, tremors/abnormal movements, ataxia, dizziness, fatigue, increased risk for falls, risk for cardiac arrhythmias/syncope, gastrointestinal side effects(hepatitis, pancreatitis, gastritis, ulcers), gingival hypertrophy/bleeding, drowsiness, sedation, anxiety/nervousness, increased risk for suicide, increased risk for depression, and psychosis.   -We also reviewed drug-drug interactions and their potential effect on seizure control and medication side effects.    -We also discussed in detail potential effects of seizures, epilepsy, and medications during pregnancy, including but not limited to fetal malformations, child developmental/intellectual disability, fetal/ risk for hemorrhages, stillbirth, maternal death, premature birth, and others. The patient/family aware that pregnancy should be avoided, unless desired, in which case we recommend discussing with us at least a year prior to planned conception. To avoid undesired pregnancy while on antiepileptics, we recommend dual contraception.   -Folic acid 2 mg is recommended for all females in childbearing age (12-44 years of age).   -Recommend chronic vitamin D supplementation and regular exercise (if not contraindicated).   -Patient/family educated on risk for SUDEP (Sudden Death in Epilepsy). Counseling was provided on the importance of strict medication and follow up compliance. The patient/family understand the risks associated with non-adherence with the medical plan as outlined, including but not  limited to an increased risk for breakthrough seizures, which may contribute to injuries, disability, status epilepticus, and even death.   -Counseling was also provided on potential effects of alcohol and other drugs, which may lower seizure threshold and/or affect the metabolism of antiepileptic drugs. We recommend avoidance of alcohol and illegal drugs.  -Avoid sleep deprivation.   -We extensively discussed the aspects related to safety in drivers who suffer from epilepsy. The patient is encourage to report to the Division of Motor Vehicles of any condition and/or spells related to confusion, disorientation, and/or loss of awareness and/or loss of consciousness; as these may pose a safety issue if they occur while operating a motor vehicle. The patient and/or family are ultimately responsible for exercising caution and abiding to regulations in place.   -Other seizure precautions were discussed at length, including no diving, no skydiving, no climbing or exposure to unprotected heights, no unsupervised swimming, no Jacuzzi or bathing in bathtubs or deep bodies of water. The patient/family have been advised about risks for operating any machinery while suffering from seizures / syncope / epilepsy and/or while taking antiepileptic drugs.   -The patient understands and agrees that due to the complexity of his/her diagnosis, results of any testing and further recommendations will typically be discussed/made during a face to face encounter in my office. The patient and/or family further understands it is their responsibility to keep proper follow up.     Patient/family agree with plan, as outlined.         Nakia Gerber, MSN, APRN, FNP-C  [facility]  Office: [phone]  Fax: [phone]    BILLING DOCUMENTATION:     Counseling:  I spent greater than 50% time face-to-face time of a total of 36 minutes visit. Over 50% of the time of the visit today was spent on counseling and or coordination  of care wtih the patient and/or family, with greater than 50% of the total discussing my assessment and plan as stated above.

## 2020-10-07 ENCOUNTER — OFFICE VISIT (OUTPATIENT)
Dept: NEUROLOGY | Facility: MEDICAL CENTER | Age: 35
End: 2020-10-07
Payer: COMMERCIAL

## 2020-10-07 VITALS
WEIGHT: 121.91 LBS | BODY MASS INDEX: 21.6 KG/M2 | TEMPERATURE: 96.1 F | SYSTOLIC BLOOD PRESSURE: 98 MMHG | HEART RATE: 78 BPM | DIASTOLIC BLOOD PRESSURE: 68 MMHG | OXYGEN SATURATION: 98 % | RESPIRATION RATE: 14 BRPM

## 2020-10-07 DIAGNOSIS — Z30.09 ENCOUNTER FOR COUNSELING REGARDING CONTRACEPTION: ICD-10-CM

## 2020-10-07 DIAGNOSIS — Z13.31 SCREENING FOR DEPRESSION: ICD-10-CM

## 2020-10-07 DIAGNOSIS — G40.919 PHARMACORESISTANT INTRACTABLE EPILEPSY (HCC): ICD-10-CM

## 2020-10-07 DIAGNOSIS — R41.3 MEMORY LOSS: ICD-10-CM

## 2020-10-07 DIAGNOSIS — Z30.09 ENCOUNTER FOR FEMALE FAMILY PLANNING COUNSELING: ICD-10-CM

## 2020-10-07 PROCEDURE — 99214 OFFICE O/P EST MOD 30 MIN: CPT | Performed by: NURSE PRACTITIONER

## 2020-10-07 ASSESSMENT — FIBROSIS 4 INDEX: FIB4 SCORE: 0.53

## 2021-02-17 NOTE — TELEPHONE ENCOUNTER
Pt was requesting vit d refill I explained in her last visit w/ conner in oct 2020 that conner wanted pt to start taking an otc vitd 2000-5000u daily. Pt verbally understood.

## 2021-04-02 NOTE — PROGRESS NOTES
Chief Complaint   Patient presents with   • Follow-Up     Pharmacoresistant intractable epilepsy        Problem List Items Addressed This Visit     None      Visit Diagnoses     Pharmacoresistant intractable epilepsy (HCC)        Screening for depression              Interim History:  Umu Mcguire 35 y.o. female presents today with mom for f/u.      provided by the clinic was used during this visit.     They report her having 8 typical atonic seizures a month with & without LOC lasting seconds. No convulsion. No tongue biting or incontinence. Compliant on medications. No clear side effects. They are not interested in adding another medication or adjusting her current doses. Not sexually active. Mood is good. No SI or HI. She is taking vit D. No other issues.         Past medical history:   Past Medical History:   Diagnosis Date   • DUB (dysfunctional uterine bleeding)    • Epilepsy (HCC) 11/4/2009    since infant.  sees Wilfrido/Codey at [facility].  Keppra/depakote.  absence siezures 1x/month-thinks iwth menstruation.   • GERD (gastroesophageal reflux disease)     gastritis-only with spicy foods   • Seizure disorder (HCC)        Past surgical history:   Past Surgical History:   Procedure Laterality Date   • ERCP  9/30/2019    Procedure: ERCP (ENDOSCOPIC RETROGRADE CHOLANGIOPANCREATOGRAPHY);  Surgeon: Kaushal Lopes M.D.;  Location: SURGERY [facility];  Service: Gastroenterology   • DEONDRE BY LAPAROSCOPY N/A 4/8/2018    Procedure: DEONDRE BY LAPAROSCOPY;  Surgeon: Chava Busby M.D.;  Location: [facility];  Service: General   • ERCP  4/6/2018    Procedure: ERCP;  Surgeon: Osiel Paige M.D.;  Location: SURGERY [facility];  Service: Gastroenterology       Family history:   Family History   Problem Relation Age of Onset   • Cancer Neg Hx    • Diabetes Neg Hx    • Stroke Neg Hx        Social history:   Social History     Socioeconomic History   • Marital  status: Single     Spouse name: Not on file   • Number of children: Not on file   • Years of education: Not on file   • Highest education level: Not on file   Occupational History   • Not on file   Tobacco Use   • Smoking status: Never Smoker   • Smokeless tobacco: Never Used   Substance and Sexual Activity   • Alcohol use: No   • Drug use: No   • Sexual activity: Not Currently     Comment: virginal   Other Topics Concern   • Not on file   Social History Narrative   • Not on file     Social Determinants of Health     Financial Resource Strain:    • Difficulty of Paying Living Expenses:    Food Insecurity:    • Worried About Running Out of Food in the Last Year:    • Ran Out of Food in the Last Year:    Transportation Needs:    • Lack of Transportation (Medical):    • Lack of Transportation (Non-Medical):    Physical Activity:    • Days of Exercise per Week:    • Minutes of Exercise per Session:    Stress:    • Feeling of Stress :    Social Connections:    • Frequency of Communication with Friends and Family:    • Frequency of Social Gatherings with Friends and Family:    • Attends [employer] Services:    • Active Member of Clubs or Organizations:    • Attends Club or Organization Meetings:    • Marital Status:    Intimate Partner Violence:    • Fear of Current or Ex-Partner:    • Emotionally Abused:    • Physically Abused:    • Sexually Abused:        Current medications:   Current Outpatient Medications   Medication   • vitamin D, Ergocalciferol, (DRISDOL) 1.25 MG (47082 UT) Cap capsule   • levETIRAcetam (KEPPRA) 500 MG Tab   • divalproex (DEPAKOTE) 500 MG Tablet Delayed Response     No current facility-administered medications for this visit.       Medication Allergy:  Allergies   Allergen Reactions   • Nkda [No Known Drug Allergy]          Review of systems:     General: Denies fevers or chills, or nightsweats, or generalized fatigue.    Head: Denies headaches or dizziness or lightheadedness  EENT: Denies vision  "changes, vision loss or pain, nasal secretion, nasal bleeding, difficulty swallowing, hearing loss, tinnitus, vertigo, ear pain  Musculoskeletal: Denies muscle pain or swelling, no atrophy, no neck and back pain or stiffness.   Neurologic: Denies facial droopiness, muscle weakness (focal or generalized), paresthesias, ataxia, change in speech or language, memory loss, abnormal movements.+ seizures, loss of consciousness  Psychiatric: Denies anxiety, depression, mood swings, suicidal or homicidal thoughts       Physical examination:   Vitals:    04/05/21 1130   BP: 100/62   BP Location: Right arm   Patient Position: Sitting   BP Cuff Size: Adult   Pulse: 81   Resp: 14   Temp: 36.6 °C (97.9 °F)   TempSrc: Temporal   SpO2: 98%   Weight: 58 kg (127 lb 13.9 oz)   Height: 1.6 m (5' 3\")     General: Patient in no acute distress, pleasant and cooperative.  HEENT: Normocephalic, no signs of acute trauma.   Neck: Supple. There is normal range of motion.   Resp: clear to auscultation bilaterally. No wheezes or crackles.   Musculoskeletal: joints exhibit full range of motion  Psychiatric: No hallucinatory behavior. No symptoms of depression or suicidal ideation. Mood and affect appear normal on exam.     NEUROLOGICAL EXAM:   Mental status, orientation: Awake, alert and fully oriented.   Speech and language: speech is clear and fluent. The patient is able to name, repeat and comprehend.   Memory: There is intact recollection of recent and remote events.   Motor exam: Strength is 5/5 in all extremities. Tone is normal. No abnormal movements were seen on exam.   Sensory exam reveals normal sense of light touch in all extremities.   Gait: The patient was able to get up from seated position on first attempt without requiring assistance. Found to be steady when walking. Movements were fluid with normal arm swing.       ANCILLARY DATA REVIEWED:       Lab Data Review:  Reviewed in chart.     Records reviewed:   Reviewed in " "chart.    Imaging:   CT head 4/25/2013  1. No acute intracranial abnormality.  2. Frontal scalp hematoma.     EEG:  EEG April 2008  IMPRESSION:  Ambulatory electroencephalogram recording is abnormalwith the appearance of rare generalized sharp activity and left temporal sharp wave activity.  Both noted during sleep only.  No definitive epileptogenic discharges are noted, but clinical correlation is warranted for possible focal and generalized seizure disorder.  Again, no definite epileptogenic discharges are noted.     VEEG July 2014  IMPRESSION:  This is an abnormal electroencephalogram  due to the presence of frequent epileptiform potentials in drowsiness and exacerbated by hyperventilation.  No clinical seizures noted.     Routine EEG 10/15/2019  INTERPRETATION:  This is an abnormal routine EEG recording in the awake, drowsy,   and sleep state(s). There is slowing in the left frontotemporal   region, as well intermittent and brief runs of rhythmic delta /   theta activity were captured in this region. The findings suggest   underlying area of cortical irritability and structural   abnormality. The patient is at increased risk for seizures,   however no seizures captured during this study.  Clinical and   radiological correlation is recommended.        ASSESSMENT AND PLAN:    1. Pharmacoresistant intractable epilepsy (HCC)    2. Screening for depression          CLINICAL DISCUSSION:  Phamacoresistant epilepsy. Hx of febrile seizure in infancy. Has long history of seizures in childhood and spells were occurring monthly since she was 7 years old. She has up to 6 spells around her menses. Catamenial? The convulsion type spell are infrequent since she was started on ASM but the \"mini spells\" of lip smacking, eyes rolling, fists clenching, drop attacks and passing out are very frequent. Her previous EEG's were abnormal. CT head was unremarkable. No MRI brain. Her last convulsive spell was in 2018. Her recent routine " EEG continues to be abnormal with slowing in the left frontotemporal region, as well intermittent and brief runs of rhythmic delta / theta activity were captured in this region. She continues to have 8-9 seizures a month on keppra and depakote. They do want further work-up d/t financial reasons. They also don't want further medication adjustment. Discussed VNS with them and they want to read more about this and will decide next time. Pt understands limitations once he has VNS replaced, including limitation on getting MRI's. They also understand possible complications of the procedure including infections and possible side effects to include changes in voice, dizziness, pain, cough and others.      Apparently, pt has tried taking a different ASM in the which gave her a side effect of hyperactivity. I saw Cat's note in  that she was planning on weaning pt off the depakote and switch to Vimpat. However, she transitioned care to Dr. Bloch and there was no documentation about what happened to med adjustment. Pt does not remember taking the Vimpat.      No family hx of epilepsy. No TBI hx.      Not drinking alcohol. Not smoking cigarettes. No recreational drug use.     Mood is good. No suicidal or homicidal thoughts.      C/o memory loss likely related to her seizures.      Pt does not plan on conceiving as of now. Discussed the importance of dual contraception preferably using copper IUD and condom for the partner as ASM's can decrease the effectiveness of OCP's. She is aware of the risk of pregnancy complications while taking ASM's which include congenital defects, abnormal fetal growth, , etc. She is not currently sexually active and not on birth control.  Continues to refuse referral to GYN and take folic acid.     Past ASMs: unknown, Vimpat?     Current ASMs: Depakote 500 mg BID, Keppra 500 mg, 3 tabs in am and 3 tabs qhs. No side effects. Compliant        Plan:  -continue ASMs     - Discussed  avoidance of spell/sz triggers: alcohol, sleep deprivation, and stress.     - Discussed Vit D supplementation.  Recommended 2000-5000u daily.      - Discussed driving restrictions. Pt does not drive.      -Labs to be checked for next appointment: none        FOLLOW-UP:   Return in about 4 weeks (around 5/3/2021).      EDUCATION AND COUNSELING:  -Education was provided to the patient and/or family regarding diagnosis and prognosis. The chronic and unpredictable nature of the condition were discussed. There is increased risk for additional events, which may carry potential for significant injuries and death. Discussed frequent seizure triggers: sleep deprivation, medication non-compliance, use of illegal drugs/alcohol, stress, and others.   -We reviewed in detail the current antiepileptic regimen. Potential side effects of antiepileptics were discussed at length, including but no limited to: hypersensitivity reactions (rash and others, some of which can be fatal), visual field changes (some of which may be irreversible), glaucoma, diplopia, kidney stones, osteopenia/osteoporosis/bone fractures, hyperthermia/anhydrosis, hyponatremia, tremors/abnormal movements, ataxia, dizziness, fatigue, increased risk for falls, risk for cardiac arrhythmias/syncope, gastrointestinal side effects(hepatitis, pancreatitis, gastritis, ulcers), gingival hypertrophy/bleeding, drowsiness, sedation, anxiety/nervousness, increased risk for suicide, increased risk for depression, and psychosis.   -We also reviewed drug-drug interactions and their potential effect on seizure control and medication side effects.    -We also discussed in detail potential effects of seizures, epilepsy, and medications during pregnancy, including but not limited to fetal malformations, child developmental/intellectual disability, fetal/ risk for hemorrhages, stillbirth, maternal death, premature birth, and others. The patient/family aware that pregnancy  should be avoided, unless desired, in which case we recommend discussing with us at least a year prior to planned conception. To avoid undesired pregnancy while on antiepileptics, we recommend dual contraception.   -Folic acid 2 mg is recommended for all females in childbearing age (12-44 years of age).   -Recommend chronic vitamin D supplementation and regular exercise (if not contraindicated).   -Patient/family educated on risk for SUDEP (Sudden Death in Epilepsy). Counseling was provided on the importance of strict medication and follow up compliance. The patient/family understand the risks associated with non-adherence with the medical plan as outlined, including but not limited to an increased risk for breakthrough seizures, which may contribute to injuries, disability, status epilepticus, and even death.   -Counseling was also provided on potential effects of alcohol and other drugs, which may lower seizure threshold and/or affect the metabolism of antiepileptic drugs. We recommend avoidance of alcohol and illegal drugs.  -Avoid sleep deprivation.   -We extensively discussed the aspects related to safety in drivers who suffer from epilepsy. The patient is encourage to report to the Division of Motor Vehicles of any condition and/or spells related to confusion, disorientation, and/or loss of awareness and/or loss of consciousness; as these may pose a safety issue if they occur while operating a motor vehicle. The patient and/or family are ultimately responsible for exercising caution and abiding to regulations in place.   -Other seizure precautions were discussed at length, including no diving, no skydiving, no climbing or exposure to unprotected heights, no unsupervised swimming, no Jacuzzi or bathing in bathtubs or deep bodies of water. The patient/family have been advised about risks for operating any machinery while suffering from seizures / syncope / epilepsy and/or while taking antiepileptic drugs.   -The  patient understands and agrees that due to the complexity of his/her diagnosis, results of any testing and further recommendations will typically be discussed/made during a face to face encounter in my office. The patient and/or family further understands it is their responsibility to keep proper follow up.     Patient/family agree with plan, as outlined.         Nakia Gerber, MSN, APRN, FNP-C  [facility]  Office: [phone]  Fax: [phone]    BILLING DOCUMENTATION:   Total time spent including chart review before and after visit was 37min. Over 50% of the time of the visit today was spent on counseling and or coordination of care wtih the patient and/or family, with greater than 50% of the total discussing my assessment and plan as stated above.

## 2021-04-05 ENCOUNTER — OFFICE VISIT (OUTPATIENT)
Dept: NEUROLOGY | Facility: MEDICAL CENTER | Age: 36
End: 2021-04-05
Attending: NURSE PRACTITIONER
Payer: COMMERCIAL

## 2021-04-05 VITALS
WEIGHT: 127.87 LBS | OXYGEN SATURATION: 98 % | RESPIRATION RATE: 14 BRPM | SYSTOLIC BLOOD PRESSURE: 100 MMHG | TEMPERATURE: 97.9 F | HEIGHT: 63 IN | HEART RATE: 81 BPM | DIASTOLIC BLOOD PRESSURE: 62 MMHG | BODY MASS INDEX: 22.66 KG/M2

## 2021-04-05 DIAGNOSIS — Z13.31 SCREENING FOR DEPRESSION: ICD-10-CM

## 2021-04-05 DIAGNOSIS — G40.919 PHARMACORESISTANT INTRACTABLE EPILEPSY (HCC): ICD-10-CM

## 2021-04-05 PROCEDURE — 99212 OFFICE O/P EST SF 10 MIN: CPT | Performed by: NURSE PRACTITIONER

## 2021-04-05 PROCEDURE — 99214 OFFICE O/P EST MOD 30 MIN: CPT | Performed by: NURSE PRACTITIONER

## 2021-04-05 ASSESSMENT — FIBROSIS 4 INDEX: FIB4 SCORE: 0.53

## 2021-04-28 ENCOUNTER — TELEPHONE (OUTPATIENT)
Dept: NEUROLOGY | Facility: MEDICAL CENTER | Age: 36
End: 2021-04-28

## 2021-04-28 NOTE — TELEPHONE ENCOUNTER
Patient called and is asking about the VNS. She stated she wanted to know how much the surgery was going to be so she can make her final decision. I advised her that we do not know that information but if she is willing to do the VNS we can start the process with her during her 05/11 appt. After filling out the PIQ form and submitted to the company they will be able to assist her finding out the cost of the surgery.       She understood that she will speak further on the topic. During her 05/11 appt.

## 2021-05-06 NOTE — PROGRESS NOTES
Chief Complaint   Patient presents with   • Follow-Up     Pharmacoresistant intractable epilepsy        Problem List Items Addressed This Visit     None      Visit Diagnoses     Localization-related epilepsy (HCC)        Relevant Medications    divalproex (DEPAKOTE) 500 MG Tablet Delayed Response    levETIRAcetam (KEPPRA) 500 MG Tab          Interim History:  Umu Mcguire 35 y.o. female presents today with mom for f/u.    Pt reports of 6 seizures since last visit. Last one was on 5/6/2021. No tongue biting or incontinence. She is compliant with her keppra and depakote. No side effects. Mood is good. She is taking vit d. They have done their research regarding the VNS and they are interested but they want to know how much they will have to pay. She is not sexually active and not on folic acid. No other issues.          Past medical history:   Past Medical History:   Diagnosis Date   • DUB (dysfunctional uterine bleeding)    • Epilepsy (HCC) 11/4/2009    since infant.  sees Wilfrido/Codey at [facility].  Keppra/depakote.  absence siezures 1x/month-thinks iwth menstruation.   • GERD (gastroesophageal reflux disease)     gastritis-only with spicy foods   • Seizure disorder (HCC)        Past surgical history:   Past Surgical History:   Procedure Laterality Date   • ERCP  9/30/2019    Procedure: ERCP (ENDOSCOPIC RETROGRADE CHOLANGIOPANCREATOGRAPHY);  Surgeon: Kaushal Lopes M.D.;  Location: [facility];  Service: Gastroenterology   • DEONDRE BY LAPAROSCOPY N/A 4/8/2018    Procedure: DEONDRE BY LAPAROSCOPY;  Surgeon: Chava Busby M.D.;  Location: [facility];  Service: General   • ERCP  4/6/2018    Procedure: ERCP;  Surgeon: Osiel Paige M.D.;  Location: [facility];  Service: Gastroenterology       Family history:   Family History   Problem Relation Age of Onset   • Cancer Neg Hx    • Diabetes Neg Hx    • Stroke Neg Hx        Social history:   Social History      Socioeconomic History   • Marital status: Single     Spouse name: Not on file   • Number of children: Not on file   • Years of education: Not on file   • Highest education level: Not on file   Occupational History   • Not on file   Tobacco Use   • Smoking status: Never Smoker   • Smokeless tobacco: Never Used   Substance and Sexual Activity   • Alcohol use: No   • Drug use: No   • Sexual activity: Not Currently     Comment: virginal   Other Topics Concern   • Not on file   Social History Narrative   • Not on file     Social Determinants of Health     Financial Resource Strain:    • Difficulty of Paying Living Expenses:    Food Insecurity:    • Worried About Running Out of Food in the Last Year:    • Ran Out of Food in the Last Year:    Transportation Needs:    • Lack of Transportation (Medical):    • Lack of Transportation (Non-Medical):    Physical Activity:    • Days of Exercise per Week:    • Minutes of Exercise per Session:    Stress:    • Feeling of Stress :    Social Connections:    • Frequency of Communication with Friends and Family:    • Frequency of Social Gatherings with Friends and Family:    • Attends Islam Services:    • Active Member of Clubs or Organizations:    • Attends Club or Organization Meetings:    • Marital Status:    Intimate Partner Violence:    • Fear of Current or Ex-Partner:    • Emotionally Abused:    • Physically Abused:    • Sexually Abused:        Current medications:   Current Outpatient Medications   Medication   • levETIRAcetam (KEPPRA) 500 MG Tab   • divalproex (DEPAKOTE) 500 MG Tablet Delayed Response     No current facility-administered medications for this visit.       Medication Allergy:  Allergies   Allergen Reactions   • Nkda [No Known Drug Allergy]          Review of systems:     General: Denies fevers or chills, or nightsweats, or generalized fatigue.    Head: Denies headaches or dizziness or lightheadedness  Musculoskeletal: Denies muscle pain or swelling, no  atrophy, no neck and back pain or stiffness.   Neurologic: Denies facial droopiness, muscle weakness (focal or generalized), paresthesias, ataxia, change in speech or language, memory loss, abnormal movements.+seizures, loss of consciousness, or episodes of confusion.   Psychiatric: Denies anxiety, depression, mood swings, suicidal or homicidal thoughts       Physical examination:   Vitals:    05/11/21 1044   BP: 100/68   BP Location: Right arm   Patient Position: Sitting   BP Cuff Size: Adult   Pulse: 84   Temp: (!) 35.3 °C (95.5 °F)   TempSrc: Temporal   SpO2: 96%   Weight: 54 kg (119 lb 0.8 oz)     General: Patient in no acute distress, pleasant and cooperative.  HEENT: Normocephalic, no signs of acute trauma.   Neck: Supple. There is normal range of motion.   Musculoskeletal: joints exhibit full range of motion  Psychiatric: No hallucinatory behavior. No symptoms of depression or suicidal ideation. Mood and affect appear normal on exam.     NEUROLOGICAL EXAM:   Mental status, orientation: Awake, alert and fully oriented.   Speech and language: speech is clear and fluent. The patient is able to name, repeat and comprehend.   Memory: There is intact recollection of recent and remote events.   Motor exam: Strength is 5/5 in all extremities. Tone is normal. No abnormal movements were seen on exam.   Sensory exam reveals normal sense of light touch in all extremities.       ANCILLARY DATA REVIEWED:       Lab Data Review:  Reviewed in chart.     Records reviewed:   Reviewed in chart.    Imaging:   CT head 4/25/2013  1. No acute intracranial abnormality.  2. Frontal scalp hematoma.     EEG:  EEG April 2008  IMPRESSION:  Ambulatory electroencephalogram recording is abnormalwith the appearance of rare generalized sharp activity and left temporal sharp wave activity.  Both noted during sleep only.  No definitive epileptogenic discharges are noted, but clinical correlation is warranted for possible focal and generalized  "seizure disorder.  Again, no definite epileptogenic discharges are noted.     VEEG July 2014  IMPRESSION:  This is an abnormal electroencephalogram  due to the presence of frequent epileptiform potentials in drowsiness and exacerbated by hyperventilation.  No clinical seizures noted.     Routine EEG 10/15/2019  INTERPRETATION:  This is an abnormal routine EEG recording in the awake, drowsy,   and sleep state(s). There is slowing in the left frontotemporal   region, as well intermittent and brief runs of rhythmic delta /   theta activity were captured in this region. The findings suggest   underlying area of cortical irritability and structural   abnormality. The patient is at increased risk for seizures,   however no seizures captured during this study.  Clinical and   radiological correlation is recommended.        ASSESSMENT AND PLAN:    1. Localization-related epilepsy (HCC)  - divalproex (DEPAKOTE) 500 MG Tablet Delayed Response; Take 1 tablet by mouth 2 times a day.  Dispense: 60 tablet; Refill: 6  - levETIRAcetam (KEPPRA) 500 MG Tab; Take 3 Tablets by mouth 2 times a day.  Dispense: 180 tablet; Refill: 6    2. Screening for depression    3. Encounter for female family planning counseling          CLINICAL DISCUSSION:  Phamacoresistant epilepsy. Hx of febrile seizure in infancy. Has long history of seizures in childhood and spells were occurring monthly since she was 7 years old. She has up to 6 spells around her menses. Catamenial? The convulsion type spell are infrequent since she was started on ASM but the \"mini spells\" of lip smacking, eyes rolling, fists clenching, drop attacks and passing out are very frequent. Her previous EEG's were abnormal. CT head was unremarkable. No MRI brain. Her last convulsive spell was in 2018. Her recent routine EEG continues to be abnormal with slowing in the left frontotemporal region, as well intermittent and brief runs of rhythmic delta / theta activity were captured in " this region. They do want further work-up d/t financial reasons. They also don't want further medication adjustment. Discussed VNS with them and they are now ready to get this placed as they have done their research on this. Pt understands limitations once she has VNS placed, including limitation on getting MRI's. They also understand possible complications of the procedure including infections and possible side effects to include changes in voice, dizziness, pain, cough and others. She had 6 seizures since last visit and the last one was on 21.      Apparently, pt has tried taking a different ASM in the which gave her a side effect of hyperactivity. I saw Cat's note in  that she was planning on weaning pt off the depakote and switch to Vimpat. However, she transitioned care to Dr. Bloch and there was no documentation about what happened to med adjustment. Pt does not remember taking the Vimpat.      No family hx of epilepsy. No TBI hx.      Not drinking alcohol. Not smoking cigarettes. No recreational drug use.     Mood is good. No suicidal or homicidal thoughts.      C/o memory loss likely related to her seizures and ASM side effects.     Pt does not plan on conceiving as of now. Discussed the importance of dual contraception preferably using copper IUD and condom for the partner as ASM's can decrease the effectiveness of OCP's. She is aware of the risk of pregnancy complications while taking ASM's which include congenital defects, abnormal fetal growth, , etc. She is not currently sexually active and not on birth control.  Continues to refuse referral to GYN and take folic acid.     Past ASMs: unknown, Vimpat?     Current ASMs: Depakote 500 mg BID, Keppra 500 mg, 3 tabs in am and 3 tabs qhs. No side effects. Compliant        Plan:  -Continue ASMs.    -Application for VNS done today.      - Discussed avoidance of spell/sz triggers: alcohol, sleep deprivation, and stress.     - Discussed Vit D  supplementation.  Recommended 2000-5000u daily.      - Discussed driving restrictions. Pt does not drive.      -Labs to be checked for next appointment: none        FOLLOW-UP:   Return in about 2 months (around 2021), or or after VNS.      EDUCATION AND COUNSELING:  -Education was provided to the patient and/or family regarding diagnosis and prognosis. The chronic and unpredictable nature of the condition were discussed. There is increased risk for additional events, which may carry potential for significant injuries and death. Discussed frequent seizure triggers: sleep deprivation, medication non-compliance, use of illegal drugs/alcohol, stress, and others.   -We reviewed in detail the current antiepileptic regimen. Potential side effects of antiepileptics were discussed at length, including but no limited to: hypersensitivity reactions (rash and others, some of which can be fatal), visual field changes (some of which may be irreversible), glaucoma, diplopia, kidney stones, osteopenia/osteoporosis/bone fractures, hyperthermia/anhydrosis, hyponatremia, tremors/abnormal movements, ataxia, dizziness, fatigue, increased risk for falls, risk for cardiac arrhythmias/syncope, gastrointestinal side effects(hepatitis, pancreatitis, gastritis, ulcers), gingival hypertrophy/bleeding, drowsiness, sedation, anxiety/nervousness, increased risk for suicide, increased risk for depression, and psychosis.   -We also reviewed drug-drug interactions and their potential effect on seizure control and medication side effects.    -We also discussed in detail potential effects of seizures, epilepsy, and medications during pregnancy, including but not limited to fetal malformations, child developmental/intellectual disability, fetal/ risk for hemorrhages, stillbirth, maternal death, premature birth, and others. The patient/family aware that pregnancy should be avoided, unless desired, in which case we recommend discussing with  us at least a year prior to planned conception. To avoid undesired pregnancy while on antiepileptics, we recommend dual contraception.   -Folic acid 2 mg is recommended for all females in childbearing age (12-44 years of age).   -Recommend chronic vitamin D supplementation and regular exercise (if not contraindicated).   -Patient/family educated on risk for SUDEP (Sudden Death in Epilepsy). Counseling was provided on the importance of strict medication and follow up compliance. The patient/family understand the risks associated with non-adherence with the medical plan as outlined, including but not limited to an increased risk for breakthrough seizures, which may contribute to injuries, disability, status epilepticus, and even death.   -Counseling was also provided on potential effects of alcohol and other drugs, which may lower seizure threshold and/or affect the metabolism of antiepileptic drugs. We recommend avoidance of alcohol and illegal drugs.  -Avoid sleep deprivation.   -We extensively discussed the aspects related to safety in drivers who suffer from epilepsy. The patient is encourage to report to the Division of Motor Vehicles of any condition and/or spells related to confusion, disorientation, and/or loss of awareness and/or loss of consciousness; as these may pose a safety issue if they occur while operating a motor vehicle. The patient and/or family are ultimately responsible for exercising caution and abiding to regulations in place.   -Other seizure precautions were discussed at length, including no diving, no skydiving, no climbing or exposure to unprotected heights, no unsupervised swimming, no Jacuzzi or bathing in bathtubs or deep bodies of water. The patient/family have been advised about risks for operating any machinery while suffering from seizures / syncope / epilepsy and/or while taking antiepileptic drugs.   -The patient understands and agrees that due to the complexity of his/her  diagnosis, results of any testing and further recommendations will typically be discussed/made during a face to face encounter in my office. The patient and/or family further understands it is their responsibility to keep proper follow up.     Patient/family agree with plan, as outlined.         Nakia Gerber, MSN, APRN, FNP-C  [facility]  Office: [phone]  Fax: [phone]    BILLING DOCUMENTATION:     Total time spent including chart review before and after visit was 36min. Over 50% of the time of the visit today was spent on counseling and or coordination of care wtih the patient and/or family, with greater than 50% of the total discussing my assessment and plan as stated above.

## 2021-05-11 ENCOUNTER — OFFICE VISIT (OUTPATIENT)
Dept: NEUROLOGY | Facility: MEDICAL CENTER | Age: 36
End: 2021-05-11
Attending: NURSE PRACTITIONER
Payer: COMMERCIAL

## 2021-05-11 VITALS
BODY MASS INDEX: 21.09 KG/M2 | HEART RATE: 84 BPM | WEIGHT: 119.05 LBS | DIASTOLIC BLOOD PRESSURE: 68 MMHG | OXYGEN SATURATION: 96 % | TEMPERATURE: 95.5 F | SYSTOLIC BLOOD PRESSURE: 100 MMHG

## 2021-05-11 DIAGNOSIS — Z30.09 ENCOUNTER FOR FEMALE FAMILY PLANNING COUNSELING: ICD-10-CM

## 2021-05-11 DIAGNOSIS — G40.109 LOCALIZATION-RELATED EPILEPSY (HCC): ICD-10-CM

## 2021-05-11 DIAGNOSIS — Z13.31 SCREENING FOR DEPRESSION: ICD-10-CM

## 2021-05-11 PROCEDURE — 99214 OFFICE O/P EST MOD 30 MIN: CPT | Performed by: NURSE PRACTITIONER

## 2021-05-11 PROCEDURE — 99212 OFFICE O/P EST SF 10 MIN: CPT | Performed by: NURSE PRACTITIONER

## 2021-05-11 RX ORDER — DIVALPROEX SODIUM 500 MG/1
500 TABLET, DELAYED RELEASE ORAL 2 TIMES DAILY
Qty: 60 TABLET | Refills: 6 | Status: SHIPPED | OUTPATIENT
Start: 2021-05-11 | End: 2021-12-22 | Stop reason: SDUPTHER

## 2021-05-11 RX ORDER — LEVETIRACETAM 500 MG/1
1500 TABLET ORAL 2 TIMES DAILY
Qty: 180 TABLET | Refills: 6 | Status: SHIPPED | OUTPATIENT
Start: 2021-05-11 | End: 2021-12-22 | Stop reason: SDUPTHER

## 2021-05-11 ASSESSMENT — FIBROSIS 4 INDEX: FIB4 SCORE: 0.53

## 2021-07-08 ENCOUNTER — OFFICE VISIT (OUTPATIENT)
Dept: MEDICAL GROUP | Facility: MEDICAL CENTER | Age: 36
End: 2021-07-08
Attending: FAMILY MEDICINE
Payer: COMMERCIAL

## 2021-07-08 VITALS
BODY MASS INDEX: 22.98 KG/M2 | RESPIRATION RATE: 18 BRPM | DIASTOLIC BLOOD PRESSURE: 62 MMHG | TEMPERATURE: 98 F | OXYGEN SATURATION: 95 % | WEIGHT: 121.7 LBS | HEIGHT: 61 IN | HEART RATE: 72 BPM | SYSTOLIC BLOOD PRESSURE: 112 MMHG

## 2021-07-08 DIAGNOSIS — R87.612 LGSIL ON PAP SMEAR OF CERVIX: ICD-10-CM

## 2021-07-08 DIAGNOSIS — Z00.00 HEALTHCARE MAINTENANCE: ICD-10-CM

## 2021-07-08 DIAGNOSIS — R14.0 ABDOMINAL BLOATING: ICD-10-CM

## 2021-07-08 PROBLEM — F41.9 ANXIETY AND DEPRESSION: Status: RESOLVED | Noted: 2018-02-27 | Resolved: 2021-07-08

## 2021-07-08 PROBLEM — R79.89 LFT ELEVATION: Status: RESOLVED | Noted: 2019-09-28 | Resolved: 2021-07-08

## 2021-07-08 PROBLEM — F32.A ANXIETY AND DEPRESSION: Status: RESOLVED | Noted: 2018-02-27 | Resolved: 2021-07-08

## 2021-07-08 PROCEDURE — 99213 OFFICE O/P EST LOW 20 MIN: CPT | Performed by: FAMILY MEDICINE

## 2021-07-08 ASSESSMENT — PATIENT HEALTH QUESTIONNAIRE - PHQ9: CLINICAL INTERPRETATION OF PHQ2 SCORE: 0

## 2021-07-08 ASSESSMENT — FIBROSIS 4 INDEX: FIB4 SCORE: 0.54

## 2021-07-08 NOTE — PATIENT INSTRUCTIONS
Llame a [phone] para seguir de la papaniculao     Jessica mason supplementa de fibra, se llama Metamucil o Benefiber

## 2021-07-08 NOTE — ASSESSMENT & PLAN NOTE
"Since cholecystectomy, ERCP in 10/2019, her abdomen hasn't been the same.   She did not change her diet after her surgery.   Reports that abdomen feels \"inflamed\", \"larger abdomen\", swollen and constipated, significant constipation, poor appetite.  Drinks 3-4 bottles of water, 500mL each.   BMs - twice a day on a good day. When constipated q2-3 days, sometimes is very hard, sometimes with significant straining.   "

## 2021-07-08 NOTE — ASSESSMENT & PLAN NOTE
Patient had abnormal pap smear 2 years ago. This was communicated to patient's brother Latrell, but patient doesn't think that he had told her. Did not get follow up with gynecology at that time. Pt is unaware that she had an abnormal pap smear

## 2021-07-08 NOTE — PROGRESS NOTES
"Subjective:     CC:    Chief Complaint   Patient presents with   • Establish Care     bloated stomach and dark spot on face       HISTORY OF THE PRESENT ILLNESS: Patient is a 36 y.o. female. This pleasant patient is here today to establish care and discuss the following issues.   His/her prior PCP was Yvonne Davidson in 2018, did see another provider in 2019     Specialists -   Neurology - [facility] neurology     Epilepsy ([facility])  Sees [facility] neurology regularly  Having breakthrough sz    LGSIL on Pap smear of cervix  Patient had abnormal pap smear 2 years ago. This was communicated to patient's brother Latrell, but patient doesn't think that he had told her. Did not get follow up with gynecology at that time. Pt is unaware that she had an abnormal pap smear     Abdominal bloating  Since cholecystectomy, ERCP in 10/2019, her abdomen hasn't been the same.   She did not change her diet after her surgery.   Reports that abdomen feels \"inflamed\", \"larger abdomen\", swollen and constipated, significant constipation, poor appetite.  Drinks 3-4 bottles of water, 500mL each.   BMs - twice a day on a good day. When constipated q2-3 days, sometimes is very hard, sometimes with significant straining.       Allergies: Nkda [no known drug allergy]    Current Outpatient Medications Ordered in Epic   Medication Sig Dispense Refill   • VITAMIN D PO Take  by mouth.     • divalproex (DEPAKOTE) 500 MG Tablet Delayed Response Take 1 tablet by mouth 2 times a day. 60 tablet 6   • levETIRAcetam (KEPPRA) 500 MG Tab Take 3 Tablets by mouth 2 times a day. 180 tablet 6     No current Epic-ordered facility-administered medications on file.       Past Medical History:   Diagnosis Date   • DUB (dysfunctional uterine bleeding)    • Epilepsy ([facility]) 11/4/2009    since infant.  sees Wilfrido/Codey at [facility].  Keppra/depakote.  absence siezures 1x/month-thinks iwth menstruation.   • GERD (gastroesophageal reflux disease)     gastritis-only with spicy foods " "  • Seizure disorder (HCC)        Past Surgical History:   Procedure Laterality Date   • ERCP  9/30/2019    Procedure: ERCP (ENDOSCOPIC RETROGRADE CHOLANGIOPANCREATOGRAPHY);  Surgeon: Kaushal Lopes M.D.;  Location: [facility];  Service: Gastroenterology   • DEONDRE BY LAPAROSCOPY N/A 4/8/2018    Procedure: DEONDRE BY LAPAROSCOPY;  Surgeon: Chava Busby M.D.;  Location: [facility];  Service: General   • ERCP  4/6/2018    Procedure: ERCP;  Surgeon: Osiel Paige M.D.;  Location: [facility];  Service: Gastroenterology       Social History     Tobacco Use   • Smoking status: Never Smoker   • Smokeless tobacco: Never Used   Vaping Use   • Vaping Use: Never used   Substance Use Topics   • Alcohol use: No   • Drug use: No       Social History     Social History Narrative   • Not on file       Family History   Problem Relation Age of Onset   • Cancer Neg Hx    • Diabetes Neg Hx    • Stroke Neg Hx        ROS:   Pulm: no sob, no cough  CV: no chest pain, no lower extremity edema  GI: Abdominal symptoms per HPI      Objective:     Exam: /62 (BP Location: Left arm, Patient Position: Sitting, BP Cuff Size: Adult)   Pulse 72   Temp 36.7 °C (98 °F) (Temporal)   Resp 18   Ht 1.549 m (5' 1\")   Wt 55.2 kg (121 lb 11.2 oz)   SpO2 95%  Body mass index is 23 kg/m².    General: Normal appearing. No distress.  Head: normocephalic  Neck: Supple. Thyroid is not enlarged.  Pulmonary: Clear to ausculation.  Normal effort. No rales, ronchi, or wheezing.  Cardiovascular: Regular rate and rhythm without murmur. Radial pulses are intact and equal bilaterally.  Abdomen: Soft, nondistended. Normal bowel sounds.  Mild left upper quadrant/epigastric tenderness  Neurologic: no facial droop, gait normal  Lymph: No cervical or supraclavicular lymph nodes are palpable  Skin: Warm and dry.  No obvious lesions.  Musculoskeletal: Normal gait. No extremity cyanosis, clubbing, or " edema.  Psych: Normal mood and affect. Alert and oriented x3. Judgment and insight is normal.      Labs:   CBC, CMP done September 2020, normal    Assessment & Plan:   36 y.o. female with the following -    1. Healthcare maintenance  - HEMOGLOBIN A1C; Future  - Lipid Profile; Future  Screen for diabetes, cholesterol    2. Abdominal bloating  Counseled patient on postcholecystectomy diet.  Advised trying a low-fat diet to see if this will help with her bloating.  She is also chronically constipated, trial of Metamucil or Benefiber.  Also advised her to increase her water intake.    3. LGSIL on Pap smear of cervix  Patient had abnormal Pap smear in 2019, lost to follow-up.  Advised her to get in touch with access to health care to do follow-up on the Pap smear      Return in about 2 months (around 9/8/2021).    Please note that this dictation was created using voice recognition software. I have made every reasonable attempt to correct obvious errors, but I expect that there are errors of grammar and possibly content that I did not discover before finalizing the note.

## 2021-07-29 ENCOUNTER — HOSPITAL ENCOUNTER (OUTPATIENT)
Facility: MEDICAL CENTER | Age: 36
End: 2021-07-29
Attending: OTOLARYNGOLOGY | Admitting: OTOLARYNGOLOGY
Payer: COMMERCIAL

## 2021-08-02 DIAGNOSIS — G40.009 PARTIAL IDIOPATHIC EPILEPSY WITH SEIZURES OF LOCALIZED ONSET, NOT INTRACTABLE, WITHOUT STATUS EPILEPTICUS (HCC): ICD-10-CM

## 2021-08-03 ENCOUNTER — TELEPHONE (OUTPATIENT)
Dept: MEDICAL GROUP | Facility: MEDICAL CENTER | Age: 36
End: 2021-08-03

## 2021-08-03 NOTE — TELEPHONE ENCOUNTER
.Phone Number Called: [phone] (home)       Call outcome: Spoke to patient regarding message below.    Message: spoke to patient , patient mention she does not know why she has a referral for Otolaryngology, patient stated she does not need it due to she is  already established with someone for her epilepsy with seizures.

## 2021-08-05 ENCOUNTER — TELEPHONE (OUTPATIENT)
Dept: MEDICAL GROUP | Facility: MEDICAL CENTER | Age: 36
End: 2021-08-05

## 2021-08-06 NOTE — TELEPHONE ENCOUNTER
VOICEMAIL  1. Caller Name: Umu Mcguire                        Call Back Number: [phone] (home)       2. Message: lvm to call back  to explain patient the ENT referral was for her surgery she was about to have and Insurance needed the referral to come from me rather than her neurologist.     3. Patient approves office to leave a detailed voicemail/MyChart message: yes

## 2021-08-10 ENCOUNTER — APPOINTMENT (OUTPATIENT)
Dept: ADMISSIONS | Facility: MEDICAL CENTER | Age: 36
End: 2021-08-10
Payer: COMMERCIAL

## 2021-08-23 ENCOUNTER — APPOINTMENT (OUTPATIENT)
Dept: ADMISSIONS | Facility: MEDICAL CENTER | Age: 36
End: 2021-08-23
Payer: COMMERCIAL

## 2021-08-27 ENCOUNTER — TELEPHONE (OUTPATIENT)
Dept: NEUROLOGY | Facility: MEDICAL CENTER | Age: 36
End: 2021-08-27

## 2021-08-27 NOTE — TELEPHONE ENCOUNTER
I called pt to schedule a follow up for her to see dami Yee's last follow up was 05/2021. I lvm to return my call.    Angela

## 2021-08-30 ENCOUNTER — TELEPHONE (OUTPATIENT)
Dept: NEUROLOGY | Facility: MEDICAL CENTER | Age: 36
End: 2021-08-30

## 2021-08-30 NOTE — TELEPHONE ENCOUNTER
Pt left VM stating that she was unable to  her phone before, but that she is now able to and would like a call back. I called her to schedule her follow-up appt with Nakia. Pt said she was unable to get VNS due to finances. I offered 9/14 appt but pt was unable to make morning time. Next available one was 9/29 at 8:40am. Pt agreed to this appt.

## 2021-09-15 NOTE — PROGRESS NOTES
Chief Complaint   Patient presents with   • Follow-Up     Localization-related epilepsy        Problem List Items Addressed This Visit     None      Visit Diagnoses     Localization-related epilepsy (HCC)        Relevant Medications    Cenobamate (XCOPRI) 14 x 12.5 MG & 14 x 25 MG Tablet Therapy Pack    Cenobamate (XCOPRI) 14 x 50 MG & 14 x100 MG Tablet Therapy Pack    Other Relevant Orders    CBC WITH DIFFERENTIAL    Comp Metabolic Panel    AMMONIA    Screening for depression        Encounter for female family planning counseling        Anxiety and depression        Vitamin D deficiency        Relevant Orders    VITAMIN D,25 HYDROXY          Interim History:  Umu Mcguire 36 y.o. female presents today with mom for seizure f/u.    Pt continues to have seizures up to 9x a month. No convulsions but more of passing out for seconds and confused for minutes afterwards. No tongue biting or incontinence. Last one was on 9/16/21. Still taking vit D. She is compliant with depakote and keppra. No side effects but her memory is really no good anymore per mom. She feels depressed but no SI or HI. Not sexually active. VNS was a no go as they had to pay $3000+ for it. No other issues.     Past medical history:   Past Medical History:   Diagnosis Date   • DUB (dysfunctional uterine bleeding)    • Epilepsy (HCC) 11/4/2009    since infant.  sees Wilfrido/Codey at [facility].  Keppra/depakote.  absence siezures 1x/month-thinks iwth menstruation.   • GERD (gastroesophageal reflux disease)     gastritis-only with spicy foods   • Seizure disorder (HCC)        Past surgical history:   Past Surgical History:   Procedure Laterality Date   • ERCP  9/30/2019    Procedure: ERCP (ENDOSCOPIC RETROGRADE CHOLANGIOPANCREATOGRAPHY);  Surgeon: Kaushal Lopes M.D.;  Location: SURGERY [facility];  Service: Gastroenterology   • DEONDRE BY LAPAROSCOPY N/A 4/8/2018    Procedure: DENODRE BY LAPAROSCOPY;  Surgeon: Chava Busby M.D.;   Location: SURGERY [facility];  Service: General   • ERCP  4/6/2018    Procedure: ERCP;  Surgeon: Osiel Paige M.D.;  Location: SURGERY [facility];  Service: Gastroenterology       Family history:   Family History   Problem Relation Age of Onset   • Cancer Neg Hx    • Diabetes Neg Hx    • Stroke Neg Hx        Social history:   Social History     Socioeconomic History   • Marital status: Single     Spouse name: Not on file   • Number of children: Not on file   • Years of education: Not on file   • Highest education level: Not on file   Occupational History   • Not on file   Tobacco Use   • Smoking status: Never Smoker   • Smokeless tobacco: Never Used   Vaping Use   • Vaping Use: Never used   Substance and Sexual Activity   • Alcohol use: No   • Drug use: No   • Sexual activity: Not Currently     Comment: virginal   Other Topics Concern   • Not on file   Social History Narrative   • Not on file     Social Determinants of Health     Financial Resource Strain:    • Difficulty of Paying Living Expenses:    Food Insecurity:    • Worried About Running Out of Food in the Last Year:    • Ran Out of Food in the Last Year:    Transportation Needs:    • Lack of Transportation (Medical):    • Lack of Transportation (Non-Medical):    Physical Activity:    • Days of Exercise per Week:    • Minutes of Exercise per Session:    Stress:    • Feeling of Stress :    Social Connections:    • Frequency of Communication with Friends and Family:    • Frequency of Social Gatherings with Friends and Family:    • Attends Bahai Services:    • Active Member of Clubs or Organizations:    • Attends Club or Organization Meetings:    • Marital Status:    Intimate Partner Violence:    • Fear of Current or Ex-Partner:    • Emotionally Abused:    • Physically Abused:    • Sexually Abused:        Current medications:   Current Outpatient Medications   Medication   • VITAMIN D PO   • divalproex (DEPAKOTE) 500 MG Tablet Delayed  Response   • levETIRAcetam (KEPPRA) 500 MG Tab     No current facility-administered medications for this visit.       Medication Allergy:  Allergies   Allergen Reactions   • Nkda [No Known Drug Allergy]          Review of systems:     General: Denies fevers or chills, or nightsweats, or generalized fatigue.    Head: Denies headaches or dizziness or lightheadedness  Dermatologic:  Denies new rash  Musculoskeletal: Denies muscle pain or swelling, no atrophy, no neck and back pain or stiffness.   Neurologic: Denies facial droopiness, muscle weakness (focal or generalized), paresthesias, ataxia, change in speech or language, memory loss, abnormal movements. +seizures, loss of consciousness  Psychiatric: Denies anxiety,  mood swings, suicidal or homicidal thoughts. +depression       Physical examination:   Vitals:    09/29/21 0833   BP: 110/68   BP Location: Left arm   Patient Position: Sitting   BP Cuff Size: Adult   Pulse: (!) 58   Temp: 37.1 °C (98.7 °F)   TempSrc: Temporal   SpO2: 97%   Weight: 55.2 kg (121 lb 11.1 oz)     General: Patient in no acute distress, pleasant and cooperative.  HEENT: Normocephalic, no signs of acute trauma.   Neck: Supple. There is normal range of motion.    Skin: no signs of acute rashes or trauma.   Musculoskeletal: joints exhibit full range of motion  Psychiatric: No hallucinatory behavior. No symptoms of depression or suicidal ideation. Mood and affect appear normal on exam.     NEUROLOGICAL EXAM:   Mental status, orientation: Awake, alert and fully oriented.   Speech and language: speech is clear and fluent. The patient is able to name, repeat and comprehend.   Memory: There is intact recollection of recent and remote events.   Motor exam: Strength is 5/5 in all extremities. Tone is normal. No abnormal movements were seen on exam.   Sensory exam reveals normal sense of light touch in all extremities.   Gait: The patient was able to get up from seated position on first attempt without  requiring assistance. Found to be steady when walking. Movements were fluid with normal arm swing.       ANCILLARY DATA REVIEWED:       Lab Data Review:  Reviewed in chart.     Records reviewed:   Reviewed in chart.    Imaging:   CT head 4/25/2013  1. No acute intracranial abnormality.  2. Frontal scalp hematoma.     EEG:  EEG April 2008  IMPRESSION:  Ambulatory electroencephalogram recording is abnormalwith the appearance of rare generalized sharp activity and left temporal sharp wave activity.  Both noted during sleep only.  No definitive epileptogenic discharges are noted, but clinical correlation is warranted for possible focal and generalized seizure disorder.  Again, no definite epileptogenic discharges are noted.     VEEG July 2014  IMPRESSION:  This is an abnormal electroencephalogram  due to the presence of frequent epileptiform potentials in drowsiness and exacerbated by hyperventilation.  No clinical seizures noted.     Routine EEG 10/15/2019  INTERPRETATION:  This is an abnormal routine EEG recording in the awake, drowsy,   and sleep state(s). There is slowing in the left frontotemporal   region, as well intermittent and brief runs of rhythmic delta /   theta activity were captured in this region. The findings suggest   underlying area of cortical irritability and structural   abnormality. The patient is at increased risk for seizures,   however no seizures captured during this study.  Clinical and   radiological correlation is recommended.      ASSESSMENT AND PLAN:  1. Localization-related epilepsy (HCC)  - CBC WITH DIFFERENTIAL; Future  - Comp Metabolic Panel; Future  - AMMONIA; Future  - Cenobamate (XCOPRI) 14 x 50 MG & 14 x100 MG Tablet Therapy Pack; Take 50 mg by mouth at bedtime for 14 days, THEN 100 mg at bedtime for 14 days.  Dispense: 28 Each; Refill: 0  - Cenobamate (XCOPRI) 14 x 12.5 MG & 14 x 25 MG Tablet Therapy Pack; Take 12.5 mg by mouth at bedtime for 14 days, THEN 25 mg at bedtime for 14  "days.  Dispense: 28 Each; Refill: 0    2. Screening for depression    3. Encounter for female family planning counseling    4. Anxiety and depression    5. Vitamin D deficiency  - VITAMIN D,25 HYDROXY; Future          CLINICAL DISCUSSION:  Phamacoresistant epilepsy. Hx of febrile seizure in infancy. Has long history of seizures in childhood and spells were occurring monthly since she was 7 years old. Catamenial in nature. The convulsion type spell are infrequent since she was started on ASM but the \"mini spells\" of lip smacking, eyes rolling, fists clenching, drop attacks and passing out are very frequent. Her previous EEG's were abnormal. CT head was unremarkable. No MRI brain. Her last convulsive spell was in 2018. Her recent routine EEG continues to be abnormal with slowing in the left frontotemporal region, as well intermittent and brief runs of rhythmic delta / theta activity were captured in this region. They do want further work-up d/t financial reasons. They cancelled the VNS placement as they cannot afford it. She continues to have passing out seizures 9x a month. Last one was on 9/16/21. We have discussed SUDEP risk again.      Apparently, pt has tried taking a different ASM in the which gave her a side effect of hyperactivity. I saw Cat's note in 2014 that she was planning on weaning pt off the depakote and switch to Vimpat. However, she transitioned care to Dr. Bloch and there was no documentation about what happened to med adjustment. Pt does not remember taking the Vimpat.      No family hx of epilepsy. No TBI hx.      Not drinking alcohol. Not smoking cigarettes. No recreational drug use.     She is feeling depressed recently. No suicidal or homicidal thoughts.      C/o memory loss likely related to her seizures and ASM side effects.     Pt does not plan on conceiving as of now. Discussed the importance of dual contraception preferably using copper IUD and condom for the partner as ASM's can " decrease the effectiveness of OCP's. She is aware of the risk of pregnancy complications while taking ASM's which include congenital defects, abnormal fetal growth, , etc. She is not currently sexually active and not on birth control.  Continues to refuse referral to GYN and take folic acid.     Past ASMs: unknown, Vimpat?     Current ASMs: Depakote 500 mg BID, Keppra 500 mg, 3 tabs in am and 3 tabs qhs.         Plan:  -Continue Keppra and depakote. They agreed on trial of xcopri this time. Aware of side effects including dizziness, drowsiness, fatigue and DRESS. Aware to watch out for rash and to stick with every 2 weeks of titration to minimize side effects.     Xcopri titration: 12.5mg daily at bedtime for 2 weeks, then 25mg daily at bedtime for another 2 weeks, then 50mg daily  at bedtime for 2 weeks then 100mg daily  at bedtime thereafter.      - Discussed avoidance of spell/sz triggers: alcohol, sleep deprivation, and stress.     - Discussed Vit D supplementation.  Recommended 2000-5000u daily.      - Discussed driving restrictions. Pt does not drive.      -Labs to be checked for next appointment: CBC, CMP, Vit D, ammonia in 2 months.         FOLLOW-UP:   Return in about 2 months (around 2021).      EDUCATION AND COUNSELING:  -Education was provided to the patient and/or family regarding diagnosis and prognosis. The chronic and unpredictable nature of the condition were discussed. There is increased risk for additional events, which may carry potential for significant injuries and death. Discussed frequent seizure triggers: sleep deprivation, medication non-compliance, use of illegal drugs/alcohol, stress, and others.   -We reviewed in detail the current antiepileptic regimen. Potential side effects of antiepileptics were discussed at length, including but no limited to: hypersensitivity reactions (rash and others, some of which can be fatal), visual field changes (some of which may be  irreversible), glaucoma, diplopia, kidney stones, osteopenia/osteoporosis/bone fractures, hyperthermia/anhydrosis, hyponatremia, tremors/abnormal movements, ataxia, dizziness, fatigue, increased risk for falls, risk for cardiac arrhythmias/syncope, gastrointestinal side effects(hepatitis, pancreatitis, gastritis, ulcers), gingival hypertrophy/bleeding, drowsiness, sedation, anxiety/nervousness, increased risk for suicide, increased risk for depression, and psychosis.   -We also reviewed drug-drug interactions and their potential effect on seizure control and medication side effects.    -We also discussed in detail potential effects of seizures, epilepsy, and medications during pregnancy, including but not limited to fetal malformations, child developmental/intellectual disability, fetal/ risk for hemorrhages, stillbirth, maternal death, premature birth, and others. The patient/family aware that pregnancy should be avoided, unless desired, in which case we recommend discussing with us at least a year prior to planned conception. To avoid undesired pregnancy while on antiepileptics, we recommend dual contraception.   -Folic acid 2 mg is recommended for all females in childbearing age (12-44 years of age).   -Recommend chronic vitamin D supplementation and regular exercise (if not contraindicated).   -Patient/family educated on risk for SUDEP (Sudden Death in Epilepsy). Counseling was provided on the importance of strict medication and follow up compliance. The patient/family understand the risks associated with non-adherence with the medical plan as outlined, including but not limited to an increased risk for breakthrough seizures, which may contribute to injuries, disability, status epilepticus, and even death.   -Counseling was also provided on potential effects of alcohol and other drugs, which may lower seizure threshold and/or affect the metabolism of antiepileptic drugs. We recommend avoidance of alcohol  and illegal drugs.  -Avoid sleep deprivation.   -We extensively discussed the aspects related to safety in drivers who suffer from epilepsy. The patient is encourage to report to the Division of Motor Vehicles of any condition and/or spells related to confusion, disorientation, and/or loss of awareness and/or loss of consciousness; as these may pose a safety issue if they occur while operating a motor vehicle. The patient and/or family are ultimately responsible for exercising caution and abiding to regulations in place.   -Other seizure precautions were discussed at length, including no diving, no skydiving, no climbing or exposure to unprotected heights, no unsupervised swimming, no Jacuzzi or bathing in bathtubs or deep bodies of water. The patient/family have been advised about risks for operating any machinery while suffering from seizures / syncope / epilepsy and/or while taking antiepileptic drugs.   -The patient understands and agrees that due to the complexity of his/her diagnosis, results of any testing and further recommendations will typically be discussed/made during a face to face encounter in my office. The patient and/or family further understands it is their responsibility to keep proper follow up.     Patient/family agree with plan, as outlined.         Nakia Gerber, MSN, APRN, FNP-C  [facility]  Office: [phone]  Fax: [phone]    BILLING DOCUMENTATION:   Total time spent including chart review before and after visit was 41min. Over 50% of the time of the visit today was spent on counseling and or coordination of care wtih the patient and/or family, with greater than 50% of the total discussing my assessment and plan as stated above. This time did not include VNS programming, which was in addition to the total visit time.

## 2021-09-29 ENCOUNTER — OFFICE VISIT (OUTPATIENT)
Dept: NEUROLOGY | Facility: MEDICAL CENTER | Age: 36
End: 2021-09-29
Attending: NURSE PRACTITIONER
Payer: COMMERCIAL

## 2021-09-29 VITALS
WEIGHT: 121.69 LBS | SYSTOLIC BLOOD PRESSURE: 110 MMHG | OXYGEN SATURATION: 97 % | DIASTOLIC BLOOD PRESSURE: 68 MMHG | TEMPERATURE: 98.7 F | BODY MASS INDEX: 22.99 KG/M2 | HEART RATE: 58 BPM

## 2021-09-29 DIAGNOSIS — E55.9 VITAMIN D DEFICIENCY: ICD-10-CM

## 2021-09-29 DIAGNOSIS — Z13.31 SCREENING FOR DEPRESSION: ICD-10-CM

## 2021-09-29 DIAGNOSIS — Z30.09 ENCOUNTER FOR FEMALE FAMILY PLANNING COUNSELING: ICD-10-CM

## 2021-09-29 DIAGNOSIS — F41.9 ANXIETY AND DEPRESSION: ICD-10-CM

## 2021-09-29 DIAGNOSIS — F32.A ANXIETY AND DEPRESSION: ICD-10-CM

## 2021-09-29 DIAGNOSIS — G40.109 LOCALIZATION-RELATED EPILEPSY (HCC): ICD-10-CM

## 2021-09-29 PROCEDURE — 99211 OFF/OP EST MAY X REQ PHY/QHP: CPT | Performed by: NURSE PRACTITIONER

## 2021-09-29 PROCEDURE — 99215 OFFICE O/P EST HI 40 MIN: CPT | Performed by: NURSE PRACTITIONER

## 2021-09-29 RX ORDER — CENOBAMATE 50MG-100MG
KIT ORAL
Qty: 28 EACH | Refills: 0 | Status: SHIPPED | OUTPATIENT
Start: 2021-09-29 | End: 2021-09-29 | Stop reason: SDUPTHER

## 2021-09-29 RX ORDER — CENOBAMATE 12.5-25MG
KIT ORAL
Qty: 28 EACH | Refills: 0 | Status: SHIPPED | OUTPATIENT
Start: 2021-09-29 | End: 2021-10-05 | Stop reason: SDUPTHER

## 2021-09-29 RX ORDER — CENOBAMATE 50MG-100MG
KIT ORAL
Qty: 28 EACH | Refills: 0 | Status: SHIPPED | OUTPATIENT
Start: 2021-09-29 | End: 2021-10-05 | Stop reason: SDUPTHER

## 2021-09-29 RX ORDER — CENOBAMATE 12.5-25MG
KIT ORAL
Qty: 28 EACH | Refills: 0 | Status: SHIPPED | OUTPATIENT
Start: 2021-09-29 | End: 2021-09-29 | Stop reason: SDUPTHER

## 2021-09-29 ASSESSMENT — FIBROSIS 4 INDEX: FIB4 SCORE: 0.54

## 2021-09-29 NOTE — PATIENT INSTRUCTIONS
Xcopri titration: 12.5mg daily at bedtime for 2 weeks, then 25mg daily at bedtime for another 2 weeks, then 50mg daily  at bedtime for 2 weeks then 100mg daily  at bedtime thereafter

## 2021-10-05 DIAGNOSIS — G40.109 LOCALIZATION-RELATED EPILEPSY (HCC): ICD-10-CM

## 2021-10-05 RX ORDER — CENOBAMATE 50MG-100MG
KIT ORAL
Qty: 28 EACH | Refills: 0 | Status: SHIPPED | OUTPATIENT
Start: 2021-10-05 | End: 2021-11-02

## 2021-10-05 RX ORDER — CENOBAMATE 12.5-25MG
KIT ORAL
Qty: 28 EACH | Refills: 0 | Status: SHIPPED | OUTPATIENT
Start: 2021-10-05 | End: 2021-11-02

## 2021-11-03 NOTE — PROGRESS NOTES
Chief Complaint   Patient presents with   • Follow-Up     Seizures       Problem List Items Addressed This Visit     None      Visit Diagnoses     Localization-related epilepsy (HCC)        Relevant Medications    Cenobamate (XCOPRI) 14 x 50 MG & 14 x100 MG Tablet Therapy Pack    cenobamate (XCOPRI) 100 MG Tab tablet    Screening for depression              Interim History:  Umu Mcguire 36 y.o. female presents today with mom for seizure f/u.      was used during his visit.     Pt continues to have seizures (6x in October, 3x in November). Last one was on 11/6/21. They seem to be less as she continues to take the xcopri and currently on 100mg QHS. She c/o nausea, abd pain/discomfort and vomited once and they state that it might have lowered her BP. She described the abdominal pain as dull and she feels it in the morning and afternoon. Localized in the mid section. She started feeling this when she increased the dose to 50mg. She states she is eating her meals on time even though she does not feel hungry. No other side effects. She is taking vit D but they have not gotten her weekly supplementation in the pharmacy. Mood is good. No SI or HI. She had done her blood work. She is not driving. Mom had more questions regarding the VNS. No other issues.        Past medical history:   Past Medical History:   Diagnosis Date   • DUB (dysfunctional uterine bleeding)    • Epilepsy (HCC) 11/4/2009    since infant.  sees Wilfrido/Codey at [facility].  Keppra/depakote.  absence siezures 1x/month-thinks iwth menstruation.   • GERD (gastroesophageal reflux disease)     gastritis-only with spicy foods   • Seizure disorder (HCC)        Past surgical history:   Past Surgical History:   Procedure Laterality Date   • ERCP  9/30/2019    Procedure: ERCP (ENDOSCOPIC RETROGRADE CHOLANGIOPANCREATOGRAPHY);  Surgeon: Kaushal Lopes M.D.;  Location: SURGERY [facility];  Service: Gastroenterology   • DEONDRE BY  LAPAROSCOPY N/A 4/8/2018    Procedure: DEONDRE BY LAPAROSCOPY;  Surgeon: Chava Busby M.D.;  Location: SURGERY [facility];  Service: General   • ERCP  4/6/2018    Procedure: ERCP;  Surgeon: Osiel Paige M.D.;  Location: SURGERY [facility];  Service: Gastroenterology       Family history:   Family History   Problem Relation Age of Onset   • Cancer Neg Hx    • Diabetes Neg Hx    • Stroke Neg Hx        Social history:   Social History     Socioeconomic History   • Marital status: Single     Spouse name: Not on file   • Number of children: Not on file   • Years of education: Not on file   • Highest education level: Not on file   Occupational History   • Not on file   Tobacco Use   • Smoking status: Never Smoker   • Smokeless tobacco: Never Used   Vaping Use   • Vaping Use: Never used   Substance and Sexual Activity   • Alcohol use: No   • Drug use: No   • Sexual activity: Not Currently     Comment: virginal   Other Topics Concern   • Not on file   Social History Narrative   • Not on file     Social Determinants of Health     Financial Resource Strain:    • Difficulty of Paying Living Expenses:    Food Insecurity:    • Worried About Running Out of Food in the Last Year:    • Ran Out of Food in the Last Year:    Transportation Needs:    • Lack of Transportation (Medical):    • Lack of Transportation (Non-Medical):    Physical Activity:    • Days of Exercise per Week:    • Minutes of Exercise per Session:    Stress:    • Feeling of Stress :    Social Connections:    • Frequency of Communication with Friends and Family:    • Frequency of Social Gatherings with Friends and Family:    • Attends Restoration Services:    • Active Member of Clubs or Organizations:    • Attends Club or Organization Meetings:    • Marital Status:    Intimate Partner Violence:    • Fear of Current or Ex-Partner:    • Emotionally Abused:    • Physically Abused:    • Sexually Abused:        Current medications:   Current  "Outpatient Medications   Medication   • VITAMIN D PO   • divalproex (DEPAKOTE) 500 MG Tablet Delayed Response   • levETIRAcetam (KEPPRA) 500 MG Tab     No current facility-administered medications for this visit.       Medication Allergy:  Allergies   Allergen Reactions   • Nkda [No Known Drug Allergy]          Review of systems:     General: Denies fevers or chills, or nightsweats, or generalized fatigue.    Head: Denies headaches or dizziness or lightheadedness  Gastrointestinal: Denies  no abdominal pain or change in bowel habits, no melena or hematochezia. +nausea, vomiting,  Urinary: Denies dysuria, frequency, hesitancy, or incontinence.  Dermatologic:  Denies new rash  Musculoskeletal: Denies muscle pain or swelling, no atrophy, no neck and back pain or stiffness.   Neurologic: Denies facial droopiness, muscle weakness (focal or generalized), paresthesias, ataxia, change in speech or language, memory loss, abnormal movements.+ seizures, loss of consciousness   Psychiatric: Denies anxiety, depression, mood swings, suicidal or homicidal thoughts       Physical examination:   Vitals:    11/17/21 1025   BP: (!) 98/64   BP Location: Left arm   Patient Position: Sitting   BP Cuff Size: Adult   Pulse: 77   Temp: 36.6 °C (97.8 °F)   TempSrc: Temporal   SpO2: 97%   Weight: 56.3 kg (124 lb 1.9 oz)   Height: 1.549 m (5' 1\")     General: Patient in no acute distress, pleasant and cooperative.  HEENT: Normocephalic, no signs of acute trauma.   Neck: Supple. There is normal range of motion.   Resp: clear to auscultation bilaterally. No wheezes or crackles.   CV: RRR, no murmurs.   Skin: no signs of acute rashes or trauma.   Musculoskeletal: joints exhibit full range of motion  Psychiatric: No hallucinatory behavior. No symptoms of depression or suicidal ideation. Mood and affect appear normal on exam.     NEUROLOGICAL EXAM:   Mental status, orientation: Awake, alert and fully oriented.   Speech and language: speech is clear " and fluent. The patient is able to name, repeat and comprehend.   Memory: There is intact recollection of recent and remote events.   Motor exam: Strength is 5/5 in all extremities. Tone is normal. No abnormal movements were seen on exam.   Sensory exam reveals normal sense of light touch in all extremities.   Gait: The patient was able to get up from seated position on first attempt without requiring assistance. Found to be steady when walking. Movements were fluid with normal arm swing.       ANCILLARY DATA REVIEWED:       Lab Data Review:  Reviewed in chart. CBC, CMp, vit D, ammonia    Records reviewed:   Reviewed in chart.    Imaging:   CT head 4/25/2013  1. No acute intracranial abnormality.  2. Frontal scalp hematoma.     EEG:  EEG April 2008  IMPRESSION:  Ambulatory electroencephalogram recording is abnormalwith the appearance of rare generalized sharp activity and left temporal sharp wave activity.  Both noted during sleep only.  No definitive epileptogenic discharges are noted, but clinical correlation is warranted for possible focal and generalized seizure disorder.  Again, no definite epileptogenic discharges are noted.     VEEG July 2014  IMPRESSION:  This is an abnormal electroencephalogram  due to the presence of frequent epileptiform potentials in drowsiness and exacerbated by hyperventilation.  No clinical seizures noted.     Routine EEG 10/15/2019  INTERPRETATION:  This is an abnormal routine EEG recording in the awake, drowsy,   and sleep state(s). There is slowing in the left frontotemporal   region, as well intermittent and brief runs of rhythmic delta /   theta activity were captured in this region. The findings suggest   underlying area of cortical irritability and structural   abnormality. The patient is at increased risk for seizures,   however no seizures captured during this study.  Clinical and   radiological correlation is recommended.        ASSESSMENT AND PLAN:    1.  "Localization-related epilepsy (HCC)  - cenobamate (XCOPRI) 100 MG Tab tablet; Take 1 Tablet by mouth every day for 60 days.  Dispense: 30 Tablet; Refill: 1    2. Screening for depression    3. 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 Has long history of seizures in childhood and spells were occurring monthly since she was 7 years old. Catamenial in nature. The convulsion type spell are infrequent since she was started on ASM but the \"mini spells\" of lip smacking, eyes rolling, fists clenching, drop attacks and passing out are very frequent. Her previous EEG's were abnormal. CT head was unremarkable. No MRI brain. Her last convulsive spell was in 2018. Her recent routine EEG continues to be abnormal with slowing in the left frontotemporal region, as well intermittent and brief runs of rhythmic delta / theta activity were captured in this region. They do want further work-up d/t financial reasons. They cancelled the VNS placement as they cannot afford it. They have noticed her seizures to be less frequent on the xcopri but she is having symptoms of n/v and abdominal discomfort. She is eating well. 3 seizures this month. Last one was on 11/6/21. I answered all VNS questions today.      Apparently, pt has tried taking a different ASM in the which gave her a side effect of hyperactivity. I saw Cat's note in 2014 that she was planning on weaning pt off the depakote and switch to Vimpat. However, she transitioned care to Dr. Bloch and there was no documentation about what happened to med adjustment. Pt does not remember taking the Vimpat.      No family hx of epilepsy. No TBI hx.      Not drinking alcohol. Not smoking cigarettes. No recreational drug use.     She is feeling depressed recently. No suicidal or homicidal thoughts.      C/o memory loss likely related to her seizures and ASM side effects.     Pt does not plan on conceiving " as of now. Discussed the importance of dual contraception preferably using copper IUD and condom for the partner as ASM's can decrease the effectiveness of OCP's. She is aware of the risk of pregnancy complications while taking ASM's which include congenital defects, abnormal fetal growth, , etc. She is not currently sexually active and not on birth control.  Continues to refuse referral to GYN and take folic acid.     Past ASMs: unknown, Vimpat?     Current ASMs: Depakote 500 mg BID, Keppra 500 mg, 3 tabs in am and 2 tabs qhs. Xcopri 100mg QHS.      Ammonia level normal. Liver enzymes normal.      Plan:  -It is possible that she is having more side effects on 3 ASMs. Reviewed up-to-date and there is 1-2% chance of abd pain with xcopri. Nausea is more common with both keppra and xcopri. We agreed on continuing xcopri dose for now, as there is a chance of better seizure control with this drug, and potentially continue to lower dose of keppra to see if the side effects will resolve. Recommended to not skip meals and maybe eat more frequently throughout the day.    -there was a concern for low BP. Her BP has been on the low side. Recommended to make a BP log for a month.        - Discussed avoidance of spell/sz triggers: alcohol, sleep deprivation, and stress.     - Discussed Vit D supplementation.  Recommended 2000-5000u daily after weekly supplementation. Vit D low     - Discussed driving restrictions. Pt does not drive.      -Labs to be checked for next appointment: none        FOLLOW-UP:   Return in about 4 weeks (around 12/15/2021).      EDUCATION AND COUNSELING:  -Education was provided to the patient and/or family regarding diagnosis and prognosis. The chronic and unpredictable nature of the condition were discussed. There is increased risk for additional events, which may carry potential for significant injuries and death. Discussed frequent seizure triggers: sleep deprivation, medication  non-compliance, use of illegal drugs/alcohol, stress, and others.   -We reviewed in detail the current antiepileptic regimen. Potential side effects of antiepileptics were discussed at length, including but no limited to: hypersensitivity reactions (rash and others, some of which can be fatal), visual field changes (some of which may be irreversible), glaucoma, diplopia, kidney stones, osteopenia/osteoporosis/bone fractures, hyperthermia/anhydrosis, hyponatremia, tremors/abnormal movements, ataxia, dizziness, fatigue, increased risk for falls, risk for cardiac arrhythmias/syncope, gastrointestinal side effects(hepatitis, pancreatitis, gastritis, ulcers), gingival hypertrophy/bleeding, drowsiness, sedation, anxiety/nervousness, increased risk for suicide, increased risk for depression, and psychosis.   -We also reviewed drug-drug interactions and their potential effect on seizure control and medication side effects.    -We also discussed in detail potential effects of seizures, epilepsy, and medications during pregnancy, including but not limited to fetal malformations, child developmental/intellectual disability, fetal/ risk for hemorrhages, stillbirth, maternal death, premature birth, and others. The patient/family aware that pregnancy should be avoided, unless desired, in which case we recommend discussing with us at least a year prior to planned conception. To avoid undesired pregnancy while on antiepileptics, we recommend dual contraception.   -Folic acid 2 mg is recommended for all females in childbearing age (12-44 years of age).   -Recommend chronic vitamin D supplementation and regular exercise (if not contraindicated).   -Patient/family educated on risk for SUDEP (Sudden Death in Epilepsy). Counseling was provided on the importance of strict medication and follow up compliance. The patient/family understand the risks associated with non-adherence with the medical plan as outlined, including but not  limited to an increased risk for breakthrough seizures, which may contribute to injuries, disability, status epilepticus, and even death.   -Counseling was also provided on potential effects of alcohol and other drugs, which may lower seizure threshold and/or affect the metabolism of antiepileptic drugs. We recommend avoidance of alcohol and illegal drugs.  -Avoid sleep deprivation.   -We extensively discussed the aspects related to safety in drivers who suffer from epilepsy. The patient is encourage to report to the Division of Motor Vehicles of any condition and/or spells related to confusion, disorientation, and/or loss of awareness and/or loss of consciousness; as these may pose a safety issue if they occur while operating a motor vehicle. The patient and/or family are ultimately responsible for exercising caution and abiding to regulations in place.   -Other seizure precautions were discussed at length, including no diving, no skydiving, no climbing or exposure to unprotected heights, no unsupervised swimming, no Jacuzzi or bathing in bathtubs or deep bodies of water. The patient/family have been advised about risks for operating any machinery while suffering from seizures / syncope / epilepsy and/or while taking antiepileptic drugs.   -The patient understands and agrees that due to the complexity of his/her diagnosis, results of any testing and further recommendations will typically be discussed/made during a face to face encounter in my office. The patient and/or family further understands it is their responsibility to keep proper follow up.     Patient/family agree with plan, as outlined.         Nakia Gerber, MSN, APRN, FNP-C  [facility]  Office: [phone]  Fax: [phone]    BILLING DOCUMENTATION:     Total time spent including chart review before and after visit was 43min. Over 50% of the time of the visit today was spent on counseling and or coordination of care [facility] the  patient and/or family, with greater than 50% of the total discussing my assessment and plan as stated above.

## 2021-11-13 ENCOUNTER — HOSPITAL ENCOUNTER (OUTPATIENT)
Dept: LAB | Facility: MEDICAL CENTER | Age: 36
End: 2021-11-13
Attending: NURSE PRACTITIONER
Payer: COMMERCIAL

## 2021-11-13 DIAGNOSIS — E55.9 VITAMIN D DEFICIENCY: ICD-10-CM

## 2021-11-13 DIAGNOSIS — G40.109 LOCALIZATION-RELATED EPILEPSY (HCC): ICD-10-CM

## 2021-11-13 LAB
ALBUMIN SERPL BCP-MCNC: 4.7 G/DL (ref 3.2–4.9)
ALBUMIN/GLOB SERPL: 1.3 G/DL
ALP SERPL-CCNC: 53 U/L (ref 30–99)
ALT SERPL-CCNC: 6 U/L (ref 2–50)
AMMONIA PLAS-SCNC: 13 UMOL/L (ref 11–45)
ANION GAP SERPL CALC-SCNC: 13 MMOL/L (ref 7–16)
AST SERPL-CCNC: 11 U/L (ref 12–45)
BASOPHILS # BLD AUTO: 0.5 % (ref 0–1.8)
BASOPHILS # BLD: 0.03 K/UL (ref 0–0.12)
BILIRUB SERPL-MCNC: <0.2 MG/DL (ref 0.1–1.5)
BUN SERPL-MCNC: 14 MG/DL (ref 8–22)
CALCIUM SERPL-MCNC: 9.5 MG/DL (ref 8.5–10.5)
CHLORIDE SERPL-SCNC: 103 MMOL/L (ref 96–112)
CO2 SERPL-SCNC: 24 MMOL/L (ref 20–33)
CREAT SERPL-MCNC: 0.58 MG/DL (ref 0.5–1.4)
EOSINOPHIL # BLD AUTO: 0.07 K/UL (ref 0–0.51)
EOSINOPHIL NFR BLD: 1.3 % (ref 0–6.9)
ERYTHROCYTE [DISTWIDTH] IN BLOOD BY AUTOMATED COUNT: 47.2 FL (ref 35.9–50)
GLOBULIN SER CALC-MCNC: 3.5 G/DL (ref 1.9–3.5)
GLUCOSE SERPL-MCNC: 80 MG/DL (ref 65–99)
HCT VFR BLD AUTO: 39.1 % (ref 37–47)
HGB BLD-MCNC: 12.3 G/DL (ref 12–16)
IMM GRANULOCYTES # BLD AUTO: 0.03 K/UL (ref 0–0.11)
IMM GRANULOCYTES NFR BLD AUTO: 0.5 % (ref 0–0.9)
LYMPHOCYTES # BLD AUTO: 1.95 K/UL (ref 1–4.8)
LYMPHOCYTES NFR BLD: 35.1 % (ref 22–41)
MCH RBC QN AUTO: 28 PG (ref 27–33)
MCHC RBC AUTO-ENTMCNC: 31.5 G/DL (ref 33.6–35)
MCV RBC AUTO: 89.1 FL (ref 81.4–97.8)
MONOCYTES # BLD AUTO: 0.43 K/UL (ref 0–0.85)
MONOCYTES NFR BLD AUTO: 7.7 % (ref 0–13.4)
NEUTROPHILS # BLD AUTO: 3.05 K/UL (ref 2–7.15)
NEUTROPHILS NFR BLD: 54.9 % (ref 44–72)
NRBC # BLD AUTO: 0 K/UL
NRBC BLD-RTO: 0 /100 WBC
PLATELET # BLD AUTO: 244 K/UL (ref 164–446)
PMV BLD AUTO: 11.6 FL (ref 9–12.9)
POTASSIUM SERPL-SCNC: 4.4 MMOL/L (ref 3.6–5.5)
PROT SERPL-MCNC: 8.2 G/DL (ref 6–8.2)
RBC # BLD AUTO: 4.39 M/UL (ref 4.2–5.4)
SODIUM SERPL-SCNC: 140 MMOL/L (ref 135–145)
WBC # BLD AUTO: 5.6 K/UL (ref 4.8–10.8)

## 2021-11-13 PROCEDURE — 36415 COLL VENOUS BLD VENIPUNCTURE: CPT

## 2021-11-13 PROCEDURE — 85025 COMPLETE CBC W/AUTO DIFF WBC: CPT

## 2021-11-13 PROCEDURE — 82306 VITAMIN D 25 HYDROXY: CPT

## 2021-11-13 PROCEDURE — 80053 COMPREHEN METABOLIC PANEL: CPT

## 2021-11-13 PROCEDURE — 82140 ASSAY OF AMMONIA: CPT

## 2021-11-16 ENCOUNTER — TELEPHONE (OUTPATIENT)
Dept: NEUROLOGY | Facility: MEDICAL CENTER | Age: 36
End: 2021-11-16

## 2021-11-16 DIAGNOSIS — E55.9 VITAMIN D DEFICIENCY: ICD-10-CM

## 2021-11-16 LAB — 25(OH)D3 SERPL-MCNC: 25 NG/ML (ref 30–80)

## 2021-11-16 RX ORDER — ERGOCALCIFEROL 1.25 MG/1
50000 CAPSULE ORAL
Qty: 4 CAPSULE | Refills: 5 | Status: SHIPPED | OUTPATIENT
Start: 2021-11-16 | End: 2022-08-15

## 2021-11-16 NOTE — TELEPHONE ENCOUNTER
Called pt to tell her, per Nakia, to stop taking OTC vit D and starting taking prescribed vit D. Told pt it was sent to her pharmacy ([facility]). Pt understood. Pt also reported that the Xcopri medication was giving her stomach aches and making her throw up. Please advise.

## 2021-11-17 ENCOUNTER — OFFICE VISIT (OUTPATIENT)
Dept: NEUROLOGY | Facility: MEDICAL CENTER | Age: 36
End: 2021-11-17
Attending: NURSE PRACTITIONER
Payer: COMMERCIAL

## 2021-11-17 VITALS
OXYGEN SATURATION: 97 % | HEIGHT: 61 IN | WEIGHT: 124.12 LBS | HEART RATE: 77 BPM | TEMPERATURE: 97.8 F | DIASTOLIC BLOOD PRESSURE: 64 MMHG | BODY MASS INDEX: 23.43 KG/M2 | SYSTOLIC BLOOD PRESSURE: 98 MMHG

## 2021-11-17 DIAGNOSIS — G40.109 LOCALIZATION-RELATED EPILEPSY (HCC): ICD-10-CM

## 2021-11-17 DIAGNOSIS — Z30.09 ENCOUNTER FOR COUNSELING REGARDING CONTRACEPTION: ICD-10-CM

## 2021-11-17 DIAGNOSIS — Z13.31 SCREENING FOR DEPRESSION: ICD-10-CM

## 2021-11-17 DIAGNOSIS — R10.13 ABDOMINAL DISCOMFORT, EPIGASTRIC: ICD-10-CM

## 2021-11-17 PROBLEM — N93.9 ABNORMAL UTERINE BLEEDING: Status: ACTIVE | Noted: 2021-11-17

## 2021-11-17 PROBLEM — R87.69 ABNORMAL CYTOLOGICAL FINDINGS IN FEMALE GENITAL ORGANS: Status: ACTIVE | Noted: 2021-11-17

## 2021-11-17 PROBLEM — R87.611 PAPANICOLAOU SMEAR OF CERVIX WITH ATYPICAL SQUAMOUS CELLS CANNOT EXCLUDE HIGH GRADE SQUAMOUS INTRAEPITHELIAL LESION (ASC-H): Status: ACTIVE | Noted: 2019-10-11

## 2021-11-17 PROBLEM — R87.810 CERVICAL HIGH RISK HPV (HUMAN PAPILLOMAVIRUS) TEST POSITIVE: Status: ACTIVE | Noted: 2021-11-17

## 2021-11-17 PROBLEM — N85.00 ENDOMETRIAL HYPERPLASIA: Status: ACTIVE | Noted: 2021-11-17

## 2021-11-17 PROCEDURE — 99211 OFF/OP EST MAY X REQ PHY/QHP: CPT | Performed by: NURSE PRACTITIONER

## 2021-11-17 PROCEDURE — 99215 OFFICE O/P EST HI 40 MIN: CPT | Performed by: NURSE PRACTITIONER

## 2021-11-17 RX ORDER — CENOBAMATE 50MG-100MG
KIT ORAL
COMMUNITY
End: 2021-12-22

## 2021-11-17 ASSESSMENT — FIBROSIS 4 INDEX: FIB4 SCORE: 0.68

## 2021-11-17 NOTE — TELEPHONE ENCOUNTER
Called pt to tell her that she has an appt tomorrow with Nakia, so she can discuss the xcopri side effects then. For now, Nakia recommended just taking it with food. Pt understood.

## 2021-12-09 NOTE — PROGRESS NOTES
Chief Complaint   Patient presents with   • Follow-Up     Seizures       Problem List Items Addressed This Visit     None      Visit Diagnoses     Localization-related epilepsy (HCC)        Relevant Medications    Cenobamate (XCOPRI) 150 MG Tab    divalproex (DEPAKOTE) 500 MG Tablet Delayed Response    levETIRAcetam (KEPPRA) 500 MG Tab    Screening for depression        Encounter for female family planning counseling        Encounter for counseling regarding contraception              Interim History:  Umu Mcguire 36 y.o. female presents today with mom for seizure f/u.      was used during this visit.     Pt reports of 4 seizures since last visit. 3x in Nov and 1 in December (12/7/21) and they said it was very mild to where she felt it coming on but did not lose consciousness. She is tolerating her current ASMs and the abd pain, N/V that they were complaining about during last visit has resolve. But mom is reporting that pt is having irregular monthly period, sometimes 2x a month. They have seen GYN before who told them he can help her but it might be costly. Mom has questions about VNS again. Still taking vit D. Mood is stable. No SI or HI. She is not driving. Not sexually active.     Past medical history:   Past Medical History:   Diagnosis Date   • DUB (dysfunctional uterine bleeding)    • Epilepsy (HCC) 11/4/2009    since infant.  sees Wilfrido/Codey at [facility].  Keppra/depakote.  absence siezures 1x/month-thinks iwth menstruation.   • GERD (gastroesophageal reflux disease)     gastritis-only with spicy foods   • Seizure disorder (HCC)        Past surgical history:   Past Surgical History:   Procedure Laterality Date   • ERCP  9/30/2019    Procedure: ERCP (ENDOSCOPIC RETROGRADE CHOLANGIOPANCREATOGRAPHY);  Surgeon: Kaushal Lopes M.D.;  Location: SURGERY [facility];  Service: Gastroenterology   • DEONDRE BY LAPAROSCOPY N/A 4/8/2018    Procedure: DEONDRE BY LAPAROSCOPY;  Surgeon:  Chava Busby M.D.;  Location: SURGERY [facility];  Service: General   • ERCP  4/6/2018    Procedure: ERCP;  Surgeon: Osiel Paige M.D.;  Location: SURGERY [facility];  Service: Gastroenterology       Family history:   Family History   Problem Relation Age of Onset   • Cancer Neg Hx    • Diabetes Neg Hx    • Stroke Neg Hx        Social history:   Social History     Socioeconomic History   • Marital status: Single     Spouse name: Not on file   • Number of children: Not on file   • Years of education: Not on file   • Highest education level: Not on file   Occupational History   • Not on file   Tobacco Use   • Smoking status: Never Smoker   • Smokeless tobacco: Never Used   Vaping Use   • Vaping Use: Never used   Substance and Sexual Activity   • Alcohol use: No   • Drug use: No   • Sexual activity: Not Currently     Comment: virginal   Other Topics Concern   • Not on file   Social History Narrative   • Not on file     Social Determinants of Health     Financial Resource Strain:    • Difficulty of Paying Living Expenses: Not on file   Food Insecurity:    • Worried About Running Out of Food in the Last Year: Not on file   • Ran Out of Food in the Last Year: Not on file   Transportation Needs:    • Lack of Transportation (Medical): Not on file   • Lack of Transportation (Non-Medical): Not on file   Physical Activity:    • Days of Exercise per Week: Not on file   • Minutes of Exercise per Session: Not on file   Stress:    • Feeling of Stress : Not on file   Social Connections:    • Frequency of Communication with Friends and Family: Not on file   • Frequency of Social Gatherings with Friends and Family: Not on file   • Attends Church Services: Not on file   • Active Member of Clubs or Organizations: Not on file   • Attends Club or Organization Meetings: Not on file   • Marital Status: Not on file   Intimate Partner Violence:    • Fear of Current or Ex-Partner: Not on file   • Emotionally  "Abused: Not on file   • Physically Abused: Not on file   • Sexually Abused: Not on file   Housing Stability:    • Unable to Pay for Housing in the Last Year: Not on file   • Number of Places Lived in the Last Year: Not on file   • Unstable Housing in the Last Year: Not on file       Current medications:   Current Outpatient Medications   Medication   • Cenobamate (XCOPRI) 14 x 50 MG & 14 x100 MG Tablet Therapy Pack   • cenobamate (XCOPRI) 100 MG Tab tablet   • ergocalciferol (DRISDOL) 12483 UNIT capsule   • divalproex (DEPAKOTE) 500 MG Tablet Delayed Response   • levETIRAcetam (KEPPRA) 500 MG Tab     No current facility-administered medications for this visit.       Medication Allergy:  Allergies   Allergen Reactions   • Nkda [No Known Drug Allergy]          Review of systems:     General: Denies fevers or chills, or nightsweats, or generalized fatigue.    Head: Denies headaches or dizziness or lightheadedness  Musculoskeletal: Denies muscle pain or swelling, no atrophy, no neck and back pain or stiffness.   Neurologic: Denies facial droopiness, muscle weakness (focal or generalized), paresthesias, ataxia, change in speech or language, memory loss, abnormal movements. +seizures,  episodes of confusion.   Psychiatric: Denies  mood swings, suicidal or homicidal thoughts. +anxiety, depression       Physical examination:   Vitals:    12/22/21 0723   BP: 100/64   BP Location: Right arm   Patient Position: Sitting   BP Cuff Size: Adult   Pulse: 74   Temp: 36.6 °C (97.8 °F)   TempSrc: Temporal   SpO2: 97%   Weight: 56.2 kg (124 lb)   Height: 1.549 m (5' 1\")     General: Patient in no acute distress, pleasant and cooperative.  HEENT: Normocephalic, no signs of acute trauma.   Neck: Supple. There is normal range of motion.   Skin: no signs of acute rashes or trauma.   Musculoskeletal: joints exhibit full range of motion  Psychiatric: No hallucinatory behavior. No symptoms of depression or suicidal ideation. Mood and affect " appear normal on exam.     NEUROLOGICAL EXAM:   Mental status, orientation: Awake, alert and fully oriented.   Speech and language: speech is clear and fluent. The patient is able to name, repeat and comprehend.   Memory: There is intact recollection of recent and remote events.   Motor exam: Strength is 5/5 in all extremities. Tone is normal. No abnormal movements were seen on exam.   Sensory exam reveals normal sense of light touch in all extremities.   Gait: The patient was able to get up from seated position on first attempt without requiring assistance. Found to be steady when walking. Movements were fluid with normal arm swing.       ANCILLARY DATA REVIEWED:       Lab Data Review:  Reviewed in chart.     Records reviewed:   Reviewed in chart.    Imaging:   CT head 4/25/2013  1. No acute intracranial abnormality.  2. Frontal scalp hematoma.     EEG:  EEG April 2008  IMPRESSION:  Ambulatory electroencephalogram recording is abnormalwith the appearance of rare generalized sharp activity and left temporal sharp wave activity.  Both noted during sleep only.  No definitive epileptogenic discharges are noted, but clinical correlation is warranted for possible focal and generalized seizure disorder.  Again, no definite epileptogenic discharges are noted.     VEEG July 2014  IMPRESSION:  This is an abnormal electroencephalogram  due to the presence of frequent epileptiform potentials in drowsiness and exacerbated by hyperventilation.  No clinical seizures noted.     Routine EEG 10/15/2019  INTERPRETATION:  This is an abnormal routine EEG recording in the awake, drowsy,   and sleep state(s). There is slowing in the left frontotemporal   region, as well intermittent and brief runs of rhythmic delta /   theta activity were captured in this region. The findings suggest   underlying area of cortical irritability and structural   abnormality. The patient is at increased risk for seizures,   however no seizures captured  "during this study.  Clinical and   radiological correlation is recommended.        ASSESSMENT AND PLAN:    1. Localization-related epilepsy (HCC)  - Cenobamate (XCOPRI) 150 MG Tab; Take 150 mg by mouth at bedtime for 60 days.  Dispense: 30 Tablet; Refill: 1  - divalproex (DEPAKOTE) 500 MG Tablet Delayed Response; Take 1 Tablet by mouth 2 times a day for 180 days.  Dispense: 180 Tablet; Refill: 1  - levETIRAcetam (KEPPRA) 500 MG Tab; Take 2 Tablets by mouth 2 times a day for 180 days.  Dispense: 120 Tablet; Refill: 5    2. Screening for depression    3. Encounter for female family planning counseling    4. Encounter for counseling regarding contraception          CLINICAL DISCUSSION:  Phamacoresistant epilepsy. Hx of febrile seizure in infancy. Has long history of seizures in childhood and spells were occurring monthly since she was 7 years old. Catamenial in nature. The convulsion type spell are infrequent since she was started on ASM but the \"mini spells\" of lip smacking, eyes rolling, fists clenching, drop attacks and passing out are very frequent. Her previous EEG's were abnormal. CT head was unremarkable. No MRI brain. Her last convulsive spell was in 2018. Her recent routine EEG continues to be abnormal with slowing in the left frontotemporal region, as well intermittent and brief runs of rhythmic delta / theta activity were captured in this region. They do want further work-up d/t financial reasons. They cancelled the VNS placement as they cannot afford it. She had 4 seizures since last visit (3 in Nov and 1 in Dec). They were mild to where they are aura like symptoms to her. They are happy with this.      Apparently, pt has tried taking a different ASM in the which gave her a side effect of hyperactivity. I saw Cat's note in 2014 that she was planning on weaning pt off the depakote and switch to Vimpat. However, she transitioned care to Dr. Bloch and there was no documentation about what happened to med " adjustment. Pt does not remember taking the Vimpat.      No family hx of epilepsy. No TBI hx.      Not drinking alcohol. Not smoking cigarettes. No recreational drug use.     She is feeling depressed recently. No suicidal or homicidal thoughts.      C/o memory loss likely related to her seizures and ASM side effects.     Pt does not plan on conceiving as of now. Discussed the importance of dual contraception preferably using copper IUD and condom for the partner as ASM's can decrease the effectiveness of OCP's. She is aware of the risk of pregnancy complications while taking ASM's which include congenital defects, abnormal fetal growth, , etc. She is not currently sexually active and not on birth control.  Refused folic acid.      Past ASMs: unknown, Vimpat?     Current ASMs: Depakote 500 mg BID, Keppra 500 mg, 2 tabs in am and 2 tabs qhs. Xcopri 150mg QHS.       Ammonia level normal. Liver enzymes normal.      Plan:  -We agreed on adjusting her ASMs again given that she is having improvement on the xcopri. Will lower keppra dose and increase xcopri for her. Side effects reported last visit resolved.      - Discussed avoidance of spell/sz triggers: alcohol, sleep deprivation, and stress.     - Discussed Vit D supplementation.  Recommended 2000-5000u daily after weekly supplementation.      - Discussed driving restrictions. Pt does not drive.      -Labs to be checked for next appointment: none    - Recommended to f/u with GYN or PCP regarding irregular menstrual cycle.          FOLLOW-UP:   Return in about 4 weeks (around 2022).      EDUCATION AND COUNSELING:  -Education was provided to the patient and/or family regarding diagnosis and prognosis. The chronic and unpredictable nature of the condition were discussed. There is increased risk for additional events, which may carry potential for significant injuries and death. Discussed frequent seizure triggers: sleep deprivation, medication non-compliance,  use of illegal drugs/alcohol, stress, and others.   -We reviewed in detail the current antiepileptic regimen. Potential side effects of antiepileptics were discussed at length, including but no limited to: hypersensitivity reactions (rash and others, some of which can be fatal), visual field changes (some of which may be irreversible), glaucoma, diplopia, kidney stones, osteopenia/osteoporosis/bone fractures, hyperthermia/anhydrosis, hyponatremia, tremors/abnormal movements, ataxia, dizziness, fatigue, increased risk for falls, risk for cardiac arrhythmias/syncope, gastrointestinal side effects(hepatitis, pancreatitis, gastritis, ulcers), gingival hypertrophy/bleeding, drowsiness, sedation, anxiety/nervousness, increased risk for suicide, increased risk for depression, and psychosis.   -We also reviewed drug-drug interactions and their potential effect on seizure control and medication side effects.    -We also discussed in detail potential effects of seizures, epilepsy, and medications during pregnancy, including but not limited to fetal malformations, child developmental/intellectual disability, fetal/ risk for hemorrhages, stillbirth, maternal death, premature birth, and others. The patient/family aware that pregnancy should be avoided, unless desired, in which case we recommend discussing with us at least a year prior to planned conception. To avoid undesired pregnancy while on antiepileptics, we recommend dual contraception.   -Folic acid 2 mg is recommended for all females in childbearing age (12-44 years of age).   -Recommend chronic vitamin D supplementation and regular exercise (if not contraindicated).   -Patient/family educated on risk for SUDEP (Sudden Death in Epilepsy). Counseling was provided on the importance of strict medication and follow up compliance. The patient/family understand the risks associated with non-adherence with the medical plan as outlined, including but not limited to an  increased risk for breakthrough seizures, which may contribute to injuries, disability, status epilepticus, and even death.   -Counseling was also provided on potential effects of alcohol and other drugs, which may lower seizure threshold and/or affect the metabolism of antiepileptic drugs. We recommend avoidance of alcohol and illegal drugs.  -Avoid sleep deprivation.   -We extensively discussed the aspects related to safety in drivers who suffer from epilepsy. The patient is encourage to report to the Division of Motor Vehicles of any condition and/or spells related to confusion, disorientation, and/or loss of awareness and/or loss of consciousness; as these may pose a safety issue if they occur while operating a motor vehicle. The patient and/or family are ultimately responsible for exercising caution and abiding to regulations in place.   -Other seizure precautions were discussed at length, including no diving, no skydiving, no climbing or exposure to unprotected heights, no unsupervised swimming, no Jacuzzi or bathing in bathtubs or deep bodies of water. The patient/family have been advised about risks for operating any machinery while suffering from seizures / syncope / epilepsy and/or while taking antiepileptic drugs.   -The patient understands and agrees that due to the complexity of his/her diagnosis, results of any testing and further recommendations will typically be discussed/made during a face to face encounter in my office. The patient and/or family further understands it is their responsibility to keep proper follow up.     Patient/family agree with plan, as outlined.         Nakia Gerber, MSN, APRN, FNP-C  [facility]  Office: [phone]  Fax: [phone]    BILLING DOCUMENTATION:   Total time spent including chart review before and after visit was 43min. Over 50% of the time of the visit today was spent on counseling and or coordination of care wtih the patient and/or  family, with greater than 50% of the total discussing my assessment and plan as stated above.

## 2021-12-22 ENCOUNTER — OFFICE VISIT (OUTPATIENT)
Dept: NEUROLOGY | Facility: MEDICAL CENTER | Age: 36
End: 2021-12-22
Attending: NURSE PRACTITIONER
Payer: COMMERCIAL

## 2021-12-22 VITALS
HEIGHT: 61 IN | OXYGEN SATURATION: 97 % | BODY MASS INDEX: 23.41 KG/M2 | SYSTOLIC BLOOD PRESSURE: 100 MMHG | DIASTOLIC BLOOD PRESSURE: 64 MMHG | TEMPERATURE: 97.8 F | WEIGHT: 124 LBS | HEART RATE: 74 BPM

## 2021-12-22 DIAGNOSIS — G40.109 LOCALIZATION-RELATED EPILEPSY (HCC): ICD-10-CM

## 2021-12-22 DIAGNOSIS — Z13.31 SCREENING FOR DEPRESSION: ICD-10-CM

## 2021-12-22 DIAGNOSIS — Z30.09 ENCOUNTER FOR FEMALE FAMILY PLANNING COUNSELING: ICD-10-CM

## 2021-12-22 DIAGNOSIS — Z30.09 ENCOUNTER FOR COUNSELING REGARDING CONTRACEPTION: ICD-10-CM

## 2021-12-22 PROCEDURE — 99211 OFF/OP EST MAY X REQ PHY/QHP: CPT | Performed by: NURSE PRACTITIONER

## 2021-12-22 PROCEDURE — 99215 OFFICE O/P EST HI 40 MIN: CPT | Performed by: NURSE PRACTITIONER

## 2021-12-22 RX ORDER — DIVALPROEX SODIUM 500 MG/1
500 TABLET, DELAYED RELEASE ORAL 2 TIMES DAILY
Qty: 180 TABLET | Refills: 1 | Status: SHIPPED | OUTPATIENT
Start: 2021-12-22 | End: 2022-06-08 | Stop reason: SDUPTHER

## 2021-12-22 RX ORDER — LEVETIRACETAM 500 MG/1
1000 TABLET ORAL 2 TIMES DAILY
Qty: 120 TABLET | Refills: 5 | Status: SHIPPED | OUTPATIENT
Start: 2021-12-22 | End: 2022-06-08 | Stop reason: SDUPTHER

## 2021-12-22 RX ORDER — CENOBAMATE 150 MG/1
150 TABLET, FILM COATED ORAL
Qty: 30 TABLET | Refills: 1 | Status: SHIPPED | OUTPATIENT
Start: 2021-12-22 | End: 2022-01-26 | Stop reason: SDUPTHER

## 2021-12-22 ASSESSMENT — FIBROSIS 4 INDEX: FIB4 SCORE: 0.68

## 2022-01-12 NOTE — PROGRESS NOTES
Chief Complaint   Patient presents with   • Follow-Up     Seizures       Problem List Items Addressed This Visit     None      Visit Diagnoses     Localization-related epilepsy (HCC)        Screening for depression        Encounter for female family planning counseling        Encounter for counseling regarding contraception              Interim History:  Umu Mcguire 36 y.o. female presents today with mom for seizure f/u.      was used during this visit.     They report of no seizures and the last one was on 12/7/2021. Compliant with ASMs (keppra, depakote and xcopri). No side effects or rash. They are very happy about this. Mood is good. She is taking vit D. She is not sexually active. Not taking vit D or folic acid. She is not driving. No other concerns.       Past medical history:   Past Medical History:   Diagnosis Date   • DUB (dysfunctional uterine bleeding)    • Epilepsy (HCC) 11/4/2009    since infant.  sees Wilfrido/Codey at [facility].  Keppra/depakote.  absence siezures 1x/month-thinks iwth menstruation.   • GERD (gastroesophageal reflux disease)     gastritis-only with spicy foods   • Seizure disorder (HCC)        Past surgical history:   Past Surgical History:   Procedure Laterality Date   • ERCP  9/30/2019    Procedure: ERCP (ENDOSCOPIC RETROGRADE CHOLANGIOPANCREATOGRAPHY);  Surgeon: Kaushal Lopes M.D.;  Location: [facility];  Service: Gastroenterology   • DEONDRE BY LAPAROSCOPY N/A 4/8/2018    Procedure: DEONDRE BY LAPAROSCOPY;  Surgeon: Chava Busby M.D.;  Location: [facility];  Service: General   • ERCP  4/6/2018    Procedure: ERCP;  Surgeon: Osiel Paige M.D.;  Location: [facility];  Service: Gastroenterology       Family history:   Family History   Problem Relation Age of Onset   • Cancer Neg Hx    • Diabetes Neg Hx    • Stroke Neg Hx        Social history:   Social History     Socioeconomic History   • Marital status:  Single     Spouse name: Not on file   • Number of children: Not on file   • Years of education: Not on file   • Highest education level: Not on file   Occupational History   • Not on file   Tobacco Use   • Smoking status: Never Smoker   • Smokeless tobacco: Never Used   Vaping Use   • Vaping Use: Never used   Substance and Sexual Activity   • Alcohol use: No   • Drug use: No   • Sexual activity: Not Currently     Comment: virginal   Other Topics Concern   • Not on file   Social History Narrative   • Not on file     Social Determinants of Health     Financial Resource Strain:    • Difficulty of Paying Living Expenses: Not on file   Food Insecurity:    • Worried About Running Out of Food in the Last Year: Not on file   • Ran Out of Food in the Last Year: Not on file   Transportation Needs:    • Lack of Transportation (Medical): Not on file   • Lack of Transportation (Non-Medical): Not on file   Physical Activity:    • Days of Exercise per Week: Not on file   • Minutes of Exercise per Session: Not on file   Stress:    • Feeling of Stress : Not on file   Social Connections:    • Frequency of Communication with Friends and Family: Not on file   • Frequency of Social Gatherings with Friends and Family: Not on file   • Attends Hinduism Services: Not on file   • Active Member of Clubs or Organizations: Not on file   • Attends Club or Organization Meetings: Not on file   • Marital Status: Not on file   Intimate Partner Violence:    • Fear of Current or Ex-Partner: Not on file   • Emotionally Abused: Not on file   • Physically Abused: Not on file   • Sexually Abused: Not on file   Housing Stability:    • Unable to Pay for Housing in the Last Year: Not on file   • Number of Places Lived in the Last Year: Not on file   • Unstable Housing in the Last Year: Not on file       Current medications:   Current Outpatient Medications   Medication   • Cenobamate (XCOPRI) 150 MG Tab   • divalproex (DEPAKOTE) 500 MG Tablet Delayed  "Response   • levETIRAcetam (KEPPRA) 500 MG Tab   • ergocalciferol (DRISDOL) 70237 UNIT capsule     No current facility-administered medications for this visit.       Medication Allergy:  Allergies   Allergen Reactions   • Nkda [No Known Drug Allergy]          Review of systems:     General: Denies fevers or chills, or nightsweats, or generalized fatigue.    Head: Denies headaches or dizziness or lightheadedness  Dermatologic:  Denies new rash  Musculoskeletal: Denies muscle pain or swelling, no atrophy, no neck and back pain or stiffness.   Neurologic: Denies facial droopiness, muscle weakness (focal or generalized), paresthesias, ataxia, change in speech or language, memory loss, abnormal movements, seizures, loss of consciousness, or episodes of confusion.   Psychiatric: Denies anxiety, depression, mood swings, suicidal or homicidal thoughts       Physical examination:   Vitals:    01/26/22 0839   BP: (!) 98/64   BP Location: Left arm   Patient Position: Sitting   BP Cuff Size: Adult   Pulse: 85   Temp: 36.6 °C (97.8 °F)   TempSrc: Temporal   SpO2: 96%   Weight: 57.4 kg (126 lb 8.7 oz)   Height: 1.549 m (5' 1\")     General: Patient in no acute distress, pleasant and cooperative.  HEENT: Normocephalic, no signs of acute trauma.   Neck: Supple. There is normal range of motion.   Resp: clear to auscultation bilaterally. No wheezes or crackles.   CV: RRR, no murmurs.   Skin: no signs of acute rashes or trauma.   Musculoskeletal: joints exhibit full range of motion   Psychiatric: No hallucinatory behavior. No symptoms of depression or suicidal ideation. Mood and affect appear normal on exam.     NEUROLOGICAL EXAM:   Mental status, orientation: Awake, alert and fully oriented.   Speech and language: speech is clear and fluent. The patient is able to name, repeat and comprehend.   Memory: There is intact recollection of recent and remote events.   Motor exam: Strength is 5/5 in all extremities. Tone is normal. No " abnormal movements were seen on exam.   Sensory exam reveals normal sense of light touch in all extremities.   Gait: The patient was able to get up from seated position on first attempt without requiring assistance. Found to be steady when walking. Movements were fluid with normal arm swing.       ANCILLARY DATA REVIEWED:       Lab Data Review:  Reviewed in chart.     Records reviewed:   Reviewed in chart.    Imaging:   CT head 4/25/2013  1. No acute intracranial abnormality.  2. Frontal scalp hematoma.     EEG:  EEG April 2008  IMPRESSION:  Ambulatory electroencephalogram recording is abnormalwith the appearance of rare generalized sharp activity and left temporal sharp wave activity.  Both noted during sleep only.  No definitive epileptogenic discharges are noted, but clinical correlation is warranted for possible focal and generalized seizure disorder.  Again, no definite epileptogenic discharges are noted.     VEEG July 2014  IMPRESSION:  This is an abnormal electroencephalogram  due to the presence of frequent epileptiform potentials in drowsiness and exacerbated by hyperventilation.  No clinical seizures noted.     Routine EEG 10/15/2019  INTERPRETATION:  This is an abnormal routine EEG recording in the awake, drowsy,   and sleep state(s). There is slowing in the left frontotemporal   region, as well intermittent and brief runs of rhythmic delta /   theta activity were captured in this region. The findings suggest   underlying area of cortical irritability and structural   abnormality. The patient is at increased risk for seizures,   however no seizures captured during this study.  Clinical and   radiological correlation is recommended.        ASSESSMENT AND PLAN:    1. Localization-related epilepsy (HCC)  - MR-BRAIN-WITH & W/O; Future  - Cenobamate (XCOPRI) 150 MG Tab; Take 150 mg by mouth at bedtime for 90 days.  Dispense: 30 Tablet; Refill: 2    2. Screening for depression    3. Encounter for female family  "planning counseling    4. Encounter for counseling regarding contraception          CLINICAL DISCUSSION:  Phamacoresistant epilepsy. Hx of febrile seizure in infancy. Has long history of seizures in childhood and spells were occurring monthly since she was 7 years old. Catamenial in nature. The convulsion type spell are infrequent since she was started on ASM but the \"mini spells\" of lip smacking, eyes rolling, fists clenching, drop attacks and passing out are very frequent. Her previous EEG's were abnormal. CT head was unremarkable. No MRI brain. Her last convulsive spell was in 2018. Her recent routine EEG continues to be abnormal with slowing in the left frontotemporal region, as well intermittent and brief runs of rhythmic delta / theta activity were captured in this region. She hasn't had any seizures since 20 and they are very happy about this.      Apparently, pt has tried taking a different ASM in the which gave her a side effect of hyperactivity. I saw Cat's note in  that she was planning on weaning pt off the depakote and switch to Vimpat. However, she transitioned care to Dr. Bloch and there was no documentation about what happened to med adjustment. Pt does not remember taking the Vimpat.      No family hx of epilepsy. No TBI hx.      Not drinking alcohol. Not smoking cigarettes. No recreational drug use.     She is feeling depressed recently. No suicidal or homicidal thoughts.      C/o memory loss likely related to her seizures and ASM side effects.     Pt does not plan on conceiving as of now. Discussed the importance of dual contraception preferably using copper IUD and condom for the partner as ASM's can decrease the effectiveness of OCP's. She is aware of the risk of pregnancy complications while taking ASM's which include congenital defects, abnormal fetal growth, , etc. Recommended at least 1 year of seizure freedom before planning pregnancy. She is not currently sexually " active and not on birth control.  Refused folic acid.      Past ASMs: unknown, Vimpat?     Current ASMs: Depakote 500 mg BID, Keppra 500 mg, 2 tabs in am and 2 tabs qhs. Xcopri 150mg QHS.         Plan:  -We have discussed options for her medications including med adjustment (decrease keppra dose and stay on current xcopri dose; decrease keppra dose and continue to increase xcopri dose; or stay as is with no changes). They decided on staying as is for now and will let me know again during our next visit if they are interested in lowering keppra dose.     -Discussed MRI brain. Will order this and they will check cost with insurance. They will proceed if they can afford it.       - Discussed avoidance of spell/sz triggers: alcohol, sleep deprivation, and stress.     - Discussed Vit D supplementation.  Recommended 2000-5000u daily after weekly supplementation.      - Discussed driving restrictions. Pt does not drive.      -Labs to be checked for next appointment: none           FOLLOW-UP:   Return in about 4 weeks (around 2/23/2022).      EDUCATION AND COUNSELING:  -Education was provided to the patient and/or family regarding diagnosis and prognosis. The chronic and unpredictable nature of the condition were discussed. There is increased risk for additional events, which may carry potential for significant injuries and death. Discussed frequent seizure triggers: sleep deprivation, medication non-compliance, use of illegal drugs/alcohol, stress, and others.   -We reviewed in detail the current antiepileptic regimen. Potential side effects of antiepileptics were discussed at length, including but no limited to: hypersensitivity reactions (rash and others, some of which can be fatal), visual field changes (some of which may be irreversible), glaucoma, diplopia, kidney stones, osteopenia/osteoporosis/bone fractures, hyperthermia/anhydrosis, hyponatremia, tremors/abnormal movements, ataxia, dizziness, fatigue, increased  risk for falls, risk for cardiac arrhythmias/syncope, gastrointestinal side effects(hepatitis, pancreatitis, gastritis, ulcers), gingival hypertrophy/bleeding, drowsiness, sedation, anxiety/nervousness, increased risk for suicide, increased risk for depression, and psychosis.   -We also reviewed drug-drug interactions and their potential effect on seizure control and medication side effects.    -We also discussed in detail potential effects of seizures, epilepsy, and medications during pregnancy, including but not limited to fetal malformations, child developmental/intellectual disability, fetal/ risk for hemorrhages, stillbirth, maternal death, premature birth, and others. The patient/family aware that pregnancy should be avoided, unless desired, in which case we recommend discussing with us at least a year prior to planned conception. To avoid undesired pregnancy while on antiepileptics, we recommend dual contraception.   -Folic acid 2 mg is recommended for all females in childbearing age (12-44 years of age).   -Recommend chronic vitamin D supplementation and regular exercise (if not contraindicated).   -Patient/family educated on risk for SUDEP (Sudden Death in Epilepsy). Counseling was provided on the importance of strict medication and follow up compliance. The patient/family understand the risks associated with non-adherence with the medical plan as outlined, including but not limited to an increased risk for breakthrough seizures, which may contribute to injuries, disability, status epilepticus, and even death.   -Counseling was also provided on potential effects of alcohol and other drugs, which may lower seizure threshold and/or affect the metabolism of antiepileptic drugs. We recommend avoidance of alcohol and illegal drugs.  -Avoid sleep deprivation.   -We extensively discussed the aspects related to safety in drivers who suffer from epilepsy. The patient is encourage to report to the Division of  Motor Vehicles of any condition and/or spells related to confusion, disorientation, and/or loss of awareness and/or loss of consciousness; as these may pose a safety issue if they occur while operating a motor vehicle. The patient and/or family are ultimately responsible for exercising caution and abiding to regulations in place.   -Other seizure precautions were discussed at length, including no diving, no skydiving, no climbing or exposure to unprotected heights, no unsupervised swimming, no Jacuzzi or bathing in bathtubs or deep bodies of water. The patient/family have been advised about risks for operating any machinery while suffering from seizures / syncope / epilepsy and/or while taking antiepileptic drugs.   -The patient understands and agrees that due to the complexity of his/her diagnosis, results of any testing and further recommendations will typically be discussed/made during a face to face encounter in my office. The patient and/or family further understands it is their responsibility to keep proper follow up.     Patient/family agree with plan, as outlined.         Nakia Gerber, MSN, APRN, FNP-C  [facility]  Office: [phone]  Fax: [phone]    BILLING DOCUMENTATION:   Total time spent including chart review before and after visit was 40min. Over 50% of the time of the visit today was spent on counseling and or coordination of care wtih the patient and/or family, with greater than 50% of the total discussing my assessment and plan as stated above.

## 2022-01-26 ENCOUNTER — OFFICE VISIT (OUTPATIENT)
Dept: NEUROLOGY | Facility: MEDICAL CENTER | Age: 37
End: 2022-01-26
Attending: NURSE PRACTITIONER
Payer: COMMERCIAL

## 2022-01-26 VITALS
BODY MASS INDEX: 23.89 KG/M2 | HEART RATE: 85 BPM | DIASTOLIC BLOOD PRESSURE: 64 MMHG | OXYGEN SATURATION: 96 % | SYSTOLIC BLOOD PRESSURE: 98 MMHG | HEIGHT: 61 IN | TEMPERATURE: 97.8 F | WEIGHT: 126.54 LBS

## 2022-01-26 DIAGNOSIS — G40.109 LOCALIZATION-RELATED EPILEPSY (HCC): ICD-10-CM

## 2022-01-26 DIAGNOSIS — Z13.31 SCREENING FOR DEPRESSION: ICD-10-CM

## 2022-01-26 DIAGNOSIS — Z30.09 ENCOUNTER FOR COUNSELING REGARDING CONTRACEPTION: ICD-10-CM

## 2022-01-26 DIAGNOSIS — Z30.09 ENCOUNTER FOR FEMALE FAMILY PLANNING COUNSELING: ICD-10-CM

## 2022-01-26 PROCEDURE — 99215 OFFICE O/P EST HI 40 MIN: CPT | Performed by: NURSE PRACTITIONER

## 2022-01-26 PROCEDURE — 99211 OFF/OP EST MAY X REQ PHY/QHP: CPT | Performed by: NURSE PRACTITIONER

## 2022-01-26 RX ORDER — CENOBAMATE 150 MG/1
150 TABLET, FILM COATED ORAL
Qty: 30 TABLET | Refills: 2 | Status: SHIPPED | OUTPATIENT
Start: 2022-01-26 | End: 2022-03-02 | Stop reason: SDUPTHER

## 2022-01-26 ASSESSMENT — FIBROSIS 4 INDEX: FIB4 SCORE: 0.68

## 2022-01-26 ASSESSMENT — PATIENT HEALTH QUESTIONNAIRE - PHQ9: CLINICAL INTERPRETATION OF PHQ2 SCORE: 0

## 2022-02-22 NOTE — PROGRESS NOTES
Chief Complaint   Patient presents with   • Follow-Up     Seizure Disorder       Problem List Items Addressed This Visit    None     Visit Diagnoses     Localization-related epilepsy (HCC)        Relevant Medications    Cenobamate (XCOPRI) 150 MG Tab    Screening for depression        Encounter for female family planning counseling              Interim History:  Umu Mcguire 36 y.o. female presents today for seizure f/u with mom.     was used during this visit.     Pt had 4 seizures (2/14 - 2x and 2/15 -2x). No known triggers. She has been compliant with ASMs. They report that she has been more sleepy with the xcopri but it's not every day and not concerning for them. They state it's more of she sleeps for 45-60min on some days.There was a message from her PCP last week that she has been feeling tired and sleepy and they believe those were d/t her breakthrough seizures. Mood is good. No SI or HI. She is taking vit D. Not sexually active. She is not driving.She is wondering as to why her BP can be low but she can't tell me the range. She states she feels like her BP is low on some days but she does not have BP machine to monitor her BP.  They have not followed-up on the MRI brain. No other concerns.       Past medical history:   Past Medical History:   Diagnosis Date   • DUB (dysfunctional uterine bleeding)    • Epilepsy (HCC) 11/4/2009    since infant.  sees Wilfrido/Codey at [facility].  Keppra/depakote.  absence siezures 1x/month-thinks iwth menstruation.   • GERD (gastroesophageal reflux disease)     gastritis-only with spicy foods   • Seizure disorder (HCC)        Past surgical history:   Past Surgical History:   Procedure Laterality Date   • ERCP  9/30/2019    Procedure: ERCP (ENDOSCOPIC RETROGRADE CHOLANGIOPANCREATOGRAPHY);  Surgeon: Kaushal Lopes M.D.;  Location: SURGERY [facility];  Service: Gastroenterology   • DEONDRE BY LAPAROSCOPY N/A 4/8/2018    Procedure: DEONDRE BY  LAPAROSCOPY;  Surgeon: Chava Busby M.D.;  Location: SURGERY [facility];  Service: General   • ERCP  4/6/2018    Procedure: ERCP;  Surgeon: Osiel Paige M.D.;  Location: SURGERY [facility];  Service: Gastroenterology       Family history:   Family History   Problem Relation Age of Onset   • Cancer Neg Hx    • Diabetes Neg Hx    • Stroke Neg Hx        Social history:   Social History     Socioeconomic History   • Marital status: Single     Spouse name: Not on file   • Number of children: Not on file   • Years of education: Not on file   • Highest education level: Not on file   Occupational History   • Not on file   Tobacco Use   • Smoking status: Never Smoker   • Smokeless tobacco: Never Used   Vaping Use   • Vaping Use: Never used   Substance and Sexual Activity   • Alcohol use: No   • Drug use: No   • Sexual activity: Not Currently     Comment: virginal   Other Topics Concern   • Not on file   Social History Narrative   • Not on file     Social Determinants of Health     Financial Resource Strain: Not on file   Food Insecurity: Not on file   Transportation Needs: Not on file   Physical Activity: Not on file   Stress: Not on file   Social Connections: Not on file   Intimate Partner Violence: Not on file   Housing Stability: Not on file       Current medications:   Current Outpatient Medications   Medication   • Cenobamate (XCOPRI) 150 MG Tab   • divalproex (DEPAKOTE) 500 MG Tablet Delayed Response   • levETIRAcetam (KEPPRA) 500 MG Tab   • ergocalciferol (DRISDOL) 77428 UNIT capsule     No current facility-administered medications for this visit.       Medication Allergy:  Allergies   Allergen Reactions   • Nkda [No Known Drug Allergy]          Review of systems:     General: Denies fevers or chills, or nightsweats, or generalized fatigue.    Head: Denies headaches or dizziness or lightheadedness  Dermatologic:  Denies new rash  Musculoskeletal: Denies muscle pain or swelling, no atrophy, no  "neck and back pain or stiffness.   Neurologic: Denies facial droopiness, muscle weakness (focal or generalized), paresthesias, ataxia, change in speech or language, memory loss, abnormal movements. + seizures, loss of consciousness  Psychiatric: Denies anxiety, depression, mood swings, suicidal or homicidal thoughts       Physical examination:   Vitals:    03/02/22 1003   BP: 116/60   BP Location: Left arm   Patient Position: Sitting   BP Cuff Size: Adult   Pulse: 71   Temp: 36.5 °C (97.7 °F)   TempSrc: Temporal   SpO2: 98%   Weight: 57.5 kg (126 lb 12.2 oz)   Height: 1.549 m (5' 1\")     General: Patient in no acute distress, pleasant and cooperative.  HEENT: Normocephalic, no signs of acute trauma.   Neck: Supple. There is normal range of motion.   Skin: no signs of acute rashes or trauma.   Musculoskeletal: joints exhibit full range of motion  Psychiatric: No hallucinatory behavior. No symptoms of depression or suicidal ideation. Mood and affect appear normal on exam.     NEUROLOGICAL EXAM:   Mental status, orientation: Awake, alert and fully oriented.   Speech and language: speech is clear and fluent. The patient is able to name, repeat and comprehend.   Memory: There is intact recollection of recent and remote events.   Motor exam: Strength is 5/5 in all extremities. Tone is normal. No abnormal movements were seen on exam.   Sensory exam reveals normal sense of light touch in all extremities.   Gait: The patient was able to get up from seated position on first attempt without requiring assistance. Found to be steady when walking. Movements were fluid with normal arm swing.       ANCILLARY DATA REVIEWED:       Lab Data Review:  Reviewed in chart. Hepatic panel    Records reviewed:   Reviewed in chart.    Imaging:   CT head 4/25/2013  1. No acute intracranial abnormality.  2. Frontal scalp hematoma.     EEG:  EEG April 2008  IMPRESSION:  Ambulatory electroencephalogram recording is abnormalwith the appearance of " "rare generalized sharp activity and left temporal sharp wave activity.  Both noted during sleep only.  No definitive epileptogenic discharges are noted, but clinical correlation is warranted for possible focal and generalized seizure disorder.  Again, no definite epileptogenic discharges are noted.     VEEG July 2014  IMPRESSION:  This is an abnormal electroencephalogram  due to the presence of frequent epileptiform potentials in drowsiness and exacerbated by hyperventilation.  No clinical seizures noted.     Routine EEG 10/15/2019  INTERPRETATION:  This is an abnormal routine EEG recording in the awake, drowsy,   and sleep state(s). There is slowing in the left frontotemporal   region, as well intermittent and brief runs of rhythmic delta /   theta activity were captured in this region. The findings suggest   underlying area of cortical irritability and structural   abnormality. The patient is at increased risk for seizures,   however no seizures captured during this study.  Clinical and   radiological correlation is recommended.      ASSESSMENT AND PLAN:    1. Localization-related epilepsy (HCC)  - Cenobamate (XCOPRI) 150 MG Tab; Take 150 mg by mouth at bedtime for 90 days.  Dispense: 30 Tablet; Refill: 2    2. Screening for depression    3. Encounter for female family planning counseling          CLINICAL DISCUSSION:  Phamacoresistant epilepsy. Hx of febrile seizure in infancy. Has long history of seizures in childhood and spells were occurring monthly since she was 7 years old. Catamenial in nature. The convulsion type spell are infrequent since she was started on ASM but the \"mini spells\" of lip smacking, eyes rolling, fists clenching, drop attacks and passing out are very frequent. Her previous EEG's were abnormal. CT head was unremarkable. No MRI brain. Her last convulsive spell was in 2018. Her recent routine EEG continues to be abnormal with slowing in the left frontotemporal region, as well intermittent " and brief runs of rhythmic delta / theta activity were captured in this region. She unfortunately had breakthrough seizures on  and 2/15 with no provoking factors.      Apparently, pt has tried taking a different ASM in the which gave her a side effect of hyperactivity. I saw Cat's note in  that she was planning on weaning pt off the depakote and switch to Vimpat. However, she transitioned care to Dr. Bloch and there was no documentation about what happened to med adjustment. Pt does not remember taking the Vimpat.      No family hx of epilepsy. No TBI hx.      Not drinking alcohol. Not smoking cigarettes. No recreational drug use.     Mood is good. No suicidal or homicidal thoughts.      C/o memory loss likely related to her seizures and ASM side effects.     Pt does not plan on conceiving as of now. Discussed the importance of dual contraception preferably using copper IUD and condom for the partner as ASM's can decrease the effectiveness of OCP's. She is aware of the risk of pregnancy complications while taking ASM's which include congenital defects, abnormal fetal growth, , etc. Recommended at least 1 year of seizure freedom before planning pregnancy. She is not currently sexually active and not on birth control.  Refused folic acid.      Past ASMs: unknown, Vimpat?     Current ASMs: Depakote 500 mg BID, Keppra 500 mg, 2 tabs in am and 2 tabs qhs. Xcopri 150mg QHS.         Plan:  -We discussed options of adjusting ASMs but they want to stay as is for a couple months.      -Discussed MRI brain. Will order this and they will check cost with insurance. They will proceed if they can afford it.  - they haven't f/u on this yet. Recommended checking with [facility] as it might be cheaper there.      - Discussed avoidance of spell/sz triggers: alcohol, sleep deprivation, and stress.     - Discussed Vit D supplementation.  Recommended 2000-5000u daily after weekly supplementation.      -  Discussed driving restrictions. Pt does not drive.      -Labs to be checked for next appointment: none    -Recommended checking BP at home and making a log then present it to her PCP next time if she is concerned of low BP. Recommended drinking plenty of water.     -They will update us if she is having more frequent seizures and I will see her sooner than 3 months if needed.         FOLLOW-UP:   Return in about 3 months (around 6/2/2022).      EDUCATION AND COUNSELING:  -Education was provided to the patient and/or family regarding diagnosis and prognosis. The chronic and unpredictable nature of the condition were discussed. There is increased risk for additional events, which may carry potential for significant injuries and death. Discussed frequent seizure triggers: sleep deprivation, medication non-compliance, use of illegal drugs/alcohol, stress, and others.   -We reviewed in detail the current antiepileptic regimen. Potential side effects of antiepileptics were discussed at length, including but no limited to: hypersensitivity reactions (rash and others, some of which can be fatal), visual field changes (some of which may be irreversible), glaucoma, diplopia, kidney stones, osteopenia/osteoporosis/bone fractures, hyperthermia/anhydrosis, hyponatremia, tremors/abnormal movements, ataxia, dizziness, fatigue, increased risk for falls, risk for cardiac arrhythmias/syncope, gastrointestinal side effects(hepatitis, pancreatitis, gastritis, ulcers), gingival hypertrophy/bleeding, drowsiness, sedation, anxiety/nervousness, increased risk for suicide, increased risk for depression, and psychosis.   -We also reviewed drug-drug interactions and their potential effect on seizure control and medication side effects.    -We also discussed in detail potential effects of seizures, epilepsy, and medications during pregnancy, including but not limited to fetal malformations, child developmental/intellectual disability,  fetal/ risk for hemorrhages, stillbirth, maternal death, premature birth, and others. The patient/family aware that pregnancy should be avoided, unless desired, in which case we recommend discussing with us at least a year prior to planned conception. To avoid undesired pregnancy while on antiepileptics, we recommend dual contraception.   -Folic acid 2 mg is recommended for all females in childbearing age (12-44 years of age).   -Recommend chronic vitamin D supplementation and regular exercise (if not contraindicated).   -Patient/family educated on risk for SUDEP (Sudden Death in Epilepsy). Counseling was provided on the importance of strict medication and follow up compliance. The patient/family understand the risks associated with non-adherence with the medical plan as outlined, including but not limited to an increased risk for breakthrough seizures, which may contribute to injuries, disability, status epilepticus, and even death.   -Counseling was also provided on potential effects of alcohol and other drugs, which may lower seizure threshold and/or affect the metabolism of antiepileptic drugs. We recommend avoidance of alcohol and illegal drugs.  -Avoid sleep deprivation.   -We extensively discussed the aspects related to safety in drivers who suffer from epilepsy. The patient is encourage to report to the Division of Motor Vehicles of any condition and/or spells related to confusion, disorientation, and/or loss of awareness and/or loss of consciousness; as these may pose a safety issue if they occur while operating a motor vehicle. The patient and/or family are ultimately responsible for exercising caution and abiding to regulations in place.   -Other seizure precautions were discussed at length, including no diving, no skydiving, no climbing or exposure to unprotected heights, no unsupervised swimming, no Jacuzzi or bathing in bathtubs or deep bodies of water. The patient/family have been advised about  risks for operating any machinery while suffering from seizures / syncope / epilepsy and/or while taking antiepileptic drugs.   -The patient understands and agrees that due to the complexity of his/her diagnosis, results of any testing and further recommendations will typically be discussed/made during a face to face encounter in my office. The patient and/or family further understands it is their responsibility to keep proper follow up.     Patient/family agree with plan, as outlined.         Nakia Gerber, MSN, APRN, FNP-C  [facility]  Office: [phone]  Fax: [phone]

## 2022-02-23 ENCOUNTER — OFFICE VISIT (OUTPATIENT)
Dept: MEDICAL GROUP | Facility: MEDICAL CENTER | Age: 37
End: 2022-02-23
Attending: FAMILY MEDICINE
Payer: COMMERCIAL

## 2022-02-23 ENCOUNTER — HOSPITAL ENCOUNTER (OUTPATIENT)
Dept: LAB | Facility: MEDICAL CENTER | Age: 37
End: 2022-02-23
Attending: FAMILY MEDICINE
Payer: COMMERCIAL

## 2022-02-23 VITALS
HEART RATE: 77 BPM | OXYGEN SATURATION: 98 % | BODY MASS INDEX: 23.94 KG/M2 | SYSTOLIC BLOOD PRESSURE: 92 MMHG | TEMPERATURE: 97 F | HEIGHT: 61 IN | DIASTOLIC BLOOD PRESSURE: 56 MMHG | RESPIRATION RATE: 16 BRPM | WEIGHT: 126.8 LBS

## 2022-02-23 DIAGNOSIS — T88.7XXA SIDE EFFECT OF DRUG: ICD-10-CM

## 2022-02-23 DIAGNOSIS — G40.009 PARTIAL IDIOPATHIC EPILEPSY WITH SEIZURES OF LOCALIZED ONSET, NOT INTRACTABLE, WITHOUT STATUS EPILEPTICUS (HCC): ICD-10-CM

## 2022-02-23 PROBLEM — R42 DIZZINESS: Status: ACTIVE | Noted: 2022-02-23

## 2022-02-23 PROBLEM — R87.69 ABNORMAL CYTOLOGICAL FINDINGS IN FEMALE GENITAL ORGANS: Status: RESOLVED | Noted: 2021-11-17 | Resolved: 2022-02-23

## 2022-02-23 LAB
ALBUMIN SERPL BCP-MCNC: 4.5 G/DL (ref 3.2–4.9)
ALP SERPL-CCNC: 53 U/L (ref 30–99)
ALT SERPL-CCNC: 10 U/L (ref 2–50)
AST SERPL-CCNC: 15 U/L (ref 12–45)
BILIRUB CONJ SERPL-MCNC: <0.2 MG/DL (ref 0.1–0.5)
BILIRUB INDIRECT SERPL-MCNC: NORMAL MG/DL (ref 0–1)
BILIRUB SERPL-MCNC: <0.2 MG/DL (ref 0.1–1.5)
PROT SERPL-MCNC: 7.7 G/DL (ref 6–8.2)

## 2022-02-23 PROCEDURE — 99214 OFFICE O/P EST MOD 30 MIN: CPT | Performed by: FAMILY MEDICINE

## 2022-02-23 PROCEDURE — 99213 OFFICE O/P EST LOW 20 MIN: CPT | Performed by: FAMILY MEDICINE

## 2022-02-23 PROCEDURE — 36415 COLL VENOUS BLD VENIPUNCTURE: CPT

## 2022-02-23 PROCEDURE — 80076 HEPATIC FUNCTION PANEL: CPT

## 2022-02-23 ASSESSMENT — FIBROSIS 4 INDEX: FIB4 SCORE: 0.68

## 2022-02-23 NOTE — ASSESSMENT & PLAN NOTE
Pt reports she had an appt with Dr. Steele in 10/2021 and had a colpo. She was told her results were normal and that she needs f/u pap in 1 year.

## 2022-02-23 NOTE — ASSESSMENT & PLAN NOTE
Patient is here today to discuss her seizures, new medication, and side effects.  She was having multiple breakthrough seizures on Keppra and Depakote when she was started on Xcopri in 11/2021. she does report significant improvement in seizure frequency without any seizures for 2 months until 2/14 when her mother describes 2 seizures that day and 2 seizures the following day.  Patient and her mother worried that it has something to do with her blood pressure being on the low side.  She feels weak, denies LOC or near syncope, denies room spinning.   Patient also notes that she is having some side effects, including nausea, weakness, tingling on the skin, yellowing of the face.  She does note that these effects are worse as they have been titrating Xcopri up.

## 2022-02-23 NOTE — ASSESSMENT & PLAN NOTE
Pt has had 3 year history of intermittent dizziness.  She reports that in recent months she had had 1 episode per month, but previously very intermittent, not even monthly.   She started Xcopri a new medication in 11/2021, she does report significant improvement in seizure frequency without any seizures in 2 months until 2/14 and she fell in the shower after a seizure followed by another seizure that day. She had another 2 seizures the next day while laying down.    She feels weak, denies LOC or near syncope, denies room spinning.   She reports that when she eats an orange with sugar and it passes. It takes about 2 days for the episode to pass  Nothing in particular causes it and feels it constantly

## 2022-02-23 NOTE — LETTER
[facility]  Gayathri Thomson M.D.  [address]  [place] [address]  Fax: [phone]   Authorization for Release/Disclosure of   Protected Health Information   Name: BETO FAY : 1985 SSN: [id number]   Address:  [address] [place] [address] Phone:    [phone] (home)    I authorize the entity listed below to release/disclose the PHI below to:   [facility]/Gayathri Thomson M.D. and Gayathri Thomson M.D.   Provider or Entity Name:  Dr. Steele   Address   City, [facility]   Phone:      Fax:     Reason for request: continuity of care   Information to be released:    [  ] LAST COLONOSCOPY,  including any PATH REPORT and follow-up  [  ] LAST FIT/COLOGUARD RESULT [  ] LAST DEXA  [  ] LAST MAMMOGRAM  [  ] LAST PAP  [  ] LAST LABS [  ] RETINA EXAM REPORT  [  ] IMMUNIZATION RECORDS  [ X ] Release all info      [  ] Check here and initial the line next to each item to release ALL health information INCLUDING  _____ Care and treatment for drug and / or alcohol abuse  _____ HIV testing, infection status, or AIDS  _____ Genetic Testing    DATES OF SERVICE OR TIME PERIOD TO BE DISCLOSED: _____________  I understand and acknowledge that:  * This Authorization may be revoked at any time by you in writing, except if your health information has already been used or disclosed.  * Your health information that will be used or disclosed as a result of you signing this authorization could be re-disclosed by the recipient. If this occurs, your re-disclosed health information may no longer be protected by State or Federal laws.  * You may refuse to sign this Authorization. Your refusal will not affect your ability to obtain treatment.  * This Authorization becomes effective upon signing and will  on (date) __________.      If no date is indicated, this Authorization will  one (1) year from the signature date.    Name: Beto Fay    Signature:   Date:     2022       PLEASE FAX REQUESTED  RECORDS BACK TO: [phone]

## 2022-02-23 NOTE — PROGRESS NOTES
Subjective:     CC:   Chief Complaint   Patient presents with   • Dizziness   • Numbness         HPI:     Papanicolaou smear of cervix with atypical squamous cells cannot exclude high grade squamous intraepithelial lesion (ASC-H)  Pt reports she had an appt with Dr. Steele in 10/2021 and had a colpo. She was told her results were normal and that she needs f/u pap in 1 year.     Epilepsy (HCC)  Patient is here today to discuss her seizures, new medication, and side effects.  She was having multiple breakthrough seizures on Keppra and Depakote when she was started on Xcopri in 11/2021. she does report significant improvement in seizure frequency without any seizures for 2 months until 2/14 when her mother describes 2 seizures that day and 2 seizures the following day.  Patient and her mother worried that it has something to do with her blood pressure being on the low side.  She feels weak, denies LOC or near syncope, denies room spinning.   Patient also notes that she is having some side effects, including nausea, weakness, tingling on the skin, yellowing of the face.  She does note that these effects are worse as they have been titrating Xcopri up.            Past Medical History:   Diagnosis Date   • DUB (dysfunctional uterine bleeding)    • Epilepsy (HCC) 11/4/2009    since infant.  sees Wilfrido/Codey at [facility].  Keppra/depakote.  absence siezures 1x/month-thinks iwth menstruation.   • GERD (gastroesophageal reflux disease)     gastritis-only with spicy foods   • Seizure disorder (HCC)        Social History     Tobacco Use   • Smoking status: Never Smoker   • Smokeless tobacco: Never Used   Vaping Use   • Vaping Use: Never used   Substance Use Topics   • Alcohol use: No   • Drug use: No       Current Outpatient Medications Ordered in Epic   Medication Sig Dispense Refill   • Cenobamate (XCOPRI) 150 MG Tab Take 150 mg by mouth at bedtime for 90 days. 30 Tablet 2   • divalproex (DEPAKOTE) 500 MG Tablet Delayed  "Response Take 1 Tablet by mouth 2 times a day for 180 days. 180 Tablet 1   • levETIRAcetam (KEPPRA) 500 MG Tab Take 2 Tablets by mouth 2 times a day for 180 days. 120 Tablet 5   • ergocalciferol (DRISDOL) 36114 UNIT capsule Take 1 Capsule by mouth every 7 days. 4 Capsule 5     No current Epic-ordered facility-administered medications on file.       Allergies:  Nkda [no known drug allergy]    ROS:  Per HPI      Objective:       Exam:  BP (!) 92/56 (BP Location: Left arm, Patient Position: Sitting, BP Cuff Size: Adult)   Pulse 77   Temp 36.1 °C (97 °F) (Temporal)   Resp 16   Ht 1.549 m (5' 1\")   Wt 57.5 kg (126 lb 12.8 oz)   SpO2 98%   BMI 23.96 kg/m²  Body mass index is 23.96 kg/m².    Constitutional: Alert, no distress, well-groomed.  Respiratory: Unlabored respiratory effort, no cough.  MSK: moves all extremities.  Derm: no scleral icterus, possibly some yellowing of the face  Psych: Alert and oriented x3, normal affect and mood.      Labs:   Reviewed labs from 11/13/2021, LFTs normal, CBC and rest of CMP is normal  Reviewed notes from her neurologist    Assessment & Plan:     36 y.o. female with the following -     1. Partial idiopathic epilepsy with seizures of localized onset, not intractable, without status epilepticus (HCC)  Patient is experiencing breakthrough seizures after.  Been seizure-free.  She states that she did not miss any doses of her medication, however her mother does report that she can be forgetful.  She is having increasing side effects, so I did discuss with her that sometimes that the benefit of the medication is significant if she is able to tolerate the side effects.  She states that she is going to tolerate side effects if she can be seizure-free.  I will send a message to her neurologist to inform her that she is having some side effects.    2. Side effect of drug  - HEPATIC FUNCTION PANEL; Future  Since patient is describing yellowing of the skin more than usual, LFTs are " ordered.    Return if symptoms worsen or fail to improve.    Please note that this dictation was created using voice recognition software. I have made every reasonable attempt to correct obvious errors, but I expect that there are errors of grammar and possibly content that I did not discover before finalizing the note.

## 2022-02-24 ENCOUNTER — TELEPHONE (OUTPATIENT)
Dept: NEUROLOGY | Facility: MEDICAL CENTER | Age: 37
End: 2022-02-24
Payer: COMMERCIAL

## 2022-02-24 NOTE — TELEPHONE ENCOUNTER
Called pt to ask her to come in this Friday, per Nakia. She should be seen ASAP due to primary notes. Pt said she would be unable to due to only having transportation on Wednesdays. She said she would like to come in earlier, but that it would not be possible this Friday. Pt decided to just wait until next Wednesday to come in.

## 2022-03-02 ENCOUNTER — OFFICE VISIT (OUTPATIENT)
Dept: NEUROLOGY | Facility: MEDICAL CENTER | Age: 37
End: 2022-03-02
Attending: NURSE PRACTITIONER
Payer: COMMERCIAL

## 2022-03-02 VITALS
HEIGHT: 61 IN | BODY MASS INDEX: 23.93 KG/M2 | HEART RATE: 71 BPM | SYSTOLIC BLOOD PRESSURE: 116 MMHG | DIASTOLIC BLOOD PRESSURE: 60 MMHG | OXYGEN SATURATION: 98 % | WEIGHT: 126.76 LBS | TEMPERATURE: 97.7 F

## 2022-03-02 DIAGNOSIS — G40.109 LOCALIZATION-RELATED EPILEPSY (HCC): ICD-10-CM

## 2022-03-02 DIAGNOSIS — Z13.31 SCREENING FOR DEPRESSION: ICD-10-CM

## 2022-03-02 DIAGNOSIS — Z30.09 ENCOUNTER FOR FEMALE FAMILY PLANNING COUNSELING: ICD-10-CM

## 2022-03-02 PROCEDURE — 99211 OFF/OP EST MAY X REQ PHY/QHP: CPT | Performed by: NURSE PRACTITIONER

## 2022-03-02 PROCEDURE — 99214 OFFICE O/P EST MOD 30 MIN: CPT | Performed by: NURSE PRACTITIONER

## 2022-03-02 RX ORDER — CENOBAMATE 150 MG/1
150 TABLET, FILM COATED ORAL
Qty: 30 TABLET | Refills: 2 | Status: SHIPPED | OUTPATIENT
Start: 2022-03-02 | End: 2022-05-30 | Stop reason: SDUPTHER

## 2022-03-02 ASSESSMENT — FIBROSIS 4 INDEX: FIB4 SCORE: 0.72

## 2022-03-04 ENCOUNTER — DOCUMENTATION (OUTPATIENT)
Dept: MEDICAL GROUP | Facility: MEDICAL CENTER | Age: 37
End: 2022-03-04
Payer: COMMERCIAL

## 2022-03-04 DIAGNOSIS — R87.611 PAPANICOLAOU SMEAR OF CERVIX WITH ATYPICAL SQUAMOUS CELLS CANNOT EXCLUDE HIGH GRADE SQUAMOUS INTRAEPITHELIAL LESION (ASC-H): ICD-10-CM

## 2022-03-21 ENCOUNTER — TELEPHONE (OUTPATIENT)
Dept: PHYSICAL MEDICINE AND REHAB | Facility: REHABILITATION | Age: 37
End: 2022-03-21
Payer: COMMERCIAL

## 2022-03-21 NOTE — TELEPHONE ENCOUNTER
Called Elif back from [facility] and let her know that pt is unable to afford [facility] at the moment. She understood.

## 2022-04-20 ENCOUNTER — TELEPHONE (OUTPATIENT)
Dept: NEUROLOGY | Facility: MEDICAL CENTER | Age: 37
End: 2022-04-20
Payer: COMMERCIAL

## 2022-04-20 NOTE — TELEPHONE ENCOUNTER
Called pt and clarified that Nakia had not ordered any labs for her. Pt said she could have sworn Nakia wanted to check something regarding her kidneys. I told pt that the last note said there were no orders to be done, and that I had already spoke with Nakia, and that she said she did not need labs from pt. Pt asked if I could ask specifically if Nakia wanted any labs done regarding pt's kidneys. Also confirmed that pt has an MRI ordered by Nakia. Please advise.

## 2022-04-22 ENCOUNTER — TELEPHONE (OUTPATIENT)
Dept: NEUROLOGY | Facility: MEDICAL CENTER | Age: 37
End: 2022-04-22
Payer: COMMERCIAL

## 2022-04-22 NOTE — TELEPHONE ENCOUNTER
Called pt to tell her that she did not need labs done, and that between Nakia and her pcp, she had everything done. Pt understood. Pt said she was having issues with her xcopri, and that she needed help with it. I told her I would submit a PA through her insurance. She understood.

## 2022-05-04 ENCOUNTER — TELEPHONE (OUTPATIENT)
Dept: NEUROLOGY | Facility: MEDICAL CENTER | Age: 37
End: 2022-05-04
Payer: COMMERCIAL

## 2022-05-04 NOTE — TELEPHONE ENCOUNTER
Called pt back and told her that I submitted a PA for her xcopri, but still have not heard back from her insurance. Once they get back to us, I will update her on the status. She understood.

## 2022-05-18 NOTE — PROGRESS NOTES
Chief Complaint   Patient presents with   • Seizure       Problem List Items Addressed This Visit    None     Visit Diagnoses     Localization-related epilepsy (HCC)        Relevant Medications    levETIRAcetam (KEPPRA) 500 MG Tab    divalproex (DEPAKOTE) 500 MG Tablet Delayed Response    Cenobamate (XCOPRI) 150 MG Tab    Other Relevant Orders    CBC WITH DIFFERENTIAL    Comp Metabolic Panel    AMMONIA    Vitamin D deficiency        Relevant Orders    VITAMIN D,25 HYDROXY          Interim History:  Umu Mcguire 36 y.o. female presents today with mom for seizure f/u.      was during this visit.     She hasn't had any seizures since February 2022. Still on depakote, keppra and xcopri. No side effects. They are happy but mom is stressed because the copay for the xcopri is over $700. Mood is good. No SI or HI. She is taking vit D. She is not sexually active. Not on birth control. They have not done MRI brain as they cannot afford financially. Mom is crying during the visit as she is asking for help financially. No other issues.        Past medical history:   Past Medical History:   Diagnosis Date   • DUB (dysfunctional uterine bleeding)    • Epilepsy (HCC) 11/4/2009    since infant.  sees Wilfrido/Codey at [facility].  Keppra/depakote.  absence siezures 1x/month-thinks iwth menstruation.   • GERD (gastroesophageal reflux disease)     gastritis-only with spicy foods   • Seizure disorder (HCC)        Past surgical history:   Past Surgical History:   Procedure Laterality Date   • ERCP  9/30/2019    Procedure: ERCP (ENDOSCOPIC RETROGRADE CHOLANGIOPANCREATOGRAPHY);  Surgeon: Kaushal Lopes M.D.;  Location: SURGERY [facility];  Service: Gastroenterology   • DEONDRE BY LAPAROSCOPY N/A 4/8/2018    Procedure: DEONDRE BY LAPAROSCOPY;  Surgeon: Chava Busby M.D.;  Location: SURGERY [facility];  Service: General   • ERCP  4/6/2018    Procedure: ERCP;  Surgeon: Osiel Paige M.D.;   Location: SURGERY [facility];  Service: Gastroenterology       Family history:   Family History   Problem Relation Age of Onset   • Cancer Neg Hx    • Diabetes Neg Hx    • Stroke Neg Hx        Social history:   Social History     Socioeconomic History   • Marital status: Single     Spouse name: Not on file   • Number of children: Not on file   • Years of education: Not on file   • Highest education level: Not on file   Occupational History   • Not on file   Tobacco Use   • Smoking status: Never Smoker   • Smokeless tobacco: Never Used   Vaping Use   • Vaping Use: Never used   Substance and Sexual Activity   • Alcohol use: No   • Drug use: No   • Sexual activity: Not Currently     Comment: virginal   Other Topics Concern   • Not on file   Social History Narrative   • Not on file     Social Determinants of Health     Financial Resource Strain: Not on file   Food Insecurity: Not on file   Transportation Needs: Not on file   Physical Activity: Not on file   Stress: Not on file   Social Connections: Not on file   Intimate Partner Violence: Not on file   Housing Stability: Not on file       Current medications:   Current Outpatient Medications   Medication   • Cenobamate (XCOPRI) 150 MG Tab   • divalproex (DEPAKOTE) 500 MG Tablet Delayed Response   • levETIRAcetam (KEPPRA) 500 MG Tab   • ergocalciferol (DRISDOL) 14124 UNIT capsule     No current facility-administered medications for this visit.       Medication Allergy:  Allergies   Allergen Reactions   • Nkda [No Known Drug Allergy]          Review of systems:     General: Denies fevers or chills, or nightsweats, or generalized fatigue.    Head: Denies headaches or dizziness or lightheadedness  Dermatologic:  Denies new rash  Musculoskeletal: Denies muscle pain or swelling, no atrophy, no neck and back pain or stiffness.   Neurologic: Denies facial droopiness, muscle weakness (focal or generalized), paresthesias, ataxia, change in speech or language, memory loss,  "abnormal movements, seizures, loss of consciousness, or episodes of confusion.   Psychiatric: Denies anxiety, depression, mood swings, suicidal or homicidal thoughts       Physical examination:   Vitals:    06/08/22 0842   BP: (!) 94/56   BP Location: Right arm   Patient Position: Sitting   BP Cuff Size: Adult   Pulse: 63   SpO2: 98%   Weight: 54.7 kg (120 lb 8 oz)   Height: 1.549 m (5' 1\")     General: Patient in no acute distress, pleasant and cooperative.  HEENT: Normocephalic, no signs of acute trauma.   Neck: Supple. There is normal range of motion.   Musculoskeletal: joints exhibit full range of motion  Psychiatric: No hallucinatory behavior. No symptoms of depression or suicidal ideation. Mood and affect appear normal on exam.     NEUROLOGICAL EXAM:   Mental status, orientation: Awake, alert and fully oriented.   Speech and language: speech is clear and fluent. The patient is able to name, repeat and comprehend.   Memory: There is intact recollection of recent and remote events.   Motor exam: Strength is 5/5 in all extremities. Tone is normal. No abnormal movements were seen on exam.   Sensory exam reveals normal sense of light touch in all extremities.   Gait: The patient was able to get up from seated position on first attempt without requiring assistance. Found to be steady when walking. Movements were fluid with normal arm swing.       ANCILLARY DATA REVIEWED:       Lab Data Review:  Reviewed in chart.     Records reviewed:   Reviewed in chart.    Imaging:   CT head 4/25/2013  1. No acute intracranial abnormality.  2. Frontal scalp hematoma.     EEG:  EEG April 2008  IMPRESSION:  Ambulatory electroencephalogram recording is abnormalwith the appearance of rare generalized sharp activity and left temporal sharp wave activity.  Both noted during sleep only.  No definitive epileptogenic discharges are noted, but clinical correlation is warranted for possible focal and generalized seizure disorder.  Again, no " "definite epileptogenic discharges are noted.     VEEG July 2014  IMPRESSION:  This is an abnormal electroencephalogram  due to the presence of frequent epileptiform potentials in drowsiness and exacerbated by hyperventilation.  No clinical seizures noted.     Routine EEG 10/15/2019  INTERPRETATION:  This is an abnormal routine EEG recording in the awake, drowsy,   and sleep state(s). There is slowing in the left frontotemporal   region, as well intermittent and brief runs of rhythmic delta /   theta activity were captured in this region. The findings suggest   underlying area of cortical irritability and structural   abnormality. The patient is at increased risk for seizures,   however no seizures captured during this study.  Clinical and   radiological correlation is recommended.      ASSESSMENT AND PLAN:    1. Localization-related epilepsy (HCC)  levETIRAcetam (KEPPRA) 500 MG Tab    divalproex (DEPAKOTE) 500 MG Tablet Delayed Response    Cenobamate (XCOPRI) 150 MG Tab    CBC WITH DIFFERENTIAL    Comp Metabolic Panel    AMMONIA   2. Vitamin D deficiency  VITAMIN D,25 HYDROXY   3. Screening for depression     4. Encounter for female family planning counseling     5. Encounter for counseling regarding contraception               CLINICAL DISCUSSION:  Phamacoresistant epilepsy. Hx of febrile seizure in infancy. Has long history of seizures in childhood and spells were occurring monthly since she was 7 years old. Catamenial in nature. The convulsion type spell are infrequent since she was started on ASM but the \"mini spells\" of lip smacking, eyes rolling, fists clenching, drop attacks and passing out are very frequent. Her previous EEG's were abnormal. CT head was unremarkable. No MRI brain. Her last convulsive spell was in 2018. Her recent routine EEG continues to be abnormal with slowing in the left frontotemporal region, as well intermittent and brief runs of rhythmic delta / theta activity were captured in this " [facility]. She has been seizure free since 2/15/22. This is the longest seizure freedom she's had.      Apparently, pt has tried taking a different ASM in the which gave her a side effect of hyperactivity. I saw Cat's note in  that she was planning on weaning pt off the depakote and switch to Vimpat. However, she transitioned care to Dr. Bloch and there was no documentation about what happened to med adjustment. Pt does not remember taking the Vimpat.      No family hx of epilepsy. No TBI hx.      Not drinking alcohol. Not smoking cigarettes. No recreational drug use.     Mood is good. No suicidal or homicidal thoughts.      C/o memory loss likely related to her seizures and ASM side effects. This is stable.      Pt does not plan on conceiving as of now. Discussed the importance of dual contraception preferably using copper IUD and condom for the partner as ASM's can decrease the effectiveness of OCP's. She is aware of the risk of pregnancy complications while taking ASM's which include congenital defects, abnormal fetal growth, , etc. Recommended at least 1 year of seizure freedom before planning pregnancy. She is not currently sexually active and not on birth control.  Refused folic acid.      Past ASMs: unknown, Vimpat?     Current ASMs: Depakote 500 mg BID, Keppra 500 mg, 2 tabs in am and 2 tabs qhs. Xcopri 150mg QHS.         Plan:  -We agreed with staying on current ASMs so she can hopefully enjoy seizure freedom more. Given xcopri samples. KEN Yanez, will work on pre-authorization for the xcopri to see if we can lower her copay.      -can't do MRI brain d/t financial restriction.      - Discussed avoidance of spell/sz triggers: alcohol, sleep deprivation, and stress.     - Discussed Vit D supplementation.  Recommended 2000-5000u daily after weekly supplementation.      - Discussed driving restrictions. Pt does not drive.      -Labs to be checked for next appointment: CBC, CMP, Vit D,  ammonia        FOLLOW-UP:   Return in about 3 months (around 2022).      EDUCATION AND COUNSELING:  -Education was provided to the patient and/or family regarding diagnosis and prognosis. The chronic and unpredictable nature of the condition were discussed. There is increased risk for additional events, which may carry potential for significant injuries and death. Discussed frequent seizure triggers: sleep deprivation, medication non-compliance, use of illegal drugs/alcohol, stress, and others.   - There are potential side effects of antiepileptics including but no limited to: hypersensitivity reactions (rash and others, some of which can be fatal), visual field changes (some of which may be irreversible), glaucoma, diplopia, kidney stones, osteopenia/osteoporosis/bone fractures, hyperthermia/anhydrosis, hyponatremia, tremors/abnormal movements, ataxia, dizziness, fatigue, increased risk for falls, risk for cardiac arrhythmias/syncope, gastrointestinal side effects(hepatitis, pancreatitis, gastritis, ulcers), gingival hypertrophy/bleeding, drowsiness, sedation, anxiety/nervousness, increased risk for suicide, increased risk for depression, and psychosis.   -There are potential effects of seizures, epilepsy, and medications during pregnancy, including but not limited to fetal malformations, child developmental/intellectual disability, fetal/ risk for hemorrhages, stillbirth, maternal death, premature birth, and others. The patient/family aware that pregnancy should be avoided, unless desired, in which case we recommend discussing with us at least a year prior to planned conception. To avoid undesired pregnancy while on antiepileptics, we recommend dual contraception.   -Folic acid 2 mg is recommended for all females in childbearing age (12-44 years of age).   -Recommend chronic vitamin D supplementation and regular exercise (if not contraindicated).   -Patient/family educated on risk for SUDEP (Sudden  Death in Epilepsy). Counseling was provided on the importance of strict medication and follow up compliance. The patient/family understand the risks associated with non-adherence with the medical plan as outlined, including but not limited to an increased risk for breakthrough seizures, which may contribute to injuries, disability, status epilepticus, and even death.   -There are potential effects of alcohol and other drugs, which may lower seizure threshold and/or affect the metabolism of antiepileptic drugs. We recommend avoidance of alcohol and illegal drugs.  -Avoid sleep deprivation.   -The patient is encourage to report to the Division of Motor Vehicles of any condition and/or spells related to confusion, disorientation, and/or loss of awareness and/or loss of consciousness; as these may pose a safety issue if they occur while operating a motor vehicle. The patient and/or family are ultimately responsible for exercising caution and abiding to regulations in place.   -Other seizure precautions were discussed at length, including no diving, no skydiving, no climbing or exposure to unprotected heights, no unsupervised swimming, no Jacuzzi or bathing in bathtubs or deep bodies of water. There are risks for operating any machinery while suffering from seizures / syncope / epilepsy and/or while taking antiepileptic drugs.   -The patient understands and agrees that due to the complexity of his/her diagnosis, results of any testing and further recommendations will typically be discussed/made during a face to face encounter in my office. The patient and/or family further understands it is their responsibility to keep proper follow up.     Patient/family agree with plan, as outlined.         Nakia Gerber, MSN, APRN, FNP-C  [facility]  Office: [phone]  Fax: [phone]

## 2022-05-30 DIAGNOSIS — G40.109 LOCALIZATION-RELATED EPILEPSY (HCC): ICD-10-CM

## 2022-05-30 RX ORDER — CENOBAMATE 150 MG/1
150 TABLET, FILM COATED ORAL
Qty: 30 TABLET | Refills: 2 | Status: SHIPPED | OUTPATIENT
Start: 2022-05-30 | End: 2022-06-08 | Stop reason: SDUPTHER

## 2022-06-08 ENCOUNTER — OFFICE VISIT (OUTPATIENT)
Dept: NEUROLOGY | Facility: MEDICAL CENTER | Age: 37
End: 2022-06-08
Attending: NURSE PRACTITIONER
Payer: COMMERCIAL

## 2022-06-08 VITALS
HEART RATE: 63 BPM | BODY MASS INDEX: 22.75 KG/M2 | HEIGHT: 61 IN | DIASTOLIC BLOOD PRESSURE: 56 MMHG | SYSTOLIC BLOOD PRESSURE: 94 MMHG | OXYGEN SATURATION: 98 % | WEIGHT: 120.5 LBS

## 2022-06-08 DIAGNOSIS — G40.109 LOCALIZATION-RELATED EPILEPSY (HCC): ICD-10-CM

## 2022-06-08 DIAGNOSIS — E55.9 VITAMIN D DEFICIENCY: ICD-10-CM

## 2022-06-08 DIAGNOSIS — Z30.09 ENCOUNTER FOR FEMALE FAMILY PLANNING COUNSELING: ICD-10-CM

## 2022-06-08 DIAGNOSIS — Z13.31 SCREENING FOR DEPRESSION: ICD-10-CM

## 2022-06-08 DIAGNOSIS — Z30.09 ENCOUNTER FOR COUNSELING REGARDING CONTRACEPTION: ICD-10-CM

## 2022-06-08 PROCEDURE — 99214 OFFICE O/P EST MOD 30 MIN: CPT | Performed by: NURSE PRACTITIONER

## 2022-06-08 PROCEDURE — 99211 OFF/OP EST MAY X REQ PHY/QHP: CPT | Performed by: NURSE PRACTITIONER

## 2022-06-08 RX ORDER — CENOBAMATE 150 MG/1
150 TABLET, FILM COATED ORAL
Qty: 30 TABLET | Refills: 5 | Status: SHIPPED | OUTPATIENT
Start: 2022-06-08 | End: 2022-06-22 | Stop reason: SDUPTHER

## 2022-06-08 RX ORDER — DIVALPROEX SODIUM 500 MG/1
500 TABLET, DELAYED RELEASE ORAL 2 TIMES DAILY
Qty: 180 TABLET | Refills: 1 | Status: SHIPPED | OUTPATIENT
Start: 2022-06-08 | End: 2022-12-28 | Stop reason: SDUPTHER

## 2022-06-08 RX ORDER — LEVETIRACETAM 500 MG/1
1000 TABLET ORAL 2 TIMES DAILY
Qty: 120 TABLET | Refills: 5 | Status: SHIPPED | OUTPATIENT
Start: 2022-06-08 | End: 2022-12-09 | Stop reason: SDUPTHER

## 2022-06-08 ASSESSMENT — FIBROSIS 4 INDEX: FIB4 SCORE: 0.72

## 2022-06-22 ENCOUNTER — TELEPHONE (OUTPATIENT)
Dept: NEUROLOGY | Facility: MEDICAL CENTER | Age: 37
End: 2022-06-22
Payer: COMMERCIAL

## 2022-06-22 DIAGNOSIS — G40.109 LOCALIZATION-RELATED EPILEPSY (HCC): ICD-10-CM

## 2022-06-22 RX ORDER — CENOBAMATE 150 MG/1
150 TABLET, FILM COATED ORAL
Qty: 30 TABLET | Refills: 5 | Status: SHIPPED | OUTPATIENT
Start: 2022-06-22 | End: 2022-12-08 | Stop reason: SDUPTHER

## 2022-06-22 NOTE — TELEPHONE ENCOUNTER
Cenobamate (XCOPRI) 150 MG Tab     To [facility]     Has been approved, Needs new RX

## 2022-06-24 ENCOUNTER — TELEPHONE (OUTPATIENT)
Dept: NEUROLOGY | Facility: MEDICAL CENTER | Age: 37
End: 2022-06-24
Payer: COMMERCIAL

## 2022-06-24 NOTE — TELEPHONE ENCOUNTER
Patient Assit - Approved for Cenobamate (XCOPRI) 150 MG Tab  Rx to be delivered by 06/29/22    Patient Assit- Exp: 12/31/2022

## 2022-07-25 ENCOUNTER — APPOINTMENT (OUTPATIENT)
Dept: RADIOLOGY | Facility: MEDICAL CENTER | Age: 37
End: 2022-07-25
Attending: NURSE PRACTITIONER
Payer: COMMERCIAL

## 2022-08-11 DIAGNOSIS — E55.9 VITAMIN D DEFICIENCY: ICD-10-CM

## 2022-08-15 RX ORDER — ERGOCALCIFEROL 1.25 MG/1
CAPSULE ORAL
Qty: 4 CAPSULE | Refills: 0 | Status: SHIPPED | OUTPATIENT
Start: 2022-08-15 | End: 2022-10-20 | Stop reason: SDUPTHER

## 2022-09-06 ENCOUNTER — HOSPITAL ENCOUNTER (OUTPATIENT)
Dept: RADIOLOGY | Facility: MEDICAL CENTER | Age: 37
End: 2022-09-06
Attending: NURSE PRACTITIONER
Payer: COMMERCIAL

## 2022-09-06 DIAGNOSIS — G40.109 LOCALIZATION-RELATED EPILEPSY (HCC): ICD-10-CM

## 2022-09-06 PROCEDURE — A9576 INJ PROHANCE MULTIPACK: HCPCS | Performed by: NURSE PRACTITIONER

## 2022-09-06 PROCEDURE — 70553 MRI BRAIN STEM W/O & W/DYE: CPT

## 2022-09-06 PROCEDURE — 700117 HCHG RX CONTRAST REV CODE 255: Performed by: NURSE PRACTITIONER

## 2022-09-06 RX ADMIN — GADOTERIDOL 10 ML: 279.3 INJECTION, SOLUTION INTRAVENOUS at 15:32

## 2022-09-22 ENCOUNTER — HOSPITAL ENCOUNTER (OUTPATIENT)
Dept: LAB | Facility: MEDICAL CENTER | Age: 37
End: 2022-09-22
Attending: NURSE PRACTITIONER
Payer: COMMERCIAL

## 2022-09-22 DIAGNOSIS — E55.9 VITAMIN D DEFICIENCY: ICD-10-CM

## 2022-09-22 DIAGNOSIS — G40.109 LOCALIZATION-RELATED EPILEPSY (HCC): ICD-10-CM

## 2022-09-22 LAB
25(OH)D3 SERPL-MCNC: 28 NG/ML (ref 30–100)
ALBUMIN SERPL BCP-MCNC: 4.3 G/DL (ref 3.2–4.9)
ALBUMIN/GLOB SERPL: 1.3 G/DL
ALP SERPL-CCNC: 52 U/L (ref 30–99)
ALT SERPL-CCNC: 6 U/L (ref 2–50)
AMMONIA PLAS-SCNC: 22 UMOL/L (ref 11–45)
ANION GAP SERPL CALC-SCNC: 13 MMOL/L (ref 7–16)
AST SERPL-CCNC: 13 U/L (ref 12–45)
BASOPHILS # BLD AUTO: 0.6 % (ref 0–1.8)
BASOPHILS # BLD: 0.03 K/UL (ref 0–0.12)
BILIRUB SERPL-MCNC: <0.2 MG/DL (ref 0.1–1.5)
BUN SERPL-MCNC: 13 MG/DL (ref 8–22)
CALCIUM SERPL-MCNC: 9.3 MG/DL (ref 8.5–10.5)
CHLORIDE SERPL-SCNC: 104 MMOL/L (ref 96–112)
CO2 SERPL-SCNC: 21 MMOL/L (ref 20–33)
CREAT SERPL-MCNC: 0.53 MG/DL (ref 0.5–1.4)
EOSINOPHIL # BLD AUTO: 0.06 K/UL (ref 0–0.51)
EOSINOPHIL NFR BLD: 1.1 % (ref 0–6.9)
ERYTHROCYTE [DISTWIDTH] IN BLOOD BY AUTOMATED COUNT: 47.5 FL (ref 35.9–50)
GFR SERPLBLD CREATININE-BSD FMLA CKD-EPI: 121 ML/MIN/1.73 M 2
GLOBULIN SER CALC-MCNC: 3.2 G/DL (ref 1.9–3.5)
GLUCOSE SERPL-MCNC: 87 MG/DL (ref 65–99)
HCT VFR BLD AUTO: 36.6 % (ref 37–47)
HGB BLD-MCNC: 11.6 G/DL (ref 12–16)
IMM GRANULOCYTES # BLD AUTO: 0.04 K/UL (ref 0–0.11)
IMM GRANULOCYTES NFR BLD AUTO: 0.7 % (ref 0–0.9)
LYMPHOCYTES # BLD AUTO: 1.95 K/UL (ref 1–4.8)
LYMPHOCYTES NFR BLD: 35.8 % (ref 22–41)
MCH RBC QN AUTO: 27.6 PG (ref 27–33)
MCHC RBC AUTO-ENTMCNC: 31.7 G/DL (ref 33.6–35)
MCV RBC AUTO: 86.9 FL (ref 81.4–97.8)
MONOCYTES # BLD AUTO: 0.69 K/UL (ref 0–0.85)
MONOCYTES NFR BLD AUTO: 12.7 % (ref 0–13.4)
NEUTROPHILS # BLD AUTO: 2.68 K/UL (ref 2–7.15)
NEUTROPHILS NFR BLD: 49.1 % (ref 44–72)
NRBC # BLD AUTO: 0 K/UL
NRBC BLD-RTO: 0 /100 WBC
PLATELET # BLD AUTO: 245 K/UL (ref 164–446)
PMV BLD AUTO: 11.4 FL (ref 9–12.9)
POTASSIUM SERPL-SCNC: 4.6 MMOL/L (ref 3.6–5.5)
PROT SERPL-MCNC: 7.5 G/DL (ref 6–8.2)
RBC # BLD AUTO: 4.21 M/UL (ref 4.2–5.4)
SODIUM SERPL-SCNC: 138 MMOL/L (ref 135–145)
WBC # BLD AUTO: 5.5 K/UL (ref 4.8–10.8)

## 2022-09-22 PROCEDURE — 82306 VITAMIN D 25 HYDROXY: CPT

## 2022-09-22 PROCEDURE — 80053 COMPREHEN METABOLIC PANEL: CPT

## 2022-09-22 PROCEDURE — 85025 COMPLETE CBC W/AUTO DIFF WBC: CPT

## 2022-09-22 PROCEDURE — 82140 ASSAY OF AMMONIA: CPT

## 2022-09-22 PROCEDURE — 36415 COLL VENOUS BLD VENIPUNCTURE: CPT

## 2022-10-12 ENCOUNTER — TELEPHONE (OUTPATIENT)
Dept: NEUROLOGY | Facility: MEDICAL CENTER | Age: 37
End: 2022-10-12

## 2022-10-12 ENCOUNTER — TELEPHONE (OUTPATIENT)
Dept: NEUROLOGY | Facility: MEDICAL CENTER | Age: 37
End: 2022-10-12
Payer: COMMERCIAL

## 2022-10-20 ENCOUNTER — TELEPHONE (OUTPATIENT)
Dept: NEUROLOGY | Facility: MEDICAL CENTER | Age: 37
End: 2022-10-20
Payer: COMMERCIAL

## 2022-10-20 DIAGNOSIS — E55.9 VITAMIN D DEFICIENCY: ICD-10-CM

## 2022-10-20 RX ORDER — ERGOCALCIFEROL 1.25 MG/1
50000 CAPSULE ORAL
Qty: 4 CAPSULE | Refills: 11 | Status: SHIPPED | OUTPATIENT
Start: 2022-10-20 | End: 2022-12-28

## 2022-10-20 NOTE — TELEPHONE ENCOUNTER
Received request via: Pharmacy    Was the patient seen in the last year in this department? Yes    Does the patient have an active prescription (recently filled or refills available) for medication(s) requested? No    Vitamin D (Ergocalciferol) Oral capsule 1.25mg (50,000UT) Needs new script  Has follow up scheduled with Halina on 12/28/22.

## 2022-12-08 ENCOUNTER — TELEPHONE (OUTPATIENT)
Dept: SCHEDULING | Facility: IMAGING CENTER | Age: 37
End: 2022-12-08

## 2022-12-08 DIAGNOSIS — G40.109 LOCALIZATION-RELATED EPILEPSY (HCC): ICD-10-CM

## 2022-12-08 RX ORDER — CENOBAMATE 150 MG/1
150 TABLET, FILM COATED ORAL
Qty: 30 TABLET | Refills: 5 | Status: SHIPPED
Start: 2022-12-08 | End: 2022-12-15 | Stop reason: CLARIF

## 2022-12-08 NOTE — TELEPHONE ENCOUNTER
Received request via: Pharmacy    Was the patient seen in the last year in this department? Yes    Does the patient have an active prescription (recently filled or refills available) for medication(s) requested? No    Does the patient have [facility] and need 100 day supply (blood pressure, diabetes and cholesterol meds only)? Medication is not for cholesterol, blood pressure or diabetes    Urgent- patient is out of medication, patient and pharmacy are calling.

## 2022-12-08 NOTE — TELEPHONE ENCOUNTER
Caller: Umu Mcguire     Medication Name and Dosage: levETIRAcetam (KEPPRA) 500 MG Tab [[id number]]     Please call your pharmacy and have them send us a refill request or speak to a live representative, RX number may have changed.    Medication amount left: 3 day supply    Preferred Pharmacy: Pharmacy on file    Other questions (Topic): N/A    Callback Number (Will only call for issues): [phone] (home)       Thank you,    Camila CAMACHO

## 2022-12-09 ENCOUNTER — TELEPHONE (OUTPATIENT)
Dept: NEUROLOGY | Facility: MEDICAL CENTER | Age: 37
End: 2022-12-09
Payer: COMMERCIAL

## 2022-12-09 DIAGNOSIS — G40.109 LOCALIZATION-RELATED EPILEPSY (HCC): ICD-10-CM

## 2022-12-09 RX ORDER — LEVETIRACETAM 500 MG/1
1000 TABLET ORAL 2 TIMES DAILY
Qty: 120 TABLET | Refills: 1 | Status: SHIPPED | OUTPATIENT
Start: 2022-12-09 | End: 2022-12-28 | Stop reason: SDUPTHER

## 2022-12-09 NOTE — TELEPHONE ENCOUNTER
Received request via: Patient    Was the patient seen in the last year in this department? No, Was last seen by Nakia     Does the patient have an active prescription (recently filled or refills available) for medication(s) requested? No    Does the patient have [facility] and need 100 day supply (blood pressure, diabetes and cholesterol meds only)? Patient does not have SCP      Can you please refill patient Keppra 500 mg   Please and thank you.

## 2022-12-15 RX ORDER — CENOBAMATE 150 MG/1
150 TABLET, FILM COATED ORAL
Qty: 30 TABLET | Refills: 5 | Status: SHIPPED | OUTPATIENT
Start: 2022-12-15 | End: 2022-12-28 | Stop reason: SDUPTHER

## 2022-12-28 ENCOUNTER — OFFICE VISIT (OUTPATIENT)
Dept: NEUROLOGY | Facility: MEDICAL CENTER | Age: 37
End: 2022-12-28
Attending: PSYCHIATRY & NEUROLOGY
Payer: COMMERCIAL

## 2022-12-28 VITALS
SYSTOLIC BLOOD PRESSURE: 120 MMHG | DIASTOLIC BLOOD PRESSURE: 72 MMHG | OXYGEN SATURATION: 97 % | TEMPERATURE: 97.1 F | WEIGHT: 118.61 LBS | BODY MASS INDEX: 22.41 KG/M2 | HEART RATE: 64 BPM

## 2022-12-28 DIAGNOSIS — G40.109 LOCALIZATION-RELATED EPILEPSY (HCC): ICD-10-CM

## 2022-12-28 DIAGNOSIS — E55.9 VITAMIN D DEFICIENCY: ICD-10-CM

## 2022-12-28 PROCEDURE — 99211 OFF/OP EST MAY X REQ PHY/QHP: CPT | Performed by: PSYCHIATRY & NEUROLOGY

## 2022-12-28 PROCEDURE — 99215 OFFICE O/P EST HI 40 MIN: CPT | Performed by: PSYCHIATRY & NEUROLOGY

## 2022-12-28 RX ORDER — ERGOCALCIFEROL 1.25 MG/1
50000 CAPSULE ORAL
Qty: 13 CAPSULE | Refills: 3 | Status: SHIPPED | OUTPATIENT
Start: 2022-12-28 | End: 2023-12-04 | Stop reason: SDUPTHER

## 2022-12-28 RX ORDER — DIVALPROEX SODIUM 500 MG/1
500 TABLET, DELAYED RELEASE ORAL 2 TIMES DAILY
Qty: 180 TABLET | Refills: 3 | Status: SHIPPED | OUTPATIENT
Start: 2022-12-28 | End: 2023-05-01 | Stop reason: SDUPTHER

## 2022-12-28 RX ORDER — LEVETIRACETAM 500 MG/1
1000 TABLET ORAL 2 TIMES DAILY
Qty: 360 TABLET | Refills: 3 | Status: SHIPPED | OUTPATIENT
Start: 2022-12-28 | End: 2023-02-08 | Stop reason: SDUPTHER

## 2022-12-28 RX ORDER — CENOBAMATE 150 MG/1
150 TABLET, FILM COATED ORAL
Qty: 30 TABLET | Refills: 5 | Status: SHIPPED | OUTPATIENT
Start: 2022-12-28 | End: 2023-05-01 | Stop reason: SDUPTHER

## 2022-12-28 ASSESSMENT — ENCOUNTER SYMPTOMS
MYALGIAS: 1
HEMOPTYSIS: 0
TREMORS: 0
BLOOD IN STOOL: 0
LOSS OF CONSCIOUSNESS: 1
SEIZURES: 1
BRUISES/BLEEDS EASILY: 0
MEMORY LOSS: 0

## 2022-12-28 ASSESSMENT — FIBROSIS 4 INDEX: FIB4 SCORE: 0.6

## 2023-02-08 ENCOUNTER — OFFICE VISIT (OUTPATIENT)
Dept: NEUROLOGY | Facility: MEDICAL CENTER | Age: 38
End: 2023-02-08
Attending: PSYCHIATRY & NEUROLOGY
Payer: COMMERCIAL

## 2023-02-08 VITALS
SYSTOLIC BLOOD PRESSURE: 110 MMHG | BODY MASS INDEX: 22.64 KG/M2 | HEIGHT: 61 IN | DIASTOLIC BLOOD PRESSURE: 70 MMHG | WEIGHT: 119.93 LBS | OXYGEN SATURATION: 97 % | HEART RATE: 77 BPM | TEMPERATURE: 97.7 F

## 2023-02-08 DIAGNOSIS — G40.109 LOCALIZATION-RELATED EPILEPSY (HCC): ICD-10-CM

## 2023-02-08 DIAGNOSIS — G40.209 COMPLEX PARTIAL SEIZURES EVOLVING TO GENERALIZED TONIC-CLONIC SEIZURES (HCC): Primary | ICD-10-CM

## 2023-02-08 PROCEDURE — 99211 OFF/OP EST MAY X REQ PHY/QHP: CPT | Performed by: PSYCHIATRY & NEUROLOGY

## 2023-02-08 PROCEDURE — 99215 OFFICE O/P EST HI 40 MIN: CPT | Performed by: PSYCHIATRY & NEUROLOGY

## 2023-02-08 PROCEDURE — 99417 PROLNG OP E/M EACH 15 MIN: CPT | Performed by: PSYCHIATRY & NEUROLOGY

## 2023-02-08 RX ORDER — LEVETIRACETAM 500 MG/1
TABLET ORAL
Qty: 180 TABLET | Refills: 5 | Status: SHIPPED | OUTPATIENT
Start: 2023-02-08 | End: 2023-05-01 | Stop reason: SDUPTHER

## 2023-02-08 ASSESSMENT — ENCOUNTER SYMPTOMS
MEMORY LOSS: 1
LOSS OF CONSCIOUSNESS: 1
SEIZURES: 1

## 2023-02-08 ASSESSMENT — FIBROSIS 4 INDEX: FIB4 SCORE: 0.6

## 2023-02-08 ASSESSMENT — PATIENT HEALTH QUESTIONNAIRE - PHQ9: CLINICAL INTERPRETATION OF PHQ2 SCORE: 0

## 2023-02-08 NOTE — Clinical Note
Jose, this is the patient you and I had discussed at length.  She is scheduled to see you on March 14.  Thanks for all your help.  Stephen

## 2023-02-09 NOTE — PROGRESS NOTES
Subjective     Umu Mcguire is a 37 y.o. female who presents with her mother Yudy, for follow-up, with a history of refractory focal seizures.  Mostly Azeri-speaking, translation services were provided.  History is also gotten from review of the old electronic records.     HPI    Seen just 2 months ago, we had maintained her Depakote 500 mg twice daily, Keppra 1000 mg twice daily and Xcopri 150 mg every evening regimen.  She was tolerating this regimen.  For most of 2022 on this dosing she actually had no seizures at all, the seizures recurred in December after she had been placed on estrogen to help control menstrual flow.  Unfortunately she then suffered from a flurry of about 10 seizures during iatrogenic menses.  They stopped the hormones, she has continued to have problems.    Though she is not menstruating again, typically 4-5-day in duration, she is averaging upwards of 4 seizures during each of those days.  The seizures are now happening towards the end of the day and even into the late night.  Before hand they were typically a morning event.  On the days between menses, she is still averaging a single seizure in a day, she rarely has a day without.  The seizures themselves have not changed in nature.  They are rapid in onset, she is not clear about warning signs of what is happening, she then loses consciousness, there are generalized convulsions briefly, she recovers in short time after sleeping.    I reviewed the records at length, in the past she had been on Keppra 1500 mg, twice daily, Depakote still 500 mg twice daily.  Discussions in the record about adding Vimpat to the regimen were made, this never occurred.  Her seizures have always been suboptimally controlled, but at this higher dosing of Keppra, there was a question of possible cognitive side effect.  Depakote and ammonia levels were stable and negative, respectively.  Xcopri was then added at the time, Depakote was maintained, but  "Keppra was diminished, and by the time she was on Xcopri 150 mg every evening, Keppra was back at 1000 mg twice daily.  There was a mention in a single note in March, 2022 where memory difficulties she was having seemed to have gotten better on the lower dose of Keppra.    She is compliant with her medications.  She has not been placed on any other new medications.  When directly asked, she states that her memory is still not very good.  Given how long ago the drug change of was made, she does not recall if there was an improvement in cognition on the lower dose of Keppra.    EEG studies have always been a mixed bag, though there had been indications of a left temporal focus of abnormal rhythmic activity that suggest an underlying epileptogenicity though no focal seizure activity was documented.  Other tracings indicated more of a left subfrontal localization for these abnormalities.  She remembers being in the EMU in the distant past.  VNS had been discussed with her but unfortunately it proved too expensive.    Medical, surgical and family histories are reviewed, there are no new drug allergies.  She is on Xcopri, Depakote and Keppra as above.  She had been on vitamin D but lost track of her bottle.    Review of Systems   Neurological:  Positive for seizures and loss of consciousness.   Psychiatric/Behavioral:  Positive for memory loss.    All other systems reviewed and are negative.    Objective     /70 (BP Location: Right arm, Patient Position: Sitting, BP Cuff Size: Adult)   Pulse 77   Temp 36.5 °C (97.7 °F) (Temporal)   Ht 1.549 m (5' 1\")   Wt 54.4 kg (119 lb 14.9 oz)   SpO2 97%   BMI 22.66 kg/m²      Physical Exam    She appears in no acute distress.  Vital signs are stable.  There is no malar rash.  The neck is supple.  Cardiac evaluation is unremarkable.     Neurological Exam    She is fully oriented, there is no aphasia, agnosia, or apraxia.    PERRLA/EOMI, visual fields are grossly full, " facial movements are symmetric, there is no bulbar dysfunction.    Musculoskeletal exam reveals normal tone throughout, there is no tremor.  She moves all 4 extremities symmetrically.    She stands easily, gait is normal and station and stride length.  Armswing is symmetric.  There is no appendicular dystaxia.  Fine motor control with her hands is intact.    Sensory exam is deferred.    Assessment & Plan     1. Complex partial seizures evolving to generalized tonic-clonic seizures (HCC)  Obviously, with seizure control still suboptimal, we are between a rock and a hard place.  Adding a fourth drug will certainly increase the risk of side effects and drug interactions, and the likelihood of better therapeutic benefit is probably no greater than would be achieved if we simply increase the Keppra or one of the other AEDs she is on to higher dosing.  Review of records indicates she may have tolerated the Keppra, thus I will increase this first, for 2 weeks adding 1/3 tablet to the evening dose only, and then subsequently 3 tablets twice a day.  She was told to watch for drowsiness and cognitive slowing with a higher dose.  Depakote and Xcopri will be maintained.  For her other real issues are the refractory seizure pattern during menses, and whether or not as needed rescue such as rectal Diastat, intranasal Valium, etc., could be considered during those times of high susceptibility.  We will try to keep in contact with each other in regards to her seizures on the higher dose of Keppra.  Unfortunately they do not have routine access to a computer, thus communications via the web are out of the picture.    I spent some time with both of them talking about the difficulties she is having and why.  I will be referring them to Dr. Jose Rocha MD, one of our new epileptologists.  He and I had the opportunity to review films, prior EEGs, as well as discuss her case at length.  He agrees with simply adjusting what she is  already on, he also agrees that probably placing her back in the EMU for better observation to characterize her seizures prior to subsequent treatment recommendations being made is the wisest choice.  He will also be able to talk with him about nonpharmacologic interventions which would include VNS, moving forwards.    - levETIRAcetam (KEPPRA) 500 MG Tab; 2 tab qAM and 3 tab qPM for 2 weeks, then 3 tab bid  Dispense: 180 Tablet; Refill: 5    Time: 80 minutes in total spent on patient care including precharting, record review, discussion with healthcare staff and documentation.  This includes face-to-face time for exam, review, discussion, as well as counseling and coordinating care.

## 2023-03-06 ENCOUNTER — TELEPHONE (OUTPATIENT)
Dept: NEUROLOGY | Facility: MEDICAL CENTER | Age: 38
End: 2023-03-06
Payer: COMMERCIAL

## 2023-03-06 NOTE — TELEPHONE ENCOUNTER
Court Rush, patient PCP hoping to speak to Dr. Meade to discuss an issue regarding one of the patient oral contraceptive. Phone number [phone]. Thank you.KEN Sutton.

## 2023-03-07 ENCOUNTER — TELEPHONE (OUTPATIENT)
Dept: NEUROLOGY | Facility: MEDICAL CENTER | Age: 38
End: 2023-03-07
Payer: COMMERCIAL

## 2023-03-07 NOTE — TELEPHONE ENCOUNTER
NEUROLOGY PATIENT PRE-VISIT PLANNING     Patient was NOT contacted to complete PVP.    Patient Appointment is scheduled as: New Patient     Is visit type and length scheduled correctly? Yes    [facility]? Yes    3.   Is referral attached to visit? Yes    4. Were records received from referring provider? Yes, patient has been seen in clinic before so records are in Epic.     4. Patient was NOT contacted to have someone accompany them to visit.     5. Is this appointment scheduled as a Hospital Follow-Up?  No    6. Does the patient require any pre procedure or post procedure follow up? No    7. If any orders were placed at last visit or intended to be done for this visit do we have Results/Consult Notes? Yes  Labs - Labs were not ordered at last office visit.  Imaging - Imaging was not ordered at last office visit.  Referrals - No referrals were ordered at last office visit.    8. If patient appointment is for Botox - is order pended for provider? N/A

## 2023-03-15 ENCOUNTER — APPOINTMENT (OUTPATIENT)
Dept: NEUROLOGY | Facility: MEDICAL CENTER | Age: 38
End: 2023-03-15
Attending: PSYCHIATRY & NEUROLOGY
Payer: COMMERCIAL

## 2023-04-04 ENCOUNTER — TELEPHONE (OUTPATIENT)
Dept: NEUROLOGY | Facility: MEDICAL CENTER | Age: 38
End: 2023-04-04

## 2023-04-04 NOTE — TELEPHONE ENCOUNTER
NEUROLOGY PATIENT PRE-VISIT PLANNING     Patient was NOT contacted to complete PVP.  Note: Patient will not be contacted if there is no indication to call.     Patient Appointment is scheduled as: Established Patient     Is visit type and length scheduled correctly? Yes    [facility] is checked in Patient Demographics? Yes    3.   Is referral attached to visit? Yes    4. Were records received from referring provider? Yes    4. Patient was NOT contacted to have someone accompany them to visit.     5. Is this appointment scheduled as a Hospital Follow-Up?  No    6. Does the patient require any pre procedure or post procedure follow up? No    7. If any orders were placed at last visit or intended to be done for this visit do we have Results/Consult Notes? No  Labs - Labs ordered, NOT completed. Patient advised to complete prior to next appointment.  Imaging - Imaging was not ordered at last office visit.  Referrals - Referral ordered, patient has NOT been seen.  Note: If patient appointment is for lab or imaging review and patient did not complete the studies, check with provider if OK to reschedule patient until completed.    8. If patient appointment is for Botox - is order pended for provider? N/A

## 2023-04-05 ENCOUNTER — OFFICE VISIT (OUTPATIENT)
Dept: NEUROLOGY | Facility: MEDICAL CENTER | Age: 38
End: 2023-04-05
Attending: PSYCHIATRY & NEUROLOGY
Payer: COMMERCIAL

## 2023-04-05 VITALS
WEIGHT: 121.25 LBS | TEMPERATURE: 97 F | HEIGHT: 61 IN | DIASTOLIC BLOOD PRESSURE: 64 MMHG | HEART RATE: 74 BPM | OXYGEN SATURATION: 97 % | SYSTOLIC BLOOD PRESSURE: 102 MMHG | BODY MASS INDEX: 22.89 KG/M2

## 2023-04-05 DIAGNOSIS — G40.209 COMPLEX PARTIAL SEIZURES EVOLVING TO GENERALIZED TONIC-CLONIC SEIZURES (HCC): ICD-10-CM

## 2023-04-05 PROCEDURE — 99417 PROLNG OP E/M EACH 15 MIN: CPT | Performed by: PSYCHIATRY & NEUROLOGY

## 2023-04-05 PROCEDURE — 99211 OFF/OP EST MAY X REQ PHY/QHP: CPT | Performed by: PSYCHIATRY & NEUROLOGY

## 2023-04-05 PROCEDURE — 99215 OFFICE O/P EST HI 40 MIN: CPT | Performed by: PSYCHIATRY & NEUROLOGY

## 2023-04-05 ASSESSMENT — FIBROSIS 4 INDEX: FIB4 SCORE: 0.65

## 2023-04-05 NOTE — PROGRESS NOTES
"[facility] Epilepsy Program  New Patient Visit      Patient's Name: Umu Mcguire  YOB: 1985  MRN: [id number]  Date of Service: 04/05/23    Referring Provider: Terrell Scherer M.D.  [address] [place] [address]  [place]  [place] [id number]    Chief Concern: Seizures.     The patient presents with her mother and provides verbal consent for her to be presents. The visit was conducted with a help of a formal  (ID [id number]).     HPI: The patient is a 37 y.o., right-handed female, who initially presented to my epilepsy for evaluation of seizures on 04/05/2023.    The patient's seizures started when she was a baby. It seems that she was diagnosed with meningitis at age 7-8 months, and that is when her first bilateral tonic-clonic seizure occurred. She remained seizure free until age 7. She then started having staring spells at age 7, as noted under event type #1. She also continued to have bilateral tonic-clonic seizures, as noted under event type #2.    Per previous documentation, VNS treatment was discussed, but proved to be too expensive for her.  This was confirmed by the patient and her mother.     Semiology:  Event type #1: \"Staring spells\".  Year/Age of Onset: 1994 (around age 7).   Initial features: The event starts with gastric uprising, carlitos vu, heart racing, sensation of fear, and metallic taste.  Event features: The patient stares ahead and is not responsive. There are oral and manual automatisms noted. If she is not attended to, she will at times fall with these events.   Post-ictal features: Confused.   Duration: Around a minute.  Frequency: Up to 10 per month.  Precipitating factors: Around her period.     Event type #2: \"Convulsions\".  Year/Age of Onset: 1986 (age 1).  Initial features: The patient has no recollection of any warning signs.   Event features: No clear lateralizing signs at the onset. She then tenses her whole body and has clonic movements. She does not " "bite her tongue. No bladder/bowel incontinence.   Post-ictal features: Confused.  Duration: Around 3 minutes.  Frequency: She gets one per 5-8 years. The last event was in 2022.   Precipitating factors: Menstrual period.     History of status epilepticus: no  History of physical injury related to seizures: yes  History of surgery related to epilepsy: no  Family Planning: She would like to become pregnant, but does not have a plan at this time.   Current Driving Status: She does not drive. She does not have 's licence  Seizure Clusters: Yes, around her menstrual periods.   Longest Seizure Freedom: Her seizures were never well controlled.     Pertinent Ancillary Test Results:    MRI brain studies:   - MRI brain wo/w contrast (09/06/2022 at [facility]): \"No acute intracranial process. Left hippocampal increased signal and slight loss of volume with altered architecture. In the appropriate clinical setting, these findings can be associated with hippocampal sclerosis. Incidental note is made of a 6 mm microadenoma involving the anterolateral right pituitary gland.      EEG studies:   - Standard EEG (10/15/2019, Dr. Urbano): This is an abnormal routine EEG recording in the awake, drowsy, and sleep state(s). There is slowing in the left frontotemporal region, as well intermittent and brief runs of rhythmic delta / theta activity were captured in this region. The findings suggest underlying area of cortical irritability and structural abnormality. The patient is at increased risk for seizures, however no seizures captured during this study.  Clinical and radiological correlation is recommended.     - EEG (04/17/2008): IMPRESSION:  Ambulatory electroencephalogram recording is abnormal with the appearance of rare generalized sharp activity and left  temporal sharp wave activity.  Both noted during sleep only.  No definitive epileptogenic discharges are noted, but clinical correlation is warranted for possible focal and " generalized seizure disorder.  Again, no definite epileptogenic discharges are noted.     - EEG (7/23/1014, Dr. Hanna): At the onset of recording, the patient has a moderate amplitude background.  She has normal gradient with a posterior alpha rhythm of 9-1/2 Hz. It appears to be reactive and symmetric.  There is some increase in general background   beta activity noted.  Some intermittent polymorphic theta slowing is noted in the left temporal region.  At times, she also gets some rhythmic 2 Hz slowing on the left.  At about 10-1/2 minutes into the recording, a few sharp waves   and then a clear spike is noted in the left temporal region, phase reversing at F7.  These become fairly frequent as the patient gets drowsy.  She gets up to 5 per page over 5/10 seconds.  The patient video was reviewed during these runs of frequent left temporal sharps and spikes, but she appears to just be resting quietly in stage I sleep.    At the end of recording, photic stimulation is performed.  This produced some photic driving at a flash frequency of 9 Hz, in 11 Hz it was symmetric.  It produced no epileptiform potentials. Hyperventilation was then performed.  This produced some increase left temporal lobe slowing and then another burst of 1-2 per second temporal sharps lasting about 8 seconds. Parenthetically, it was noted that the EKG monitored throughout this recording  showed a normal sinus rhythm of 78 beats per minute.   IMPRESSION:  This is an abnormal electroencephalogram  due to the presence of frequent epileptiform potentials in drowsiness and exacerbated by hyperventilation.  No clinical seizures noted.    Current Antiseizure Medications: Xcopri 150 mg at bedtime. Depakote  mg BID. Keppra 1500 mg BID. Vitamin D supplementation.    Previously Tried Antiseizure Medications: None.     Review of Systems: No recent fevers. She lost some weight with Xcopri. No mood issues and no suicidal nor homicidal ideation.     Risk  Factors For Epilepsy/Seizure Disorder: The patient is a product of normal pregnancy and uncomplicated delivery. The early development was normal and the patient started waking, talking, and engaging in social interaction as expected. There were no challenges during school and no reported attendance of special education programs. There is no history of head trauma. There is no history of stroke. There is a history of meningitis at age 7-8 months and her first seizure in context of febrile meningitis illness. There is no history of neurosurgical interventions. There is a notion of staring spells starting at age 7 years.     Past Medical History:  Past Medical History:   Diagnosis Date    DUB (dysfunctional uterine bleeding)     Epilepsy (HCC) 11/4/2009    since infant.  sees Wilfrido/Codey at [facility].  Keppra/depakote.  absence siezures 1x/month-thinks iwth menstruation.    GERD (gastroesophageal reflux disease)     gastritis-only with spicy foods    Seizure disorder (HCC)      Past Surgical History:  Past Surgical History:   Procedure Laterality Date    ERCP  9/30/2019    Procedure: ERCP (ENDOSCOPIC RETROGRADE CHOLANGIOPANCREATOGRAPHY);  Surgeon: Kaushal Lopes M.D.;  Location: [facility];  Service: Gastroenterology    DEONDRE BY LAPAROSCOPY N/A 4/8/2018    Procedure: DEONDRE BY LAPAROSCOPY;  Surgeon: Chava Busby M.D.;  Location: [facility];  Service: General    ERCP  4/6/2018    Procedure: ERCP;  Surgeon: Osiel Paige M.D.;  Location: [facility];  Service: Gastroenterology      Social History:  Social History     Tobacco Use    Smoking status: Never    Smokeless tobacco: Never   Vaping Use    Vaping Use: Never used   Substance Use Topics    Alcohol use: No    Drug use: No     Family History:  There is a known family history of seizures/epilepsy on her maternal side with at least two family members with known epilepsy.   Family History   Problem Relation Age of  "Onset    Cancer Neg Hx     Diabetes Neg Hx     Stroke Neg Hx      Allergies:  Allergies   Allergen Reactions    Nkda [No Known Drug Allergy]        Current Medications:    Current Outpatient Medications:     levETIRAcetam, 2 tab qAM and 3 tab qPM for 2 weeks, then 3 tab bid, Taking    Xcopri, 150 mg, Oral, QHS, Taking    divalproex, 500 mg, Oral, BID, Taking    vitamin D2 (Ergocalciferol), 50,000 Units, Oral, Q7 DAYS, Taking    Physical Examination:    Ambulatory Vitals  Encounter Vitals  Temperature: 36.1 °C (97 °F)  Temp src: Temporal  Blood Pressure: 102/64  Pulse: 74  Pulse Oximetry: 97 %  Weight: 55 kg (121 lb 4.1 oz)  Height: 154.9 cm (5' 1\")  BMI (Calculated): 22.91    Neurological Examination:  Mental Status: The patient is alert and oriented to person, place, time, and situation. Speech is fluent, with no aphasia nor dysarthria noted. Affect is normal.    Cranial Nerve Examination:  CN I: Olfaction examination is deferred.  CN II: Visual fields are full to confrontation examination and show no visual field defect.  CN III, IV, VI: Eye movements are normal in all directions. Pupils are reactive to direct and consensual light. There is no relative afferent pupillary defect. There is no nystagmus.  CN V: Facial sensation to light touch is intact throughout.   CN VII: No significant facial muscle or other soft tissue asymmetry.  CN VIII: Hearing intact to rubbing sounds bilaterally.   CN IX, X: Soft palate elevates symmetrically.  CN XI: Symmetrical shoulder shrug exam.  CN XII: Midline tongue protrusion and moves symmetrically to each side.     Motor Examination:  Muscle strength is intact (5/5) throughout. Muscle tone is normal throughout. No abnormal movements are observed. No pronator drift is noted.     Muscle Stretch Reflexes Examination:  Muscle stretch reflexes are normal (2+) throughout and symmetric.    Sensory Examination:  Preserved sensation to light touch in all extremities. "     Coordination:  Normal finger to nose testing bilaterally, no postural nor intentional tremor was noted.     Stance/gait:  Normal regular gait with normal arm swings and stride length. Able to perform tandem gait. Romberg sign is absent.     ASSESSMENT AND PLAN:  1. Medically intractractable focal epilepsy, with focal impaired awareness seizures and rare focal to bilateral tonic-clonic seizures, likely originating from the left mesial temporal lobe. The etiology might be related to the history of meningitis, as well as positive family history of seizures.    - we had an extensive discussion about the nature of her epilepsy, cause, and potential next best steps:   - we discussed that she might be a potential epilepsy surgery candidate, which would give her the highest chance for seizure freedom (though she has reported history of meningitis, she also has a clear left MTS).   - the first step to pursue epilepsy surgery would be to proceed with EMU for up to 5 days. The family wants to hold off on EMU for now due to concern of cost.    - the family shared concerns about the costs, and how they had to cancel VNS due to cost issues.   - I advised the family to explore disability and Medicaid insurance through social security office and her primary care provider   - they will research lamotrigine, which we will try to use instead of Depakote, since she would like to become pregnant. We will continue to pursue antiseizure medication optimization, while we are also exploring potential surgical treatment options.      - continue current antiseizure medications with no changes  - discussed in detail pregnancy related issues and that she should not become pregnant until she is off Depakote.   - vitamin D supplementation recommended.   - driving was discussed and she will continue to abstain from driving.   - the patient was advised to bring us home medications that she takes at home and that she feels make her seizures  worse.     Patient Instructions   Please continue Xcopri 150 mg once a day.    Please continue Keppra 1500 mg twice daily with no changes.    Please continue Depakote  mg twice daily.    Please continue vitamin D supplementation.    Please research the medication called lamotrigine.     Please explore your options if you can get Medicaid insurance through your primary care provider or through social security office.     Please make sure that you don't become pregnant while you are taking Depakote, because this medication may harm your baby.     Please let our office know if you have any changes in your seizure frequency and/characteristics. Otherwise, please keep the diary of your events and bring it with you at the time of your next follow up visit with our office.     Please bring all your medication bottles for the next visit.     Please keep the diary of all your event and please bring it for the next visit.     Please take folic acid 1 mg daily. This is an over-the-counter supplement that is recommended to prevent certain developmental problems in your baby, in case you become pregnant in the future.    Please let our office know as soon as you become pregnant or plan to become pregnant (again, your should not become pregnant while you are on Depakote).    If you are caring for a baby/young child, please make sure to be sitting on a soft surface while holding your baby/young child, so in case you have a seizure, your baby/young child is not injured due to fall.     Please note that the following might precipitate seizures: missed doses of antiseizure medications, being sick with fever, stress, fatigue, sleep deprivation, not eating regularly, not drinking enough water, drinking too much alcohol, stopping alcohol suddenly if you are currently using it on a regular/daily basis, and/or using recreational drugs, among others.    Please note that the following might lead to an injury, potentially a  life-threatening injury, in case you have a seizure and/or lose awareness while:   - being in a large body of water by yourself, such as bath, pool, lake, ocean, among others (risk of drowning)   - being on unprotected heights (risk of fall)   - being around and/or operating heavy machinery (risk of injury)   - being around open fire/hot surfaces (risk of burns)   - any other activities/circumstances, in which if you lose awareness, you might injure yourself and/or others.    Please call for help (crisis line and/or 911) in case you have thoughts of harming yourself and/or others.    Please abstain from driving until further notice.    Due to the high volume of patients we are trying to help, your physician will not be able to respond by phone or in Live[employer]zz[employer] to your routine concerns between appointments.  This does not reflect a lack of interest or concern for you or your diagnosis.  Please bring these questions and concerns to your appointment where your physician can answer.  Please relay more pressing concerns to our office, either via LiveBuzzhart, or by phone; if not able to reach us please visit nearby Urgent Care Center or Emergency Department.  If any emergent medical needs, please seek emergent medical help and/or call 911.    Please note that we are not able to fill out paperwork that might be related to your work, utility company, disability, and/or driving, among others, in between the visits.  Please schedule a dedicated appointment to address your paperwork, so we can do that in a timely manner.  This is not due to lack of concern or interest for your disease-related work/administrative problems, but to make sure that we provide the best possible care and to fill out your paperwork in a correct and timely manner.    Thank you for entrusting your neurological care to [facility] and we look forward to continuing to serve you.     Follow up in 1 month.     My total time spent caring for the patient on the  day of the encounter was 72 minutes.   This does not include time spent on separately billable procedures/tests.      Sean Patel MD  Neurology Attending, Epilepsy Program  [facility]

## 2023-04-05 NOTE — PATIENT INSTRUCTIONS
Please continue Xcopri 150 mg once a day.    Please continue Keppra 1500 mg twice daily with no changes.    Please continue Depakote  mg twice daily.    Please continue vitamin D supplementation.    Please research the medication called lamotrigine.     Please explore your options if you can get Medicaid insurance through your primary care provider or through social security office.     Please make sure that you don't become pregnant while you are taking Depakote, because this medication may harm your baby.     Please let our office know if you have any changes in your seizure frequency and/characteristics. Otherwise, please keep the diary of your events and bring it with you at the time of your next follow up visit with our office.     Please bring all your medication bottles for the next visit.     Please keep the diary of all your event and please bring it for the next visit.     Please take folic acid 1 mg daily. This is an over-the-counter supplement that is recommended to prevent certain developmental problems in your baby, in case you become pregnant in the future.    Please let our office know as soon as you become pregnant or plan to become pregnant (again, your should not become pregnant while you are on Depakote).    If you are caring for a baby/young child, please make sure to be sitting on a soft surface while holding your baby/young child, so in case you have a seizure, your baby/young child is not injured due to fall.     Please note that the following might precipitate seizures: missed doses of antiseizure medications, being sick with fever, stress, fatigue, sleep deprivation, not eating regularly, not drinking enough water, drinking too much alcohol, stopping alcohol suddenly if you are currently using it on a regular/daily basis, and/or using recreational drugs, among others.    Please note that the following might lead to an injury, potentially a life-threatening injury, in case you have a  seizure and/or lose awareness while:   - being in a large body of water by yourself, such as bath, pool, lake, ocean, among others (risk of drowning)   - being on unprotected heights (risk of fall)   - being around and/or operating heavy machinery (risk of injury)   - being around open fire/hot surfaces (risk of burns)   - any other activities/circumstances, in which if you lose awareness, you might injure yourself and/or others.    Please call for help (crisis line and/or 911) in case you have thoughts of harming yourself and/or others.    Please abstain from driving until further notice.    Due to the high volume of patients we are trying to help, your physician will not be able to respond by phone or in The[employer] to your routine concerns between appointments.  This does not reflect a lack of interest or concern for you or your diagnosis.  Please bring these questions and concerns to your appointment where your physician can answer.  Please relay more pressing concerns to our office, either via TheFamilyhart, or by phone; if not able to reach us please visit nearby Urgent Care Center or Emergency Department.  If any emergent medical needs, please seek emergent medical help and/or call 911.    Please note that we are not able to fill out paperwork that might be related to your work, utility company, disability, and/or driving, among others, in between the visits.  Please schedule a dedicated appointment to address your paperwork, so we can do that in a timely manner.  This is not due to lack of concern or interest for your disease-related work/administrative problems, but to make sure that we provide the best possible care and to fill out your paperwork in a correct and timely manner.    Thank you for entrusting your neurological care to [facility] and we look forward to continuing to serve you.

## 2023-05-01 ENCOUNTER — OFFICE VISIT (OUTPATIENT)
Dept: NEUROLOGY | Facility: MEDICAL CENTER | Age: 38
End: 2023-05-01
Attending: PSYCHIATRY & NEUROLOGY
Payer: COMMERCIAL

## 2023-05-01 VITALS
HEART RATE: 81 BPM | HEIGHT: 61 IN | RESPIRATION RATE: 14 BRPM | WEIGHT: 127.21 LBS | OXYGEN SATURATION: 98 % | DIASTOLIC BLOOD PRESSURE: 54 MMHG | SYSTOLIC BLOOD PRESSURE: 98 MMHG | BODY MASS INDEX: 24.02 KG/M2

## 2023-05-01 DIAGNOSIS — G40.109 LOCALIZATION-RELATED EPILEPSY (HCC): ICD-10-CM

## 2023-05-01 DIAGNOSIS — G40.209 COMPLEX PARTIAL SEIZURES EVOLVING TO GENERALIZED TONIC-CLONIC SEIZURES (HCC): ICD-10-CM

## 2023-05-01 PROCEDURE — 99211 OFF/OP EST MAY X REQ PHY/QHP: CPT | Performed by: PSYCHIATRY & NEUROLOGY

## 2023-05-01 PROCEDURE — 99214 OFFICE O/P EST MOD 30 MIN: CPT | Performed by: PSYCHIATRY & NEUROLOGY

## 2023-05-01 RX ORDER — CENOBAMATE 150 MG/1
150 TABLET, FILM COATED ORAL
Qty: 30 TABLET | Refills: 5 | Status: SHIPPED | OUTPATIENT
Start: 2023-05-01 | End: 2023-05-25 | Stop reason: SDUPTHER

## 2023-05-01 RX ORDER — DIVALPROEX SODIUM 500 MG/1
500 TABLET, DELAYED RELEASE ORAL 2 TIMES DAILY
Qty: 180 TABLET | Refills: 1 | Status: SHIPPED | OUTPATIENT
Start: 2023-05-01 | End: 2023-08-10 | Stop reason: SDUPTHER

## 2023-05-01 RX ORDER — LEVETIRACETAM 500 MG/1
1500 TABLET ORAL 2 TIMES DAILY
Qty: 180 TABLET | Refills: 5 | Status: SHIPPED | OUTPATIENT
Start: 2023-05-01 | End: 2023-08-10 | Stop reason: SDUPTHER

## 2023-05-01 ASSESSMENT — FIBROSIS 4 INDEX: FIB4 SCORE: 0.65

## 2023-05-01 NOTE — PATIENT INSTRUCTIONS
Please continue Xcopri 150 mg once a day.    Please continue Keppra 1500 mg twice daily.    Please continue Depakote  mg twice daily.    Please continue vitamin D supplementation.    Please research the medication called lamotrigine goal dose 100 mg twice daily, including its cost.     Please explore your options if you can get Medicaid insurance through your primary care provider or through social security office.     Please make sure that you don't become pregnant while you are taking Depakote, because this medication may harm your baby.     Please let our office know if you have any changes in your seizure frequency and/characteristics. Otherwise, please keep the diary of your events and bring it with you at the time of your next follow up visit with our office.     Please let our office know if you have any changes in your seizure frequency and/characteristics. Otherwise, please keep the diary of your events and bring it with you at the time of your next follow up visit with our office.     Please take vitamin D3 [id number] internation units daily.     Please take folic acid 1 mg daily. This is an over-the-counter supplement that is recommended to prevent certain developmental problems in your baby, in case you become pregnant in the future.    Please let our office know as soon as you become pregnant or plan to become pregnant - again, you should not become pregnant while on Depakote.    If you are caring for a baby/young child, please make sure to be sitting on a soft surface while holding your baby/young child, so in case you have a seizure, your baby/young child is not injured due to fall.     Please let us know if you observe that your baby is excessively sleepy/has other changes and the pediatrician feels that there are no other explanations except possible adverse effects of antiseizure medication(s) your are taking while nursing your baby.     Please note that the following might precipitate  seizures: missed doses of antiseizure medications, being sick with fever, stress, fatigue, sleep deprivation, not eating regularly, not drinking enough water, drinking too much alcohol, stopping alcohol suddenly if you are currently using it on a regular/daily basis, and/or using recreational drugs, among others.    Please note that the following might lead to an injury, potentially a life-threatening injury, in case you have a seizure and/or lose awareness while:   - being in a large body of water by yourself, such as bath, pool, lake, ocean, among others (risk of drowning)   - being on unprotected heights (risk of fall)   - being around and/or operating heavy machinery (risk of injury)   - being around open fire/hot surfaces (risk of burns)   - any other activities/circumstances, in which if you lose awareness, you might injure yourself and/or others.    Please call for help (crisis line and/or 911) in case you have thoughts of harming yourself and/or others.    Please abstain from driving until further notice.    ------------------------------------------------------------------------------------------  Instructions for your family/caregivers:  Please call 911 if the patient has a seizure longer than 2-3 minutes, if seizures are back to back without her recovering to her baseline, or she does not start recovering within 5-10 minutes after the seizure stops. During the seizure - please turn her on her side, please make sure her head is protected (for example, you should put a pillow under her head, if one is available), and please do not put anything in her mouth.   -------------------------------------------------------------------------------------------    It is important that your seizures are well controlled and you have none or have them rarely. In addition to avoiding injury related to breakthrough seizures, frequent seizures increase risk of SUDEP (sudden unexpected death in epilepsy), where a person goes  into a seizure and then never wakes up - this is a rare complication of seizure disorder; one of the best available ways to prevent it is to control your seizures well.     Due to the high volume of patients we are trying to help, your physician will not be able to respond by phone or in [facility] to your routine concerns between appointments.  This does not reflect a lack of interest or concern for you or your diagnosis.  Please bring these questions and concerns to your appointment where your physician can answer.  Please relay more pressing concerns to our office, either via MyChart, or by phone; if not able to reach us please visit nearby Urgent Care Center or Emergency Department.  If any emergent medical needs, please seek emergent medical help and/or call 911.    Please note that we are not able to fill out paperwork that might be related to your work, utility company, disability, and/or driving, among others, in between the visits.  Please schedule a dedicated appointment to address your paperwork, so we can do that in a timely manner.  This is not due to lack of concern or interest for your disease-related work/administrative problems, but to make sure that we provide the best possible care and to fill out your paperwork in a correct and timely manner.    Thank you for entrusting your neurological care to [facility] and we look forward to continuing to serve you.

## 2023-05-01 NOTE — PROGRESS NOTES
"[facility] Epilepsy Program  Follow Up Visit      Patient's Name: Umu Mcguire  YOB: 1985  MRN: [id number]  Date of Service: 05/01/23    Referring Provider: Terrell Scherer M.D.  [address] [place] [address]  [place]  [place] [id number]    Chief Concern: Seizures.     The patient presents with her mother and provides verbal consent for her to be present. The visit was conducted with a help of a formal  (ID [id number]).     HPI (as obtained at the time of the initial visit and updated as necessary): The patient is a 37 y.o., right-handed female, who initially presented to my epilepsy for evaluation of seizures on 04/05/2023. She now presents for a follow up.     The patient's seizures started when she was a baby. It seems that she was diagnosed with meningitis at age 7-8 months, and that is when her first bilateral tonic-clonic seizure occurred. She remained seizure free until age 7. She then started having staring spells at age 7, as noted under event type #1. She also continued to have bilateral tonic-clonic seizures, as noted under event type #2.    Per previous documentation, VNS treatment was discussed, but proved to be too expensive for her.  This was confirmed by the patient and her mother.     Semiology:  Event type #1: \"Staring spells\".  Year/Age of Onset: 1994 (around age 7).   Initial features: The event starts with gastric uprising, carlitos vu, heart racing, sensation of fear, and metallic taste.  Event features: The patient stares ahead and is not responsive. There are oral and manual automatisms noted. If she is not attended to, she will at times fall with these events.   Post-ictal features: Confused.   Duration: Around a minute.  Frequency: Up to 10 per month. The last event was on 04/27/2023  Precipitating factors: Around her period.     Event type #2: \"Convulsions\".  Year/Age of Onset: 1986 (age 1).  Initial features: The patient has no recollection of any warning " "signs.   Event features: No clear lateralizing signs at the onset. She then tenses her whole body and has clonic movements. She does not bite her tongue. No bladder/bowel incontinence.   Post-ictal features: Confused.  Duration: Around 3 minutes.  Frequency: She gets one per 5-8 years. The last event was in 2022.   Precipitating factors: Menstrual period.     History of status epilepticus: no  History of physical injury related to seizures: yes  History of surgery related to epilepsy: no  Family Planning: She would like to become pregnant, but does not have a plan at this time.   Current Driving Status: She does not drive. She does not have 's licence  Seizure Clusters: Yes, around her menstrual periods.   Longest Seizure Freedom: Her seizures were never well controlled.     Pertinent Ancillary Test Results:    MRI brain studies:   - MRI brain wo/w contrast (09/06/2022 at [facility]): \"No acute intracranial process. Left hippocampal increased signal and slight loss of volume with altered architecture. In the appropriate clinical setting, these findings can be associated with hippocampal sclerosis. Incidental note is made of a 6 mm microadenoma involving the anterolateral right pituitary gland.\"      EEG studies:   - Standard EEG (10/15/2019, Dr. Urbano): This is an abnormal routine EEG recording in the awake, drowsy, and sleep state(s). There is slowing in the left frontotemporal region, as well intermittent and brief runs of rhythmic delta / theta activity were captured in this region. The findings suggest underlying area of cortical irritability and structural abnormality. The patient is at increased risk for seizures, however no seizures captured during this study.  Clinical and radiological correlation is recommended.     - EEG (04/17/2008): IMPRESSION:  Ambulatory electroencephalogram recording is abnormal with the appearance of rare generalized sharp activity and left  temporal sharp wave activity.  Both noted " during sleep only.  No definitive epileptogenic discharges are noted, but clinical correlation is warranted for possible focal and generalized seizure disorder.  Again, no definite epileptogenic discharges are noted.     - EEG (7/23/1014, Dr. Hanna): At the onset of recording, the patient has a moderate amplitude background.  She has normal gradient with a posterior alpha rhythm of 9-1/2 Hz. It appears to be reactive and symmetric.  There is some increase in general background beta activity noted.  Some intermittent polymorphic theta slowing is noted in the left temporal region.  At times, she also gets some rhythmic 2 Hz slowing on the left.  At about 10-1/2 minutes into the recording, a few sharp waves and then a clear spike is noted in the left temporal region, phase reversing at F7.  These become fairly frequent as the patient gets drowsy.  She gets up to 5 per page over 5/10 seconds.  The patient video was reviewed during these runs of frequent left temporal sharps and spikes, but she appears to just be resting quietly in stage I sleep.  At the end of recording, photic stimulation is performed.  This produced some photic driving at a flash frequency of 9 Hz, in 11 Hz it was symmetric.  It produced no epileptiform potentials. Hyperventilation was then performed.  This produced some increase left temporal lobe slowing and then another burst of 1-2 per second temporal sharps lasting about 8 seconds. Parenthetically, it was noted that the EKG monitored throughout this recording  showed a normal sinus rhythm of 78 beats per minute.   IMPRESSION:  This is an abnormal electroencephalogram  due to the presence of frequent epileptiform potentials in drowsiness and exacerbated by hyperventilation.  No clinical seizures noted.      INTERIM HISTORY (05/01/2023): The patient continues to have focal seizures (event type #1) on average 4-5 times per month, with the last event on 04/27/2023; this is improved compared to  before, when she had 10 or so per month. She had no convulsive seizures in the interim. She is taking her antiseizure medications regularly and has no adverse effects from it.     She brought the pill box for iron and shared that it is her experience that iron supplementation precipitates her seizures.    Current Antiseizure Medications: Xcopri 150 mg at bedtime. Depakote  mg BID. Keppra 1500 mg BID. Vitamin D supplementation.    Previously Tried Antiseizure Medications: None.     Review of Systems: No recent fevers. She gained 6 lbs since the last visit and she is happy with it. No mood issues and no suicidal nor homicidal ideation.     Risk Factors For Epilepsy/Seizure Disorder: The patient is a product of normal pregnancy and uncomplicated delivery. The early development was normal and the patient started waking, talking, and engaging in social interaction as expected. There were no challenges during school and no reported attendance of special education programs. There is no history of head trauma. There is no history of stroke. There is a history of meningitis at age 7-8 months and her first seizure in context of febrile meningitis illness. There is no history of neurosurgical interventions. There is a notion of staring spells starting at age 7 years.     Past Medical History:  Past Medical History:   Diagnosis Date    DUB (dysfunctional uterine bleeding)     Epilepsy (HCC) 11/4/2009    since infant.  sees Wilfrido/Codey at [facility].  Keppra/depakote.  absence siezures 1x/month-thinks iwth menstruation.    GERD (gastroesophageal reflux disease)     gastritis-only with spicy foods    Seizure disorder (HCC)      Past Surgical History:  Past Surgical History:   Procedure Laterality Date    ERCP  9/30/2019    Procedure: ERCP (ENDOSCOPIC RETROGRADE CHOLANGIOPANCREATOGRAPHY);  Surgeon: Kaushal Lopes M.D.;  Location: SURGERY [facility];  Service: Gastroenterology    DEONDRE BY LAPAROSCOPY N/A 4/8/2018     "Procedure: DEONDRE BY LAPAROSCOPY;  Surgeon: Chava Busby M.D.;  Location: SURGERY [facility];  Service: General    ERCP  4/6/2018    Procedure: ERCP;  Surgeon: Osiel Paige M.D.;  Location: SURGERY [facility];  Service: Gastroenterology      Social History:  Social History     Tobacco Use    Smoking status: Never    Smokeless tobacco: Never   Vaping Use    Vaping Use: Never used   Substance Use Topics    Alcohol use: No    Drug use: No     Family History:  There is a known family history of seizures/epilepsy on her maternal side with at least two family members with known epilepsy.   Family History   Problem Relation Age of Onset    Cancer Neg Hx     Diabetes Neg Hx     Stroke Neg Hx      Allergies:  Allergies   Allergen Reactions    Nkda [No Known Drug Allergy]      Current Medications:    Current Outpatient Medications:     levETIRAcetam, 2 tab qAM and 3 tab qPM for 2 weeks, then 3 tab bid, Taking    Xcopri, 150 mg, Oral, QHS, Taking    divalproex, 500 mg, Oral, BID, Taking    vitamin D2 (Ergocalciferol), 50,000 Units, Oral, Q7 DAYS, Taking    Physical Examination:    Ambulatory Vitals  Standard Vitals  Vitals  Blood Pressure: 98/54  Pulse: 81  Respiration: 14  Pulse Oximetry: 98 %  Height: 154.9 cm (5' 1\")  Weight: 57.7 kg (127 lb 3.3 oz)  BMI (Calculated): 24.04    Neurological Examination:  Mental Status: The patient is alert and oriented to person, place, time, and situation. Speech is fluent, with no aphasia nor dysarthria noted. Affect is normal.    Cranial Nerve Examination:  CN I: Olfaction examination is deferred.  CN II: Visual fields are full to confrontation examination and show no visual field defect.  CN III, IV, VI: Eye movements are normal in all directions. Pupils are reactive to direct and consensual light. There is no relative afferent pupillary defect. There is no nystagmus.  CN V: Facial sensation to light touch is intact throughout.   CN VII: No significant facial " muscle or other soft tissue asymmetry.  CN VIII: Hearing intact to rubbing sounds bilaterally.   CN IX, X: Soft palate elevates symmetrically.  CN XI: Symmetrical shoulder shrug exam.  CN XII: Midline tongue protrusion and moves symmetrically to each side.     Motor Examination:  Muscle strength is intact (5/5) throughout. Muscle tone is normal throughout. No abnormal movements are observed. No pronator drift is noted.     Muscle Stretch Reflexes Examination:  Muscle stretch reflexes are normal (2+) throughout and symmetric.    Sensory Examination:  Preserved sensation to light touch in all extremities.     Coordination:  Normal finger to nose testing bilaterally, no postural nor intentional tremor was noted.     Stance/gait:  Normal regular gait with normal arm swings and stride length. Able to perform tandem gait. Romberg sign is absent.     ASSESSMENT AND PLAN:  1. Medically intractractable focal epilepsy, with focal impaired awareness seizures and rare focal to bilateral tonic-clonic seizures, likely originating from the left mesial temporal lobe. The etiology might be related to the history of meningitis, as well as positive family history of seizures. We had an extensive discussion at the time of the initial visit about the nature of her epilepsy, cause, and potential next best steps; we discussed that she might be a potential epilepsy surgery candidate, which would give her the highest chance for seizure freedom (though she has reported history of meningitis, she also has a clear left MTS). We discussed at the time of the initial visit that the first step to pursue epilepsy surgery would be to proceed with EMU for up to 5 days.   - a referral for social work was provided, since the cost remains the main barrier for her further treatment  - she will research lamotrigine and its cost (not done in the interim) and will send me a message via [employer] if she wants to proceed with it. The goal is to switch from  Depakote to lamotrigine, in context of her wanting to become pregnant.   - continue current antiseizure medications with no changes:  - Cenobamate (XCOPRI) 150 MG Tab; Take 150 mg by mouth at bedtime for 180 days.  Dispense: 30 Tablet; Refill: 5  - divalproex (DEPAKOTE) 500 MG Tablet Delayed Response; Take 1 Tablet by mouth 2 times a day for 180 days.  Dispense: 180 Tablet; Refill: 1  - levETIRAcetam (KEPPRA) 500 MG Tab; Take 3 Tablets by mouth 2 times a day.  Dispense: 180 Tablet; Refill: 5  - discussed again pregnancy related issues and that she should not become pregnant until she is off Depakote.   - vitamin D supplementation recommended.   - driving was discussed and she will continue to abstain from driving.     Patient Instructions   Please continue Xcopri 150 mg once a day.    Please continue Keppra 1500 mg twice daily.    Please continue Depakote  mg twice daily.    Please continue vitamin D supplementation.    Please research the medication called lamotrigine (goal dose 100 mg twice daily), including its cost.     Please explore your options if you can get Medicaid insurance through your primary care provider or through social security office.     Please make sure that you don't become pregnant while you are taking Depakote, because this medication may harm your baby.     Please let our office know if you have any changes in your seizure frequency and/characteristics. Otherwise, please keep the diary of your events and bring it with you at the time of your next follow up visit with our office.     Please let our office know if you have any changes in your seizure frequency and/characteristics. Otherwise, please keep the diary of your events and bring it with you at the time of your next follow up visit with our office.     Please take vitamin D3 [id number] internation units daily.     Please take folic acid 1 mg daily. This is an over-the-counter supplement that is recommended to prevent certain  developmental problems in your baby, in case you become pregnant in the future.    Please let our office know as soon as you become pregnant or plan to become pregnant - again, you should not become pregnant while on Depakote.    If you are caring for a baby/young child, please make sure to be sitting on a soft surface while holding your baby/young child, so in case you have a seizure, your baby/young child is not injured due to fall.     Please let us know if you observe that your baby is excessively sleepy/has other changes and the pediatrician feels that there are no other explanations except possible adverse effects of antiseizure medication(s) your are taking while nursing your baby.     Please note that the following might precipitate seizures: missed doses of antiseizure medications, being sick with fever, stress, fatigue, sleep deprivation, not eating regularly, not drinking enough water, drinking too much alcohol, stopping alcohol suddenly if you are currently using it on a regular/daily basis, and/or using recreational drugs, among others.    Please note that the following might lead to an injury, potentially a life-threatening injury, in case you have a seizure and/or lose awareness while:   - being in a large body of water by yourself, such as bath, pool, lake, ocean, among others (risk of drowning)   - being on unprotected heights (risk of fall)   - being around and/or operating heavy machinery (risk of injury)   - being around open fire/hot surfaces (risk of burns)   - any other activities/circumstances, in which if you lose awareness, you might injure yourself and/or others.    Please call for help (crisis line and/or 911) in case you have thoughts of harming yourself and/or others.    Please abstain from driving until further notice.    ------------------------------------------------------------------------------------------  Instructions for your family/caregivers:  Please call 911 if the patient  has a seizure longer than 2-3 minutes, if seizures are back to back without her recovering to her baseline, or she does not start recovering within 5-10 minutes after the seizure stops. During the seizure - please turn her on her side, please make sure her head is protected (for example, you should put a pillow under her head, if one is available), and please do not put anything in her mouth.   -------------------------------------------------------------------------------------------    It is important that your seizures are well controlled and you have none or have them rarely. In addition to avoiding injury related to breakthrough seizures, frequent seizures increase risk of SUDEP (sudden unexpected death in epilepsy), where a person goes into a seizure and then never wakes up - this is a rare complication of seizure disorder; one of the best available ways to prevent it is to control your seizures well.     Due to the high volume of patients we are trying to help, your physician will not be able to respond by phone or in [facility] to your routine concerns between appointments.  This does not reflect a lack of interest or concern for you or your diagnosis.  Please bring these questions and concerns to your appointment where your physician can answer.  Please relay more pressing concerns to our office, either via [facility], or by phone; if not able to reach us please visit nearby Urgent Care Center or Emergency Department.  If any emergent medical needs, please seek emergent medical help and/or call 911.    Please note that we are not able to fill out paperwork that might be related to your work, utility company, disability, and/or driving, among others, in between the visits.  Please schedule a dedicated appointment to address your paperwork, so we can do that in a timely manner.  This is not due to lack of concern or interest for your disease-related work/administrative problems, but to make sure that we provide the  best possible care and to fill out your paperwork in a correct and timely manner.    Thank you for entrusting your neurological care to [facility] and we look forward to continuing to serve you.     Follow up in 2 months.     My total time spent caring for the patient on the day of the encounter was 30 minutes.   This does not include time spent on separately billable procedures/tests.      Sean Patel MD  Neurology Attending, Epilepsy Program  [facility]

## 2023-05-03 ENCOUNTER — PATIENT OUTREACH (OUTPATIENT)
Dept: HEALTH INFORMATION MANAGEMENT | Facility: OTHER | Age: 38
End: 2023-05-03
Payer: COMMERCIAL

## 2023-05-03 NOTE — PROGRESS NOTES
5/3/23    Reason for referral:  Referral received from Care Management work queue from pt's Neurologists requesting assistance with insurance resources.    CHW Interventions:  CHW spoke with patient. Patient reports she is in need of another insurance since she currently only has access to health care which is a discount plan.   CHW provided the following resources and education:contact and address to [facility] and their bilingual medical insurance in-person assister.     Plan: CHW to follow up in one week. Pt was provided CHW contact information and encouraged to reach out should she have any questions or concerns.

## 2023-05-08 ENCOUNTER — TELEPHONE (OUTPATIENT)
Dept: NEUROLOGY | Facility: MEDICAL CENTER | Age: 38
End: 2023-05-08

## 2023-05-08 ENCOUNTER — TELEPHONE (OUTPATIENT)
Dept: NEUROLOGY | Facility: MEDICAL CENTER | Age: 38
End: 2023-05-08
Payer: COMMERCIAL

## 2023-05-08 NOTE — TELEPHONE ENCOUNTER
Spoke to patient through .  She states  Discussed a new medication with her. She went to the pharmacy and they only had 3.    I seen in visit note that provider asked her to Please research the medication called lamotrigine (goal dose 100 mg twice daily), including its cost.     She stated she called them and they will not cover that medication.    Please advise if there was anything else?    Thanks

## 2023-05-10 ENCOUNTER — PATIENT OUTREACH (OUTPATIENT)
Dept: HEALTH INFORMATION MANAGEMENT | Facility: OTHER | Age: 38
End: 2023-05-10
Payer: COMMERCIAL

## 2023-05-10 NOTE — PROGRESS NOTES
5/10/23    Reason for phone call:  CHW called patient to follow up on [facility] clinic and medical insurance. Patient reports she in not eligible for medicaid. Pt already has Access to Health care discount plan.     Plan: CHW to follow up in one week. Close program if no additional resources are needed.

## 2023-05-15 ENCOUNTER — PATIENT OUTREACH (OUTPATIENT)
Dept: HEALTH INFORMATION MANAGEMENT | Facility: OTHER | Age: 38
End: 2023-05-15
Payer: COMMERCIAL

## 2023-05-15 NOTE — PROGRESS NOTES
5/15/23    Reason for pt outreach:  CHW called patient to follow up on resources provided and usage. Patient reports she has not been able to contact [facility] but will do so today. CHW spoke with pt's mother Yudy and informed her about this resource as well. Yudy was very thankful for offering this resource as she faces hardship with her financial situation. Pt also requested to use [phone] as her primary contact and not [phone]  since this is her brother's number. CHW updated this information in Epic.     CHW Interventions during outreach:  Provided contact information to [facility] in-person assisters  Updated family's contact information in Epic      Plan: CHW no to follow since requested resources have been provided. CHW graduating pt from program.
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
## 2023-05-25 DIAGNOSIS — G40.109 LOCALIZATION-RELATED EPILEPSY (HCC): ICD-10-CM

## 2023-05-25 RX ORDER — CENOBAMATE 150 MG/1
150 TABLET, FILM COATED ORAL
Qty: 30 TABLET | Refills: 5 | Status: SHIPPED | OUTPATIENT
Start: 2023-05-25 | End: 2023-08-10 | Stop reason: SDUPTHER

## 2023-05-25 NOTE — PROGRESS NOTES
Printed Xcopri prescription, in addition to providing it electronically, for patient assistance program for this medication.     Sean Patel MD  Neurology Attending, Epilepsy Program  [facility]

## 2023-08-10 ENCOUNTER — OFFICE VISIT (OUTPATIENT)
Dept: NEUROLOGY | Facility: MEDICAL CENTER | Age: 38
End: 2023-08-10
Attending: PSYCHIATRY & NEUROLOGY
Payer: COMMERCIAL

## 2023-08-10 VITALS
OXYGEN SATURATION: 99 % | HEART RATE: 64 BPM | TEMPERATURE: 97.9 F | HEIGHT: 61 IN | WEIGHT: 128.53 LBS | DIASTOLIC BLOOD PRESSURE: 78 MMHG | SYSTOLIC BLOOD PRESSURE: 114 MMHG | BODY MASS INDEX: 24.27 KG/M2

## 2023-08-10 DIAGNOSIS — G40.209 COMPLEX PARTIAL SEIZURES EVOLVING TO GENERALIZED TONIC-CLONIC SEIZURES (HCC): ICD-10-CM

## 2023-08-10 DIAGNOSIS — G40.109 LOCALIZATION-RELATED EPILEPSY (HCC): ICD-10-CM

## 2023-08-10 PROCEDURE — 99215 OFFICE O/P EST HI 40 MIN: CPT | Performed by: PSYCHIATRY & NEUROLOGY

## 2023-08-10 PROCEDURE — 99211 OFF/OP EST MAY X REQ PHY/QHP: CPT | Performed by: PSYCHIATRY & NEUROLOGY

## 2023-08-10 PROCEDURE — 3074F SYST BP LT 130 MM HG: CPT | Performed by: PSYCHIATRY & NEUROLOGY

## 2023-08-10 PROCEDURE — 3078F DIAST BP <80 MM HG: CPT | Performed by: PSYCHIATRY & NEUROLOGY

## 2023-08-10 RX ORDER — CENOBAMATE 150 MG/1
150 TABLET, FILM COATED ORAL
Qty: 30 TABLET | Refills: 5 | Status: SHIPPED | OUTPATIENT
Start: 2023-08-10 | End: 2023-12-04 | Stop reason: SDUPTHER

## 2023-08-10 RX ORDER — LEVETIRACETAM 500 MG/1
1500 TABLET ORAL 2 TIMES DAILY
Qty: 180 TABLET | Refills: 5 | Status: SHIPPED | OUTPATIENT
Start: 2023-08-10 | End: 2023-12-04 | Stop reason: SDUPTHER

## 2023-08-10 RX ORDER — DIVALPROEX SODIUM 500 MG/1
500 TABLET, DELAYED RELEASE ORAL 2 TIMES DAILY
Qty: 180 TABLET | Refills: 1 | Status: SHIPPED | OUTPATIENT
Start: 2023-08-10 | End: 2023-12-04 | Stop reason: SDUPTHER

## 2023-08-10 RX ORDER — LAMOTRIGINE 25 MG/1
25 TABLET ORAL DAILY
Qty: 30 TABLET | Refills: 1 | Status: SHIPPED | OUTPATIENT
Start: 2023-08-10 | End: 2023-08-10

## 2023-08-10 ASSESSMENT — FIBROSIS 4 INDEX: FIB4 SCORE: 0.81

## 2023-08-10 NOTE — PROGRESS NOTES
"[facility][note 3] Epilepsy Program  Follow Up Visit      Patient's Name: Umu Mcguire  YOB: 1985  MRN: [id number]  Date of Service: 08/10/23    Referring Provider: Terrell Scherer M.D.  [address] [place] [address]  [place]  [place] [id number]    Chief Concern: Seizures.     The patient presents with her mother and provides verbal consent for her to be present. The visit was conducted with a help of a formal .     HPI (as obtained at the time of the initial visit and updated as necessary): The patient is a 38 y.o., right-handed female, who initially presented to my epilepsy clinic for evaluation of seizures on 04/05/2023. She now presents for a follow up.     The patient's seizures started when she was a baby. It seems that she was diagnosed with meningitis at age 7-8 months, and that is when her first bilateral tonic-clonic seizure occurred. She remained seizure free until age 7. She then started having staring spells at age 7, as noted under event type #1. She also continued to have bilateral tonic-clonic seizures, as noted under event type #2.    Per previous documentation, VNS treatment was discussed, but proved to be too expensive for her.  This was confirmed by the patient and her mother.     Semiology:  Event type #1: \"Staring spells\".  Year/Age of Onset: 1995 (around age 7).   Initial features: The event starts with gastric uprising, carlitos vu, heart racing, sensation of fear, and metallic taste.  Event features: The patient stares ahead and is not responsive. There are oral and manual automatisms noted. If she is not attended to, she will at times fall with these events.   Post-ictal features: Confused.   Duration: Around a minute.  Frequency: Up to 10 per month. Unfortunately, she continues to have these spells.   Precipitating factors: Around her period.     Event type #2: \"Convulsions\".  Year/Age of Onset: 1986 (age 1).  Initial features: The patient has no recollection of " "any warning signs.   Event features: No clear lateralizing signs at the onset. She then tenses her whole body and has clonic movements. She does not bite her tongue. No bladder/bowel incontinence.   Post-ictal features: Confused.  Duration: Around 3 minutes.  Frequency: She gets one per 5-8 years. The last event was in 2022.   Precipitating factors: Menstrual period.     History of status epilepticus: no  History of physical injury related to seizures: yes  History of surgery related to epilepsy: no  Family Planning: She would like to become pregnant, but does not have a plan at this time.   Current Driving Status: She does not drive. She does not have 's licence  Seizure Clusters: Yes, around her menstrual periods.   Longest Seizure Freedom: Her seizures were never well controlled.     Pertinent Ancillary Test Results:    MRI brain studies:   - MRI brain wo/w contrast (09/06/2022 at [facility]): \"No acute intracranial process. Left hippocampal increased signal and slight loss of volume with altered architecture. In the appropriate clinical setting, these findings can be associated with hippocampal sclerosis. Incidental note is made of a 6 mm microadenoma involving the anterolateral right pituitary gland.\"      EEG studies:   - Standard EEG (10/15/2019, Dr. Urbano): This is an abnormal routine EEG recording in the awake, drowsy, and sleep state(s). There is slowing in the left frontotemporal region, as well intermittent and brief runs of rhythmic delta / theta activity were captured in this region. The findings suggest underlying area of cortical irritability and structural abnormality. The patient is at increased risk for seizures, however no seizures captured during this study.  Clinical and radiological correlation is recommended.     - EEG (04/17/2008): IMPRESSION:  Ambulatory electroencephalogram recording is abnormal with the appearance of rare generalized sharp activity and left  temporal sharp wave activity. "  Both noted during sleep only.  No definitive epileptogenic discharges are noted, but clinical correlation is warranted for possible focal and generalized seizure disorder.  Again, no definite epileptogenic discharges are noted.     - EEG (7/23/1014, Dr. Hanna): At the onset of recording, the patient has a moderate amplitude background.  She has normal gradient with a posterior alpha rhythm of 9-1/2 Hz. It appears to be reactive and symmetric.  There is some increase in general background beta activity noted.  Some intermittent polymorphic theta slowing is noted in the left temporal region.  At times, she also gets some rhythmic 2 Hz slowing on the left.  At about 10-1/2 minutes into the recording, a few sharp waves and then a clear spike is noted in the left temporal region, phase reversing at F7.  These become fairly frequent as the patient gets drowsy.  She gets up to 5 per page over 5/10 seconds.  The patient video was reviewed during these runs of frequent left temporal sharps and spikes, but she appears to just be resting quietly in stage I sleep.  At the end of recording, photic stimulation is performed.  This produced some photic driving at a flash frequency of 9 Hz, in 11 Hz it was symmetric.  It produced no epileptiform potentials. Hyperventilation was then performed.  This produced some increase left temporal lobe slowing and then another burst of 1-2 per second temporal sharps lasting about 8 seconds. Parenthetically, it was noted that the EKG monitored throughout this recording  showed a normal sinus rhythm of 78 beats per minute.   IMPRESSION:  This is an abnormal electroencephalogram  due to the presence of frequent epileptiform potentials in drowsiness and exacerbated by hyperventilation.  No clinical seizures noted.    INTERIM HISTORY (08/10/2023): The patient unfortunately continues to have focal seizures (event type #1) on average up to 8 times per month, which is similar to her baseline, and  somewhat worse than focal seizures frequency that was reported at the last visit. She had no convulsions in the interim. She is taking her antiseizure medications regularly and has no adverse effects from it.    The patient continues to have challenges with insurance. She tried to explore other insurance options, but she is not eligible for those.    She has explored lamotrigine, but was not able to find out about the pricing.     Current Antiseizure Medications: Xcopri 150 mg at bedtime. Depakote  mg BID. Keppra 1500 mg BID. Vitamin D supplementation.    Previously Tried Antiseizure Medications: None.     Review of Systems: No recent fevers. There was no significant weight changes in the interm. No mood issues and no suicidal nor homicidal ideation.     Risk Factors For Epilepsy/Seizure Disorder: The patient is a product of normal pregnancy and uncomplicated delivery. The early development was normal and the patient started waking, talking, and engaging in social interaction as expected. There were no challenges during school and no reported attendance of special education programs. There is no history of head trauma. There is no history of stroke. There is a history of meningitis at age 7-8 months and her first seizure in context of febrile meningitis illness. There is no history of neurosurgical interventions. There is a notion of staring spells starting at age 7 years.     Past Medical History:  Past Medical History:   Diagnosis Date    DUB (dysfunctional uterine bleeding)     Epilepsy (HCC) 11/4/2009    since infant.  sees Wilfrido/Codey at [facility].  Keppra/depakote.  absence siezures 1x/month-thinks iwth menstruation.    GERD (gastroesophageal reflux disease)     gastritis-only with spicy foods    Seizure disorder (HCC)      Past Surgical History:  Past Surgical History:   Procedure Laterality Date    ERCP  9/30/2019    Procedure: ERCP (ENDOSCOPIC RETROGRADE CHOLANGIOPANCREATOGRAPHY);  Surgeon: Kaushal BEAL  "JORDAN Lopes;  Location: SURGERY [facility];  Service: Gastroenterology    DEONDRE BY LAPAROSCOPY N/A 4/8/2018    Procedure: DEONDRE BY LAPAROSCOPY;  Surgeon: Chava Busby M.D.;  Location: [facility];  Service: General    ERCP  4/6/2018    Procedure: ERCP;  Surgeon: Osiel Paige M.D.;  Location: SURGERY [facility];  Service: Gastroenterology      Social History:  Social History     Tobacco Use    Smoking status: Never    Smokeless tobacco: Never   Vaping Use    Vaping Use: Never used   Substance Use Topics    Alcohol use: No    Drug use: No     Family History:  There is a known family history of seizures/epilepsy on her maternal side with at least two family members with known epilepsy.   Family History   Problem Relation Age of Onset    Cancer Neg Hx     Diabetes Neg Hx     Stroke Neg Hx      Startex Suicide Reassessment  New or continued thoughts about killing self?: No  Preparing to end life?: No    Allergies:  Allergies   Allergen Reactions    Nkda [No Known Drug Allergy]      Current Medications:    Current Outpatient Medications:     Xcopri, 150 mg, Oral, QHS, Taking    divalproex, 500 mg, Oral, BID, Taking    levETIRAcetam, 1,500 mg, Oral, BID, Taking    vitamin D2 (Ergocalciferol), 50,000 Units, Oral, Q7 DAYS, Taking    Physical Examination:    Ambulatory Vitals  Encounter Vitals  Temperature: 36.6 °C (97.9 °F)  Temp src: Temporal  Blood Pressure: 114/78  Pulse: 64  Pulse Oximetry: 99 %  Weight: 58.3 kg (128 lb 8.5 oz)  Height: 154.9 cm (5' 1\")  BMI (Calculated): 24.29    Neurological Examination:  Mental Status: The patient is alert and oriented to person, place, time, and situation. Speech is fluent, with no aphasia nor dysarthria noted. Affect is normal.    Cranial Nerve Examination:  CN I: Olfaction examination is deferred.  CN II: Visual fields are full to confrontation examination and show no visual field defect.  CN III, IV, VI: Eye movements are normal in all " directions. Pupils are reactive to direct and consensual light. There is no relative afferent pupillary defect. There is no nystagmus.  CN V: Facial sensation to light touch is intact throughout.   CN VII: No significant facial muscle or other soft tissue asymmetry.  CN VIII: Hearing intact to rubbing sounds bilaterally.   CN IX, X: Soft palate elevates symmetrically.  CN XI: Symmetrical shoulder shrug exam.  CN XII: Midline tongue protrusion and moves symmetrically to each side.     Motor Examination:  Muscle strength is intact (5/5) throughout. Muscle tone is normal throughout. No abnormal movements are observed. No pronator drift is noted.     Muscle Stretch Reflexes Examination:  Muscle stretch reflexes are normal (2+) throughout and symmetric.    Sensory Examination:  Preserved sensation to light touch in all extremities.     Coordination:  Normal finger to nose testing bilaterally, no postural nor intentional tremor was noted.     Stance/gait:  Normal regular gait with normal arm swings and stride length. Able to perform tandem gait. Romberg sign is absent.     ASSESSMENT AND PLAN:  1. Medically intractractable focal epilepsy, with focal impaired awareness seizures and rare focal to bilateral tonic-clonic seizures, likely originating from the left mesial temporal lobe. The etiology might be related to the history of meningitis, as well as positive family history of seizures. We had an extensive discussion at the time of the initial visit about the nature of her epilepsy, cause, and potential next best steps; we discussed that she might be a potential epilepsy surgery candidate, which would give her the highest chance for seizure freedom (though she has reported history of meningitis, she also has a clear left MTS). We discussed at the time of the initial visit that the first step to pursue epilepsy surgery would be to proceed with EMU for up to 5 days.     The patient continues to have challenges with  insurance and this is the main barrier in proceeding with further evaluation for epilepsy surgery. This is also a barrier for any further antiseizure medication optimization, since both the patient and her mother are very concerned about any potential side effects of new antiseizure medications, as well as potential for convulsive breakthrough seizures with ASM changes, that might lead to further healthcare-related expenses.     We had a detailed discussion about the fact that she should not get pregnant on Depakote. The patient and her mother are concerned about any potential complication related to switching Depakote to lamotrigine, as well as cost of lamotrigine. They verbalized that they want to continue current antiseizure medication regimen with no changes. I have advised the patient to establish care with ObGyn and discuss all the risks associated to a potential pregnancy, so she can make fully informed decision if she wants to become pregnant or not in the future.     We will continue current antiseizure medications with no changes. Adverse effects were discussed in detail, specifically adverse effects of Depakote on pregnancy. Refills were provided.   - Cenobamate (XCOPRI) 150 MG Tab; Take 150 mg by mouth at bedtime for 180 days.  Dispense: 30 Tablet; Refill: 5  - divalproex (DEPAKOTE) 500 MG Tablet Delayed Response; Take 1 Tablet by mouth 2 times a day for 180 days.  Dispense: 180 Tablet; Refill: 1  - levETIRAcetam (KEPPRA) 500 MG Tab; Take 3 Tablets by mouth 2 times a day.  Dispense: 180 Tablet; Refill: 5    We will plan for repeat CBC/CMP at the time of the next visit.     She is to follow up on incidental pituitary gland changes with her PCP.     Patient Instructions   Please continue Xcopri 150 mg once a day.    Please continue Keppra 1500 mg twice daily.    Please continue Depakote  mg twice daily.    Please continue vitamin D supplementation.    Please explore your options if you can get  Medicaid insurance through your primary care provider or through social security office.     Please make sure that you don't become pregnant while you are taking Depakote, because this medication may harm your baby.     Please let our office know if you have any changes in your seizure frequency and/characteristics. Otherwise, please keep the diary of your events and bring it with you at the time of your next follow up visit with our office.     Please let our office know if you have any changes in your seizure frequency and/characteristics. Otherwise, please keep the diary of your events and bring it with you at the time of your next follow up visit with our office.     Please take vitamin D3 [id number] internation units daily.     Please take folic acid 1 mg daily. This is an over-the-counter supplement that is recommended to prevent certain developmental problems in your baby, in case you become pregnant in the future.    Please let our office know as soon as you become pregnant or plan to become pregnant - again, you should not become pregnant while on Depakote.    If you are caring for a baby/young child, please make sure to be sitting on a soft surface while holding your baby/young child, so in case you have a seizure, your baby/young child is not injured due to fall.     Please let us know if you observe that your baby is excessively sleepy/has other changes and the pediatrician feels that there are no other explanations except possible adverse effects of antiseizure medication(s) your are taking while nursing your baby.     Please note that the following might precipitate seizures: missed doses of antiseizure medications, being sick with fever, stress, fatigue, sleep deprivation, not eating regularly, not drinking enough water, drinking too much alcohol, stopping alcohol suddenly if you are currently using it on a regular/daily basis, and/or using recreational drugs, among others.    Please note that the  following might lead to an injury, potentially a life-threatening injury, in case you have a seizure and/or lose awareness while:   - being in a large body of water by yourself, such as bath, pool, lake, ocean, among others (risk of drowning)   - being on unprotected heights (risk of fall)   - being around and/or operating heavy machinery (risk of injury)   - being around open fire/hot surfaces (risk of burns)   - any other activities/circumstances, in which if you lose awareness, you might injure yourself and/or others.    Please call for help (crisis line and/or 911) in case you have thoughts of harming yourself and/or others.    Please abstain from driving until further notice.    ------------------------------------------------------------------------------------------  Instructions for your family/caregivers:  Please call 911 if the patient has a seizure longer than 2-3 minutes, if seizures are back to back without her recovering to her baseline, or she does not start recovering within 5-10 minutes after the seizure stops. During the seizure - please turn her on her side, please make sure her head is protected (for example, you should put a pillow under her head, if one is available), and please do not put anything in her mouth.   -------------------------------------------------------------------------------------------    It is important that your seizures are well controlled and you have none or have them rarely. In addition to avoiding injury related to breakthrough seizures, frequent seizures increase risk of SUDEP (sudden unexpected death in epilepsy), where a person goes into a seizure and then never wakes up - this is a rare complication of seizure disorder; one of the best available ways to prevent it is to control your seizures well.     Due to the high volume of patients we are trying to help, your physician will not be able to respond by phone or in MyChart to your routine concerns between  appointments.  This does not reflect a lack of interest or concern for you or your diagnosis.  Please bring these questions and concerns to your appointment where your physician can answer.  Please relay more pressing concerns to our office, either via MyChart, or by phone; if not able to reach us please visit nearby Urgent Care Center or Emergency Department.  If any emergent medical needs, please seek emergent medical help and/or call 911.    Please note that we are not able to fill out paperwork that might be related to your work, utility company, disability, and/or driving, among others, in between the visits.  Please schedule a dedicated appointment to address your paperwork, so we can do that in a timely manner.  This is not due to lack of concern or interest for your disease-related work/administrative problems, but to make sure that we provide the best possible care and to fill out your paperwork in a correct and timely manner.    Thank you for entrusting your neurological care to [facility] and we look forward to continuing to serve you.     Follow up in 6 months.     My total time spent caring for the patient on the day of the encounter was 42 minutes.   This does not include time spent on separately billable procedures/tests.      Sean Patel MD  Neurology Attending, Epilepsy Program  [facility]

## 2023-08-10 NOTE — PATIENT INSTRUCTIONS
Please continue Xcopri 150 mg once a day.    Please continue Keppra 1500 mg twice daily.    Please continue Depakote  mg twice daily.    Please continue vitamin D supplementation.    Please explore your options if you can get Medicaid insurance through your primary care provider or through social security office.     Please make sure that you don't become pregnant while you are taking Depakote, because this medication may harm your baby.     Please let our office know if you have any changes in your seizure frequency and/characteristics. Otherwise, please keep the diary of your events and bring it with you at the time of your next follow up visit with our office.     Please let our office know if you have any changes in your seizure frequency and/characteristics. Otherwise, please keep the diary of your events and bring it with you at the time of your next follow up visit with our office.     Please take vitamin D3 5600-7784 internation units daily.     Please take folic acid 1 mg daily. This is an over-the-counter supplement that is recommended to prevent certain developmental problems in your baby, in case you become pregnant in the future.    Please let our office know as soon as you become pregnant or plan to become pregnant - again, you should not become pregnant while on Depakote.    If you are caring for a baby/young child, please make sure to be sitting on a soft surface while holding your baby/young child, so in case you have a seizure, your baby/young child is not injured due to fall.     Please let us know if you observe that your baby is excessively sleepy/has other changes and the pediatrician feels that there are no other explanations except possible adverse effects of antiseizure medication(s) your are taking while nursing your baby.     Please note that the following might precipitate seizures: missed doses of antiseizure medications, being sick with fever, stress, fatigue, sleep deprivation,  not eating regularly, not drinking enough water, drinking too much alcohol, stopping alcohol suddenly if you are currently using it on a regular/daily basis, and/or using recreational drugs, among others.    Please note that the following might lead to an injury, potentially a life-threatening injury, in case you have a seizure and/or lose awareness while:   - being in a large body of water by yourself, such as bath, pool, lake, ocean, among others (risk of drowning)   - being on unprotected heights (risk of fall)   - being around and/or operating heavy machinery (risk of injury)   - being around open fire/hot surfaces (risk of burns)   - any other activities/circumstances, in which if you lose awareness, you might injure yourself and/or others.    Please call for help (crisis line and/or 911) in case you have thoughts of harming yourself and/or others.    Please abstain from driving until further notice.    ------------------------------------------------------------------------------------------  Instructions for your family/caregivers:  Please call 911 if the patient has a seizure longer than 2-3 minutes, if seizures are back to back without her recovering to her baseline, or she does not start recovering within 5-10 minutes after the seizure stops. During the seizure - please turn her on her side, please make sure her head is protected (for example, you should put a pillow under her head, if one is available), and please do not put anything in her mouth.   -------------------------------------------------------------------------------------------    It is important that your seizures are well controlled and you have none or have them rarely. In addition to avoiding injury related to breakthrough seizures, frequent seizures increase risk of SUDEP (sudden unexpected death in epilepsy), where a person goes into a seizure and then never wakes up - this is a rare complication of seizure disorder; one of the best  available ways to prevent it is to control your seizures well.     Due to the high volume of patients we are trying to help, your physician will not be able to respond by phone or in MyChart to your routine concerns between appointments.  This does not reflect a lack of interest or concern for you or your diagnosis.  Please bring these questions and concerns to your appointment where your physician can answer.  Please relay more pressing concerns to our office, either via MyChart, or by phone; if not able to reach us please visit nearby Urgent Care Center or Emergency Department.  If any emergent medical needs, please seek emergent medical help and/or call 911.    Please note that we are not able to fill out paperwork that might be related to your work, utility company, disability, and/or driving, among others, in between the visits.  Please schedule a dedicated appointment to address your paperwork, so we can do that in a timely manner.  This is not due to lack of concern or interest for your disease-related work/administrative problems, but to make sure that we provide the best possible care and to fill out your paperwork in a correct and timely manner.    Thank you for entrusting your neurological care to [facility] and we look forward to continuing to serve you.

## 2023-09-11 NOTE — ASSESSMENT & PLAN NOTE
Patient states her seizures have been under relatively good control. She needs refills on her medications. She tends to have breakthrough seizures when she is ill. She also has a catamenial pattern of epilepsy.   RW as per chart review/needs device

## 2023-10-13 NOTE — OR SURGEON
Immediate Post OP Note    PreOp Diagnosis: Cholecystitis    PostOp Diagnosis: same    Procedure(s):  DEONDRE BY LAPAROSCOPY - Wound Class: Clean Contaminated    Surgeon(s):  Chava Busby M.D.    Anesthesiologist/Type of Anesthesia:  Anesthesiologist: Eb Tse M.D.  Anesthesia Technician: Steve Topete/General    Surgical Staff:  Circulator: Dulce Palmer R.N.  Scrub Person: Jayla Davidson    Specimens removed if any:  Gallbladder    Estimated Blood Loss: 5cc    Findings: Inflamed GB removed with hemostasis and critical view achieved    Complications: none        4/8/2018 9:04 AM Chava Busby M.D.       Patient will continue hydroxyzine as needed for anxiety and sleep

## 2023-12-04 ENCOUNTER — OFFICE VISIT (OUTPATIENT)
Dept: NEUROLOGY | Facility: MEDICAL CENTER | Age: 38
End: 2023-12-04
Attending: PSYCHIATRY & NEUROLOGY
Payer: COMMERCIAL

## 2023-12-04 VITALS
TEMPERATURE: 97.3 F | WEIGHT: 130.95 LBS | HEART RATE: 76 BPM | BODY MASS INDEX: 24.72 KG/M2 | SYSTOLIC BLOOD PRESSURE: 102 MMHG | OXYGEN SATURATION: 98 % | HEIGHT: 61 IN | DIASTOLIC BLOOD PRESSURE: 62 MMHG

## 2023-12-04 DIAGNOSIS — G40.109 LOCALIZATION-RELATED EPILEPSY (HCC): ICD-10-CM

## 2023-12-04 DIAGNOSIS — G40.209 COMPLEX PARTIAL SEIZURES EVOLVING TO GENERALIZED TONIC-CLONIC SEIZURES (HCC): ICD-10-CM

## 2023-12-04 DIAGNOSIS — E55.9 VITAMIN D DEFICIENCY: ICD-10-CM

## 2023-12-04 PROCEDURE — 99211 OFF/OP EST MAY X REQ PHY/QHP: CPT | Performed by: PSYCHIATRY & NEUROLOGY

## 2023-12-04 PROCEDURE — 3074F SYST BP LT 130 MM HG: CPT | Performed by: PSYCHIATRY & NEUROLOGY

## 2023-12-04 PROCEDURE — 3078F DIAST BP <80 MM HG: CPT | Performed by: PSYCHIATRY & NEUROLOGY

## 2023-12-04 PROCEDURE — 99214 OFFICE O/P EST MOD 30 MIN: CPT | Performed by: PSYCHIATRY & NEUROLOGY

## 2023-12-04 RX ORDER — ERGOCALCIFEROL 1.25 MG/1
50000 CAPSULE ORAL
Qty: 13 CAPSULE | Refills: 3 | Status: SHIPPED | OUTPATIENT
Start: 2023-12-04

## 2023-12-04 RX ORDER — DIVALPROEX SODIUM 500 MG/1
500 TABLET, DELAYED RELEASE ORAL 2 TIMES DAILY
Qty: 180 TABLET | Refills: 1 | Status: SHIPPED | OUTPATIENT
Start: 2023-12-04 | End: 2024-06-01

## 2023-12-04 RX ORDER — LEVETIRACETAM 500 MG/1
1500 TABLET ORAL 2 TIMES DAILY
Qty: 180 TABLET | Refills: 5 | Status: SHIPPED | OUTPATIENT
Start: 2023-12-04

## 2023-12-04 RX ORDER — CENOBAMATE 150 MG/1
150 TABLET, FILM COATED ORAL
Qty: 30 TABLET | Refills: 5 | Status: SHIPPED | OUTPATIENT
Start: 2023-12-04 | End: 2024-06-01

## 2023-12-04 RX ORDER — CENOBAMATE 150 MG/1
150 TABLET, FILM COATED ORAL
Qty: 30 TABLET | Refills: 5 | Status: SHIPPED | OUTPATIENT
Start: 2023-12-04 | End: 2023-12-04 | Stop reason: SDUPTHER

## 2023-12-04 ASSESSMENT — FIBROSIS 4 INDEX: FIB4 SCORE: 0.6

## 2023-12-04 NOTE — PATIENT INSTRUCTIONS
Please continue Xcopri 150 mg once a day.    Please continue Keppra 1500 mg twice daily.    Please continue Depakote  mg twice daily.    Please continue vitamin D supplementation.    Please explore your options if you can get Medicaid insurance through your primary care provider or through social security office.     Please make sure that you don't become pregnant while you are taking Depakote, because this medication may harm your baby.     Please let our office know if you have any changes in your seizure frequency and/characteristics. Otherwise, please keep the diary of your events and bring it with you at the time of your next follow up visit with our office.     Please let our office know if you have any changes in your seizure frequency and/characteristics. Otherwise, please keep the diary of your events and bring it with you at the time of your next follow up visit with our office.     Please take vitamin D3 [id number] internation units daily.     Please take folic acid 1 mg daily. This is an over-the-counter supplement that is recommended to prevent certain developmental problems in your baby, in case you become pregnant in the future.    Please let our office know as soon as you become pregnant or plan to become pregnant - again, you should not become pregnant while on Depakote.    If you are caring for a baby/young child, please make sure to be sitting on a soft surface while holding your baby/young child, so in case you have a seizure, your baby/young child is not injured due to fall.     Please let us know if you observe that your baby is excessively sleepy/has other changes and the pediatrician feels that there are no other explanations except possible adverse effects of antiseizure medication(s) your are taking while nursing your baby.     Please note that the following might precipitate seizures: missed doses of antiseizure medications, being sick with fever, stress, fatigue, sleep deprivation,  not eating regularly, not drinking enough water, drinking too much alcohol, stopping alcohol suddenly if you are currently using it on a regular/daily basis, and/or using recreational drugs, among others.    Please note that the following might lead to an injury, potentially a life-threatening injury, in case you have a seizure and/or lose awareness while:   - being in a large body of water by yourself, such as bath, pool, lake, ocean, among others (risk of drowning)   - being on unprotected heights (risk of fall)   - being around and/or operating heavy machinery (risk of injury)   - being around open fire/hot surfaces (risk of burns)   - any other activities/circumstances, in which if you lose awareness, you might injure yourself and/or others.    Please call for help (crisis line and/or 911) in case you have thoughts of harming yourself and/or others.    Please abstain from driving until further notice.    ------------------------------------------------------------------------------------------  Instructions for your family/caregivers:  Please call 911 if the patient has a seizure longer than 2-3 minutes, if seizures are back to back without her recovering to her baseline, or she does not start recovering within 5-10 minutes after the seizure stops. During the seizure - please turn her on her side, please make sure her head is protected (for example, you should put a pillow under her head, if one is available), and please do not put anything in her mouth.   -------------------------------------------------------------------------------------------    It is important that your seizures are well controlled and you have none or have them rarely. In addition to avoiding injury related to breakthrough seizures, frequent seizures increase risk of SUDEP (sudden unexpected death in epilepsy), where a person goes into a seizure and then never wakes up - this is a rare complication of seizure disorder; one of the best  available ways to prevent it is to control your seizures well.     Due to the high volume of patients we are trying to help, your physician will not be able to respond by phone or in MyChart to your routine concerns between appointments.  This does not reflect a lack of interest or concern for you or your diagnosis.  Please bring these questions and concerns to your appointment where your physician can answer.  Please relay more pressing concerns to our office, either via MyChart, or by phone; if not able to reach us please visit nearby Urgent Care Center or Emergency Department.  If any emergent medical needs, please seek emergent medical help and/or call 911.    Please note that we are not able to fill out paperwork that might be related to your work, utility company, disability, and/or driving, among others, in between the visits.  Please schedule a dedicated appointment to address your paperwork, so we can do that in a timely manner.  This is not due to lack of concern or interest for your disease-related work/administrative problems, but to make sure that we provide the best possible care and to fill out your paperwork in a correct and timely manner.    Thank you for entrusting your neurological care to [facility] and we look forward to continuing to serve you.

## 2023-12-04 NOTE — TELEPHONE ENCOUNTER
You are going to send her xcopri to [facility] [place] pharmacy. She would like to know if you can order labs to check her kidney's and liver.

## 2023-12-04 NOTE — PROGRESS NOTES
"[facility] Epilepsy Program  Follow Up Visit      Patient's Name: Umu Mcguire  YOB: 1985  MRN: [id number]  Date of Service: 12/04/23    Referring Provider: Terrell Scherer M.D.  [address] [place] [address]  [place]  [place] [id number]    Chief Concern: Seizures.     The patient presents with her mother and provides verbal consent for her to be present. The visit was conducted with a help of a formal .     HPI (as obtained at the time of the initial visit and updated as necessary): The patient is a 38 y.o., right-handed female, who initially presented to my epilepsy clinic for evaluation of seizures on 04/05/2023. She now presents for a follow up.     The patient's seizures started when she was a baby. It seems that she was diagnosed with meningitis at age 7-8 months, and that is when her first bilateral tonic-clonic seizure occurred. She remained seizure free until age 7. She then started having staring spells at age 7, as noted under event type #1. She also continued to have bilateral tonic-clonic seizures, as noted under event type #2.    Per previous documentation, VNS treatment was discussed, but proved to be too expensive for her.  This was confirmed by the patient and her mother.     Semiology:  Event type #1: \"Staring spells\".  Year/Age of Onset: 1995 (around age 7).   Initial features: The event starts with gastric uprising, carlitos vu, heart racing, sensation of fear, and metallic taste.  Event features: The patient stares ahead and is not responsive. There are oral and manual automatisms noted. If she is not attended to, she will at times fall with these events.   Post-ictal features: Confused.   Duration: Around a minute.  Frequency: Up to 10 per month. Unfortunately, she continues to have these spells.   Precipitating factors: Around her period.     Event type #2: \"Convulsions\".  Year/Age of Onset: 1986 (age 1).  Initial features: The patient has no recollection of " "any warning signs.   Event features: No clear lateralizing signs at the onset. She then tenses her whole body and has clonic movements. She does not bite her tongue. No bladder/bowel incontinence.   Post-ictal features: Confused.  Duration: Around 3 minutes.  Frequency: She gets one per 5-8 years. The last event was in 2022.   Precipitating factors: Menstrual period.     History of status epilepticus: no  History of physical injury related to seizures: yes  History of surgery related to epilepsy: no  Family Planning: She would like to become pregnant, but does not have a plan at this time.   Current Driving Status: She does not drive. She does not have 's licence  Seizure Clusters: Yes, around her menstrual periods.   Longest Seizure Freedom: Her seizures were never well controlled.     Pertinent Ancillary Test Results:    MRI brain studies:   - MRI brain wo/w contrast (09/06/2022 at [facility]): \"No acute intracranial process. Left hippocampal increased signal and slight loss of volume with altered architecture. In the appropriate clinical setting, these findings can be associated with hippocampal sclerosis. Incidental note is made of a 6 mm microadenoma involving the anterolateral right pituitary gland.\"    EEG studies:   - Standard EEG (10/15/2019, Dr. Urbano): This is an abnormal routine EEG recording in the awake, drowsy, and sleep state(s). There is slowing in the left frontotemporal region, as well intermittent and brief runs of rhythmic delta / theta activity were captured in this region. The findings suggest underlying area of cortical irritability and structural abnormality. The patient is at increased risk for seizures, however no seizures captured during this study.  Clinical and radiological correlation is recommended.     - EEG (04/17/2008): IMPRESSION:  Ambulatory electroencephalogram recording is abnormal with the appearance of rare generalized sharp activity and left temporal sharp wave activity.  " Both noted during sleep only.  No definitive epileptogenic discharges are noted, but clinical correlation is warranted for possible focal and generalized seizure disorder.  Again, no definite epileptogenic discharges are noted.     - EEG (7/23/1014, Dr. Hanna): At the onset of recording, the patient has a moderate amplitude background.  She has normal gradient with a posterior alpha rhythm of 9-1/2 Hz. It appears to be reactive and symmetric.  There is some increase in general background beta activity noted.  Some intermittent polymorphic theta slowing is noted in the left temporal region.  At times, she also gets some rhythmic 2 Hz slowing on the left.  At about 10-1/2 minutes into the recording, a few sharp waves and then a clear spike is noted in the left temporal region, phase reversing at F7.  These become fairly frequent as the patient gets drowsy.  She gets up to 5 per page over 5/10 seconds.  The patient video was reviewed during these runs of frequent left temporal sharps and spikes, but she appears to just be resting quietly in stage I sleep.  At the end of recording, photic stimulation is performed.  This produced some photic driving at a flash frequency of 9 Hz, in 11 Hz it was symmetric.  It produced no epileptiform potentials. Hyperventilation was then performed.  This produced some increase left temporal lobe slowing and then another burst of 1-2 per second temporal sharps lasting about 8 seconds. Parenthetically, it was noted that the EKG monitored throughout this recording  showed a normal sinus rhythm of 78 beats per minute.   IMPRESSION:  This is an abnormal electroencephalogram  due to the presence of frequent epileptiform potentials in drowsiness and exacerbated by hyperventilation.  No clinical seizures noted.    INTERIM HISTORY (12/04/2023): The patient unfortunately continues to have staring spells, on average 6-8 per month, which is slightly less than average, through the frequency of these  events fluctuate.     She had no convulsions since 2022.     She is taking her antiseizure medications regularly and overall has no adverse effects from these.     She followed with her ObGyn and per the patient and her mother, her ObGyn advised her not to become pregnant, in context of risks associated with potential pregnancy.     The patient continues to have challenges with insurance. She tried to explore other insurance options, but she is not eligible for those.    She has explored lamotrigine in the past, but was not able to find out about the pricing.     Current Antiseizure Medications: Xcopri 150 mg at bedtime. Depakote  mg BID. Keppra 1500 mg BID. Vitamin D supplementation.    Previously Tried Antiseizure Medications: None.     Review of Systems: No recent fevers. There was no significant weight changes in the interm. No mood issues and no suicidal nor homicidal ideation.     Risk Factors For Epilepsy/Seizure Disorder: The patient is a product of normal pregnancy and uncomplicated delivery. The early development was normal and the patient started waking, talking, and engaging in social interaction as expected. There were no challenges during school and no reported attendance of special education programs. There is no history of head trauma. There is no history of stroke. There is a history of meningitis at age 7-8 months and her first seizure in context of febrile meningitis illness. There is no history of neurosurgical interventions. There is a notion of staring spells starting at age 7 years.     Past Medical History:  Past Medical History:   Diagnosis Date    DUB (dysfunctional uterine bleeding)     Epilepsy (HCC) 11/4/2009    since infant.  sees Wilfrido/Codey at [facility].  Keppra/depakote.  absence siezures 1x/month-thinks iwth menstruation.    GERD (gastroesophageal reflux disease)     gastritis-only with spicy foods    Seizure disorder (HCC)      Past Surgical History:  Past Surgical History:  "  Procedure Laterality Date    ERCP  9/30/2019    Procedure: ERCP (ENDOSCOPIC RETROGRADE CHOLANGIOPANCREATOGRAPHY);  Surgeon: Kaushal Lopes M.D.;  Location: [facility];  Service: Gastroenterology    DEONDRE BY LAPAROSCOPY N/A 4/8/2018    Procedure: DEONDRE BY LAPAROSCOPY;  Surgeon: Chava Busby M.D.;  Location: [facility];  Service: General    ERCP  4/6/2018    Procedure: ERCP;  Surgeon: Osiel Paige M.D.;  Location: [facility];  Service: Gastroenterology      Social History:  Social History     Tobacco Use    Smoking status: Never    Smokeless tobacco: Never   Vaping Use    Vaping Use: Never used   Substance Use Topics    Alcohol use: No    Drug use: No     Family History:  There is a known family history of seizures/epilepsy on her maternal side with at least two family members with known epilepsy.   Family History   Problem Relation Age of Onset    Cancer Neg Hx     Diabetes Neg Hx     Stroke Neg Hx      [place] Suicide Reassessment  New or continued thoughts about killing self?: No  Preparing to end life?: No    Allergies:  Allergies   Allergen Reactions    Nkda [No Known Drug Allergy]      Current Medications:    Current Outpatient Medications:     Xcopri, 150 mg, Oral, QHS, Taking    divalproex, 500 mg, Oral, BID, Taking    levETIRAcetam, 1,500 mg, Oral, BID, Taking    vitamin D2 (Ergocalciferol), 50,000 Units, Oral, Q7 DAYS, Taking    Physical Examination:    Ambulatory Vitals  Encounter Vitals  Temperature: 36.3 °C (97.3 °F)  Temp src: Temporal  Blood Pressure: 102/62  Pulse: 76  Pulse Oximetry: 98 %  Weight: 59.4 kg (130 lb 15.3 oz)  Height: 154.9 cm (5' 1\")  BMI (Calculated): 24.74    Neurological Examination:  Mental Status: The patient is alert and oriented to person, place, time, and situation. Speech is fluent, with no aphasia nor dysarthria noted. Affect is normal.    Cranial Nerve Examination:  CN I: Olfaction examination is deferred.  CN II: " Visual fields are full to confrontation examination and show no visual field defect.  CN III, IV, VI: Eye movements are normal in all directions. Pupils are reactive to direct and consensual light. There is no relative afferent pupillary defect. There is no nystagmus.  CN V: Facial sensation to light touch is intact throughout.   CN VII: No significant facial muscle or other soft tissue asymmetry.  CN VIII: Hearing intact to rubbing sounds bilaterally.   CN IX, X: Soft palate elevates symmetrically.  CN XI: Symmetrical shoulder shrug exam.  CN XII: Midline tongue protrusion and moves symmetrically to each side.     Motor Examination:  Muscle strength is intact (5/5) throughout. Muscle tone is normal throughout. No abnormal movements are observed. No pronator drift is noted.     Muscle Stretch Reflexes Examination:  Muscle stretch reflexes are normal (2+) throughout and symmetric.    Sensory Examination:  Preserved sensation to light touch in all extremities.     Coordination:  Normal finger to nose testing bilaterally, no postural nor intentional tremor was noted.     Stance/gait:  Normal regular gait with normal arm swings and stride length. Able to perform tandem gait. Romberg sign is absent.     ASSESSMENT AND PLAN:  1. Medically intractractable focal epilepsy, with focal impaired awareness seizures and rare focal to bilateral tonic-clonic seizures, likely originating from the left mesial temporal lobe. The etiology might be related to the history of meningitis, as well as positive family history of seizures. We had an extensive discussion at the time of the initial visit about the nature of her epilepsy, cause, and potential next best steps; we discussed that she might be a potential epilepsy surgery candidate, which would give her the highest chance for seizure freedom (though she has reported history of meningitis, she also has a clear left MTS). We discussed at the time of the initial visit that the first  step to pursue epilepsy surgery would be to proceed with EMU for up to 5 days.     The patient continues to have challenges with insurance and this is the main barrier in proceeding with further evaluation for epilepsy surgery. This is also a barrier for any further antiseizure medication optimization, since both the patient and her mother are very concerned about any potential side effects of new antiseizure medications, as well as potential for convulsive breakthrough seizures with ASM changes, that might lead to further healthcare-related expenses.     We had a detailed discussion about the fact that she should not get pregnant on Depakote. The patient discussed the issue of pregnancy with ObGyn in late 2023 and was advised not to become pregnant due to health risks.     We will continue current antiseizure medications with no changes. Adverse effects were discussed in detail, specifically adverse effects of Depakote on pregnancy. Refills were provided.   - Cenobamate (XCOPRI) 150 MG Tab; Take 150 mg by mouth at bedtime for 180 days.  Dispense: 30 Tablet; Refill: 5  - divalproex (DEPAKOTE) 500 MG Tablet Delayed Response; Take 1 Tablet by mouth 2 times a day for 180 days.  Dispense: 180 Tablet; Refill: 1  - levETIRAcetam (KEPPRA) 500 MG Tab; Take 3 Tablets by mouth 2 times a day.  Dispense: 180 Tablet; Refill: 5    - vitamin D2, Ergocalciferol, (DRISDOL) 1.25 MG (17421 UT) Cap capsule; Take 1 Capsule by mouth every 7 days.  Dispense: 13 Capsule; Refill: 3    We will obtain CMP and Ammonia. Reviewed the most recent CBC.   - Comp Metabolic Panel; Future  - AMMONIA; Future    She is to follow up on incidental pituitary gland changes with her PCP.     Patient Instructions   Please continue Xcopri 150 mg once a day.    Please continue Keppra 1500 mg twice daily.    Please continue Depakote  mg twice daily.    Please continue vitamin D supplementation.    Please explore your options if you can get Medicaid  insurance through your primary care provider or through social security office.     Please make sure that you don't become pregnant while you are taking Depakote, because this medication may harm your baby.     Please let our office know if you have any changes in your seizure frequency and/characteristics. Otherwise, please keep the diary of your events and bring it with you at the time of your next follow up visit with our office.     Please let our office know if you have any changes in your seizure frequency and/characteristics. Otherwise, please keep the diary of your events and bring it with you at the time of your next follow up visit with our office.     Please take vitamin D3 [id number] internation units daily.     Please take folic acid 1 mg daily. This is an over-the-counter supplement that is recommended to prevent certain developmental problems in your baby, in case you become pregnant in the future.    Please let our office know as soon as you become pregnant or plan to become pregnant - again, you should not become pregnant while on Depakote.    If you are caring for a baby/young child, please make sure to be sitting on a soft surface while holding your baby/young child, so in case you have a seizure, your baby/young child is not injured due to fall.     Please let us know if you observe that your baby is excessively sleepy/has other changes and the pediatrician feels that there are no other explanations except possible adverse effects of antiseizure medication(s) your are taking while nursing your baby.     Please note that the following might precipitate seizures: missed doses of antiseizure medications, being sick with fever, stress, fatigue, sleep deprivation, not eating regularly, not drinking enough water, drinking too much alcohol, stopping alcohol suddenly if you are currently using it on a regular/daily basis, and/or using recreational drugs, among others.    Please note that the following  might lead to an injury, potentially a life-threatening injury, in case you have a seizure and/or lose awareness while:   - being in a large body of water by yourself, such as bath, pool, lake, ocean, among others (risk of drowning)   - being on unprotected heights (risk of fall)   - being around and/or operating heavy machinery (risk of injury)   - being around open fire/hot surfaces (risk of burns)   - any other activities/circumstances, in which if you lose awareness, you might injure yourself and/or others.    Please call for help (crisis line and/or 911) in case you have thoughts of harming yourself and/or others.    Please abstain from driving until further notice.    ------------------------------------------------------------------------------------------  Instructions for your family/caregivers:  Please call 911 if the patient has a seizure longer than 2-3 minutes, if seizures are back to back without her recovering to her baseline, or she does not start recovering within 5-10 minutes after the seizure stops. During the seizure - please turn her on her side, please make sure her head is protected (for example, you should put a pillow under her head, if one is available), and please do not put anything in her mouth.   -------------------------------------------------------------------------------------------    It is important that your seizures are well controlled and you have none or have them rarely. In addition to avoiding injury related to breakthrough seizures, frequent seizures increase risk of SUDEP (sudden unexpected death in epilepsy), where a person goes into a seizure and then never wakes up - this is a rare complication of seizure disorder; one of the best available ways to prevent it is to control your seizures well.     Due to the high volume of patients we are trying to help, your physician will not be able to respond by phone or in MyChart to your routine concerns between appointments.  This  does not reflect a lack of interest or concern for you or your diagnosis.  Please bring these questions and concerns to your appointment where your physician can answer.  Please relay more pressing concerns to our office, either via MyChart, or by phone; if not able to reach us please visit nearby Urgent Care Center or Emergency Department.  If any emergent medical needs, please seek emergent medical help and/or call 911.    Please note that we are not able to fill out paperwork that might be related to your work, utility company, disability, and/or driving, among others, in between the visits.  Please schedule a dedicated appointment to address your paperwork, so we can do that in a timely manner.  This is not due to lack of concern or interest for your disease-related work/administrative problems, but to make sure that we provide the best possible care and to fill out your paperwork in a correct and timely manner.    Thank you for entrusting your neurological care to [facility] and we look forward to continuing to serve you.    Follow up in 6 months.     Sean Patel MD  Neurology Attending, Epilepsy Program  [facility]

## 2023-12-05 NOTE — TELEPHONE ENCOUNTER
Noted. The initial script was routed to [facility], as well as the second Xcopri scipt that I just signed, but both of these defaulted to [facility]. Please work with Rajwinder Huff on rectifying this issue. Thank you.

## 2024-01-26 ENCOUNTER — GYNECOLOGY VISIT (OUTPATIENT)
Dept: OBGYN | Facility: CLINIC | Age: 39
End: 2024-01-26
Payer: COMMERCIAL

## 2024-01-26 VITALS — DIASTOLIC BLOOD PRESSURE: 62 MMHG | BODY MASS INDEX: 25.13 KG/M2 | SYSTOLIC BLOOD PRESSURE: 90 MMHG | WEIGHT: 133 LBS

## 2024-01-26 DIAGNOSIS — Z00.00 WELL WOMAN EXAM (NO GYNECOLOGICAL EXAM): ICD-10-CM

## 2024-01-26 PROCEDURE — 3074F SYST BP LT 130 MM HG: CPT | Performed by: NURSE PRACTITIONER

## 2024-01-26 PROCEDURE — 99385 PREV VISIT NEW AGE 18-39: CPT | Performed by: NURSE PRACTITIONER

## 2024-01-26 PROCEDURE — 3078F DIAST BP <80 MM HG: CPT | Performed by: NURSE PRACTITIONER

## 2024-01-26 ASSESSMENT — FIBROSIS 4 INDEX: FIB4 SCORE: 0.6

## 2024-01-26 NOTE — PROGRESS NOTES
Patient here for GYN visit.   LMP : 1/9/24  BCM : None   Pap : 10/2019 - ASCUS and LGSIL . Updated pap from 12/2023 in media with [facility] referral.   Pt states she has an increase in dx after her cycles.   Phone/Pharmacy verified.          ID : [id number]

## 2024-01-26 NOTE — PROGRESS NOTES
Umu Mcguire is a 38 y.o. y.o. female who presents for her annual gynecological exam.       HPI Comments: Pt reports abnormal paps and colpos in the past two years. She has been diagnosed with HPV as well.     Review of Systems:  Cardio: Denies any issues.   Respiratory: Denies any issues.   Constitutional:  Denies any issues.   : Duy any issues.   Abdominal: Denies any issues.   Psychosocial: Denies any issues.   EENT: Denies any issues.   Metabolic: Denies any issues.   Pertinent positives documented in HPI and all other systems reviewed & are negative.     Gynecological hx:   Last pap was abnormal and one abnormal before that. Reports two biopsies in 2022 and 2023 and HPV positive results as well.     Denies any other hx of STIs other and was last tested for STIs unknown.     Patient's last menstrual period was 01/09/2024 (exact date).. Reports has regular periods every 28 days lasting 4-5 days and started menstruating at age 12.     Not currently sexually active. She has had a total of 1 sexual partners in lifetime, male. She reports sex was always painful with him     Reports does use birth control.     Denies any history of breast issues, surgeries, cancer.     OB Hx:  - n/a    Denies any psychological hx including hospitalizations and psych medication.   Denies any hx of or current issues with interpersonal violence.   Denies any use of tobacco, alcohol, drugs.     All PMH, PSH, allergies, social history and FH reviewed and updated today:  Past Medical History:   Diagnosis Date    Epilepsy (HCC) 11/04/2009    since infant.  sees Wilfrido/Codey at [facility].  Keppra/depakote.  absence siezures 1x/month-thinks iwth menstruation.     Past Surgical History:   Procedure Laterality Date    ERCP  9/30/2019    Procedure: ERCP (ENDOSCOPIC RETROGRADE CHOLANGIOPANCREATOGRAPHY);  Surgeon: Kaushal Lopes M.D.;  Location: SURGERY [facility];  Service: Gastroenterology    DEONDRE BY LAPAROSCOPY N/A 4/8/2018     Procedure: DEONDRE BY LAPAROSCOPY;  Surgeon: Chava Busby M.D.;  Location: SURGERY [facility];  Service: General    ERCP  4/6/2018    Procedure: ERCP;  Surgeon: Osiel Paige M.D.;  Location: SURGERY [facility];  Service: Gastroenterology     Nkda [no known drug allergy]  Social History     Socioeconomic History    Marital status: Single   Tobacco Use    Smoking status: Never    Smokeless tobacco: Never   Vaping Use    Vaping Use: Never used   Substance and Sexual Activity    Alcohol use: No    Drug use: No    Sexual activity: Not Currently     Partners: Male     Birth control/protection: None     Family History   Problem Relation Age of Onset    No Known Problems Mother     No Known Problems Father     Cancer Neg Hx     Diabetes Neg Hx     Stroke Neg Hx      Medications:   Current Outpatient Medications Ordered in Epic   Medication Sig Dispense Refill    divalproex (DEPAKOTE) 500 MG Tablet Delayed Response Take 1 Tablet by mouth 2 times a day for 180 days. 180 Tablet 1    levETIRAcetam (KEPPRA) 500 MG Tab Take 3 Tablets by mouth 2 times a day. 180 Tablet 5    vitamin D2, Ergocalciferol, (DRISDOL) 1.25 MG (45224 UT) Cap capsule Take 1 Capsule by mouth every 7 days. 13 Capsule 3    Cenobamate (XCOPRI) 150 MG Tab Take 150 mg by mouth at bedtime for 180 days. 30 Tablet 5     No current Epic-ordered facility-administered medications on file.          Objective:   Vital measurements:  BP 90/62 (BP Location: Right arm, Patient Position: Sitting, BP Cuff Size: Adult)   Wt 133 lb   Body mass index is 25.13 kg/m². (Goal BM I>18 <25)    Physical Exam   Nursing note and vitals reviewed.    Constitutional: She is oriented to person, place, and time. She appears well-developed and well-nourished. No distress.     HEENT:   Head: Normocephalic and atraumatic.   Right Ear: External ear normal.   Left Ear: External ear normal.   Nose: Nose normal.   Eyes: Conjunctivae and EOM are normal. Pupils are equal,  round, and reactive to light. No scleral icterus.     Neck: Normal range of motion. Neck supple. No tracheal deviation present. No thyromegaly present.     Pulmonary/Chest: Effort normal and breath sounds normal. No respiratory distress. She has no wheezes. She has no rales. She exhibits no tenderness.     Cardiovascular: Regular, rate and rhythm. No JVD.    Abdominal: Soft. Bowel sounds are normal. She exhibits no distension and no mass. No tenderness. She has no rebound and no guarding.       Genitourinary:  Pelvic exam was deferred    Musculoskeletal: Normal range of motion. She exhibits no edema and no tenderness.     Lymphadenopathy: She has no cervical adenopathy.     Neurological: She is alert and oriented to person, place, and time. She exhibits normal muscle tone.     Skin: Skin is warm and dry. No rash noted. She is not diaphoretic. No erythema. No pallor.     Psychiatric: She has a normal mood and affect. Her behavior is normal. Judgment and thought content normal.      Assessment:     Well woman without gynecological exam     Plan:   Pap results from [facility] requested; declines gc/ct testing today would like to do at biopsy appt  No record of past colpos as done with Dr. Steele  STI screening via blood also accepted today  Monthly self breast exam education provided  Never received HPV vaccine  Pt is interested in a future pregnancy but has been told she should not because of her age and epilepsy and medications, she would like to have a preconception appt with [facility] at some point to review risk v. benefits   Pt reports she does have a PCP  Encourage exercise and proper diet  Mammograms starting @ age 40 annually  Return to clinic: for colpo

## 2024-02-28 ENCOUNTER — TELEPHONE (OUTPATIENT)
Dept: OBGYN | Facility: CLINIC | Age: 39
End: 2024-02-28
Payer: COMMERCIAL

## 2024-02-28 NOTE — TELEPHONE ENCOUNTER
Patient on the phone stating that she received a letter and she is not sure what is it about. Patient is Gibraltarian speaking and does not read any English. Chart reviewed, patient was informed that it was a referral letter letting her know that [facility] approved the procedure she needs ( colposcopy). Patient understands now and will call when ready to schedule procedure. She had no more questions

## 2024-03-08 NOTE — PROGRESS NOTES
Subjective     Umu Mcguire is a 37 y.o. female who presents with her mother Yudy, for follow-up, with a history of focal onset seizures.  Mostly Slovenian-speaking, translation services were utilized.     HPI    Followed in this clinic over the last 8 years, she was under the care of EMETERIO Bolden, and has had a rather long course of difficult seizure control.  Seizures evidently started in childhood, typically with a focal onset though the patient herself is not truly aware of what is happening before she suffers from a secondarily generalized event.  She awakens with postictal confusion, malaise, and headache.  She returns to usual state of health in short order after rest.    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    She was just placed on what I presume was an estrogen-based contraceptive to help regulate menses which have always been irregular and rather heavy, on December 1, 2022.  Within 10 days she suffered from 2 more seizures in rapid succession, and another isolated seizure the following day.  They stopped birth control at that point and she has had no subsequent seizures.  There was no real change in her overall menstrual pattern during this brief interval of contraceptive use.    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  Recent MRI scan from September 6, 2022 revealed signal abnormality in the left hippocampus with cortical atrophy with loss of normal gyration.  There was subtle temporal horn ventricular dilatation as well.  This elevated the possibility of MTS.    She has been on Depakote 500 mg, twice daily, Keppra 1000 mg twice daily and more recently Xcopri 150 mg nightly.  Since February of this year, she has remained seizure-free, this is the longest  interval of seizure control.  In February, there was a spontaneous occurrence, and the true cause could not be determined.  I reviewed the electronic health record including the office note from Ms. Gerber on March 2, 2022.  At that time, her examination was unremarkable.    She has a history of vitamin D deficiency, has been on a 50,000 UT supplement on a weekly basis, but her last level was still low at 28 on September 22, 2022, though it was increasing from a lower level of 22 in June, 2020.  From October 6, 2022, H/H 10.7/33.4 with MCV normal, MCH and MCHC reduced, but platelets and WBC normal.  Iron studies have not been done.    Medical, surgical and family histories are reviewed, there are no new drug allergies.  She has been dealing with a mild anemia, and she was placed on a contraceptive for this reason alone.  She is not sexually active.  She is on Depakote 500 mg, twice daily, Xcopri 150 mg nightly and Keppra 1000 mg twice daily.  She ran out of her vitamin D supplement 1 month ago.    Review of Systems   Constitutional:  Negative for malaise/fatigue.   HENT:  Negative for nosebleeds.    Respiratory:  Negative for hemoptysis.    Gastrointestinal:  Negative for blood in stool and melena.   Genitourinary:  Negative for hematuria.   Musculoskeletal:  Positive for joint pain and myalgias.   Neurological:  Positive for seizures and loss of consciousness. Negative for tremors.   Endo/Heme/Allergies:  Does not bruise/bleed easily.   Psychiatric/Behavioral:  Negative for memory loss.    All other systems reviewed and are negative.    Objective     /72 (BP Location: Left arm, Patient Position: Sitting, BP Cuff Size: Adult)   Pulse 64   Temp 36.2 °C (97.1 °F) (Temporal)   Wt 53.8 kg (118 lb 9.7 oz)   SpO2 97%   BMI 22.41 kg/m²      Physical Exam    She appears in no acute distress.  Vital signs are stable.  There is no malar rash, jaw or temporal tenderness, jaw claudication, or allodynia.  Her neck is  supple, range of motion is full.  Cardiac evaluation is unremarkable.     Neurological Exam    Fully oriented, there is no aphasia, apraxia, or inattention.    PERRLA/EOMI, visual fields are full to movement detection on confrontation bilaterally.  Facial movements are symmetric, sensory exam is intact to temperature and pinprick bilaterally.  The tongue and uvula are midline, there is no bulbar dysfunction.  Shoulder shrug and head rotation are normal.  Jaw movements are intact.    Musculoskeletal exam reveals normal tone bilaterally, there is no tremor, asterixis, or drift.  Strength is 5/5 in all 4 extremities.  Reflexes are brisk but present throughout, there are no asymmetries, none are dropped.  Both toes are downgoing.    She stands easily, gait is normal in station and stride length, heel, toe, and tandem walking are normal.  There is no appendicular dystaxia.  Repetitive movements with fine motor control are normal and symmetric bilaterally.    Sensory exam is intact to vibration and temperature, Romberg is absent.    Assessment & Plan     1. Localization-related epilepsy (HCC)  For now we will continue her present medication regimen unchanged.  I will try to talk with her family practitioner, Court Davidson PA-C ([phone]), in regards to estrogen and progesterone manipulation to control menstrual flow without worsening her seizures.  Hers have a catamenial association, and this may make it very difficult to adjust menses and provide adequate birth control.    We talked at length about the nature of her seizures, circumstances that lower thresholds, and what to do in case she has a seizure breakthrough.  Part of the problem is that they do not have the ability to access [employer] via computer.  This makes communication difficult, though not impossible.  They were told to call the office if she were to have a seizure breakthrough.  She was given samples of Xcopri 50 mg tablets to have extra just in case.   Prior authorization for patient assistance with the Xcopri also will be redone within the next 6 months.  There is no reason to repeat drug levels at this time.    For now she does not drive, but she was also told that in this state she is legal to drive if she remains seizure-free for 3 months.    - levETIRAcetam (KEPPRA) 500 MG Tab; Take 2 Tablets by mouth 2 times a day for 360 days.  Dispense: 360 Tablet; Refill: 3  - Cenobamate (XCOPRI) 150 MG Tab; Take 150 mg by mouth at bedtime for 180 days.  Dispense: 30 Tablet; Refill: 5  - divalproex (DEPAKOTE) 500 MG Tablet Delayed Response; Take 1 Tablet by mouth 2 times a day for 180 days.  Dispense: 180 Tablet; Refill: 3    2. Vitamin D deficiency  I will get her back on her vitamin D 50,000 UT supplements every week.  She is given enough refills to last her the following year.  We will need to recheck a level in the next year.    - vitamin D2, Ergocalciferol, (DRISDOL) 1.25 MG (51424 UT) Cap capsule; Take 1 Capsule by mouth every 7 days.  Dispense: 13 Capsule; Refill: 3    Time: 40 minutes in total spent on patient care including precharting, record review, discussion with healthcare staff and documentation.  This includes face-to-face time for exam, review, discussion, as well as counseling and coordinating care.             (2) Some effort against gravity; leg falls to bed by 5 secs, but has some effort against gravity

## 2024-04-11 NOTE — ED PROVIDER NOTES
ED Provider Note    ED Provider Note    Primary care provider: JUAN M Becerril  Means of arrival: Walk-in  History obtained from: Patient    CHIEF COMPLAINT  Chief Complaint   Patient presents with   • Abdominal Pain   • N/V     Seen at 2:32 PM.   HPI  Umu Mcguire is a 34 y.o. female who presents to the Emergency Department with severe right-sided upper chest pain and back pain at the same level beginning last Wednesday.  She is 1 year status post cholecystectomy.  Was evaluated at this facility approximately 48 hours ago for pain in the right upper quadrant and upper back, CT the abdomen and pelvis were unremarkable at that time.  She states that she did have pain in the chest and upper back at that time but it was not as severe as her abdominal pain.  She states her abdominal pain has improved and now the pain appears to be much higher than it was previously.  The pain currently is worse with any coughing or deep inspiration.  She also feels shortness of breath.  She denies any fevers, chills, nausea, vomiting, numbness, weakness, diarrhea, constipation, dysuria.    REVIEW OF SYSTEMS  See HPI,   Remainder of ROS negative.     PAST MEDICAL HISTORY   has a past medical history of DUB (dysfunctional uterine bleeding), Epilepsy (HCC) (11/4/2009), GERD (gastroesophageal reflux disease), and Seizure disorder ([facility]).    SURGICAL HISTORY   has a past surgical history that includes ercp (4/6/2018) and jessica by laparoscopy (N/A, 4/8/2018).    SOCIAL HISTORY  Social History     Tobacco Use   • Smoking status: Never Smoker   • Smokeless tobacco: Never Used   Substance Use Topics   • Alcohol use: No   • Drug use: No      Social History     Substance and Sexual Activity   Drug Use No       FAMILY HISTORY  Family History   Problem Relation Age of Onset   • Cancer Neg Hx    • Diabetes Neg Hx    • Stroke Neg Hx        CURRENT MEDICATIONS  Reviewed.  See Encounter Summary.     ALLERGIES  Allergies   Allergen  "Reactions   • Nkda [No Known Drug Allergy]        PHYSICAL EXAM  VITAL SIGNS: /70   Pulse 91   Temp 36.7 °C (98 °F) (Temporal)   Resp 18   Ht 1.626 m (5' 4\")   Wt 50 kg (110 lb 3.7 oz)   LMP 09/19/2019 (Approximate)   SpO2 98%   BMI 18.92 kg/m²   Constitutional: Awake, alert in no apparent distress.  Appears anxious.  Also appears to be in some pain.  HENT: Normocephalic, Bilateral external ears normal. Nose normal.   Eyes: Conjunctiva normal, non-icteric, EOMI.    Thorax & Lungs: Easy unlabored respirations, Clear to ascultation bilaterally.  Patient has tenderness to palpation throughout the upper thoracic spine and the right side of the thorax.  Cardiovascular: Regular rate, Regular rhythm, No murmurs, rubs or gallops.  Abdomen:  Soft, nontender, nondistended, normal active bowel sounds.   :    Skin: Visualized skin is  Dry, No erythema, No rash.   Musculoskeletal:   No cyanosis, clubbing or edema.  Neurologic: Alert, Grossly non-focal.   Psychiatric: Normal affect, Normal mood  Lymphatic:  No cervical LAD        RADIOLOGY  CT-ABDOMEN-PELVIS WITH   Final Result      1.  Negative contrast-enhanced CT of the abdomen and pelvis.      2.  Stable minor intrahepatic and extrahepatic biliary dilatation consistent with postcholecystectomy status.      3.  Small amount of free fluid in the pelvis which may be physiologic.      4.  Increased volume of stool within the colon.      5.  Normal appearance of the appendix in the right lower quadrant.      CT-CTA CHEST PULMONARY ARTERY W/ RECONS   Final Result      1.  No CT evidence of pulmonary embolism.      2.  No incidental abnormalities are identified in the chest.                  COURSE & MEDICAL DECISION MAKING  Pertinent Labs & Imaging studies reviewed. (See chart for details)    Differential diagnoses include but are not limited to: Pulmonary embolism, gallstone pancreatitis, pneumonia, anxiety, constipation    2:32 PM - Medical record reviewed, " patient seen and examined at bedside.    5:20 PM-patient still with persistent pain.  She does have a transaminitis which is new.  CT is unremarkable.  In light of the LFT abnormalities, persistent pain plan to admit for MRCP, the case was discussed with Dr. Robertson who will evaluate the patient for admission.    Decision Making:  This is a 34 y.o. year old female who presents with several days of worsening right upper quadrant abdominal pain and now right-sided pleuritic chest pain.  Differential as above.  The patient does not have a leukocytosis or leftward shift, she does not have a hypoxia.  CTA of the chest is negative for pulmonary infiltrate or embolus.  CT abdomen pelvis is also normal as well, there is no evidence of an abscess, biliary obstruction, pancreatitis or appendicitis.  The patient does have new LFT abnormalities, not appreciated in her prior visit.  Lipase is also mildly elevated as well.    Retained stone is a possibility, given that she has persistent pain plan to admit for MRCP.      FINAL IMPRESSION  1. Right upper quadrant abdominal pain    2. Pleuritic chest pain    3. Transaminitis    4. Elevated lipase          Lab Facility: 3

## 2024-04-27 ENCOUNTER — HOSPITAL ENCOUNTER (OUTPATIENT)
Dept: LAB | Facility: MEDICAL CENTER | Age: 39
End: 2024-04-27
Attending: PSYCHIATRY & NEUROLOGY
Payer: COMMERCIAL

## 2024-04-27 DIAGNOSIS — G40.209 COMPLEX PARTIAL SEIZURES EVOLVING TO GENERALIZED TONIC-CLONIC SEIZURES (HCC): ICD-10-CM

## 2024-04-27 LAB
ALBUMIN SERPL BCP-MCNC: 4.8 G/DL (ref 3.2–4.9)
ALBUMIN/GLOB SERPL: 1.3 G/DL
ALP SERPL-CCNC: 65 U/L (ref 30–99)
ALT SERPL-CCNC: 11 U/L (ref 2–50)
AMMONIA PLAS-SCNC: 17 UMOL/L (ref 11–45)
ANION GAP SERPL CALC-SCNC: 14 MMOL/L (ref 7–16)
AST SERPL-CCNC: 16 U/L (ref 12–45)
BILIRUB SERPL-MCNC: <0.2 MG/DL (ref 0.1–1.5)
BUN SERPL-MCNC: 14 MG/DL (ref 8–22)
CALCIUM ALBUM COR SERPL-MCNC: 8.8 MG/DL (ref 8.5–10.5)
CALCIUM SERPL-MCNC: 9.4 MG/DL (ref 8.5–10.5)
CHLORIDE SERPL-SCNC: 102 MMOL/L (ref 96–112)
CO2 SERPL-SCNC: 22 MMOL/L (ref 20–33)
CREAT SERPL-MCNC: 0.53 MG/DL (ref 0.5–1.4)
GFR SERPLBLD CREATININE-BSD FMLA CKD-EPI: 120 ML/MIN/1.73 M 2
GLOBULIN SER CALC-MCNC: 3.6 G/DL (ref 1.9–3.5)
GLUCOSE SERPL-MCNC: 92 MG/DL (ref 65–99)
POTASSIUM SERPL-SCNC: 4.6 MMOL/L (ref 3.6–5.5)
PROT SERPL-MCNC: 8.4 G/DL (ref 6–8.2)
SODIUM SERPL-SCNC: 138 MMOL/L (ref 135–145)

## 2024-04-27 PROCEDURE — 36415 COLL VENOUS BLD VENIPUNCTURE: CPT

## 2024-04-27 PROCEDURE — 80053 COMPREHEN METABOLIC PANEL: CPT

## 2024-04-27 PROCEDURE — 82140 ASSAY OF AMMONIA: CPT

## 2024-04-30 ENCOUNTER — TELEPHONE (OUTPATIENT)
Dept: NEUROLOGY | Facility: MEDICAL CENTER | Age: 39
End: 2024-04-30
Payer: COMMERCIAL

## 2024-05-06 ENCOUNTER — TELEPHONE (OUTPATIENT)
Dept: NEUROLOGY | Facility: MEDICAL CENTER | Age: 39
End: 2024-05-06
Payer: COMMERCIAL

## 2024-05-06 ENCOUNTER — OFFICE VISIT (OUTPATIENT)
Dept: NEUROLOGY | Facility: MEDICAL CENTER | Age: 39
End: 2024-05-06
Attending: PSYCHIATRY & NEUROLOGY
Payer: COMMERCIAL

## 2024-05-06 VITALS
OXYGEN SATURATION: 91 % | HEIGHT: 61 IN | SYSTOLIC BLOOD PRESSURE: 102 MMHG | HEART RATE: 65 BPM | TEMPERATURE: 97.3 F | RESPIRATION RATE: 14 BRPM | BODY MASS INDEX: 25.14 KG/M2 | DIASTOLIC BLOOD PRESSURE: 62 MMHG | WEIGHT: 133.16 LBS

## 2024-05-06 DIAGNOSIS — G40.109 LOCALIZATION-RELATED EPILEPSY (HCC): ICD-10-CM

## 2024-05-06 DIAGNOSIS — G40.909 NONINTRACTABLE EPILEPSY WITHOUT STATUS EPILEPTICUS, UNSPECIFIED EPILEPSY TYPE (HCC): ICD-10-CM

## 2024-05-06 PROCEDURE — 3074F SYST BP LT 130 MM HG: CPT | Performed by: PSYCHIATRY & NEUROLOGY

## 2024-05-06 PROCEDURE — 3078F DIAST BP <80 MM HG: CPT | Performed by: PSYCHIATRY & NEUROLOGY

## 2024-05-06 PROCEDURE — 99214 OFFICE O/P EST MOD 30 MIN: CPT | Performed by: PSYCHIATRY & NEUROLOGY

## 2024-05-06 RX ORDER — CENOBAMATE 150 MG/1
TABLET, FILM COATED ORAL
Qty: 90 TABLET | Refills: 1 | Status: SHIPPED | OUTPATIENT
Start: 2024-05-06 | End: 2024-05-06 | Stop reason: SDUPTHER

## 2024-05-06 RX ORDER — CENOBAMATE 150 MG/1
TABLET, FILM COATED ORAL
Qty: 90 TABLET | Refills: 1 | Status: SHIPPED | OUTPATIENT
Start: 2024-05-06 | End: 2024-11-05

## 2024-05-06 ASSESSMENT — FIBROSIS 4 INDEX: FIB4 SCORE: 0.63

## 2024-05-06 ASSESSMENT — PATIENT HEALTH QUESTIONNAIRE - PHQ9: CLINICAL INTERPRETATION OF PHQ2 SCORE: 0

## 2024-05-06 NOTE — TELEPHONE ENCOUNTER
I called and spoke to the patient relayed this detailed message. Patient verbally acknowledged understanding. Lesley Chavira MA.

## 2024-05-06 NOTE — PROGRESS NOTES
"[facility] Epilepsy Program  Follow Up Visit      Patient's Name: Umu Mcguire  YOB: 1985  MRN: [id number]  Date of Service: 05/06/24    Referring Provider: Terrell Scherer M.D.  [address] [place] [address]  [place]  [place] [id number]    Chief Concern: Seizures.     The patient presents with her mother and provides verbal consent for her to be present. The visit was conducted with a help of a formal .     HPI (as obtained at the time of the initial visit and updated as necessary): The patient is a 38 y.o., right-handed female, who initially presented to my epilepsy clinic for evaluation of seizures on 04/05/2023. She now presents for a follow up.     The patient's seizures started when she was a baby. It seems that she was diagnosed with meningitis at age 7-8 months, and that is when her first bilateral tonic-clonic seizure occurred. She remained seizure free until age 7. She then started having staring spells at age 7, as noted under event type #1. She also continued to have bilateral tonic-clonic seizures, as noted under event type #2.    Per previous documentation, VNS treatment was discussed, but proved to be too expensive for her.  This was confirmed by the patient and her mother.     Semiology:  Event type #1: \"Staring spells\".  Year/Age of Onset: 1995 (around age 7).   Initial features: The event starts with gastric uprising, carlitos vu, heart racing, sensation of fear, and metallic taste.  Event features: The patient stares ahead and is not responsive. There are oral and manual automatisms noted. If she is not attended to, she will at times fall with these events.   Post-ictal features: Confused.   Duration: Around a minute.  Frequency: Up to 10 per month. Unfortunately, she continues to have these spells.   Precipitating factors: Around her period.     Event type #2: \"Convulsions\".  Year/Age of Onset: 1986 (age 1).  Initial features: The patient has no recollection of " "any warning signs.   Event features: No clear lateralizing signs at the onset. She then tenses her whole body and has clonic movements. She does not bite her tongue. No bladder/bowel incontinence.   Post-ictal features: Confused.  Duration: Around 3 minutes.  Frequency: She gets one per 5-8 years. The last event was in 2022.   Precipitating factors: Menstrual period.     History of status epilepticus: no  History of physical injury related to seizures: yes  History of surgery related to epilepsy: no  Family Planning: She would like to become pregnant, but does not have a plan at this time.   Current Driving Status: She does not drive. She does not have 's licence  Seizure Clusters: Yes, around her menstrual periods.   Longest Seizure Freedom: Her seizures were never well controlled.     Pertinent Ancillary Test Results:    MRI brain studies:   - MRI brain wo/w contrast (09/06/2022 at [facility]): \"No acute intracranial process. Left hippocampal increased signal and slight loss of volume with altered architecture. In the appropriate clinical setting, these findings can be associated with hippocampal sclerosis. Incidental note is made of a 6 mm microadenoma involving the anterolateral right pituitary gland.\"    EEG studies:   - Standard EEG (10/15/2019, Dr. Urbano): This is an abnormal routine EEG recording in the awake, drowsy, and sleep state(s). There is slowing in the left frontotemporal region, as well intermittent and brief runs of rhythmic delta / theta activity were captured in this region. The findings suggest underlying area of cortical irritability and structural abnormality. The patient is at increased risk for seizures, however no seizures captured during this study.  Clinical and radiological correlation is recommended.     - EEG (04/17/2008): IMPRESSION:  Ambulatory electroencephalogram recording is abnormal with the appearance of rare generalized sharp activity and left temporal sharp wave activity.  " Both noted during sleep only.  No definitive epileptogenic discharges are noted, but clinical correlation is warranted for possible focal and generalized seizure disorder.  Again, no definite epileptogenic discharges are noted.     - EEG (7/23/1014, Dr. Hanna): At the onset of recording, the patient has a moderate amplitude background.  She has normal gradient with a posterior alpha rhythm of 9-1/2 Hz. It appears to be reactive and symmetric.  There is some increase in general background beta activity noted.  Some intermittent polymorphic theta slowing is noted in the left temporal region.  At times, she also gets some rhythmic 2 Hz slowing on the left.  At about 10-1/2 minutes into the recording, a few sharp waves and then a clear spike is noted in the left temporal region, phase reversing at F7.  These become fairly frequent as the patient gets drowsy.  She gets up to 5 per page over 5/10 seconds.  The patient video was reviewed during these runs of frequent left temporal sharps and spikes, but she appears to just be resting quietly in stage I sleep.  At the end of recording, photic stimulation is performed.  This produced some photic driving at a flash frequency of 9 Hz, in 11 Hz it was symmetric.  It produced no epileptiform potentials. Hyperventilation was then performed.  This produced some increase left temporal lobe slowing and then another burst of 1-2 per second temporal sharps lasting about 8 seconds. Parenthetically, it was noted that the EKG monitored throughout this recording  showed a normal sinus rhythm of 78 beats per minute.   IMPRESSION:  This is an abnormal electroencephalogram  due to the presence of frequent epileptiform potentials in drowsiness and exacerbated by hyperventilation.  No clinical seizures noted.    INTERIM HISTORY (05/06/2024): The patient unfortunately continues to have brief staring spells, but these have decreased over time.  She had 8 stereotypical staring spells in December  2023, 8-9 in January 2024, 5 in February 2024, 4 in March 2024, 1 in April 2024, and 1 in May 2024 up to now.  She had no convulsions in the interim.    The patient is taking Xcopri 150 mg daily, but she decreased doses of Depakote and Keppra, and she feels much better once these doses have been decreased.  The patient decreased the doses on her own.    She had no convulsions since 2022.     She followed with her ObGyn and per the patient and her mother, her ObGyn advised her not to become pregnant, in context of risks associated with potential pregnancy.     The patient continues to have challenges with insurance. She tried to explore other insurance options, but she is not eligible for those.    She has explored lamotrigine in the past, but was not able to find out about the pricing.     Current Antiseizure Medications: Xcopri 150 mg at bedtime. Depakote  mg in AM (takes once per day instead of BID). Keppra 500 mg BID (instead 1500 mg BID). Vitamin D supplementation.    Previously Tried Antiseizure Medications: None.     Review of Systems: No recent fevers. There was no significant weight changes in the interm. No mood issues and no suicidal nor homicidal ideation.     Risk Factors For Epilepsy/Seizure Disorder: The patient is a product of normal pregnancy and uncomplicated delivery. The early development was normal and the patient started waking, talking, and engaging in social interaction as expected. There were no challenges during school and no reported attendance of special education programs. There is no history of head trauma. There is no history of stroke. There is a history of meningitis at age 7-8 months and her first seizure in context of febrile meningitis illness. There is no history of neurosurgical interventions. There is a notion of staring spells starting at age 7 years.     Past Medical History:  Past Medical History:   Diagnosis Date    Epilepsy ([facility]) 11/04/2009    since infant.  sees  Wilfrido/Codey at [facility].  Keppra/depakote.  absence siezures 1x/month-thinks iwth menstruation.     Past Surgical History:  Past Surgical History:   Procedure Laterality Date    ERCP  9/30/2019    Procedure: ERCP (ENDOSCOPIC RETROGRADE CHOLANGIOPANCREATOGRAPHY);  Surgeon: Kaushal Lopes M.D.;  Location: [facility];  Service: Gastroenterology    DEONDRE BY LAPAROSCOPY N/A 4/8/2018    Procedure: DEONDRE BY LAPAROSCOPY;  Surgeon: Chava Busby M.D.;  Location: SURGERY [facility];  Service: General    ERCP  4/6/2018    Procedure: ERCP;  Surgeon: Osiel Paige M.D.;  Location: [facility];  Service: Gastroenterology      Social History:  Social History     Tobacco Use    Smoking status: Never    Smokeless tobacco: Never   Vaping Use    Vaping Use: Never used   Substance Use Topics    Alcohol use: No    Drug use: No     Family History:  There is a known family history of seizures/epilepsy on her maternal side with at least two family members with known epilepsy.   Family History   Problem Relation Age of Onset    No Known Problems Mother     No Known Problems Father     Cancer Neg Hx     Diabetes Neg Hx     Stroke Neg Hx          5/6/2024     3:00 PM 2/8/2023     3:20 PM 1/26/2022     8:40 AM 7/8/2021     3:00 PM 3/27/2018     3:20 PM   PHQ-9 Screening   Little interest or pleasure in doing things 0 - not at all 0 - not at all 0 - not at all 0 - not at all 0 - not at all   Feeling down, depressed, or hopeless 0 - not at all 0 - not at all 0 - not at all 0 - not at all 0 - not at all   PHQ-2 Total Score 0 0 0 0 0     [place] Suicide Reassessment  New or continued thoughts about killing self?: No  Preparing to end life?: No    Allergies:  Allergies   Allergen Reactions    Nkda [No Known Drug Allergy]      Current Medications:    Current Outpatient Medications:     Xcopri, TAKE 1 TABLET (150MG) BY MOUTH DAILY AT BEDTIME, Taking    divalproex, 500 mg, Oral, BID, Taking     "levETIRAcetam, 1,500 mg, Oral, BID, Taking    vitamin D2 (Ergocalciferol), 50,000 Units, Oral, Q7 DAYS, Taking    Physical Examination:    Ambulatory Vitals  Encounter Vitals  Temperature: 36.3 °C (97.3 °F)  Temp src: Temporal  Blood Pressure: 102/62  Pulse: 65  Respiration: 14  Pulse Oximetry: 91 %  Weight: 60.4 kg (133 lb 2.5 oz)  Height: 154.9 cm (5' 1\")  BMI (Calculated): 25.16    Neurological Examination:  Mental Status: The patient is alert and oriented to person, place, time, and situation. Speech is fluent, with no aphasia nor dysarthria noted. Affect is normal.    Cranial Nerve Examination:  CN I: Olfaction examination is deferred.  CN II: Visual fields are full to confrontation examination and show no visual field defect.  CN III, IV, VI: Eye movements are normal in all directions. Pupils are reactive to direct and consensual light. There is no relative afferent pupillary defect. There is no nystagmus.  CN V: Facial sensation to light touch is intact throughout.   CN VII: No significant facial muscle or other soft tissue asymmetry.  CN VIII: Hearing intact to rubbing sounds bilaterally.   CN IX, X: Soft palate elevates symmetrically.  CN XI: Symmetrical shoulder shrug exam.  CN XII: Midline tongue protrusion and moves symmetrically to each side.     Motor Examination:  Muscle strength is intact throughout. Muscle tone is normal throughout. No abnormal movements are observed. No pronator drift is noted.     Muscle Stretch Reflexes Examination:  Muscle stretch reflexes are normal throughout and symmetric.    Sensory Examination:  Preserved sensation to light touch in all extremities.     Coordination:  Normal finger to nose testing bilaterally, no postural nor intentional tremor was noted.     Stance/gait:  Normal regular gait with normal arm swings and stride length. Able to perform tandem gait. Romberg sign is absent.     ASSESSMENT AND PLAN:  1. Medically intractractable focal epilepsy, with focal " impaired awareness seizures and rare focal to bilateral tonic-clonic seizures, likely originating from the left mesial temporal lobe. The etiology might be related to the history of meningitis, as well as positive family history of seizures. We had an extensive discussion at the time of the initial visit about the nature of her epilepsy, cause, and potential next best steps; we discussed that she might be a potential epilepsy surgery candidate, which would give her the highest chance for seizure freedom (though she has reported history of meningitis, she also has a clear left MTS). We discussed at the time of the initial visit that the first step to pursue epilepsy surgery would be to proceed with EMU for up to 5 days.     The patient continues to have challenges with insurance and this is the main barrier in proceeding with further evaluation for epilepsy surgery. This is also a barrier for any further antiseizure medication optimization.     We had a detailed discussion about the fact that she should not get pregnant on Depakote. The patient discussed the issue of pregnancy with ObGyn in late 2023 and was advised not to become pregnant due to health risks.     We reviewed recent labs. The patient had slightly increased total protein/globulin and we will follow this closely. Ammonia was within normal limits.     The patient decreased the dose of Depakote by half, and she is taking currently one third of the prescribed dose of Keppra; she continues Xcopri with no changes.  With these changes, the patient feels much better, and has decreased frequency of her seizures.  In that context, I advised the patient to continue Xcopri with no changes, as well as Depakote and Keppra as she is taking it right now, and I provided refills for Xcopri.  We will have close follow-up to ensure that she does not have further breakthrough seizures in context of decreasing antiseizure medications of her own.  - Cenobamate (XCOPRI) 150  MG Tab; TAKE 1 TABLET (150MG) BY MOUTH DAILY AT BEDTIME  Dispense: 90 Tablet; Refill: 1    - vitamin D2, Ergocalciferol, (DRISDOL) 1.25 MG (53496 UT) Cap capsule; Take 1 Capsule by mouth every 7 days.  Dispense: 13 Capsule; Refill: 3 (no need for refills at this time)    We will plan for repeat labs at the time of the next visit to follow up on total protein/globulin, but this is unlikely due to her antiseizure medications and/or epilepsy (the note was also sent to her PCP).    She is to follow up on incidental pituitary gland changes with her PCP.     Patient Instructions   Please continue Xcopri 150 mg once a day.    Please continue Keppra 500 mg twice daily.    Please continue Depakote  mg once per daily.    Please continue vitamin D supplementation.    Please explore your options if you can get Medicaid insurance through your primary care provider or through social security office.     Please make sure that you don't become pregnant while you are taking Depakote, because this medication may harm your baby.     Please let our office know if you have any changes in your seizure frequency and/characteristics. Otherwise, please keep the diary of your events and bring it with you at the time of your next follow up visit with our office.     Please let our office know if you have any changes in your seizure frequency and/characteristics. Otherwise, please keep the diary of your events and bring it with you at the time of your next follow up visit with our office.     Please take vitamin D3 [id number] internation units daily.     Please take folic acid 1 mg daily. This is an over-the-counter supplement that is recommended to prevent certain developmental problems in your baby, in case you become pregnant in the future.    Please let our office know as soon as you become pregnant or plan to become pregnant - again, you should not become pregnant while on Depakote.    If you are caring for a baby/young child, please  make sure to be sitting on a soft surface while holding your baby/young child, so in case you have a seizure, your baby/young child is not injured due to fall.     Please let us know if you observe that your baby is excessively sleepy/has other changes and the pediatrician feels that there are no other explanations except possible adverse effects of antiseizure medication(s) your are taking while nursing your baby.     Please note that the following might precipitate seizures: missed doses of antiseizure medications, being sick with fever, stress, fatigue, sleep deprivation, not eating regularly, not drinking enough water, drinking too much alcohol, stopping alcohol suddenly if you are currently using it on a regular/daily basis, and/or using recreational drugs, among others.    Please note that the following might lead to an injury, potentially a life-threatening injury, in case you have a seizure and/or lose awareness while:   - being in a large body of water by yourself, such as bath, pool, lake, ocean, among others (risk of drowning)   - being on unprotected heights (risk of fall)   - being around and/or operating heavy machinery (risk of injury)   - being around open fire/hot surfaces (risk of burns)   - any other activities/circumstances, in which if you lose awareness, you might injure yourself and/or others.    Please call for help (crisis line and/or 911) in case you have thoughts of harming yourself and/or others.    Please abstain from driving until further notice.    ------------------------------------------------------------------------------------------  Instructions for your family/caregivers:  Please call 911 if the patient has a seizure longer than 2-3 minutes, if seizures are back to back without her recovering to her baseline, or she does not start recovering within 5-10 minutes after the seizure stops. During the seizure - please turn her on her side, please make sure her head is protected (for  example, you should put a pillow under her head, if one is available), and please do not put anything in her mouth.   -------------------------------------------------------------------------------------------    It is important that your seizures are well controlled and you have none or have them rarely. In addition to avoiding injury related to breakthrough seizures, frequent seizures increase risk of SUDEP (sudden unexpected death in epilepsy), where a person goes into a seizure and then never wakes up - this is a rare complication of seizure disorder; one of the best available ways to prevent it is to control your seizures well.     Due to the high volume of patients we are trying to help, your physician will not be able to respond by phone or in [employer] to your routine concerns between appointments.  This does not reflect a lack of interest or concern for you or your diagnosis.  Please bring these questions and concerns to your appointment where your physician can answer.  Please relay more pressing concerns to our office, either via [employer], or by phone; if not able to reach us please visit nearby Urgent Care Center or Emergency Department.  If any emergent medical needs, please seek emergent medical help and/or call 911.    Please note that we are not able to fill out paperwork that might be related to your work, utility company, disability, and/or driving, among others, in between the visits.  Please schedule a dedicated appointment to address your paperwork, so we can do that in a timely manner.  This is not due to lack of concern or interest for your disease-related work/administrative problems, but to make sure that we provide the best possible care and to fill out your paperwork in a correct and timely manner.    Thank you for entrusting your neurological care to [facility] and we look forward to continuing to serve you.    Follow up in 3 months.     My total time spent caring for the patient on  the day of the encounter was 30 minutes.   This does not include time spent on separately billable procedures/tests.      Sean Patel MD  Neurology Attending, Epilepsy Program  [facility]

## 2024-05-06 NOTE — TELEPHONE ENCOUNTER
Austin SIMPSON Patient was seen today and forgot to ask you about the last lab result. Patient wanted to find out her current lab result please. Please advise. Thank you. KEN Sutton.   sensory intact

## 2024-05-06 NOTE — TELEPHONE ENCOUNTER
The patient had minimal elevation in her serum protein and globulin, and I contacted her primary care physician to discuss this with the patient further.  This is unlikely to be related to her seizures and/or antiseizure medications.  I will also order repeat labs at the time of the next visit.  Please advise the patient as above.  Thank you.

## 2024-05-06 NOTE — PATIENT INSTRUCTIONS
Please continue Xcopri 150 mg once a day.    Please continue Keppra 500 mg twice daily.    Please continue Depakote  mg once per daily.    Please continue vitamin D supplementation.    Please explore your options if you can get Medicaid insurance through your primary care provider or through social security office.     Please make sure that you don't become pregnant while you are taking Depakote, because this medication may harm your baby.     Please let our office know if you have any changes in your seizure frequency and/characteristics. Otherwise, please keep the diary of your events and bring it with you at the time of your next follow up visit with our office.     Please let our office know if you have any changes in your seizure frequency and/characteristics. Otherwise, please keep the diary of your events and bring it with you at the time of your next follow up visit with our office.     Please take vitamin D3 [id number] internation units daily.     Please take folic acid 1 mg daily. This is an over-the-counter supplement that is recommended to prevent certain developmental problems in your baby, in case you become pregnant in the future.    Please let our office know as soon as you become pregnant or plan to become pregnant - again, you should not become pregnant while on Depakote.    If you are caring for a baby/young child, please make sure to be sitting on a soft surface while holding your baby/young child, so in case you have a seizure, your baby/young child is not injured due to fall.     Please let us know if you observe that your baby is excessively sleepy/has other changes and the pediatrician feels that there are no other explanations except possible adverse effects of antiseizure medication(s) your are taking while nursing your baby.     Please note that the following might precipitate seizures: missed doses of antiseizure medications, being sick with fever, stress, fatigue, sleep deprivation,  not eating regularly, not drinking enough water, drinking too much alcohol, stopping alcohol suddenly if you are currently using it on a regular/daily basis, and/or using recreational drugs, among others.    Please note that the following might lead to an injury, potentially a life-threatening injury, in case you have a seizure and/or lose awareness while:   - being in a large body of water by yourself, such as bath, pool, lake, ocean, among others (risk of drowning)   - being on unprotected heights (risk of fall)   - being around and/or operating heavy machinery (risk of injury)   - being around open fire/hot surfaces (risk of burns)   - any other activities/circumstances, in which if you lose awareness, you might injure yourself and/or others.    Please call for help (crisis line and/or 911) in case you have thoughts of harming yourself and/or others.    Please abstain from driving until further notice.    ------------------------------------------------------------------------------------------  Instructions for your family/caregivers:  Please call 911 if the patient has a seizure longer than 2-3 minutes, if seizures are back to back without her recovering to her baseline, or she does not start recovering within 5-10 minutes after the seizure stops. During the seizure - please turn her on her side, please make sure her head is protected (for example, you should put a pillow under her head, if one is available), and please do not put anything in her mouth.   -------------------------------------------------------------------------------------------    It is important that your seizures are well controlled and you have none or have them rarely. In addition to avoiding injury related to breakthrough seizures, frequent seizures increase risk of SUDEP (sudden unexpected death in epilepsy), where a person goes into a seizure and then never wakes up - this is a rare complication of seizure disorder; one of the best  available ways to prevent it is to control your seizures well.     Due to the high volume of patients we are trying to help, your physician will not be able to respond by phone or in MyChart to your routine concerns between appointments.  This does not reflect a lack of interest or concern for you or your diagnosis.  Please bring these questions and concerns to your appointment where your physician can answer.  Please relay more pressing concerns to our office, either via MyChart, or by phone; if not able to reach us please visit nearby Urgent Care Center or Emergency Department.  If any emergent medical needs, please seek emergent medical help and/or call 911.    Please note that we are not able to fill out paperwork that might be related to your work, utility company, disability, and/or driving, among others, in between the visits.  Please schedule a dedicated appointment to address your paperwork, so we can do that in a timely manner.  This is not due to lack of concern or interest for your disease-related work/administrative problems, but to make sure that we provide the best possible care and to fill out your paperwork in a correct and timely manner.    Thank you for entrusting your neurological care to [facility] and we look forward to continuing to serve you.

## 2024-06-20 ENCOUNTER — HOSPITAL ENCOUNTER (OUTPATIENT)
Dept: LAB | Facility: MEDICAL CENTER | Age: 39
End: 2024-06-20
Attending: PHYSICIAN ASSISTANT
Payer: COMMERCIAL

## 2024-06-20 LAB
ALBUMIN SERPL BCP-MCNC: 4.4 G/DL (ref 3.2–4.9)
ALBUMIN/GLOB SERPL: 1.3 G/DL
ALP SERPL-CCNC: 70 U/L (ref 30–99)
ALT SERPL-CCNC: 12 U/L (ref 2–50)
ANION GAP SERPL CALC-SCNC: 12 MMOL/L (ref 7–16)
AST SERPL-CCNC: 16 U/L (ref 12–45)
B-HCG SERPL-ACNC: <1 MIU/ML (ref 0–5)
BASOPHILS # BLD AUTO: 0.7 % (ref 0–1.8)
BASOPHILS # BLD: 0.04 K/UL (ref 0–0.12)
BILIRUB SERPL-MCNC: <0.2 MG/DL (ref 0.1–1.5)
BUN SERPL-MCNC: 13 MG/DL (ref 8–22)
CALCIUM ALBUM COR SERPL-MCNC: 9.1 MG/DL (ref 8.5–10.5)
CALCIUM SERPL-MCNC: 9.4 MG/DL (ref 8.5–10.5)
CHLORIDE SERPL-SCNC: 102 MMOL/L (ref 96–112)
CO2 SERPL-SCNC: 25 MMOL/L (ref 20–33)
CREAT SERPL-MCNC: 0.49 MG/DL (ref 0.5–1.4)
EOSINOPHIL # BLD AUTO: 0.2 K/UL (ref 0–0.51)
EOSINOPHIL NFR BLD: 3.7 % (ref 0–6.9)
ERYTHROCYTE [DISTWIDTH] IN BLOOD BY AUTOMATED COUNT: 47.5 FL (ref 35.9–50)
GFR SERPLBLD CREATININE-BSD FMLA CKD-EPI: 122 ML/MIN/1.73 M 2
GLOBULIN SER CALC-MCNC: 3.4 G/DL (ref 1.9–3.5)
GLUCOSE SERPL-MCNC: 79 MG/DL (ref 65–99)
HCT VFR BLD AUTO: 37.5 % (ref 37–47)
HGB BLD-MCNC: 11.4 G/DL (ref 12–16)
IMM GRANULOCYTES # BLD AUTO: 0.02 K/UL (ref 0–0.11)
IMM GRANULOCYTES NFR BLD AUTO: 0.4 % (ref 0–0.9)
LYMPHOCYTES # BLD AUTO: 1.96 K/UL (ref 1–4.8)
LYMPHOCYTES NFR BLD: 36.6 % (ref 22–41)
MCH RBC QN AUTO: 26.3 PG (ref 27–33)
MCHC RBC AUTO-ENTMCNC: 30.4 G/DL (ref 32.2–35.5)
MCV RBC AUTO: 86.4 FL (ref 81.4–97.8)
MONOCYTES # BLD AUTO: 0.65 K/UL (ref 0–0.85)
MONOCYTES NFR BLD AUTO: 12.1 % (ref 0–13.4)
NEUTROPHILS # BLD AUTO: 2.48 K/UL (ref 1.82–7.42)
NEUTROPHILS NFR BLD: 46.5 % (ref 44–72)
NRBC # BLD AUTO: 0 K/UL
NRBC BLD-RTO: 0 /100 WBC (ref 0–0.2)
PLATELET # BLD AUTO: 284 K/UL (ref 164–446)
PMV BLD AUTO: 11.4 FL (ref 9–12.9)
POTASSIUM SERPL-SCNC: 4.9 MMOL/L (ref 3.6–5.5)
PROLACTIN SERPL-MCNC: 105 NG/ML (ref 2.8–26)
PROT SERPL-MCNC: 7.8 G/DL (ref 6–8.2)
RBC # BLD AUTO: 4.34 M/UL (ref 4.2–5.4)
SODIUM SERPL-SCNC: 139 MMOL/L (ref 135–145)
TSH SERPL DL<=0.005 MIU/L-ACNC: 1.18 UIU/ML (ref 0.38–5.33)
WBC # BLD AUTO: 5.4 K/UL (ref 4.8–10.8)

## 2024-06-20 PROCEDURE — 85025 COMPLETE CBC W/AUTO DIFF WBC: CPT

## 2024-06-20 PROCEDURE — 84146 ASSAY OF PROLACTIN: CPT

## 2024-06-20 PROCEDURE — 36415 COLL VENOUS BLD VENIPUNCTURE: CPT

## 2024-06-20 PROCEDURE — 84443 ASSAY THYROID STIM HORMONE: CPT

## 2024-06-20 PROCEDURE — 84702 CHORIONIC GONADOTROPIN TEST: CPT

## 2024-06-20 PROCEDURE — 80053 COMPREHEN METABOLIC PANEL: CPT

## 2024-07-29 ENCOUNTER — TELEPHONE (OUTPATIENT)
Dept: NEUROLOGY | Facility: MEDICAL CENTER | Age: 39
End: 2024-07-29
Payer: COMMERCIAL

## 2024-08-07 ENCOUNTER — HOSPITAL ENCOUNTER (OUTPATIENT)
Dept: LAB | Facility: MEDICAL CENTER | Age: 39
End: 2024-08-07
Attending: PSYCHIATRY & NEUROLOGY
Payer: COMMERCIAL

## 2024-08-07 ENCOUNTER — OFFICE VISIT (OUTPATIENT)
Dept: NEUROLOGY | Facility: MEDICAL CENTER | Age: 39
End: 2024-08-07
Attending: PSYCHIATRY & NEUROLOGY
Payer: COMMERCIAL

## 2024-08-07 VITALS
WEIGHT: 129.41 LBS | HEART RATE: 54 BPM | OXYGEN SATURATION: 98 % | BODY MASS INDEX: 24.43 KG/M2 | HEIGHT: 61 IN | SYSTOLIC BLOOD PRESSURE: 90 MMHG | TEMPERATURE: 97.3 F | DIASTOLIC BLOOD PRESSURE: 62 MMHG

## 2024-08-07 DIAGNOSIS — G40.909 NONINTRACTABLE EPILEPSY WITHOUT STATUS EPILEPTICUS, UNSPECIFIED EPILEPSY TYPE (HCC): ICD-10-CM

## 2024-08-07 DIAGNOSIS — G40.109 LOCALIZATION-RELATED EPILEPSY (HCC): ICD-10-CM

## 2024-08-07 LAB
ALBUMIN SERPL BCP-MCNC: 4.4 G/DL (ref 3.2–4.9)
ALBUMIN/GLOB SERPL: 1.2 G/DL
ALP SERPL-CCNC: 67 U/L (ref 30–99)
ALT SERPL-CCNC: 15 U/L (ref 2–50)
ANION GAP SERPL CALC-SCNC: 15 MMOL/L (ref 7–16)
AST SERPL-CCNC: 21 U/L (ref 12–45)
BILIRUB SERPL-MCNC: <0.2 MG/DL (ref 0.1–1.5)
BUN SERPL-MCNC: 13 MG/DL (ref 8–22)
CALCIUM ALBUM COR SERPL-MCNC: 8.8 MG/DL (ref 8.5–10.5)
CALCIUM SERPL-MCNC: 9.1 MG/DL (ref 8.5–10.5)
CHLORIDE SERPL-SCNC: 102 MMOL/L (ref 96–112)
CO2 SERPL-SCNC: 22 MMOL/L (ref 20–33)
CREAT SERPL-MCNC: 0.64 MG/DL (ref 0.5–1.4)
GFR SERPLBLD CREATININE-BSD FMLA CKD-EPI: 114 ML/MIN/1.73 M 2
GLOBULIN SER CALC-MCNC: 3.6 G/DL (ref 1.9–3.5)
GLUCOSE SERPL-MCNC: 84 MG/DL (ref 65–99)
POTASSIUM SERPL-SCNC: 4.7 MMOL/L (ref 3.6–5.5)
PROT SERPL-MCNC: 8 G/DL (ref 6–8.2)
SODIUM SERPL-SCNC: 139 MMOL/L (ref 135–145)
VALPROATE SERPL-MCNC: 82 UG/ML (ref 50–100)

## 2024-08-07 PROCEDURE — 80164 ASSAY DIPROPYLACETIC ACD TOT: CPT

## 2024-08-07 PROCEDURE — 99211 OFF/OP EST MAY X REQ PHY/QHP: CPT | Performed by: PSYCHIATRY & NEUROLOGY

## 2024-08-07 PROCEDURE — 36415 COLL VENOUS BLD VENIPUNCTURE: CPT

## 2024-08-07 PROCEDURE — 80053 COMPREHEN METABOLIC PANEL: CPT

## 2024-08-07 PROCEDURE — 80177 DRUG SCRN QUAN LEVETIRACETAM: CPT

## 2024-08-07 PROCEDURE — 99214 OFFICE O/P EST MOD 30 MIN: CPT | Performed by: PSYCHIATRY & NEUROLOGY

## 2024-08-07 PROCEDURE — 3078F DIAST BP <80 MM HG: CPT | Performed by: PSYCHIATRY & NEUROLOGY

## 2024-08-07 PROCEDURE — 3074F SYST BP LT 130 MM HG: CPT | Performed by: PSYCHIATRY & NEUROLOGY

## 2024-08-07 RX ORDER — CENOBAMATE 150 MG/1
TABLET, FILM COATED ORAL
Qty: 90 TABLET | Refills: 1 | Status: SHIPPED | OUTPATIENT
Start: 2024-08-07 | End: 2025-02-06

## 2024-08-07 RX ORDER — DIVALPROEX SODIUM 500 MG/1
500 TABLET, DELAYED RELEASE ORAL 3 TIMES DAILY
COMMUNITY
End: 2024-08-07 | Stop reason: SDUPTHER

## 2024-08-07 RX ORDER — DIVALPROEX SODIUM 500 MG/1
500 TABLET, DELAYED RELEASE ORAL DAILY
Qty: 30 TABLET | Refills: 7 | Status: SHIPPED | OUTPATIENT
Start: 2024-08-07

## 2024-08-07 RX ORDER — LEVETIRACETAM 500 MG/1
500 TABLET ORAL 2 TIMES DAILY
Qty: 60 TABLET | Refills: 7 | Status: SHIPPED | OUTPATIENT
Start: 2024-08-07

## 2024-08-07 ASSESSMENT — PATIENT HEALTH QUESTIONNAIRE - PHQ9: CLINICAL INTERPRETATION OF PHQ2 SCORE: 0

## 2024-08-07 ASSESSMENT — FIBROSIS 4 INDEX: FIB4 SCORE: 0.65

## 2024-08-07 NOTE — PATIENT INSTRUCTIONS
Please continue Xcopri 150 mg once a day.    Please continue Keppra 500 mg twice daily.    Please continue Depakote  mg once per daily.    Please continue vitamin D supplementation.    Please explore your options if you can get Medicaid insurance through your primary care provider or through social security office.     Please make sure that you don't become pregnant while you are taking Depakote, because this medication may harm your baby.     Please let our office know if you have any changes in your seizure frequency and/characteristics. Otherwise, please keep the diary of your events and bring it with you at the time of your next follow up visit with our office.     Please let our office know if you have any changes in your seizure frequency and/characteristics. Otherwise, please keep the diary of your events and bring it with you at the time of your next follow up visit with our office.     Please take vitamin D3 [id number] internation units daily.     Please take folic acid 1 mg daily. This is an over-the-counter supplement that is recommended to prevent certain developmental problems in your baby, in case you become pregnant in the future.    Please let our office know as soon as you become pregnant or plan to become pregnant - again, you should not become pregnant while on Depakote.    If you are caring for a baby/young child, please make sure to be sitting on a soft surface while holding your baby/young child, so in case you have a seizure, your baby/young child is not injured due to fall.     Please let us know if you observe that your baby is excessively sleepy/has other changes and the pediatrician feels that there are no other explanations except possible adverse effects of antiseizure medication(s) your are taking while nursing your baby.     Please note that the following might precipitate seizures: missed doses of antiseizure medications, being sick with fever, stress, fatigue, sleep deprivation,  not eating regularly, not drinking enough water, drinking too much alcohol, stopping alcohol suddenly if you are currently using it on a regular/daily basis, and/or using recreational drugs, among others.    Please note that the following might lead to an injury, potentially a life-threatening injury, in case you have a seizure and/or lose awareness while:   - being in a large body of water by yourself, such as bath, pool, lake, ocean, among others (risk of drowning)   - being on unprotected heights (risk of fall)   - being around and/or operating heavy machinery (risk of injury)   - being around open fire/hot surfaces (risk of burns)   - any other activities/circumstances, in which if you lose awareness, you might injure yourself and/or others.    Please call for help (crisis line and/or 911) in case you have thoughts of harming yourself and/or others.    Please abstain from driving until further notice.    ------------------------------------------------------------------------------------------  Instructions for your family/caregivers:  Please call 911 if the patient has a seizure longer than 2-3 minutes, if seizures are back to back without her recovering to her baseline, or she does not start recovering within 5-10 minutes after the seizure stops. During the seizure - please turn her on her side, please make sure her head is protected (for example, you should put a pillow under her head, if one is available), and please do not put anything in her mouth.   -------------------------------------------------------------------------------------------    It is important that your seizures are well controlled and you have none or have them rarely. In addition to avoiding injury related to breakthrough seizures, frequent seizures increase risk of SUDEP (sudden unexpected death in epilepsy), where a person goes into a seizure and then never wakes up - this is a rare complication of seizure disorder; one of the best  available ways to prevent it is to control your seizures well.     Due to the high volume of patients we are trying to help, your physician will not be able to respond by phone or in MyChart to your routine concerns between appointments.  This does not reflect a lack of interest or concern for you or your diagnosis.  Please bring these questions and concerns to your appointment where your physician can answer.  Please relay more pressing concerns to our office, either via MyChart, or by phone; if not able to reach us please visit nearby Urgent Care Center or Emergency Department.  If any emergent medical needs, please seek emergent medical help and/or call 911.    Please note that we are not able to fill out paperwork that might be related to your work, utility company, disability, and/or driving, among others, in between the visits.  Please schedule a dedicated appointment to address your paperwork, so we can do that in a timely manner.  This is not due to lack of concern or interest for your disease-related work/administrative problems, but to make sure that we provide the best possible care and to fill out your paperwork in a correct and timely manner.    Thank you for entrusting your neurological care to [facility] and we look forward to continuing to serve you.

## 2024-08-07 NOTE — PROGRESS NOTES
"[facility] Epilepsy Program  Follow Up Visit      Patient's Name: Umu Mcguire  YOB: 1985  MRN: [id number]  Date of Service: 08/07/24.    Referring Provider: Terrell Scherer M.D.  [address] [place] [address]  [place] [address]    Chief Concern: Seizures.     The patient presents with her mother and provides verbal consent for her to be present. The visit was conducted with a help of a formal .     HPI (as obtained at the time of the initial visit and updated as necessary): The patient is a 39 y.o., right-handed female, who initially presented to my epilepsy clinic for evaluation of seizures on 04/05/2023. She now presents for a follow up.     The patient's seizures started when she was a baby. It seems that she was diagnosed with meningitis at age 7-8 months, and that is when her first bilateral tonic-clonic seizure occurred. She remained seizure free until age 7. She then started having staring spells at age 7, as noted under event type #1. She also continued to have bilateral tonic-clonic seizures, as noted under event type #2.    Per previous documentation, VNS treatment was discussed, but proved to be too expensive for her.  This was confirmed by the patient and her mother.     Semiology:  Event type #1: \"Staring spells\".  Year/Age of Onset: 1995 (around age 7).   Initial features: The event starts with gastric uprising, carlitos vu, heart racing, sensation of fear, and metallic taste.  Event features: The patient stares ahead and is not responsive. There are oral and manual automatisms noted. If she is not attended to, she will at times fall with these events.   Post-ictal features: Confused.   Duration: Around a minute.  Frequency: Up to 10 per month. Unfortunately, she continues to have these spells.   Precipitating factors: Around her period.     Event type #2: \"Convulsions\".  Year/Age of Onset: 1986 (age 1).  Initial features: The patient has no recollection " "of any warning signs.   Event features: No clear lateralizing signs at the onset. She then tenses her whole body and has clonic movements. She does not bite her tongue. No bladder/bowel incontinence.   Post-ictal features: Confused.  Duration: Around 3 minutes.  Frequency: She gets one per 5-8 years. The last event was in 2022.   Precipitating factors: Menstrual period.     History of status epilepticus: no  History of physical injury related to seizures: yes  History of surgery related to epilepsy: no  Family Planning: She would like to become pregnant, but does not have a plan at this time.   Current Driving Status: She does not drive. She does not have 's licence  Seizure Clusters: Yes, around her menstrual periods.   Longest Seizure Freedom: Her seizures were never well controlled.     Pertinent Ancillary Test Results:    MRI brain studies:   - MRI brain wo/w contrast (09/06/2022 at [facility]): \"No acute intracranial process. Left hippocampal increased signal and slight loss of volume with altered architecture. In the appropriate clinical setting, these findings can be associated with hippocampal sclerosis. Incidental note is made of a 6 mm microadenoma involving the anterolateral right pituitary gland.\"    EEG studies:   - Standard EEG (10/15/2019, Dr. Urbano): This is an abnormal routine EEG recording in the awake, drowsy, and sleep state(s). There is slowing in the left frontotemporal region, as well intermittent and brief runs of rhythmic delta / theta activity were captured in this region. The findings suggest underlying area of cortical irritability and structural abnormality. The patient is at increased risk for seizures, however no seizures captured during this study.  Clinical and radiological correlation is recommended.     - EEG (04/17/2008): IMPRESSION:  Ambulatory electroencephalogram recording is abnormal with the appearance of rare generalized sharp activity and left temporal sharp wave activity. "  Both noted during sleep only.  No definitive epileptogenic discharges are noted, but clinical correlation is warranted for possible focal and generalized seizure disorder.  Again, no definite epileptogenic discharges are noted.     - EEG (7/23/1014, Dr. Hanna): At the onset of recording, the patient has a moderate amplitude background.  She has normal gradient with a posterior alpha rhythm of 9-1/2 Hz. It appears to be reactive and symmetric.  There is some increase in general background beta activity noted.  Some intermittent polymorphic theta slowing is noted in the left temporal region.  At times, she also gets some rhythmic 2 Hz slowing on the left.  At about 10-1/2 minutes into the recording, a few sharp waves and then a clear spike is noted in the left temporal region, phase reversing at F7.  These become fairly frequent as the patient gets drowsy.  She gets up to 5 per page over 5/10 seconds.  The patient video was reviewed during these runs of frequent left temporal sharps and spikes, but she appears to just be resting quietly in stage I sleep.  At the end of recording, photic stimulation is performed.  This produced some photic driving at a flash frequency of 9 Hz, in 11 Hz it was symmetric.  It produced no epileptiform potentials. Hyperventilation was then performed.  This produced some increase left temporal lobe slowing and then another burst of 1-2 per second temporal sharps lasting about 8 seconds. Parenthetically, it was noted that the EKG monitored throughout this recording  showed a normal sinus rhythm of 78 beats per minute.   IMPRESSION:  This is an abnormal electroencephalogram  due to the presence of frequent epileptiform potentials in drowsiness and exacerbated by hyperventilation.  No clinical seizures noted.    INTERIM HISTORY (08/07/2024): The patient continues to have her focal stereotypically focal seizures - total of 5 in July 2024 - she states this is her typical seizure frequency.      She is taking her antiseizure medications compared to the prior visit in May 2024 with no changes, but she did decreased doses of Keppra and Depakote in early 2024 on her own.     She had no convulsions since 2022.     She followed with her ObGyn and per the patient and her mother, her ObGyn advised her not to become pregnant, in context of risks associated with potential pregnancy.     The patient continues to have challenges with insurance. She tried to explore other insurance options, but she is not eligible for those.    She has explored lamotrigine in the past, but was not able to find out about the pricing.     Current Antiseizure Medications: Xcopri 150 mg at bedtime. Depakote  mg in AM (takes once per day instead of BID). Keppra 500 mg BID (instead 1500 mg BID). Vitamin D supplementation.    Previously Tried Antiseizure Medications: None.     Review of Systems: No recent fevers. She lost 4 lbs in the interim. No mood issues and no suicidal nor homicidal ideation.     Risk Factors For Epilepsy/Seizure Disorder: The patient is a product of normal pregnancy and uncomplicated delivery. The early development was normal and the patient started waking, talking, and engaging in social interaction as expected. There were no challenges during school and no reported attendance of special education programs. There is no history of head trauma. There is no history of stroke. There is a history of meningitis at age 7-8 months and her first seizure in context of febrile meningitis illness. There is no history of neurosurgical interventions. There is a notion of staring spells starting at age 7 years.     Past Medical History:  Past Medical History:   Diagnosis Date    Epilepsy ([facility]) 11/04/2009    since infant.  sees Wilfrido/Codey at [facility].  Keppra/depakote.  absence siezures 1x/month-thinks iwth menstruation.     Past Surgical History:  Past Surgical History:   Procedure Laterality Date    ERCP  9/30/2019     Procedure: ERCP (ENDOSCOPIC RETROGRADE CHOLANGIOPANCREATOGRAPHY);  Surgeon: Kaushal Lopes M.D.;  Location: SURGERY [facility];  Service: Gastroenterology    DEONDRE BY LAPAROSCOPY N/A 4/8/2018    Procedure: DEONDRE BY LAPAROSCOPY;  Surgeon: Chava Busby M.D.;  Location: [facility];  Service: General    ERCP  4/6/2018    Procedure: ERCP;  Surgeon: Osiel Paige M.D.;  Location: SURGERY [facility];  Service: Gastroenterology      Social History:  Social History     Tobacco Use    Smoking status: Never    Smokeless tobacco: Never   Vaping Use    Vaping status: Never Used   Substance Use Topics    Alcohol use: No    Drug use: No     Family History:  There is a known family history of seizures/epilepsy on her maternal side with at least two family members with known epilepsy.   Family History   Problem Relation Age of Onset    No Known Problems Mother     No Known Problems Father     Cancer Neg Hx     Diabetes Neg Hx     Stroke Neg Hx          8/7/2024     1:00 PM 5/6/2024     3:00 PM 2/8/2023     3:20 PM 1/26/2022     8:40 AM 7/8/2021     3:00 PM   PHQ-9 Screening   Little interest or pleasure in doing things 0 - not at all 0 - not at all 0 - not at all 0 - not at all 0 - not at all   Feeling down, depressed, or hopeless 0 - not at all 0 - not at all 0 - not at all 0 - not at all 0 - not at all   PHQ-2 Total Score 0 0 0 0 0     [place] Suicide Reassessment  New or continued thoughts about killing self?: No  Preparing to end life?: No    Allergies:  Allergies   Allergen Reactions    Nkda [No Known Drug Allergy]      Current Medications:    Current Outpatient Medications:     divalproex, 500 mg, Oral, TID, Taking    Xcopri, TAKE 1 TABLET (150MG) BY MOUTH DAILY AT BEDTIME, Taking    levETIRAcetam, 1,500 mg, Oral, BID, Taking    vitamin D2 (Ergocalciferol), 50,000 Units, Oral, Q7 DAYS, Taking    Physical Examination:    Ambulatory Vitals  Encounter Vitals  Temperature: 36.3 °C (97.3  "°F)  Temp src: Temporal  Blood Pressure: 90/62  Pulse: (!) 54  Pulse Oximetry: 98 %  Weight: 58.7 kg (129 lb 6.6 oz)  Height: 154.9 cm (5' 1\")  BMI (Calculated): 24.45    Neurological Examination:  Mental Status: The patient is alert and oriented to person, place, time, and situation. Speech is fluent, with no aphasia nor dysarthria noted. Affect is normal.    Cranial Nerve Examination:  CN I: Olfaction examination is deferred.  CN II: Visual fields are full to confrontation examination and show no visual field defect.  CN III, IV, VI: Eye movements are normal in all directions. Pupils are reactive to direct and consensual light. There is no relative afferent pupillary defect. There is no nystagmus.  CN V: Facial sensation to light touch is intact throughout.   CN VII: No significant facial muscle or other soft tissue asymmetry.  CN VIII: Hearing intact to rubbing sounds bilaterally.   CN IX, X: Soft palate elevates symmetrically.  CN XI: Symmetrical shoulder shrug exam.  CN XII: Midline tongue protrusion and moves symmetrically to each side.     Motor Examination:  Muscle strength is intact throughout. Muscle tone is normal throughout. No abnormal movements are observed. No pronator drift is noted.     Muscle Stretch Reflexes Examination:  Muscle stretch reflexes are normal throughout and symmetric.    Sensory Examination:  Preserved sensation to light touch in all extremities.     Coordination:  Normal finger to nose testing bilaterally, no postural nor intentional tremor was noted.     Stance/gait:  Normal regular gait with normal arm swings and stride length. Able to perform tandem gait. Romberg sign is absent.     ASSESSMENT AND PLAN:  1. Medically intractractable focal epilepsy, with focal impaired awareness seizures and rare focal to bilateral tonic-clonic seizures, likely originating from the left mesial temporal lobe. The etiology might be related to the history of meningitis, as well as positive family " history of seizures. We had an extensive discussion at the time of the initial visit about the nature of her epilepsy, cause, and potential next best steps; we discussed that she might be a potential epilepsy surgery candidate, which would give her the highest chance for seizure freedom (though she has reported history of meningitis, she also has a clear left MTS). We discussed at the time of the initial visit that the first step to pursue epilepsy surgery would be to proceed with EMU for up to 5 days.     The patient continues to have challenges with insurance and this is the main barrier in proceeding with further evaluation for epilepsy surgery. This is also a barrier for any further antiseizure medication optimization.     We had a detailed discussion about the fact that she should not get pregnant on Depakote. The patient discussed the issue of pregnancy with ObGyn in late 2023 and was advised not to become pregnant due to health risks.     The patient is overall stable on current doses of antiseizure medications, despite decreasing the doses on her own in early 2024.  Will continue current antiseizure medication regimen, at lower doses as the patient is taking it right now, with no changes.  Refills were provided.  - Cenobamate (XCOPRI) 150 MG Tab; TAKE 1 TABLET (150MG) BY MOUTH DAILY AT BEDTIME  Dispense: 90 Tablet; Refill: 1  - divalproex (DEPAKOTE) 500 MG Tablet Delayed Response; Take 1 Tablet by mouth every day.  Dispense: 30 Tablet; Refill: 7  - levETIRAcetam (KEPPRA) 500 MG Tab; Take 1 Tablet by mouth 2 times a day.  Dispense: 60 Tablet; Refill: 7    We will obtain repeat labs:  - Comp Metabolic Panel; Future  - LEVETIRACETAM (KEPPRA), S  - VALPROIC ACID; Future    Epilepsy counseling provided in writing.    She is to follow up on incidental pituitary gland changes with her PCP.  It seems that there was some confusion on patient's mother's side in regards to pituitary gland changes and MRI studies.  I  shared with the patient that this should be followed by primary care provider/endocrinologist and that I am happy to provide any further information if any of the other providers would call me.  The patient and her mother verbalized understanding and agreement.    Noted blood pressure on the lower side and borderline bradycardia - the patient appeared asymptomatic.     Patient Instructions   Please continue Xcopri 150 mg once a day.    Please continue Keppra 500 mg twice daily.    Please continue Depakote  mg once per daily.    Please continue vitamin D supplementation.    Please explore your options if you can get Medicaid insurance through your primary care provider or through social security office.     Please make sure that you don't become pregnant while you are taking Depakote, because this medication may harm your baby.     Please let our office know if you have any changes in your seizure frequency and/characteristics. Otherwise, please keep the diary of your events and bring it with you at the time of your next follow up visit with our office.     Please let our office know if you have any changes in your seizure frequency and/characteristics. Otherwise, please keep the diary of your events and bring it with you at the time of your next follow up visit with our office.     Please take vitamin D3 [id number] internation units daily.     Please take folic acid 1 mg daily. This is an over-the-counter supplement that is recommended to prevent certain developmental problems in your baby, in case you become pregnant in the future.    Please let our office know as soon as you become pregnant or plan to become pregnant - again, you should not become pregnant while on Depakote.    If you are caring for a baby/young child, please make sure to be sitting on a soft surface while holding your baby/young child, so in case you have a seizure, your baby/young child is not injured due to fall.     Please let us know if  you observe that your baby is excessively sleepy/has other changes and the pediatrician feels that there are no other explanations except possible adverse effects of antiseizure medication(s) your are taking while nursing your baby.     Please note that the following might precipitate seizures: missed doses of antiseizure medications, being sick with fever, stress, fatigue, sleep deprivation, not eating regularly, not drinking enough water, drinking too much alcohol, stopping alcohol suddenly if you are currently using it on a regular/daily basis, and/or using recreational drugs, among others.    Please note that the following might lead to an injury, potentially a life-threatening injury, in case you have a seizure and/or lose awareness while:   - being in a large body of water by yourself, such as bath, pool, lake, ocean, among others (risk of drowning)   - being on unprotected heights (risk of fall)   - being around and/or operating heavy machinery (risk of injury)   - being around open fire/hot surfaces (risk of burns)   - any other activities/circumstances, in which if you lose awareness, you might injure yourself and/or others.    Please call for help (crisis line and/or 911) in case you have thoughts of harming yourself and/or others.    Please abstain from driving until further notice.    ------------------------------------------------------------------------------------------  Instructions for your family/caregivers:  Please call 911 if the patient has a seizure longer than 2-3 minutes, if seizures are back to back without her recovering to her baseline, or she does not start recovering within 5-10 minutes after the seizure stops. During the seizure - please turn her on her side, please make sure her head is protected (for example, you should put a pillow under her head, if one is available), and please do not put anything in her mouth.    -------------------------------------------------------------------------------------------    It is important that your seizures are well controlled and you have none or have them rarely. In addition to avoiding injury related to breakthrough seizures, frequent seizures increase risk of SUDEP (sudden unexpected death in epilepsy), where a person goes into a seizure and then never wakes up - this is a rare complication of seizure disorder; one of the best available ways to prevent it is to control your seizures well.     Due to the high volume of patients we are trying to help, your physician will not be able to respond by phone or in [facility] to your routine concerns between appointments.  This does not reflect a lack of interest or concern for you or your diagnosis.  Please bring these questions and concerns to your appointment where your physician can answer.  Please relay more pressing concerns to our office, either via [facility], or by phone; if not able to reach us please visit nearby Urgent Care Center or Emergency Department.  If any emergent medical needs, please seek emergent medical help and/or call 911.    Please note that we are not able to fill out paperwork that might be related to your work, utility company, disability, and/or driving, among others, in between the visits.  Please schedule a dedicated appointment to address your paperwork, so we can do that in a timely manner.  This is not due to lack of concern or interest for your disease-related work/administrative problems, but to make sure that we provide the best possible care and to fill out your paperwork in a correct and timely manner.    Thank you for entrusting your neurological care to [facility] and we look forward to continuing to serve you.    Follow up in 5 months.     My total time spent caring for the patient on the day of the encounter was 34 minutes.   This does not include time spent on separately billable  procedures/tests.      Sean Patel MD  Neurology Attending, Epilepsy Program  [facility]

## 2024-08-10 LAB — LEVETIRACETAM SERPL-MCNC: 18 UG/ML (ref 10–40)

## 2024-08-29 ENCOUNTER — OFFICE VISIT (OUTPATIENT)
Dept: ENDOCRINOLOGY | Facility: MEDICAL CENTER | Age: 39
End: 2024-08-29
Payer: COMMERCIAL

## 2024-08-29 VITALS
HEIGHT: 60 IN | BODY MASS INDEX: 25.13 KG/M2 | DIASTOLIC BLOOD PRESSURE: 60 MMHG | HEART RATE: 82 BPM | SYSTOLIC BLOOD PRESSURE: 104 MMHG | WEIGHT: 128 LBS | OXYGEN SATURATION: 98 %

## 2024-08-29 DIAGNOSIS — D35.2 PITUITARY MICROADENOMA (HCC): ICD-10-CM

## 2024-08-29 DIAGNOSIS — E22.1 HYPERPROLACTINEMIA (HCC): ICD-10-CM

## 2024-08-29 PROCEDURE — 99212 OFFICE O/P EST SF 10 MIN: CPT

## 2024-08-29 ASSESSMENT — FIBROSIS 4 INDEX: FIB4 SCORE: 0.76

## 2024-08-29 NOTE — PROGRESS NOTES
Chief Complaint: Consult requested by Gayathri Thomson M.D. for evaluation of Pituitary Mass    Subjective:      Umu Mcguire is a 39 y.o. female here for initial evaluation of pituitary tumor.  She was first diagnosed with a pituitary tumor in 2022 incidentally found via MRI.  She has history of epilepsy. Currently kepra, depakote, xcopri, and vitamin D2    Pituitary microadenoma  Presenting symptoms which led to the investigation of the pituitary included elevated prolactin levels     Initial MRI of the pituitary on September 2022 revealed 6 mm microadenoma.  Pituitary function testing revealed:  elevated prolactin.    Prior pituitary surgery: No.    Prior pituitary radiation therapy: No.    She reports headache   denies vision difficulties   denies frequent urination   denies increased thirst   denies galactorrhea.   Latest Reference Range & Units 06/20/24 10:56   Prolactin 2.80 - 26.00 ng/mL 105.00 (H)      Latest Reference Range & Units 06/20/24 10:56   Bhcg 0.0 - 5.0 mIU/mL <1.0     Patient's medications, allergies, and social histories were reviewed and updated as appropriate.    ROS:     CONS:     No fever, no chills, no weight loss, no fatigue   EYES:      No diplopia, no blurry vision, no redness of eyes, no swelling of eyelids   ENT:    No hearing loss, No ear pain, No sore throat, no dysphagia, no neck swelling   CV:     No chest pain, no palpitations, no claudication, no orthopnea, no PND   PULM:    No SOB, no cough, no hemoptysis, no wheezing    GI:   No nausea, no vomiting, no diarrhea, no constipation, no bloody stools   :  Passing urine well, no dysuria, no hematuria   ENDO:   No polyuria, no polydipsia, no heat intolerance, no cold intolerance   NEURO: No headaches, no dizziness, no convulsions, no tremors   MUSC:  No joint swellings, no arthralgias, no myalgias, no weakness   SKIN:   No rash, no ulcers, no dry skin   PSYCH:   No depression, no anxiety, no difficulty sleeping       Past  Medical History:  Patient Active Problem List    Diagnosis Date Noted    Cervical high risk HPV (human papillomavirus) test positive 11/17/2021    Endometrial hyperplasia 11/17/2021    Papanicolaou smear of cervix with atypical squamous cells cannot exclude high grade squamous intraepithelial lesion (ASC-H) 10/11/2019    S/P laparoscopic cholecystectomy 04/08/2018    Epilepsy (HCC) 11/04/2009       Past Surgical History:  Past Surgical History:   Procedure Laterality Date    ERCP  9/30/2019    Procedure: ERCP (ENDOSCOPIC RETROGRADE CHOLANGIOPANCREATOGRAPHY);  Surgeon: Kaushal Lopes M.D.;  Location: [facility];  Service: Gastroenterology    DEONDRE BY LAPAROSCOPY N/A 4/8/2018    Procedure: DEONDRE BY LAPAROSCOPY;  Surgeon: Chava Busby M.D.;  Location: [facility];  Service: General    ERCP  4/6/2018    Procedure: ERCP;  Surgeon: Osiel Paige M.D.;  Location: [facility];  Service: Gastroenterology        Allergies:  Nkda [no known drug allergy]     Current Medications:    Current Outpatient Medications:     Cenobamate (XCOPRI) 150 MG Tab, TAKE 1 TABLET (150MG) BY MOUTH DAILY AT BEDTIME, Disp: 90 Tablet, Rfl: 1    divalproex (DEPAKOTE) 500 MG Tablet Delayed Response, Take 1 Tablet by mouth every day., Disp: 30 Tablet, Rfl: 7    levETIRAcetam (KEPPRA) 500 MG Tab, Take 1 Tablet by mouth 2 times a day., Disp: 60 Tablet, Rfl: 7    vitamin D2, Ergocalciferol, (DRISDOL) 1.25 MG (30517 UT) Cap capsule, Take 1 Capsule by mouth every 7 days., Disp: 13 Capsule, Rfl: 3    Social History:  Social History     Socioeconomic History    Marital status: Single     Spouse name: Not on file    Number of children: Not on file    Years of education: Not on file    Highest education level: Not on file   Occupational History    Not on file   Tobacco Use    Smoking status: Never    Smokeless tobacco: Never   Vaping Use    Vaping status: Never Used   Substance and Sexual Activity     Alcohol use: No    Drug use: No    Sexual activity: Not Currently     Partners: Male     Birth control/protection: None   Other Topics Concern    Not on file   Social History Narrative    Not on file     Social Determinants of Health     Financial Resource Strain: Not on file   Food Insecurity: Not on file   Transportation Needs: Not on file   Physical Activity: Not on file   Stress: Not on file   Social Connections: Not on file   Intimate Partner Violence: Not on file   Housing Stability: Not on file        Family History:   Family History   Problem Relation Age of Onset    No Known Problems Mother     No Known Problems Father     Cancer Neg Hx     Diabetes Neg Hx     Stroke Neg Hx          PHYSICAL EXAM:   Vital signs: /60 (BP Location: Right arm, Patient Position: Sitting, BP Cuff Size: Adult)   Pulse 82   Ht 1.524 m (5')   Wt 58.1 kg (128 lb)   SpO2 98%   BMI 25.00 kg/m²   GENERAL: Well-developed, well-nourished  in no apparent distress.   EYE: No ocular and eyelid asymmetry, Anicteric sclerae,  PERRL, No exophthalmos or lidlag  HENT: Hearing grossly intact, Normocephalic, atraumatic. Pink, moist mucous membranes, No exudate  NECK: Supple. Trachea midline.   CARDIOVASCULAR: Regular rate and rhythm. No murmurs, rubs, or gallops.   LUNGS: Clear to auscultation bilaterally   ABDOMEN: Soft, nontender with positive bowel sounds.   EXTREMITIES: No clubbing, cyanosis, or edema.   NEUROLOGICAL: Cranial nerves II-XII are grossly intact   Symmetric reflexes at the patella no proximal muscle weakness, No visible tremor with both outstretched hands  LYMPH: No cervical, supraclavicular,  adenopathy palpated.   SKIN: No rashes, lesions. Turgor is normal.    Labs:  Lab Results   Component Value Date/Time    WBC 5.4 06/20/2024 10:56 AM    RBC 4.34 06/20/2024 10:56 AM    HEMOGLOBIN 11.4 (L) 06/20/2024 10:56 AM    MCV 86.4 06/20/2024 10:56 AM    MCH 26.3 (L) 06/20/2024 10:56 AM    MCHC 30.4 (L) 06/20/2024 10:56 AM     "RDW 47.5 06/20/2024 10:56 AM    MPV 11.4 06/20/2024 10:56 AM       Lab Results   Component Value Date/Time    SODIUM 139 08/07/2024 02:23 PM    POTASSIUM 4.7 08/07/2024 02:23 PM    CHLORIDE 102 08/07/2024 02:23 PM    CO2 22 08/07/2024 02:23 PM    ANION 15.0 08/07/2024 02:23 PM    GLUCOSE 84 08/07/2024 02:23 PM    BUN 13 08/07/2024 02:23 PM    CREATININE 0.64 08/07/2024 02:23 PM    CREATININE 0.6 09/15/2008 09:20 PM    CALCIUM 9.1 08/07/2024 02:23 PM    ASTSGOT 21 08/07/2024 02:23 PM    ALTSGPT 15 08/07/2024 02:23 PM    TBILIRUBIN <0.2 08/07/2024 02:23 PM    ALBUMIN 4.4 08/07/2024 02:23 PM    TOTPROTEIN 8.0 08/07/2024 02:23 PM    GLOBULIN 3.6 (H) 08/07/2024 02:23 PM    AGRATIO 1.2 08/07/2024 02:23 PM       Lab Results   Component Value Date/Time    TSHULTRASEN 1.180 06/20/2024 1056     No results found for: \"FREEDIR\"  No results found for: \"FREET3\"  No results found for: \"THYSTIMIG\"        Imaging:    ASSESSMENT/PLAN:     1. Pituitary microadenoma (HCC)  Unstable  Discussed the etiology of pituitary microadenoma  Reviewed MRI from 2013 which showed an incidentally found 6 mm microadenoma.  I will get a repeat of MRI as patient presents with elevated prolactin levels this may be a prolactinoma  I will also get additional blood work to determine increased excretion of other hormones  - Comp Metabolic Panel; Future  - TSH; Future  - FREE THYROXINE; Future  - IGF-1 SOMATOMEDIN; Future  - MR-BRAIN PITUITARY W/WO; Future    2. Hyperprolactinemia (HCC)  Unstable  I will get updated prolactin levels to determine a treatment plan  Patient currently is asymptomatic  - PROLACTIN; Future     Return in about 4 weeks (around 9/26/2024).  Patient will have blood work done prior to follow-up      Thank you kindly for allowing me to participate in the endocrine care plan for this patient.    Ronak Del Cid, EMETERIO   08/29/24    CC:   Gayathri Thomson M.D.  "

## 2024-08-30 ENCOUNTER — TELEPHONE (OUTPATIENT)
Dept: NEUROLOGY | Facility: MEDICAL CENTER | Age: 39
End: 2024-08-30
Payer: COMMERCIAL

## 2024-08-30 NOTE — TELEPHONE ENCOUNTER
Austin SIMPSON Patient called requesting for recent lab results on 8/7. Please advsie. Thank you. Lesley Chavira MA.

## 2024-08-30 NOTE — TELEPHONE ENCOUNTER
The labs are overall stable. Please advise the patient to make sure to follow up with her PCP and endocrinology, as we discussed at the time of the last visit. Thank you.

## 2024-09-10 ENCOUNTER — TELEPHONE (OUTPATIENT)
Dept: ENDOCRINOLOGY | Facility: MEDICAL CENTER | Age: 39
End: 2024-09-10
Payer: COMMERCIAL

## 2024-09-10 NOTE — TELEPHONE ENCOUNTER
Patient called to ask why she hasn't gotten a call to schedule her ordered MRI. I transferred patient over to [facility].

## 2024-09-16 ENCOUNTER — TELEPHONE (OUTPATIENT)
Dept: ENDOCRINOLOGY | Facility: MEDICAL CENTER | Age: 39
End: 2024-09-16
Payer: COMMERCIAL

## 2024-09-16 NOTE — TELEPHONE ENCOUNTER
Patient called to reschedule her appt due to her mri being schedules in November. Patient was also wondering if we could fax the mri orders to her insurance so they could see how much they will be able to cover. Papers have been faxed over.

## 2024-10-18 ENCOUNTER — TELEPHONE (OUTPATIENT)
Dept: NEUROLOGY | Facility: MEDICAL CENTER | Age: 39
End: 2024-10-18
Payer: COMMERCIAL

## 2024-11-09 ENCOUNTER — HOSPITAL ENCOUNTER (OUTPATIENT)
Dept: LAB | Facility: MEDICAL CENTER | Age: 39
End: 2024-11-09
Payer: COMMERCIAL

## 2024-11-09 DIAGNOSIS — E22.1 HYPERPROLACTINEMIA (HCC): ICD-10-CM

## 2024-11-09 DIAGNOSIS — D35.2 PITUITARY MICROADENOMA (HCC): ICD-10-CM

## 2024-11-09 LAB
ALBUMIN SERPL BCP-MCNC: 4.7 G/DL (ref 3.2–4.9)
ALBUMIN/GLOB SERPL: 1.2 G/DL
ALP SERPL-CCNC: 87 U/L (ref 30–99)
ALT SERPL-CCNC: 18 U/L (ref 2–50)
ANION GAP SERPL CALC-SCNC: 13 MMOL/L (ref 7–16)
AST SERPL-CCNC: 27 U/L (ref 12–45)
BILIRUB SERPL-MCNC: <0.2 MG/DL (ref 0.1–1.5)
BUN SERPL-MCNC: 14 MG/DL (ref 8–22)
CALCIUM ALBUM COR SERPL-MCNC: 8.9 MG/DL (ref 8.5–10.5)
CALCIUM SERPL-MCNC: 9.5 MG/DL (ref 8.5–10.5)
CHLORIDE SERPL-SCNC: 105 MMOL/L (ref 96–112)
CO2 SERPL-SCNC: 21 MMOL/L (ref 20–33)
CREAT SERPL-MCNC: 0.52 MG/DL (ref 0.5–1.4)
GFR SERPLBLD CREATININE-BSD FMLA CKD-EPI: 120 ML/MIN/1.73 M 2
GLOBULIN SER CALC-MCNC: 4 G/DL (ref 1.9–3.5)
GLUCOSE SERPL-MCNC: 81 MG/DL (ref 65–99)
POTASSIUM SERPL-SCNC: 4.3 MMOL/L (ref 3.6–5.5)
PROLACTIN SERPL-MCNC: 125 NG/ML (ref 2.8–26)
PROT SERPL-MCNC: 8.7 G/DL (ref 6–8.2)
SODIUM SERPL-SCNC: 139 MMOL/L (ref 135–145)
T4 FREE SERPL-MCNC: 1.05 NG/DL (ref 0.93–1.7)
TSH SERPL-ACNC: 1.14 UIU/ML (ref 0.35–5.5)

## 2024-11-09 PROCEDURE — 84146 ASSAY OF PROLACTIN: CPT

## 2024-11-09 PROCEDURE — 84439 ASSAY OF FREE THYROXINE: CPT

## 2024-11-09 PROCEDURE — 84305 ASSAY OF SOMATOMEDIN: CPT

## 2024-11-09 PROCEDURE — 36415 COLL VENOUS BLD VENIPUNCTURE: CPT

## 2024-11-09 PROCEDURE — 80053 COMPREHEN METABOLIC PANEL: CPT

## 2024-11-09 PROCEDURE — 84443 ASSAY THYROID STIM HORMONE: CPT

## 2024-11-10 ENCOUNTER — HOSPITAL ENCOUNTER (OUTPATIENT)
Dept: RADIOLOGY | Facility: MEDICAL CENTER | Age: 39
End: 2024-11-10
Payer: COMMERCIAL

## 2024-11-10 DIAGNOSIS — D35.2 PITUITARY MICROADENOMA (HCC): ICD-10-CM

## 2024-11-10 PROCEDURE — 70553 MRI BRAIN STEM W/O & W/DYE: CPT

## 2024-11-10 PROCEDURE — A9579 GAD-BASE MR CONTRAST NOS,1ML: HCPCS | Mod: JZ

## 2024-11-10 PROCEDURE — 700117 HCHG RX CONTRAST REV CODE 255: Mod: JZ

## 2024-11-10 RX ADMIN — GADOTERIDOL 12 ML: 279.3 INJECTION, SOLUTION INTRAVENOUS at 16:24

## 2024-11-12 LAB
IGF-I SERPL-MCNC: 112 NG/ML (ref 76–271)
IGF-I Z-SCORE SERPL: -1

## 2024-11-14 ENCOUNTER — OFFICE VISIT (OUTPATIENT)
Dept: ENDOCRINOLOGY | Facility: MEDICAL CENTER | Age: 39
End: 2024-11-14
Payer: COMMERCIAL

## 2024-11-14 VITALS
HEIGHT: 63 IN | SYSTOLIC BLOOD PRESSURE: 110 MMHG | HEART RATE: 81 BPM | WEIGHT: 128 LBS | DIASTOLIC BLOOD PRESSURE: 56 MMHG | OXYGEN SATURATION: 99 % | BODY MASS INDEX: 22.68 KG/M2

## 2024-11-14 DIAGNOSIS — E22.1 HYPERPROLACTINEMIA (HCC): ICD-10-CM

## 2024-11-14 DIAGNOSIS — D35.2 PITUITARY MICROADENOMA (HCC): ICD-10-CM

## 2024-11-14 PROCEDURE — 99214 OFFICE O/P EST MOD 30 MIN: CPT

## 2024-11-14 PROCEDURE — 3078F DIAST BP <80 MM HG: CPT

## 2024-11-14 PROCEDURE — 3074F SYST BP LT 130 MM HG: CPT

## 2024-11-14 PROCEDURE — 99211 OFF/OP EST MAY X REQ PHY/QHP: CPT

## 2024-11-14 RX ORDER — CABERGOLINE 0.5 MG/1
0.5 TABLET ORAL
Qty: 24 TABLET | Refills: 3 | Status: SHIPPED | OUTPATIENT
Start: 2024-11-14

## 2024-11-14 ASSESSMENT — FIBROSIS 4 INDEX: FIB4 SCORE: 0.9

## 2024-11-14 NOTE — PROGRESS NOTES
Chief Complaint: Consult requested by Gayathri Thomson M.D. for evaluation of Pituitary Mass    Subjective:      Umu Mcguire is a 39 y.o. female here for initial evaluation of pituitary tumor.  She was first diagnosed with a pituitary tumor in 2022 incidentally found via MRI.  She has history of epilepsy. Currently kepra, depakote, xcopri, and vitamin D2    Pituitary microadenoma  Presenting symptoms which led to the investigation of the pituitary included elevated prolactin levels     Initial MRI of the pituitary on September 2022 revealed 6 mm microadenoma.  Pituitary function testing revealed:  elevated prolactin.    Prior pituitary surgery: No.    Prior pituitary radiation therapy: No.    She reports headache   denies vision difficulties   denies frequent urination  denies increased thirst   denies galactorrhea.    MRI on 11/10/24   An approximately 7 mm sized T2 hyperintense hypoenhancing lesion in the right side of the pituitary gland likely representing pituitary microadenoma. Previously, this lesion demonstrates uniform hyperenhancement. However in this current study, the   lesion is hypoenhancing which might indicate treatment related necrosis. The size is slightly increased in size.   Latest Reference Range & Units 06/20/24 10:56 11/09/24 09:54   Prolactin 2.80 - 26.00 ng/mL 105.00 (H) 125.00 (H)        Latest Reference Range & Units 11/09/24 09:54   TSH 0.350 - 5.500 uIU/mL 1.140      Latest Reference Range & Units 11/09/24 09:54   Free T-4 0.93 - 1.70 ng/dL 1.05      Latest Reference Range & Units 11/09/24 09:54   IGF1 76 - 271 ng/mL 112   IGF-1 Z Score Calculation  -1.0       Patient's medications, allergies, and social histories were reviewed and updated as appropriate.    ROS:     CONS:     No fever, no chills, no weight loss, no fatigue   EYES:      No diplopia, no blurry vision, no redness of eyes, no swelling of eyelids   ENT:    No hearing loss, No ear pain, No sore throat, no dysphagia, no  neck swelling   CV:     No chest pain, no palpitations, no claudication, no orthopnea, no PND   PULM:    No SOB, no cough, no hemoptysis, no wheezing    GI:   No nausea, no vomiting, no diarrhea, no constipation, no bloody stools   :  Passing urine well, no dysuria, no hematuria   ENDO:   No polyuria, no polydipsia, no heat intolerance, no cold intolerance   NEURO: No headaches, no dizziness, no convulsions, no tremors   MUSC:  No joint swellings, no arthralgias, no myalgias, no weakness   SKIN:   No rash, no ulcers, no dry skin   PSYCH:   No depression, no anxiety, no difficulty sleeping       Past Medical History:  Patient Active Problem List    Diagnosis Date Noted    Cervical high risk HPV (human papillomavirus) test positive 11/17/2021    Endometrial hyperplasia 11/17/2021    Papanicolaou smear of cervix with atypical squamous cells cannot exclude high grade squamous intraepithelial lesion (ASC-H) 10/11/2019    S/P laparoscopic cholecystectomy 04/08/2018    Epilepsy (HCC) 11/04/2009       Past Surgical History:  Past Surgical History:   Procedure Laterality Date    ERCP  9/30/2019    Procedure: ERCP (ENDOSCOPIC RETROGRADE CHOLANGIOPANCREATOGRAPHY);  Surgeon: Kaushal Lopes M.D.;  Location: [facility];  Service: Gastroenterology    DEONDRE BY LAPAROSCOPY N/A 4/8/2018    Procedure: DEONDRE BY LAPAROSCOPY;  Surgeon: Chava Busby M.D.;  Location: [facility];  Service: General    ERCP  4/6/2018    Procedure: ERCP;  Surgeon: Osiel Paige M.D.;  Location: [facility];  Service: Gastroenterology        Allergies:  Nkda [no known drug allergy]     Current Medications:    Current Outpatient Medications:     cabergoline (DOSTINEX) 0.5 MG tablet, Take 1 Tablet by mouth two times a week., Disp: 24 Tablet, Rfl: 3    Cenobamate (XCOPRI) 150 MG Tab, TAKE 1 TABLET (150MG) BY MOUTH DAILY AT BEDTIME, Disp: 90 Tablet, Rfl: 1    divalproex (DEPAKOTE) 500 MG Tablet Delayed  "Response, Take 1 Tablet by mouth every day., Disp: 30 Tablet, Rfl: 7    levETIRAcetam (KEPPRA) 500 MG Tab, Take 1 Tablet by mouth 2 times a day., Disp: 60 Tablet, Rfl: 7    vitamin D2, Ergocalciferol, (DRISDOL) 1.25 MG (21858 UT) Cap capsule, Take 1 Capsule by mouth every 7 days., Disp: 13 Capsule, Rfl: 3    Social History:  Social History     Socioeconomic History    Marital status: Single     Spouse name: Not on file    Number of children: Not on file    Years of education: Not on file    Highest education level: Not on file   Occupational History    Not on file   Tobacco Use    Smoking status: Never    Smokeless tobacco: Never   Vaping Use    Vaping status: Never Used   Substance and Sexual Activity    Alcohol use: No    Drug use: No    Sexual activity: Not Currently     Partners: Male     Birth control/protection: None   Other Topics Concern    Not on file   Social History Narrative    Not on file     Social Drivers of Health     Financial Resource Strain: Not on file   Food Insecurity: Not on file   Transportation Needs: Not on file   Physical Activity: Not on file   Stress: Not on file   Social Connections: Not on file   Intimate Partner Violence: Not on file   Housing Stability: Not on file        Family History:   Family History   Problem Relation Age of Onset    No Known Problems Mother     No Known Problems Father     Cancer Neg Hx     Diabetes Neg Hx     Stroke Neg Hx          PHYSICAL EXAM:   Vital signs: /56 (BP Location: Left arm, Patient Position: Sitting, BP Cuff Size: Adult)   Pulse 81   Ht 1.6 m (5' 3\")   Wt 58.1 kg (128 lb)   SpO2 99%   BMI 22.67 kg/m²   GENERAL: Well-developed, well-nourished  in no apparent distress.   EYE: No ocular and eyelid asymmetry, Anicteric sclerae,  PERRL, No exophthalmos or lidlag  HENT: Hearing grossly intact, Normocephalic, atraumatic. Pink, moist mucous membranes, No exudate  NECK: Supple. Trachea midline.   CARDIOVASCULAR: Regular rate and rhythm. No " "murmurs, rubs, or gallops.   LUNGS: Clear to auscultation bilaterally   ABDOMEN: Soft, nontender with positive bowel sounds.   EXTREMITIES: No clubbing, cyanosis, or edema.   NEUROLOGICAL: Cranial nerves II-XII are grossly intact   Symmetric reflexes at the patella no proximal muscle weakness, No visible tremor with both outstretched hands  LYMPH: No cervical, supraclavicular,  adenopathy palpated.   SKIN: No rashes, lesions. Turgor is normal.    Labs:  Lab Results   Component Value Date/Time    WBC 5.4 06/20/2024 10:56 AM    RBC 4.34 06/20/2024 10:56 AM    HEMOGLOBIN 11.4 (L) 06/20/2024 10:56 AM    MCV 86.4 06/20/2024 10:56 AM    MCH 26.3 (L) 06/20/2024 10:56 AM    MCHC 30.4 (L) 06/20/2024 10:56 AM    RDW 47.5 06/20/2024 10:56 AM    MPV 11.4 06/20/2024 10:56 AM       Lab Results   Component Value Date/Time    SODIUM 139 11/09/2024 09:54 AM    POTASSIUM 4.3 11/09/2024 09:54 AM    CHLORIDE 105 11/09/2024 09:54 AM    CO2 21 11/09/2024 09:54 AM    ANION 13.0 11/09/2024 09:54 AM    GLUCOSE 81 11/09/2024 09:54 AM    BUN 14 11/09/2024 09:54 AM    CREATININE 0.52 11/09/2024 09:54 AM    CREATININE 0.6 09/15/2008 09:20 PM    CALCIUM 9.5 11/09/2024 09:54 AM    ASTSGOT 27 11/09/2024 09:54 AM    ALTSGPT 18 11/09/2024 09:54 AM    TBILIRUBIN <0.2 11/09/2024 09:54 AM    ALBUMIN 4.7 11/09/2024 09:54 AM    TOTPROTEIN 8.7 (H) 11/09/2024 09:54 AM    GLOBULIN 4.0 (H) 11/09/2024 09:54 AM    AGRATIO 1.2 11/09/2024 09:54 AM       Lab Results   Component Value Date/Time    TSHULTRASEN 1.180 06/20/2024 1056     No results found for: \"FREEDIR\"  No results found for: \"FREET3\"  No results found for: \"THYSTIMIG\"        Imaging:  COMPARISON:  9/6/2022     FINDINGS: There is an approximately 7 mm sized T2 hyperintense, hypoenhancing lesion in the right side of the pituitary gland.    The pituitary stalk is mildly deviated towards left side.     There are no suprasellar or parasellar mass lesions. The cavernous sinuses show normal enhancement and " configuration. Vascular flow voids in the juxtasellar carotid arteries are unremarkable.     There is loss of left hippocampal volume and abnormal increased T2 signal intensity.     There is a nonspecific foci of white matter T2 hyperintensity in the right frontal white matter.           IMPRESSION:     1.  An approximately 7 mm sized T2 hyperintense hypoenhancing lesion in the right side of the pituitary gland likely representing pituitary microadenoma. Previously, this lesion demonstrates uniform hyperenhancement. However in this current study, the   lesion is hypoenhancing which might indicate treatment related necrosis. The size is slightly increased in size.  2.  Left hippocampal sclerosis. This is unchanged since the previous study.  3.  Nonspecific foci of white matter gliosis in the right frontal lobe. This is unchanged since the previous study.  ASSESSMENT/PLAN:   1. Pituitary microadenoma (HCC)  Unstable  Reviewed MRI of pituitary which showed 7 mm sized T2 hyperintense hypoenhancing lesion in the right side of the pituitary gland likely representing pituitary microadenoma. Previously, this lesion demonstrates uniform hyperenhancement. However in this current study, the   lesion is hypoenhancing which might indicate treatment related necrosis. The size is slightly increased in size.  I suspect this is a prolactinoma. I will start patient on cabergoline and repeat MRI in one year.   - TSH; Future  - FREE THYROXINE; Future    2. Hyperprolactinemia (HCC)  Unstable  Prolactin at 125  Medication:  Cabergoline 0.5 mg 2 x a week - start  Patient understands to take the medication at bedtime to prevent dizziness  Side effects of medication discussed with patient.   - cabergoline (DOSTINEX) 0.5 MG tablet; Take 1 Tablet by mouth two times a week.  Dispense: 24 Tablet; Refill: 3  - PROLACTIN; Future     Return in about 3 months (around 2/14/2025).  Patient will have blood work done prior in 6 weeks and notify via  luana for review.       Thank you kindly for allowing me to participate in the endocrine care plan for this patient.    Ronak Del Cid, APRN   11/14/24    CC:   Gayathri Thomson M.D.

## 2024-12-11 ENCOUNTER — TELEPHONE (OUTPATIENT)
Dept: PHARMACY | Facility: MEDICAL CENTER | Age: 39
End: 2024-12-11

## 2024-12-11 ENCOUNTER — OFFICE VISIT (OUTPATIENT)
Dept: NEUROLOGY | Facility: MEDICAL CENTER | Age: 39
End: 2024-12-11
Attending: PSYCHIATRY & NEUROLOGY
Payer: COMMERCIAL

## 2024-12-11 VITALS
HEART RATE: 73 BPM | HEIGHT: 63 IN | OXYGEN SATURATION: 96 % | RESPIRATION RATE: 12 BRPM | BODY MASS INDEX: 21.97 KG/M2 | TEMPERATURE: 97.3 F | SYSTOLIC BLOOD PRESSURE: 106 MMHG | DIASTOLIC BLOOD PRESSURE: 82 MMHG | WEIGHT: 124 LBS

## 2024-12-11 DIAGNOSIS — G40.909 NONINTRACTABLE EPILEPSY WITHOUT STATUS EPILEPTICUS, UNSPECIFIED EPILEPSY TYPE (HCC): ICD-10-CM

## 2024-12-11 DIAGNOSIS — G40.109 LOCALIZATION-RELATED EPILEPSY (HCC): ICD-10-CM

## 2024-12-11 PROCEDURE — 99213 OFFICE O/P EST LOW 20 MIN: CPT | Performed by: PSYCHIATRY & NEUROLOGY

## 2024-12-11 PROCEDURE — 3079F DIAST BP 80-89 MM HG: CPT | Performed by: PSYCHIATRY & NEUROLOGY

## 2024-12-11 PROCEDURE — 99211 OFF/OP EST MAY X REQ PHY/QHP: CPT | Performed by: PSYCHIATRY & NEUROLOGY

## 2024-12-11 PROCEDURE — 3074F SYST BP LT 130 MM HG: CPT | Performed by: PSYCHIATRY & NEUROLOGY

## 2024-12-11 RX ORDER — CENOBAMATE 150 MG/1
TABLET, FILM COATED ORAL
Qty: 90 TABLET | Refills: 1 | Status: SHIPPED | OUTPATIENT
Start: 2024-12-11 | End: 2025-06-12

## 2024-12-11 RX ORDER — DIVALPROEX SODIUM 500 MG/1
500 TABLET, DELAYED RELEASE ORAL DAILY
Qty: 30 TABLET | Refills: 7 | Status: SHIPPED | OUTPATIENT
Start: 2024-12-11

## 2024-12-11 RX ORDER — LEVETIRACETAM 500 MG/1
500 TABLET ORAL 2 TIMES DAILY
Qty: 60 TABLET | Refills: 7 | Status: SHIPPED | OUTPATIENT
Start: 2024-12-11

## 2024-12-11 ASSESSMENT — PATIENT HEALTH QUESTIONNAIRE - PHQ9: CLINICAL INTERPRETATION OF PHQ2 SCORE: 0

## 2024-12-11 ASSESSMENT — FIBROSIS 4 INDEX: FIB4 SCORE: 0.9

## 2024-12-11 NOTE — TELEPHONE ENCOUNTER
Received [employer] request via [employer]  for   levETIRAcetam (KEPPRA) 500 MG Tab. (Quantity:60, Day Supply:30)     Insurance: N/A  Member ID:  N/A  BIN: N/A  PCN: [id number]  Group: [id number]     Ran Test claim via [facility]  No active rx ins. Pt may be self pay. Pt has already declined services. Will release to pt's preferred pharmacy on file.

## 2024-12-11 NOTE — PATIENT INSTRUCTIONS
Please continue Xcopri 150 mg once a day.    Please continue Keppra 500 mg twice daily.    Please continue Depakote  mg once per daily.    Please continue vitamin D supplementation.    Please explore your options if you can get Medicaid insurance through your primary care provider or through social security office.     Please make sure that you don't become pregnant while you are taking Depakote, because this medication may harm your baby.     Please let our office know if you have any changes in your seizure frequency and/characteristics. Otherwise, please keep the diary of your events and bring it with you at the time of your next follow up visit with our office.     Please let our office know if you have any changes in your seizure frequency and/characteristics. Otherwise, please keep the diary of your events and bring it with you at the time of your next follow up visit with our office.     Please take vitamin D3 8532-6276 internation units daily.     Please take folic acid 1 mg daily. This is an over-the-counter supplement that is recommended to prevent certain developmental problems in your baby, in case you become pregnant in the future.    Please let our office know as soon as you become pregnant or plan to become pregnant - again, you should not become pregnant while on Depakote.    If you are caring for a baby/young child, please make sure to be sitting on a soft surface while holding your baby/young child, so in case you have a seizure, your baby/young child is not injured due to fall.     Please let us know if you observe that your baby is excessively sleepy/has other changes and the pediatrician feels that there are no other explanations except possible adverse effects of antiseizure medication(s) your are taking while nursing your baby.     Please note that the following might precipitate seizures: missed doses of antiseizure medications, being sick with fever, stress, fatigue, sleep deprivation,  not eating regularly, not drinking enough water, drinking too much alcohol, stopping alcohol suddenly if you are currently using it on a regular/daily basis, and/or using recreational drugs, among others.    Please note that the following might lead to an injury, potentially a life-threatening injury, in case you have a seizure and/or lose awareness while:   - being in a large body of water by yourself, such as bath, pool, lake, ocean, among others (risk of drowning)   - being on unprotected heights (risk of fall)   - being around and/or operating heavy machinery (risk of injury)   - being around open fire/hot surfaces (risk of burns)   - any other activities/circumstances, in which if you lose awareness, you might injure yourself and/or others.    Please call for help (crisis line and/or 911) in case you have thoughts of harming yourself and/or others.    Please abstain from driving until further notice.    ------------------------------------------------------------------------------------------  Instructions for your family/caregivers:  Please call 911 if the patient has a seizure longer than 2-3 minutes, if seizures are back to back without her recovering to her baseline, or she does not start recovering within 5-10 minutes after the seizure stops. During the seizure - please turn her on her side, please make sure her head is protected (for example, you should put a pillow under her head, if one is available), and please do not put anything in her mouth.   -------------------------------------------------------------------------------------------    It is important that your seizures are well controlled and you have none or have them rarely. In addition to avoiding injury related to breakthrough seizures, frequent seizures increase risk of SUDEP (sudden unexpected death in epilepsy), where a person goes into a seizure and then never wakes up - this is a rare complication of seizure disorder; one of the best  available ways to prevent it is to control your seizures well.     Due to the high volume of patients we are trying to help, your physician will not be able to respond by phone or in MyChart to your routine concerns between appointments.  This does not reflect a lack of interest or concern for you or your diagnosis.  Please bring these questions and concerns to your appointment where your physician can answer.  Please relay more pressing concerns to our office, either via MyChart, or by phone; if not able to reach us please visit nearby Urgent Care Center or Emergency Department.  If any emergent medical needs, please seek emergent medical help and/or call 911.    Please note that we are not able to fill out paperwork that might be related to your work, utility company, disability, and/or driving, among others, in between the visits.  Please schedule a dedicated appointment to address your paperwork, so we can do that in a timely manner.  This is not due to lack of concern or interest for your disease-related work/administrative problems, but to make sure that we provide the best possible care and to fill out your paperwork in a correct and timely manner.    Thank you for entrusting your neurological care to [facility] and we look forward to continuing to serve you.

## 2024-12-11 NOTE — PROGRESS NOTES
"[facility] [place] [facility]  Follow Up Visit      Patient's Name: Umu Mcguire  YOB: 1985  MRN: [id number]  Date of Service: 12/11/24.    Referring Provider: Terrell Scherer M.D.  [address] [place] [address]  [place] [address]    Chief Concern: Seizures.     The patient presents with her mother and provides verbal consent for her to be present. The visit was conducted with a help of a formal .     HPI (as obtained at the time of the initial visit and updated as necessary): The patient is a 39 y.o., right-handed female, who initially presented to my epilepsy clinic for evaluation of seizures on 04/05/2023. She now presents for a follow up.     The patient's seizures started when she was a baby. It seems that she was diagnosed with meningitis at age 7-8 months, and that is when her first bilateral tonic-clonic seizure occurred. She remained seizure free until age 7. She then started having staring spells at age 7, as noted under event type #1. She also continued to have bilateral tonic-clonic seizures, as noted under event type #2.    Per previous documentation, VNS treatment was discussed, but proved to be too expensive for her.  This was confirmed by the patient and her mother.     Semiology:  Event type #1: \"Staring spells\".  Year/Age of Onset: 1995 (around age 7).   Initial features: The event starts with gastric uprising, carlitos vu, heart racing, sensation of fear, and metallic taste.  Event features: The patient stares ahead and is not responsive. There are oral and manual automatisms noted. If she is not attended to, she will at times fall with these events.   Post-ictal features: Confused.   Duration: Around a minute.  Frequency: Up to 10 per month. Unfortunately, she continues to have these spells.   Precipitating factors: Around her period.     Event type #2: \"Convulsions\".  Year/Age of Onset: 1986 (age 1).  Initial features: The patient has no recollection " "of any warning signs.   Event features: No clear lateralizing signs at the onset. She then tenses her whole body and has clonic movements. She does not bite her tongue. No bladder/bowel incontinence.   Post-ictal features: Confused.  Duration: Around 3 minutes.  Frequency: She gets one per 5-8 years. The last event was in 2022.   Precipitating factors: Menstrual period.     History of status epilepticus: no  History of physical injury related to seizures: yes  History of surgery related to epilepsy: no  Family Planning: She would like to become pregnant, but does not have a plan at this time.   Current Driving Status: She does not drive. She does not have 's licence  Seizure Clusters: Yes, around her menstrual periods.   Longest Seizure Freedom: Her seizures were never well controlled.     Pertinent Ancillary Test Results:    MRI brain studies:   - MRI brain wo/w contrast (09/06/2022 at [facility]): \"No acute intracranial process. Left hippocampal increased signal and slight loss of volume with altered architecture. In the appropriate clinical setting, these findings can be associated with hippocampal sclerosis. Incidental note is made of a 6 mm microadenoma involving the anterolateral right pituitary gland.\"    EEG studies:   - Standard EEG (10/15/2019, Dr. Urbano): This is an abnormal routine EEG recording in the awake, drowsy, and sleep state(s). There is slowing in the left frontotemporal region, as well intermittent and brief runs of rhythmic delta / theta activity were captured in this region. The findings suggest underlying area of cortical irritability and structural abnormality. The patient is at increased risk for seizures, however no seizures captured during this study.  Clinical and radiological correlation is recommended.     - EEG (04/17/2008): IMPRESSION:  Ambulatory electroencephalogram recording is abnormal with the appearance of rare generalized sharp activity and left temporal sharp wave activity. "  Both noted during sleep only.  No definitive epileptogenic discharges are noted, but clinical correlation is warranted for possible focal and generalized seizure disorder.  Again, no definite epileptogenic discharges are noted.     - EEG (7/23/1014, Dr. Hanna): At the onset of recording, the patient has a moderate amplitude background.  She has normal gradient with a posterior alpha rhythm of 9-1/2 Hz. It appears to be reactive and symmetric.  There is some increase in general background beta activity noted.  Some intermittent polymorphic theta slowing is noted in the left temporal region.  At times, she also gets some rhythmic 2 Hz slowing on the left.  At about 10-1/2 minutes into the recording, a few sharp waves and then a clear spike is noted in the left temporal region, phase reversing at F7.  These become fairly frequent as the patient gets drowsy.  She gets up to 5 per page over 5/10 seconds.  The patient video was reviewed during these runs of frequent left temporal sharps and spikes, but she appears to just be resting quietly in stage I sleep.  At the end of recording, photic stimulation is performed.  This produced some photic driving at a flash frequency of 9 Hz, in 11 Hz it was symmetric.  It produced no epileptiform potentials. Hyperventilation was then performed.  This produced some increase left temporal lobe slowing and then another burst of 1-2 per second temporal sharps lasting about 8 seconds. Parenthetically, it was noted that the EKG monitored throughout this recording  showed a normal sinus rhythm of 78 beats per minute.   IMPRESSION:  This is an abnormal electroencephalogram  due to the presence of frequent epileptiform potentials in drowsiness and exacerbated by hyperventilation.  No clinical seizures noted.    INTERIM HISTORY (12/11/2024): The patient continues to have her focal stereotypically focal seizures - on average 2-3 per month. No convulsions since 2022.  She is taking her  antiseizure medications regularly, and has no adverse effects from these.  She is doing well on lower dose of Depakote and Keppra, which she decreased herself earlier in 2024.    The patient has an appointment with endocrinology coming up.    She followed with her ObGyn and per the patient and her mother, her ObGyn advised her not to become pregnant, in context of risks associated with potential pregnancy.     The patient continues to have challenges with insurance. She tried to explore other insurance options, but she is not eligible for those.    She has explored lamotrigine in the past, but was not able to find out about the pricing.     Current Antiseizure Medications: Xcopri 150 mg at bedtime. Depakote  mg in AM (takes once per day instead of BID). Keppra 500 mg BID (instead 1500 mg BID). Vitamin D supplementation.    Previously Tried Antiseizure Medications: None.     Review of Systems: No recent fevers. She lost 5 lbs in the interim. No mood issues and no suicidal nor homicidal ideation.     Risk Factors For Epilepsy/Seizure Disorder: The patient is a product of normal pregnancy and uncomplicated delivery. The early development was normal and the patient started waking, talking, and engaging in social interaction as expected. There were no challenges during school and no reported attendance of special education programs. There is no history of head trauma. There is no history of stroke. There is a history of meningitis at age 7-8 months and her first seizure in context of febrile meningitis illness. There is no history of neurosurgical interventions. There is a notion of staring spells starting at age 7 years.     Past Medical History:  Past Medical History:   Diagnosis Date    Epilepsy ([facility]) 11/04/2009    since infant.  sees Wilfrido/Codey at [facility].  Keppra/depakote.  absence siezures 1x/month-thinks iwth menstruation.     Past Surgical History:  Past Surgical History:   Procedure Laterality Date     ERCP  9/30/2019    Procedure: ERCP (ENDOSCOPIC RETROGRADE CHOLANGIOPANCREATOGRAPHY);  Surgeon: Kaushal Lopes M.D.;  Location: SURGERY [facility];  Service: Gastroenterology    DEONDRE BY LAPAROSCOPY N/A 4/8/2018    Procedure: DEONDRE BY LAPAROSCOPY;  Surgeon: Chava Busby M.D.;  Location: SURGERY [facility];  Service: General    ERCP  4/6/2018    Procedure: ERCP;  Surgeon: Osiel Paige M.D.;  Location: SURGERY [facility];  Service: Gastroenterology      Social History:  Social History     Tobacco Use    Smoking status: Never    Smokeless tobacco: Never   Vaping Use    Vaping status: Never Used   Substance Use Topics    Alcohol use: No    Drug use: No     Family History:  There is a known family history of seizures/epilepsy on her maternal side with at least two family members with known epilepsy.   Family History   Problem Relation Age of Onset    No Known Problems Mother     No Known Problems Father     Cancer Neg Hx     Diabetes Neg Hx     Stroke Neg Hx          12/11/2024     2:00 PM 8/7/2024     1:00 PM 5/6/2024     3:00 PM 2/8/2023     3:20 PM 1/26/2022     8:40 AM   PHQ-9 Screening   Little interest or pleasure in doing things 0 - not at all 0 - not at all 0 - not at all 0 - not at all 0 - not at all   Feeling down, depressed, or hopeless 0 - not at all 0 - not at all 0 - not at all 0 - not at all 0 - not at all   Trouble falling or staying asleep, or sleeping too much 0 - not at all       PHQ-2 Total Score 0 0 0 0 0     [place] Suicide Reassessment  New or continued thoughts about killing self?: No  Preparing to end life?: No    Allergies:  Allergies   Allergen Reactions    Nkda [No Known Drug Allergy]      Current Medications:    Current Outpatient Medications:     cabergoline, 0.5 mg, Oral, 2X A WEEK, Taking    Xcopri, TAKE 1 TABLET (150MG) BY MOUTH DAILY AT BEDTIME, Taking    divalproex, 500 mg, Oral, DAILY, Taking    levETIRAcetam, 500 mg, Oral, BID, Taking    vitamin D2  "(Ergocalciferol), 50,000 Units, Oral, Q7 DAYS, Taking    Physical Examination:    Ambulatory Vitals  Encounter Vitals  Temperature: 36.3 °C (97.3 °F)  Temp src: Temporal  Blood Pressure: 106/82  Pulse: 73  Respiration: 12  Pulse Oximetry: 96 %  Weight: 56.2 kg (124 lb)  Height: 160 cm (5' 3\")  BMI (Calculated): 21.97    Neurological Examination:  Mental Status: The patient is alert and oriented to person, place, time, and situation. Speech is fluent, with no aphasia nor dysarthria noted. Affect is normal.    Cranial Nerve Examination:  CN I: Olfaction examination is deferred.  CN II: Visual fields are full to confrontation examination and show no visual field defect.  CN III, IV, VI: Eye movements are normal in all directions. Pupils are reactive to direct and consensual light. There is no relative afferent pupillary defect. There is no nystagmus.  CN V: Facial sensation to light touch is intact throughout.   CN VII: No significant facial muscle or other soft tissue asymmetry.  CN VIII: Hearing intact to rubbing sounds bilaterally.   CN IX, X: Soft palate elevates symmetrically.  CN XI: Symmetrical shoulder shrug exam.  CN XII: Midline tongue protrusion and moves symmetrically to each side.     Motor Examination:  Muscle strength is intact throughout. Muscle tone is normal throughout. No abnormal movements are observed. No pronator drift is noted.     Muscle Stretch Reflexes Examination:  Deferred.     Sensory Examination:  Preserved sensation to light touch in all extremities.     Coordination:  Normal finger to nose testing bilaterally, no postural nor intentional tremor was noted.     Stance/gait:  Normal regular gait with normal arm swings and stride length. Able to perform tandem gait. Romberg sign is absent.     Labs:   - Valproic acid (08/07/2024): 82   - Keppra (08/07/2024): 18   - CMP (08/07/2024): Overall unremarkable.     ASSESSMENT AND PLAN:  1. Medically intractractable focal epilepsy, with focal " impaired awareness seizures and rare focal to bilateral tonic-clonic seizures, likely originating from the left mesial temporal lobe. The etiology might be related to the history of meningitis, as well as positive family history of seizures. We had an extensive discussion at the time of the initial visit about the nature of her epilepsy, cause, and potential next best steps; we discussed that she might be a potential epilepsy surgery candidate, which would give her the highest chance for seizure freedom (though she has reported history of meningitis, she also has a clear left MTS). We discussed at the time of the initial visit that the first step to pursue epilepsy surgery would be to proceed with EMU for up to 5 days.     The patient continues to have challenges with insurance and this is the main barrier in proceeding with further evaluation for epilepsy surgery. This is also a barrier for any further antiseizure medication optimization.     We had a detailed discussion about the fact that she should not get pregnant on Depakote. The patient discussed the issue of pregnancy with ObGyn in late 2023 and was advised not to become pregnant due to health risks.     The patient is overall stable on current doses of antiseizure medications, despite decreasing the doses on her own in early 2024.  Will continue current antiseizure medication regimen, at lower doses as the patient is taking it right now, with no changes.  Refills were provided.  - Cenobamate (XCOPRI) 150 MG Tab; TAKE 1 TABLET (150MG) BY MOUTH DAILY AT BEDTIME  Dispense: 90 Tablet; Refill: 1  - divalproex (DEPAKOTE) 500 MG Tablet Delayed Response; Take 1 Tablet by mouth every day.  Dispense: 30 Tablet; Refill: 7  - levETIRAcetam (KEPPRA) 500 MG Tab; Take 1 Tablet by mouth 2 times a day.  Dispense: 60 Tablet; Refill: 7    We reviewed the most recent blood work and will obtain repeat blood work at the time of the next visit.     Epilepsy counseling provided in  writing. The patient is happy with her current seizure control in context of her current treatment options.     The patient was again advised to follow up with endocrinology.     Follow up with PCP for weight loss.     Patient Instructions   Please continue Xcopri 150 mg once a day.    Please continue Keppra 500 mg twice daily.    Please continue Depakote  mg once per daily.    Please continue vitamin D supplementation.    Please explore your options if you can get Medicaid insurance through your primary care provider or through social security office.     Please make sure that you don't become pregnant while you are taking Depakote, because this medication may harm your baby.     Please let our office know if you have any changes in your seizure frequency and/characteristics. Otherwise, please keep the diary of your events and bring it with you at the time of your next follow up visit with our office.     Please let our office know if you have any changes in your seizure frequency and/characteristics. Otherwise, please keep the diary of your events and bring it with you at the time of your next follow up visit with our office.     Please take vitamin D3 [id number] internation units daily.     Please take folic acid 1 mg daily. This is an over-the-counter supplement that is recommended to prevent certain developmental problems in your baby, in case you become pregnant in the future.    Please let our office know as soon as you become pregnant or plan to become pregnant - again, you should not become pregnant while on Depakote.    If you are caring for a baby/young child, please make sure to be sitting on a soft surface while holding your baby/young child, so in case you have a seizure, your baby/young child is not injured due to fall.     Please let us know if you observe that your baby is excessively sleepy/has other changes and the pediatrician feels that there are no other explanations except possible  adverse effects of antiseizure medication(s) your are taking while nursing your baby.     Please note that the following might precipitate seizures: missed doses of antiseizure medications, being sick with fever, stress, fatigue, sleep deprivation, not eating regularly, not drinking enough water, drinking too much alcohol, stopping alcohol suddenly if you are currently using it on a regular/daily basis, and/or using recreational drugs, among others.    Please note that the following might lead to an injury, potentially a life-threatening injury, in case you have a seizure and/or lose awareness while:   - being in a large body of water by yourself, such as bath, pool, lake, ocean, among others (risk of drowning)   - being on unprotected heights (risk of fall)   - being around and/or operating heavy machinery (risk of injury)   - being around open fire/hot surfaces (risk of burns)   - any other activities/circumstances, in which if you lose awareness, you might injure yourself and/or others.    Please call for help (crisis line and/or 911) in case you have thoughts of harming yourself and/or others.    Please abstain from driving until further notice.    ------------------------------------------------------------------------------------------  Instructions for your family/caregivers:  Please call 911 if the patient has a seizure longer than 2-3 minutes, if seizures are back to back without her recovering to her baseline, or she does not start recovering within 5-10 minutes after the seizure stops. During the seizure - please turn her on her side, please make sure her head is protected (for example, you should put a pillow under her head, if one is available), and please do not put anything in her mouth.   -------------------------------------------------------------------------------------------    It is important that your seizures are well controlled and you have none or have them rarely. In addition to avoiding  injury related to breakthrough seizures, frequent seizures increase risk of SUDEP (sudden unexpected death in epilepsy), where a person goes into a seizure and then never wakes up - this is a rare complication of seizure disorder; one of the best available ways to prevent it is to control your seizures well.     Due to the high volume of patients we are trying to help, your physician will not be able to respond by phone or in MyChart to your routine concerns between appointments.  This does not reflect a lack of interest or concern for you or your diagnosis.  Please bring these questions and concerns to your appointment where your physician can answer.  Please relay more pressing concerns to our office, either via MyChart, or by phone; if not able to reach us please visit nearby Urgent Care Center or Emergency Department.  If any emergent medical needs, please seek emergent medical help and/or call 911.    Please note that we are not able to fill out paperwork that might be related to your work, utility company, disability, and/or driving, among others, in between the visits.  Please schedule a dedicated appointment to address your paperwork, so we can do that in a timely manner.  This is not due to lack of concern or interest for your disease-related work/administrative problems, but to make sure that we provide the best possible care and to fill out your paperwork in a correct and timely manner.    Thank you for entrusting your neurological care to [facility] and we look forward to continuing to serve you.      Follow up in 5 months.     Sean Patel MD  Neurology Attending, Epilepsy Program  [facility]

## 2024-12-11 NOTE — TELEPHONE ENCOUNTER
Received New Start request via [employer]  for   Cenobamate (XCOPRI) 150 MG Tab . (Quantity:90, Day Supply:90)     Insurance: N/A  Member ID:  N/A  BIN: N/A  PCN: N/A  Group: [id number]     Ran Test claim via [employer] [facility]  No active rx ins on file. Nothing comes up in search. Pt may be self pay. Pt has already declined services. Will release to pt's preferred pharmacy on file.

## 2025-02-12 DIAGNOSIS — G40.109 LOCALIZATION-RELATED EPILEPSY (HCC): ICD-10-CM

## 2025-02-12 NOTE — TELEPHONE ENCOUNTER
Received request via: Patient    Medication Name/Dosage Cenobamate (Xcopri) 150 mg tablet,  TAKE 1 TABLET (150MG) BY MOUTH DAILY AT BEDTIME     When was medication last prescribed 12/11/24    How many refills were previously provided 1    How many Refills does he patient have left from last prescription 0    Was the patient seen in the last year in this department? Yes   Date of last office visit 12/11/24     Per last Neurology Office Visit, when was the date of next follow up visit set for?   5 months                         Date of office visit follow up request 5/11/25     Does the patient have an upcoming appointment? Yes   If yes, when 5/14/25             If no, schedule appointment Scheduled     Does the patient have [facility] and need 100 day supply (blood pressure, diabetes and cholesterol meds only)? Patient does not have SCP      Patient called and said she is out of medication

## 2025-02-13 RX ORDER — CENOBAMATE 150 MG/1
TABLET, FILM COATED ORAL
Qty: 90 TABLET | Refills: 0 | Status: SHIPPED | OUTPATIENT
Start: 2025-02-13 | End: 2025-02-20

## 2025-02-13 NOTE — TELEPHONE ENCOUNTER
The patient should have enough refills till June 2025. Please confirm with the patient why additional refills are needed at this time. Thank you.

## 2025-02-20 ENCOUNTER — TELEPHONE (OUTPATIENT)
Dept: NEUROLOGY | Facility: MEDICAL CENTER | Age: 40
End: 2025-02-20
Payer: COMMERCIAL

## 2025-02-20 NOTE — TELEPHONE ENCOUNTER
PT called to get her meds refilled ( Cenobamate (XCOPRI) 150 MG TAB. PT need it filled ASAP. PT would like a call [phone]    2/20/25   9:32 AM

## 2025-02-27 ENCOUNTER — TELEPHONE (OUTPATIENT)
Dept: ENDOCRINOLOGY | Facility: MEDICAL CENTER | Age: 40
End: 2025-02-27
Payer: COMMERCIAL

## 2025-03-05 ENCOUNTER — HOSPITAL ENCOUNTER (OUTPATIENT)
Dept: RADIOLOGY | Facility: MEDICAL CENTER | Age: 40
End: 2025-03-05
Attending: ADVANCED PRACTICE MIDWIFE
Payer: COMMERCIAL

## 2025-03-05 DIAGNOSIS — Z12.31 ENCOUNTER FOR MAMMOGRAM TO ESTABLISH BASELINE MAMMOGRAM: ICD-10-CM

## 2025-03-05 PROCEDURE — 77067 SCR MAMMO BI INCL CAD: CPT

## 2025-05-13 ENCOUNTER — HOSPITAL ENCOUNTER (OUTPATIENT)
Dept: LAB | Facility: MEDICAL CENTER | Age: 40
End: 2025-05-13
Payer: COMMERCIAL

## 2025-05-13 DIAGNOSIS — D35.2 PITUITARY MICROADENOMA (HCC): ICD-10-CM

## 2025-05-13 DIAGNOSIS — E22.1 HYPERPROLACTINEMIA (HCC): ICD-10-CM

## 2025-05-13 LAB
PROLACTIN SERPL-MCNC: 10.6 NG/ML (ref 2.8–26)
T4 FREE SERPL-MCNC: 1.17 NG/DL (ref 0.93–1.7)
TSH SERPL-ACNC: 1.11 UIU/ML (ref 0.38–5.33)

## 2025-05-13 PROCEDURE — 84146 ASSAY OF PROLACTIN: CPT

## 2025-05-13 PROCEDURE — 84439 ASSAY OF FREE THYROXINE: CPT

## 2025-05-13 PROCEDURE — 84443 ASSAY THYROID STIM HORMONE: CPT

## 2025-05-13 PROCEDURE — 36415 COLL VENOUS BLD VENIPUNCTURE: CPT

## 2025-05-14 ENCOUNTER — APPOINTMENT (OUTPATIENT)
Dept: NEUROLOGY | Facility: MEDICAL CENTER | Age: 40
End: 2025-05-14
Attending: PSYCHIATRY & NEUROLOGY
Payer: COMMERCIAL

## 2025-05-15 ENCOUNTER — OFFICE VISIT (OUTPATIENT)
Dept: ENDOCRINOLOGY | Facility: MEDICAL CENTER | Age: 40
End: 2025-05-15
Payer: COMMERCIAL

## 2025-05-15 VITALS
WEIGHT: 126 LBS | HEART RATE: 73 BPM | SYSTOLIC BLOOD PRESSURE: 100 MMHG | HEIGHT: 63 IN | OXYGEN SATURATION: 99 % | BODY MASS INDEX: 22.32 KG/M2 | DIASTOLIC BLOOD PRESSURE: 63 MMHG

## 2025-05-15 DIAGNOSIS — E22.1 HYPERPROLACTINEMIA (HCC): ICD-10-CM

## 2025-05-15 DIAGNOSIS — D35.2 PROLACTINOMA (HCC): Primary | ICD-10-CM

## 2025-05-15 PROCEDURE — 3078F DIAST BP <80 MM HG: CPT

## 2025-05-15 PROCEDURE — 99213 OFFICE O/P EST LOW 20 MIN: CPT

## 2025-05-15 PROCEDURE — 99214 OFFICE O/P EST MOD 30 MIN: CPT

## 2025-05-15 PROCEDURE — 3074F SYST BP LT 130 MM HG: CPT

## 2025-05-15 ASSESSMENT — FIBROSIS 4 INDEX: FIB4 SCORE: 0.9

## 2025-05-15 NOTE — PROGRESS NOTES
Chief Complaint: Consult requested by Gayathri Thomson M.D. for evaluation of Pituitary Mass    Subjective:      Umu Mcguire is a 39 y.o. female here for initial evaluation of pituitary tumor.  She was first diagnosed with a pituitary tumor in 2022 incidentally found via MRI.  She has history of epilepsy. Currently kepra, depakote, xcopri, and vitamin D2    Prolactinoma  Presenting symptoms which led to the investigation of the pituitary included elevated prolactin levels     Initial MRI of the pituitary on September 2022 revealed 6 mm microadenoma.  Pituitary function testing revealed:  elevated prolactin.    Prior pituitary surgery: No.    Prior pituitary radiation therapy: No.    She denies headache   denies vision difficulties   denies frequent urination  denies increased thirst   denies galactorrhea.    MRI on 11/10/24   An approximately 7 mm sized T2 hyperintense hypoenhancing lesion in the right side of the pituitary gland likely representing pituitary microadenoma. Previously, this lesion demonstrates uniform hyperenhancement. However in this current study, the   lesion is hypoenhancing which might indicate treatment related necrosis. The size is slightly increased in size.    Currently on cabergoline 0.5 mg  2x weekly  She is tolerating well.    Latest Reference Range & Units 05/13/25 09:49   Prolactin 2.80 - 26.00 ng/mL 10.60      Latest Reference Range & Units 06/20/24 10:56 11/09/24 09:54   Prolactin 2.80 - 26.00 ng/mL 105.00 (H) 125.00 (H)        Latest Reference Range & Units 05/13/25 09:49   TSH 0.380 - 5.330 uIU/mL 1.110   Free T-4 0.93 - 1.70 ng/dL 1.17      Latest Reference Range & Units 11/09/24 09:54   IGF1 76 - 271 ng/mL 112   IGF-1 Z Score Calculation  -1.0       Patient's medications, allergies, and social histories were reviewed and updated as appropriate.    ROS:     CONS:     No fever, no chills, no weight loss, no fatigue   EYES:      No diplopia, no blurry vision, no redness of eyes,  no swelling of eyelids   ENT:    No hearing loss, No ear pain, No sore throat, no dysphagia, no neck swelling   CV:     No chest pain, no palpitations, no claudication, no orthopnea, no PND   PULM:    No SOB, no cough, no hemoptysis, no wheezing    GI:   No nausea, no vomiting, no diarrhea, no constipation, no bloody stools   :  Passing urine well, no dysuria, no hematuria   ENDO:   No polyuria, no polydipsia, no heat intolerance, no cold intolerance   NEURO: No headaches, no dizziness, no convulsions, no tremors   MUSC:  No joint swellings, no arthralgias, no myalgias, no weakness   SKIN:   No rash, no ulcers, no dry skin   PSYCH:   No depression, no anxiety, no difficulty sleeping       Past Medical History:  Patient Active Problem List    Diagnosis Date Noted    Cervical high risk HPV (human papillomavirus) test positive 11/17/2021    Endometrial hyperplasia 11/17/2021    Papanicolaou smear of cervix with atypical squamous cells cannot exclude high grade squamous intraepithelial lesion (ASC-H) 10/11/2019    S/P laparoscopic cholecystectomy 04/08/2018    Epilepsy (HCC) 11/04/2009       Past Surgical History:  Past Surgical History:   Procedure Laterality Date    ERCP  9/30/2019    Procedure: ERCP (ENDOSCOPIC RETROGRADE CHOLANGIOPANCREATOGRAPHY);  Surgeon: Kaushal Lopes M.D.;  Location: [facility];  Service: Gastroenterology    DEONDRE BY LAPAROSCOPY N/A 4/8/2018    Procedure: DEONDRE BY LAPAROSCOPY;  Surgeon: Chava Busby M.D.;  Location: [facility];  Service: General    ERCP  4/6/2018    Procedure: ERCP;  Surgeon: Osiel Paige M.D.;  Location: [facility];  Service: Gastroenterology        Allergies:  Nkda [no known drug allergy]     Current Medications:    Current Outpatient Medications:     Cenobamate (XCOPRI) 150 MG Tab, TAKE ONE TABLET (150MG) BY MOUTH AT BEDTIME, Disp: 90 Tablet, Rfl: 1    vitamin D2, Ergocalciferol, (DRISDOL) 1.25 MG (23819 UT) Cap  "capsule, TAKE ONE CAPSULE BY MOUTH WEEKLY, Disp: 13 Capsule, Rfl: 3    divalproex (DEPAKOTE) 500 MG Tablet Delayed Response, Take 1 Tablet by mouth every day., Disp: 30 Tablet, Rfl: 7    levETIRAcetam (KEPPRA) 500 MG Tab, Take 1 Tablet by mouth 2 times a day., Disp: 60 Tablet, Rfl: 7    cabergoline (DOSTINEX) 0.5 MG tablet, Take 1 Tablet by mouth two times a week., Disp: 24 Tablet, Rfl: 3    Social History:  Social History     Socioeconomic History    Marital status: Single     Spouse name: Not on file    Number of children: Not on file    Years of education: Not on file    Highest education level: Not on file   Occupational History    Not on file   Tobacco Use    Smoking status: Never    Smokeless tobacco: Never   Vaping Use    Vaping status: Never Used   Substance and Sexual Activity    Alcohol use: No    Drug use: No    Sexual activity: Not Currently     Partners: Male     Birth control/protection: None   Other Topics Concern    Not on file   Social History Narrative    Not on file     Social Drivers of Health     Financial Resource Strain: Not on file   Food Insecurity: Not on file   Transportation Needs: Not on file   Physical Activity: Not on file   Stress: Not on file   Social Connections: Not on file   Intimate Partner Violence: Not on file   Housing Stability: Not on file        Family History:   Family History   Problem Relation Age of Onset    No Known Problems Mother     No Known Problems Father     Cancer Neg Hx     Diabetes Neg Hx     Stroke Neg Hx          PHYSICAL EXAM:   Vital signs: /63   Pulse 73   Ht 1.6 m (5' 3\")   Wt 57.2 kg (126 lb)   SpO2 99%   BMI 22.32 kg/m²   GENERAL: Well-developed, well-nourished  in no apparent distress.   EYE: No ocular and eyelid asymmetry, Anicteric sclerae,  PERRL, No exophthalmos or lidlag  HENT: Hearing grossly intact, Normocephalic, atraumatic. Pink, moist mucous membranes, No exudate  NECK: Supple. Trachea midline.   CARDIOVASCULAR: Regular rate and " "rhythm. No murmurs, rubs, or gallops.   LUNGS: Clear to auscultation bilaterally   ABDOMEN: Soft, nontender with positive bowel sounds.   EXTREMITIES: No clubbing, cyanosis, or edema.   NEUROLOGICAL: Cranial nerves II-XII are grossly intact   Symmetric reflexes at the patella no proximal muscle weakness, No visible tremor with both outstretched hands  LYMPH: No cervical, supraclavicular,  adenopathy palpated.   SKIN: No rashes, lesions. Turgor is normal.    Labs:  Lab Results   Component Value Date/Time    WBC 5.4 06/20/2024 10:56 AM    RBC 4.34 06/20/2024 10:56 AM    HEMOGLOBIN 11.4 (L) 06/20/2024 10:56 AM    MCV 86.4 06/20/2024 10:56 AM    MCH 26.3 (L) 06/20/2024 10:56 AM    MCHC 30.4 (L) 06/20/2024 10:56 AM    RDW 47.5 06/20/2024 10:56 AM    MPV 11.4 06/20/2024 10:56 AM       Lab Results   Component Value Date/Time    SODIUM 139 11/09/2024 09:54 AM    POTASSIUM 4.3 11/09/2024 09:54 AM    CHLORIDE 105 11/09/2024 09:54 AM    CO2 21 11/09/2024 09:54 AM    ANION 13.0 11/09/2024 09:54 AM    GLUCOSE 81 11/09/2024 09:54 AM    BUN 14 11/09/2024 09:54 AM    CREATININE 0.52 11/09/2024 09:54 AM    CREATININE 0.6 09/15/2008 09:20 PM    CALCIUM 9.5 11/09/2024 09:54 AM    ASTSGOT 27 11/09/2024 09:54 AM    ALTSGPT 18 11/09/2024 09:54 AM    TBILIRUBIN <0.2 11/09/2024 09:54 AM    ALBUMIN 4.7 11/09/2024 09:54 AM    TOTPROTEIN 8.7 (H) 11/09/2024 09:54 AM    GLOBULIN 4.0 (H) 11/09/2024 09:54 AM    AGRATIO 1.2 11/09/2024 09:54 AM       Lab Results   Component Value Date/Time    TSHULTRASEN 1.180 06/20/2024 1056     No results found for: \"FREEDIR\"  No results found for: \"FREET3\"  No results found for: \"THYSTIMIG\"        Imaging:  COMPARISON:  9/6/2022     FINDINGS: There is an approximately 7 mm sized T2 hyperintense, hypoenhancing lesion in the right side of the pituitary gland.    The pituitary stalk is mildly deviated towards left side.     There are no suprasellar or parasellar mass lesions. The cavernous sinuses show normal " enhancement and configuration. Vascular flow voids in the juxtasellar carotid arteries are unremarkable.     There is loss of left hippocampal volume and abnormal increased T2 signal intensity.     There is a nonspecific foci of white matter T2 hyperintensity in the right frontal white matter.           IMPRESSION:     1.  An approximately 7 mm sized T2 hyperintense hypoenhancing lesion in the right side of the pituitary gland likely representing pituitary microadenoma. Previously, this lesion demonstrates uniform hyperenhancement. However in this current study, the   lesion is hypoenhancing which might indicate treatment related necrosis. The size is slightly increased in size.  2.  Left hippocampal sclerosis. This is unchanged since the previous study.  3.  Nonspecific foci of white matter gliosis in the right frontal lobe. This is unchanged since the previous study.  ASSESSMENT/PLAN:   1. Prolactinoma (HCC) (Primary)  Stable  I will get updated MRI to determine if the pituitary tumor has shrunk in size  I will check all hormones for hypersecretion.   - MR-BRAIN PITUITARY W/WO; Future  - TSH; Future  - FREE THYROXINE; Future  - ACTH; Future  - CORTISOL; Future  - IGF-1 SOMATOMEDIN; Future    2. Hyperprolactinemia (HCC)  Stable  Prolactin at 10.5  Medication:  Cabergoline 0.5 mg 2 x a week - continue  Patient understands to take the medication at bedtime to prevent dizziness  Side effects of medication discussed with patient.   - PROLACTIN; Future     Return in about 7 months (around 12/15/2025).  Patient will have blood work done prior in 4 months and reach out for review.       Thank you kindly for allowing me to participate in the endocrine care plan for this patient.    Ronak Del Cid, EMETERIO   5/15/25    CC:   Gayathri Thomson M.D.

## 2025-06-09 ENCOUNTER — TELEPHONE (OUTPATIENT)
Dept: PHARMACY | Facility: MEDICAL CENTER | Age: 40
End: 2025-06-09

## 2025-06-09 ENCOUNTER — OFFICE VISIT (OUTPATIENT)
Dept: NEUROLOGY | Facility: MEDICAL CENTER | Age: 40
End: 2025-06-09
Attending: PSYCHIATRY & NEUROLOGY
Payer: COMMERCIAL

## 2025-06-09 VITALS
HEART RATE: 66 BPM | RESPIRATION RATE: 12 BRPM | BODY MASS INDEX: 22.42 KG/M2 | DIASTOLIC BLOOD PRESSURE: 60 MMHG | TEMPERATURE: 97.7 F | OXYGEN SATURATION: 100 % | HEIGHT: 63 IN | WEIGHT: 126.54 LBS | SYSTOLIC BLOOD PRESSURE: 108 MMHG

## 2025-06-09 DIAGNOSIS — G40.109 LOCALIZATION-RELATED EPILEPSY (HCC): ICD-10-CM

## 2025-06-09 DIAGNOSIS — G40.909 NONINTRACTABLE EPILEPSY WITHOUT STATUS EPILEPTICUS, UNSPECIFIED EPILEPSY TYPE (HCC): Primary | ICD-10-CM

## 2025-06-09 PROCEDURE — 99214 OFFICE O/P EST MOD 30 MIN: CPT | Performed by: PSYCHIATRY & NEUROLOGY

## 2025-06-09 PROCEDURE — 99213 OFFICE O/P EST LOW 20 MIN: CPT | Performed by: PSYCHIATRY & NEUROLOGY

## 2025-06-09 PROCEDURE — 3074F SYST BP LT 130 MM HG: CPT | Performed by: PSYCHIATRY & NEUROLOGY

## 2025-06-09 PROCEDURE — 3078F DIAST BP <80 MM HG: CPT | Performed by: PSYCHIATRY & NEUROLOGY

## 2025-06-09 RX ORDER — CENOBAMATE 150 MG/1
TABLET, FILM COATED ORAL
Qty: 90 TABLET | Refills: 1 | Status: SHIPPED | OUTPATIENT
Start: 2025-06-09 | End: 2025-06-11 | Stop reason: SDUPTHER

## 2025-06-09 RX ORDER — LEVETIRACETAM 500 MG/1
500 TABLET ORAL 2 TIMES DAILY
Qty: 60 TABLET | Refills: 7 | Status: SHIPPED | OUTPATIENT
Start: 2025-06-09

## 2025-06-09 RX ORDER — DIVALPROEX SODIUM 500 MG/1
500 TABLET, DELAYED RELEASE ORAL DAILY
Qty: 30 TABLET | Refills: 7 | Status: SHIPPED | OUTPATIENT
Start: 2025-06-09

## 2025-06-09 ASSESSMENT — FIBROSIS 4 INDEX: FIB4 SCORE: 0.9

## 2025-06-09 ASSESSMENT — PATIENT HEALTH QUESTIONNAIRE - PHQ9: CLINICAL INTERPRETATION OF PHQ2 SCORE: 0

## 2025-06-09 NOTE — TELEPHONE ENCOUNTER
Received [employer]  request via [employer]  for Cenobamate (XCOPRI) 150 MG Tab. (Quantity:90, Day Supply:90)     Insurance: N/A  Member ID:  N/A  BIN: N/A  PCN: N/A  Group: N/[id number]     Ran Test claim via [facility] Pt has no active rx ins. Going to release to pt's preferred pharmacy on file. Pt has already declined services.

## 2025-06-09 NOTE — TELEPHONE ENCOUNTER
Received [employer] request via [employer]  for levETIRAcetam (KEPPRA) 500 MG Tab. (Quantity:60, Day Supply:30)     Insurance: N/A  Member ID:  N/A  BIN: N/A  PCN: N/A  Group: N/[id number]     Ran Test claim via [facility] No active rx ins. Going to release to pt's preferred pharmacy on file. Pt has already declined services.

## 2025-06-09 NOTE — PATIENT INSTRUCTIONS
Please continue Xcopri 150 mg once a day.    Please continue Keppra 500 mg twice daily.    Please continue Depakote  mg once per daily.    Please continue vitamin D supplementation.    Please explore your options if you can get Medicaid insurance through your primary care provider or through social security office.     Please make sure that you don't become pregnant while you are taking Depakote, because this medication may harm your baby.     Please let our office know if you have any changes in your seizure frequency and/characteristics. Otherwise, please keep the diary of your events and bring it with you at the time of your next follow up visit with our office.     Please let our office know if you have any changes in your seizure frequency and/characteristics. Otherwise, please keep the diary of your events and bring it with you at the time of your next follow up visit with our office.     Please take vitamin D3 [id number] internation units daily.     Please take folic acid 1 mg daily. This is an over-the-counter supplement that is recommended to prevent certain developmental problems in your baby, in case you become pregnant in the future.    Please let our office know as soon as you become pregnant or plan to become pregnant - again, you should not become pregnant while on Depakote.    If you are caring for a baby/young child, please make sure to be sitting on a soft surface while holding your baby/young child, so in case you have a seizure, your baby/young child is not injured due to fall.     Please let us know if you observe that your baby is excessively sleepy/has other changes and the pediatrician feels that there are no other explanations except possible adverse effects of antiseizure medication(s) your are taking while nursing your baby.     Please note that the following might precipitate seizures: missed doses of antiseizure medications, being sick with fever, stress, fatigue, sleep deprivation,  not eating regularly, not drinking enough water, drinking too much alcohol, stopping alcohol suddenly if you are currently using it on a regular/daily basis, and/or using recreational drugs, among others.    Please note that the following might lead to an injury, potentially a life-threatening injury, in case you have a seizure and/or lose awareness while:   - being in a large body of water by yourself, such as bath, pool, lake, ocean, among others (risk of drowning)   - being on unprotected heights (risk of fall)   - being around and/or operating heavy machinery (risk of injury)   - being around open fire/hot surfaces (risk of burns)   - any other activities/circumstances, in which if you lose awareness, you might injure yourself and/or others.    Please call for help (crisis line and/or 911) in case you have thoughts of harming yourself and/or others.    Please abstain from driving until further notice.    ------------------------------------------------------------------------------------------  Instructions for your family/caregivers:  Please call 911 if the patient has a seizure longer than 2-3 minutes, if seizures are back to back without her recovering to her baseline, or she does not start recovering within 5-10 minutes after the seizure stops. During the seizure - please turn her on her side, please make sure her head is protected (for example, you should put a pillow under her head, if one is available), and please do not put anything in her mouth.   -------------------------------------------------------------------------------------------    It is important that your seizures are well controlled and you have none or have them rarely. In addition to avoiding injury related to breakthrough seizures, frequent seizures increase risk of SUDEP (sudden unexpected death in epilepsy), where a person goes into a seizure and then never wakes up - this is a rare complication of seizure disorder; one of the best  available ways to prevent it is to control your seizures well.     Due to the high volume of patients we are trying to help, your physician will not be able to respond by phone or in MyChart to your routine concerns between appointments.  This does not reflect a lack of interest or concern for you or your diagnosis.  Please bring these questions and concerns to your appointment where your physician can answer.  Please relay more pressing concerns to our office, either via MyChart, or by phone; if not able to reach us please visit nearby Urgent Care Center or Emergency Department.  If any emergent medical needs, please seek emergent medical help and/or call 911.    Please note that we are not able to fill out paperwork that might be related to your work, utility company, disability, and/or driving, among others, in between the visits.  Please schedule a dedicated appointment to address your paperwork, so we can do that in a timely manner.  This is not due to lack of concern or interest for your disease-related work/administrative problems, but to make sure that we provide the best possible care and to fill out your paperwork in a correct and timely manner.    Thank you for entrusting your neurological care to [facility] and we look forward to continuing to serve you.

## 2025-06-09 NOTE — PROGRESS NOTES
"[facility] Epilepsy Program  Follow Up Visit      Patient's Name: Umu Mcguire  YOB: 1985  MRN: [id number]  Date of Service: 06/09/25.    Referring Provider: Terrell Scherer M.D.  [address] [place] [address]  [place] [id number]    Chief Concern: Seizures.     The patient presents with her mother and provides verbal consent for her to be present. The visit was conducted with a help of a formal .     HPI (as obtained at the time of the initial visit and updated as necessary): The patient is a 39 y.o., right-handed female, who initially presented to my epilepsy clinic for evaluation of seizures on 04/05/2023. She now presents for a follow up.     The patient's seizures started when she was a baby. It seems that she was diagnosed with meningitis at age 7-8 months, and that is when her first bilateral tonic-clonic seizure occurred. She remained seizure free until age 7. She then started having staring spells at age 7, as noted under event type #1. She also continued to have bilateral tonic-clonic seizures, as noted under event type #2.    Per previous documentation, VNS treatment was discussed, but proved to be too expensive for her.  This was confirmed by the patient and her mother.     Semiology:  Event type #1: \"Staring spells\".  Year/Age of Onset: 1995 (around age 7).   Initial features: The event starts with gastric uprising, carlitos vu, heart racing, sensation of fear, and metallic taste.  Event features: The patient stares ahead and is not responsive. There are oral and manual automatisms noted. If she is not attended to, she will at times fall with these events.   Post-ictal features: Confused.   Duration: Around a minute.  Frequency: Up to 10 per month. Unfortunately, she continues to have these spells.   Precipitating factors: Around her period.     Event type #2: \"Convulsions\".  Year/Age of Onset: 1986 (age 1).  Initial features: The patient has no recollection " "of any warning signs.   Event features: No clear lateralizing signs at the onset. She then tenses her whole body and has clonic movements. She does not bite her tongue. No bladder/bowel incontinence.   Post-ictal features: Confused.  Duration: Around 3 minutes.  Frequency: She gets one per 5-8 years. The last event was in 2022.   Precipitating factors: Menstrual period.     History of status epilepticus: no  History of physical injury related to seizures: yes  History of surgery related to epilepsy: no  Family Planning: She would like to become pregnant, but does not have a plan at this time.   Current Driving Status: She does not drive. She does not have 's licence  Seizure Clusters: Yes, around her menstrual periods.   Longest Seizure Freedom: Her seizures were never well controlled.     Pertinent Ancillary Test Results:    MRI brain studies:   - MRI brain wo/w contrast (09/06/2022 at [facility]): \"No acute intracranial process. Left hippocampal increased signal and slight loss of volume with altered architecture. In the appropriate clinical setting, these findings can be associated with hippocampal sclerosis. Incidental note is made of a 6 mm microadenoma involving the anterolateral right pituitary gland.\"    EEG studies:   - Standard EEG (10/15/2019, Dr. Urbano): This is an abnormal routine EEG recording in the awake, drowsy, and sleep state(s). There is slowing in the left frontotemporal region, as well intermittent and brief runs of rhythmic delta / theta activity were captured in this region. The findings suggest underlying area of cortical irritability and structural abnormality. The patient is at increased risk for seizures, however no seizures captured during this study.  Clinical and radiological correlation is recommended.     - EEG (04/17/2008): IMPRESSION:  Ambulatory electroencephalogram recording is abnormal with the appearance of rare generalized sharp activity and left temporal sharp wave activity. "  Both noted during sleep only.  No definitive epileptogenic discharges are noted, but clinical correlation is warranted for possible focal and generalized seizure disorder.  Again, no definite epileptogenic discharges are noted.     - EEG (7/23/1014, Dr. Hanna): At the onset of recording, the patient has a moderate amplitude background.  She has normal gradient with a posterior alpha rhythm of 9-1/2 Hz. It appears to be reactive and symmetric.  There is some increase in general background beta activity noted.  Some intermittent polymorphic theta slowing is noted in the left temporal region.  At times, she also gets some rhythmic 2 Hz slowing on the left.  At about 10-1/2 minutes into the recording, a few sharp waves and then a clear spike is noted in the left temporal region, phase reversing at F7.  These become fairly frequent as the patient gets drowsy.  She gets up to 5 per page over 5/10 seconds.  The patient video was reviewed during these runs of frequent left temporal sharps and spikes, but she appears to just be resting quietly in stage I sleep.  At the end of recording, photic stimulation is performed.  This produced some photic driving at a flash frequency of 9 Hz, in 11 Hz it was symmetric.  It produced no epileptiform potentials. Hyperventilation was then performed.  This produced some increase left temporal lobe slowing and then another burst of 1-2 per second temporal sharps lasting about 8 seconds. Parenthetically, it was noted that the EKG monitored throughout this recording  showed a normal sinus rhythm of 78 beats per minute.   IMPRESSION:  This is an abnormal electroencephalogram  due to the presence of frequent epileptiform potentials in drowsiness and exacerbated by hyperventilation.  No clinical seizures noted.    INTERIM HISTORY (06/09/2025): The patient again continues to have her focal stereotypically focal seizures - on average 2-3 per month. Again, no convulsions since 2022.  She is  taking her antiseizure medications regularly, and has no adverse effects from these.  She is doing well on lower dose of Depakote and Keppra, which she decreased herself earlier in 2024.    The patient is now following up with endocrinology at this time.     She followed with her ObGyn and per the patient and her mother, her ObGyn advised her not to become pregnant, in context of risks associated with potential pregnancy.     The patient continues to have challenges with insurance. She tried to explore other insurance options, but she is not eligible for those.    She has explored lamotrigine in the past, but was not able to find out about the pricing.     Current Antiseizure Medications: Xcopri 150 mg at bedtime. Depakote  mg in AM (takes once per day instead of BID). Keppra 500 mg BID (instead 1500 mg BID). Vitamin D supplementation.    Previously Tried Antiseizure Medications: None.     Review of Systems: No recent fevers. She gained 2 lbs in the interim. No mood issues and no suicidal nor homicidal ideation.     Risk Factors For Epilepsy/Seizure Disorder: The patient is a product of normal pregnancy and uncomplicated delivery. The early development was normal and the patient started waking, talking, and engaging in social interaction as expected. There were no challenges during school and no reported attendance of special education programs. There is no history of head trauma. There is no history of stroke. There is a history of meningitis at age 7-8 months and her first seizure in context of febrile meningitis illness. There is no history of neurosurgical interventions. There is a notion of staring spells starting at age 7 years.     Past Medical History:  Past Medical History:   Diagnosis Date    Epilepsy (HCC) 11/04/2009    since infant.  sees Wilfrido/Codey at [facility].  Keppra/depakote.  absence siezures 1x/month-thinks iwth menstruation.     Past Surgical History:  Past Surgical History:   Procedure  Laterality Date    ERCP  9/30/2019    Procedure: ERCP (ENDOSCOPIC RETROGRADE CHOLANGIOPANCREATOGRAPHY);  Surgeon: Kaushal Lopes M.D.;  Location: SURGERY [facility];  Service: Gastroenterology    DEONDRE BY LAPAROSCOPY N/A 4/8/2018    Procedure: DEONDRE BY LAPAROSCOPY;  Surgeon: Chava Bubsy M.D.;  Location: [facility];  Service: General    ERCP  4/6/2018    Procedure: ERCP;  Surgeon: Osiel Paige M.D.;  Location: SURGERY [facility];  Service: Gastroenterology      Social History:  Social History     Tobacco Use    Smoking status: Never    Smokeless tobacco: Never   Vaping Use    Vaping status: Never Used   Substance Use Topics    Alcohol use: No    Drug use: No     Family History:  There is a known family history of seizures/epilepsy on her maternal side with at least two family members with known epilepsy.   Family History   Problem Relation Age of Onset    No Known Problems Mother     No Known Problems Father     Cancer Neg Hx     Diabetes Neg Hx     Stroke Neg Hx          6/9/2025     3:30 PM 12/11/2024     2:00 PM 8/7/2024     1:00 PM 5/6/2024     3:00 PM 2/8/2023     3:20 PM   PHQ-9 Screening   Little interest or pleasure in doing things 0 - not at all 0 - not at all 0 - not at all 0 - not at all 0 - not at all   Feeling down, depressed, or hopeless 0 - not at all 0 - not at all 0 - not at all 0 - not at all 0 - not at all   Trouble falling or staying asleep, or sleeping too much  0 - not at all      PHQ-2 Total Score 0 0 0 0 0     [place] Suicide Reassessment  New or continued thoughts about killing self?: No  Preparing to end life?: No    Allergies:  Allergies   Allergen Reactions    Nkda [No Known Drug Allergy]      Current Medications:    Current Outpatient Medications:     Xcopri, TAKE ONE TABLET (150MG) BY MOUTH AT BEDTIME, Taking    vitamin D2, TAKE ONE CAPSULE BY MOUTH WEEKLY, Taking    divalproex, 500 mg, Oral, DAILY, Taking    levETIRAcetam, 500 mg, Oral, BID,  "Taking    cabergoline, 0.5 mg, Oral, 2X A WEEK, Taking    Physical Examination:    Ambulatory Vitals  Encounter Vitals  Temperature: 36.5 °C (97.7 °F)  Temp src: Temporal  Blood Pressure: 108/60  Pulse: 66  Respiration: 12  Pulse Oximetry: 100 %  Weight: 57.4 kg (126 lb 8.7 oz)  Height: 160 cm (5' 3\")  BMI (Calculated): 22.42    Neurological Examination:  Mental Status: The patient is alert and oriented to person, place, time, and situation. Speech is fluent, with no aphasia nor dysarthria noted. Affect is normal.    Cranial Nerve Examination:  CN I: Olfaction examination is deferred.  CN II: Visual fields are full to confrontation examination and show no visual field defect.  CN III, IV, VI: Eye movements are normal in all directions. Pupils are reactive to direct and consensual light. There is no relative afferent pupillary defect. There is no nystagmus.  CN V: Facial sensation to light touch is intact throughout.   CN VII: No significant facial muscle or other soft tissue asymmetry.  CN VIII: Hearing intact to rubbing sounds bilaterally.   CN IX, X: Soft palate elevates symmetrically.  CN XI: Symmetrical shoulder shrug exam.  CN XII: Midline tongue protrusion and moves symmetrically to each side.     Motor Examination:  Muscle strength is intact throughout. Muscle tone is normal throughout. No abnormal movements are observed. No pronator drift is noted.     Muscle Stretch Reflexes Examination:  Deferred.     Sensory Examination:  Preserved sensation to light touch in all extremities.     Coordination:  Normal finger to nose testing bilaterally, no postural nor intentional tremor was noted.     Stance/gait:  Normal regular gait with normal arm swings and stride length. Able to perform tandem gait. Romberg sign is absent.     Labs:   - Valproic acid (08/07/2024): 82   - Keppra (08/07/2024): 18   - CMP (08/07/2024): Overall unremarkable.     ASSESSMENT AND PLAN:  1. Medically intractractable focal epilepsy, with " focal impaired awareness seizures and rare focal to bilateral tonic-clonic seizures, likely originating from the left mesial temporal lobe. The etiology might be related to the history of meningitis, as well as positive family history of seizures. We had an extensive discussion at the time of the initial visit about the nature of her epilepsy, cause, and potential next best steps; we discussed that she might be a potential epilepsy surgery candidate, which would give her the highest chance for seizure freedom (though she has reported history of meningitis, she also has a clear left MTS). We discussed at the time of the initial visit that the first step to pursue epilepsy surgery would be to proceed with EMU for up to 5 days.     The patient continues to have challenges with insurance and this is the main barrier in proceeding with further evaluation for epilepsy surgery. This is also a barrier for any further antiseizure medication optimization.     We had a detailed discussion about the fact that she should not get pregnant on Depakote. The patient discussed the issue of pregnancy with ObGyn in late 2023 and was advised not to become pregnant due to health risks.     The patient is overall stable on current doses of antiseizure medications, despite decreasing the doses on her own in early 2024.  Will continue current antiseizure medication regimen, at lower doses as the patient is taking it right now, with no changes.  Refills were provided.  - levETIRAcetam (KEPPRA) 500 MG Tab; Take 1 Tablet by mouth 2 times a day.  Dispense: 60 Tablet; Refill: 7  - divalproex (DEPAKOTE) 500 MG Tablet Delayed Response; Take 1 Tablet by mouth every day.  Dispense: 30 Tablet; Refill: 7  - Cenobamate (XCOPRI) 150 MG Tab; TAKE ONE TABLET (150MG) BY MOUTH AT BEDTIME  Dispense: 90 Tablet; Refill: 1    We will obtain routine labs:  - CBC WITH DIFFERENTIAL; Future  - Comp Metabolic Panel; Future  - CENOBAMATE (XCOPRI), PLASMA  -  LEVETIRACETAM (KEPPRA), S  - VALPROIC ACID; Future    Epilepsy counseling provided in writing. The patient is happy with her current seizure control in context of her current treatment options.     The patient was again advised to follow up with endocrinology.     Follow up with PCP for weight changes.     Patient Instructions   Please continue Xcopri 150 mg once a day.    Please continue Keppra 500 mg twice daily.    Please continue Depakote  mg once per daily.    Please continue vitamin D supplementation.    Please explore your options if you can get Medicaid insurance through your primary care provider or through social security office.     Please make sure that you don't become pregnant while you are taking Depakote, because this medication may harm your baby.     Please let our office know if you have any changes in your seizure frequency and/characteristics. Otherwise, please keep the diary of your events and bring it with you at the time of your next follow up visit with our office.     Please let our office know if you have any changes in your seizure frequency and/characteristics. Otherwise, please keep the diary of your events and bring it with you at the time of your next follow up visit with our office.     Please take vitamin D3 [id number] internation units daily.     Please take folic acid 1 mg daily. This is an over-the-counter supplement that is recommended to prevent certain developmental problems in your baby, in case you become pregnant in the future.    Please let our office know as soon as you become pregnant or plan to become pregnant - again, you should not become pregnant while on Depakote.    If you are caring for a baby/young child, please make sure to be sitting on a soft surface while holding your baby/young child, so in case you have a seizure, your baby/young child is not injured due to fall.     Please let us know if you observe that your baby is excessively sleepy/has other  changes and the pediatrician feels that there are no other explanations except possible adverse effects of antiseizure medication(s) your are taking while nursing your baby.     Please note that the following might precipitate seizures: missed doses of antiseizure medications, being sick with fever, stress, fatigue, sleep deprivation, not eating regularly, not drinking enough water, drinking too much alcohol, stopping alcohol suddenly if you are currently using it on a regular/daily basis, and/or using recreational drugs, among others.    Please note that the following might lead to an injury, potentially a life-threatening injury, in case you have a seizure and/or lose awareness while:   - being in a large body of water by yourself, such as bath, pool, lake, ocean, among others (risk of drowning)   - being on unprotected heights (risk of fall)   - being around and/or operating heavy machinery (risk of injury)   - being around open fire/hot surfaces (risk of burns)   - any other activities/circumstances, in which if you lose awareness, you might injure yourself and/or others.    Please call for help (crisis line and/or 911) in case you have thoughts of harming yourself and/or others.    Please abstain from driving until further notice.    ------------------------------------------------------------------------------------------  Instructions for your family/caregivers:  Please call 911 if the patient has a seizure longer than 2-3 minutes, if seizures are back to back without her recovering to her baseline, or she does not start recovering within 5-10 minutes after the seizure stops. During the seizure - please turn her on her side, please make sure her head is protected (for example, you should put a pillow under her head, if one is available), and please do not put anything in her mouth.   -------------------------------------------------------------------------------------------    It is important that your  seizures are well controlled and you have none or have them rarely. In addition to avoiding injury related to breakthrough seizures, frequent seizures increase risk of SUDEP (sudden unexpected death in epilepsy), where a person goes into a seizure and then never wakes up - this is a rare complication of seizure disorder; one of the best available ways to prevent it is to control your seizures well.     Due to the high volume of patients we are trying to help, your physician will not be able to respond by phone or in [employer] to your routine concerns between appointments.  This does not reflect a lack of interest or concern for you or your diagnosis.  Please bring these questions and concerns to your appointment where your physician can answer.  Please relay more pressing concerns to our office, either via MyChart, or by phone; if not able to reach us please visit nearby Urgent Care Center or Emergency Department.  If any emergent medical needs, please seek emergent medical help and/or call 911.    Please note that we are not able to fill out paperwork that might be related to your work, utility company, disability, and/or driving, among others, in between the visits.  Please schedule a dedicated appointment to address your paperwork, so we can do that in a timely manner.  This is not due to lack of concern or interest for your disease-related work/administrative problems, but to make sure that we provide the best possible care and to fill out your paperwork in a correct and timely manner.    Thank you for entrusting your neurological care to [facility]rology and we look forward to continuing to serve you.    Follow up in 5 months.     Sean Patel MD  Neurology Attending, Epilepsy Program  [facility]

## 2025-06-11 ENCOUNTER — TELEPHONE (OUTPATIENT)
Dept: NEUROLOGY | Facility: MEDICAL CENTER | Age: 40
End: 2025-06-11
Payer: COMMERCIAL

## 2025-06-11 DIAGNOSIS — G40.109 LOCALIZATION-RELATED EPILEPSY (HCC): ICD-10-CM

## 2025-06-11 RX ORDER — CENOBAMATE 150 MG/1
TABLET, FILM COATED ORAL
Qty: 90 TABLET | Refills: 1 | Status: SHIPPED
Start: 2025-06-11 | End: 2025-06-12

## 2025-06-12 ENCOUNTER — TELEPHONE (OUTPATIENT)
Dept: PHARMACY | Facility: MEDICAL CENTER | Age: 40
End: 2025-06-12
Payer: COMMERCIAL

## 2025-06-12 RX ORDER — CENOBAMATE 150 MG/1
150 TABLET, FILM COATED ORAL DAILY
Qty: 90 TABLET | Refills: 1 | Status: SHIPPED | OUTPATIENT
Start: 2025-06-12 | End: 2025-12-09

## 2025-06-12 NOTE — TELEPHONE ENCOUNTER
[facility] sent fax requesting day supply on the Xcopri 150 Mg. [employer]

## 2025-06-12 NOTE — TELEPHONE ENCOUNTER
Received [employer] Start  request via [employer]  for Cenobamate (XCOPRI) 150 MG Tab . (Quantity:90, Day Supply:30)     Insurance: N/A  Member ID:  N/A  BIN: N/A  PCN: N/A  Group: N/[id number]     Ran Test claim via [employer] No active rx ins on file. Going to release to pt's preferred pharmacy. Pt has already declined services.

## 2025-06-13 NOTE — TELEPHONE ENCOUNTER
The script for Xcopri was again updated. Please confirm with the patient's pharmacy. Thank you.

## 2025-06-20 ENCOUNTER — HOSPITAL ENCOUNTER (OUTPATIENT)
Dept: LAB | Facility: MEDICAL CENTER | Age: 40
End: 2025-06-20
Attending: PSYCHIATRY & NEUROLOGY
Payer: COMMERCIAL

## 2025-06-20 DIAGNOSIS — G40.909 NONINTRACTABLE EPILEPSY WITHOUT STATUS EPILEPTICUS, UNSPECIFIED EPILEPSY TYPE (HCC): ICD-10-CM

## 2025-06-20 LAB
ALBUMIN SERPL BCP-MCNC: 4 G/DL (ref 3.2–4.9)
ALBUMIN/GLOB SERPL: 1.3 G/DL
ALP SERPL-CCNC: 56 U/L (ref 30–99)
ALT SERPL-CCNC: <5 U/L (ref 2–50)
ANION GAP SERPL CALC-SCNC: 12 MMOL/L (ref 7–16)
AST SERPL-CCNC: 17 U/L (ref 12–45)
BASOPHILS # BLD AUTO: 0.4 % (ref 0–1.8)
BASOPHILS # BLD: 0.03 K/UL (ref 0–0.12)
BILIRUB SERPL-MCNC: <0.2 MG/DL (ref 0.1–1.5)
BUN SERPL-MCNC: 16 MG/DL (ref 8–22)
CALCIUM ALBUM COR SERPL-MCNC: 8.9 MG/DL (ref 8.5–10.5)
CALCIUM SERPL-MCNC: 8.9 MG/DL (ref 8.5–10.5)
CHLORIDE SERPL-SCNC: 105 MMOL/L (ref 96–112)
CO2 SERPL-SCNC: 22 MMOL/L (ref 20–33)
CREAT SERPL-MCNC: 0.62 MG/DL (ref 0.5–1.4)
EOSINOPHIL # BLD AUTO: 0.09 K/UL (ref 0–0.51)
EOSINOPHIL NFR BLD: 1.2 % (ref 0–6.9)
ERYTHROCYTE [DISTWIDTH] IN BLOOD BY AUTOMATED COUNT: 49 FL (ref 35.9–50)
GFR SERPLBLD CREATININE-BSD FMLA CKD-EPI: 115 ML/MIN/1.73 M 2
GLOBULIN SER CALC-MCNC: 3.2 G/DL (ref 1.9–3.5)
GLUCOSE SERPL-MCNC: 87 MG/DL (ref 65–99)
HCT VFR BLD AUTO: 34.1 % (ref 37–47)
HGB BLD-MCNC: 10.3 G/DL (ref 12–16)
IMM GRANULOCYTES # BLD AUTO: 0.02 K/UL (ref 0–0.11)
IMM GRANULOCYTES NFR BLD AUTO: 0.3 % (ref 0–0.9)
LYMPHOCYTES # BLD AUTO: 2.34 K/UL (ref 1–4.8)
LYMPHOCYTES NFR BLD: 31.2 % (ref 22–41)
MCH RBC QN AUTO: 25.4 PG (ref 27–33)
MCHC RBC AUTO-ENTMCNC: 30.2 G/DL (ref 32.2–35.5)
MCV RBC AUTO: 84.2 FL (ref 81.4–97.8)
MONOCYTES # BLD AUTO: 0.63 K/UL (ref 0–0.85)
MONOCYTES NFR BLD AUTO: 8.4 % (ref 0–13.4)
NEUTROPHILS # BLD AUTO: 4.38 K/UL (ref 1.82–7.42)
NEUTROPHILS NFR BLD: 58.5 % (ref 44–72)
NRBC # BLD AUTO: 0 K/UL
NRBC BLD-RTO: 0 /100 WBC (ref 0–0.2)
PLATELET # BLD AUTO: 298 K/UL (ref 164–446)
PMV BLD AUTO: 11.6 FL (ref 9–12.9)
POTASSIUM SERPL-SCNC: 4.3 MMOL/L (ref 3.6–5.5)
PROT SERPL-MCNC: 7.2 G/DL (ref 6–8.2)
RBC # BLD AUTO: 4.05 M/UL (ref 4.2–5.4)
SODIUM SERPL-SCNC: 139 MMOL/L (ref 135–145)
VALPROATE SERPL-MCNC: 31 UG/ML (ref 50–100)
WBC # BLD AUTO: 7.5 K/UL (ref 4.8–10.8)

## 2025-06-20 PROCEDURE — 80339 ANTIEPILEPTICS NOS 1-3: CPT

## 2025-06-20 PROCEDURE — 80053 COMPREHEN METABOLIC PANEL: CPT

## 2025-06-20 PROCEDURE — 36415 COLL VENOUS BLD VENIPUNCTURE: CPT

## 2025-06-20 PROCEDURE — 369999 HCHG MISC LAB CHARGE

## 2025-06-20 PROCEDURE — 80177 DRUG SCRN QUAN LEVETIRACETAM: CPT

## 2025-06-20 PROCEDURE — 80164 ASSAY DIPROPYLACETIC ACD TOT: CPT

## 2025-06-20 PROCEDURE — 85025 COMPLETE CBC W/AUTO DIFF WBC: CPT

## 2025-06-23 LAB — LEVETIRACETAM SERPL-MCNC: 12 UG/ML (ref 10–40)

## 2025-07-02 LAB — TEST NAME 95000: NORMAL

## 2025-07-30 ENCOUNTER — TELEPHONE (OUTPATIENT)
Dept: PHARMACY | Facility: MEDICAL CENTER | Age: 40
End: 2025-07-30
Payer: COMMERCIAL

## 2025-07-30 DIAGNOSIS — G40.109 LOCALIZATION-RELATED EPILEPSY (HCC): ICD-10-CM

## 2025-07-30 RX ORDER — CENOBAMATE 150 MG/1
150 TABLET, FILM COATED ORAL DAILY
Qty: 90 TABLET | Refills: 1 | Status: SHIPPED | OUTPATIENT
Start: 2025-07-30 | End: 2026-01-26

## 2025-07-30 NOTE — TELEPHONE ENCOUNTER
Received [employer]  request via [employer]  for   Cenobamate (XCOPRI) 150 MG Tab . (Quantity:90, Day Supply:90)     Insurance: N/A  Member ID:  N/A  BIN: N/A  PCN: N/A  Group: N/[id number]     Ran Test claim via [employer] Pt has no active rx ins. Going to release to pt's preferred pharmacy. Pt has already declined services.

## 2025-07-30 NOTE — TELEPHONE ENCOUNTER
Please resend to patients preferred pharmacy. I have changed it in the order. Thank you!    Katerina Lane, Med Ass't

## 2025-08-01 ENCOUNTER — OFFICE VISIT (OUTPATIENT)
Dept: MEDICAL GROUP | Facility: MEDICAL CENTER | Age: 40
End: 2025-08-01
Attending: FAMILY MEDICINE
Payer: COMMERCIAL

## 2025-08-01 VITALS
HEART RATE: 71 BPM | TEMPERATURE: 97.6 F | OXYGEN SATURATION: 97 % | SYSTOLIC BLOOD PRESSURE: 80 MMHG | HEIGHT: 62 IN | WEIGHT: 124 LBS | DIASTOLIC BLOOD PRESSURE: 40 MMHG | RESPIRATION RATE: 16 BRPM | BODY MASS INDEX: 22.82 KG/M2

## 2025-08-01 DIAGNOSIS — D64.9 ANEMIA, UNSPECIFIED TYPE: ICD-10-CM

## 2025-08-01 DIAGNOSIS — D35.2 PITUITARY MICROADENOMA (HCC): Primary | ICD-10-CM

## 2025-08-01 PROCEDURE — 3078F DIAST BP <80 MM HG: CPT | Performed by: FAMILY MEDICINE

## 2025-08-01 PROCEDURE — 99203 OFFICE O/P NEW LOW 30 MIN: CPT | Performed by: FAMILY MEDICINE

## 2025-08-01 PROCEDURE — 99213 OFFICE O/P EST LOW 20 MIN: CPT | Performed by: FAMILY MEDICINE

## 2025-08-01 PROCEDURE — 3074F SYST BP LT 130 MM HG: CPT | Performed by: FAMILY MEDICINE

## 2025-08-01 ASSESSMENT — FIBROSIS 4 INDEX: FIB4 SCORE: 1.1

## 2025-11-19 ENCOUNTER — APPOINTMENT (OUTPATIENT)
Dept: NEUROLOGY | Facility: MEDICAL CENTER | Age: 40
End: 2025-11-19
Attending: PSYCHIATRY & NEUROLOGY
Payer: COMMERCIAL

## (undated) DEVICE — SENSOR SPO2 NEO LNCS ADHESIVE (20/BX) SEE USER NOTES

## (undated) DEVICE — CANNULA W/ SUPPLY TUBING O2 - (50/CA)

## (undated) DEVICE — KIT ANESTHESIA W/CIRCUIT & 3/LT BAG W/FILTER (20EA/CA)

## (undated) DEVICE — APPLIER ENDO MEDIUM CLIP (3EA/BX)

## (undated) DEVICE — TUBING CLEARLINK DUO-VENT - C-FLO (48EA/CA)

## (undated) DEVICE — TUBE CONNECTING SUCTION - CLEAR PLASTIC STERILE 72 IN (50EA/CA)

## (undated) DEVICE — SYRINGE SAFETY 10 ML 18 GA X 1 1/2 BLUNT LL (100/BX 4BX/CA)

## (undated) DEVICE — PROTECTOR ULNA NERVE - (36PR/CA)

## (undated) DEVICE — CANNULA W/SEAL 5X100 Z-THRE - ADED KII (12/BX)

## (undated) DEVICE — BASIN EMESIS DISP. - (250/CA)

## (undated) DEVICE — EXTRACTOR PRO XL 12-15 MM ABOVE

## (undated) DEVICE — TROCAR 5X100 NON BLADED Z-TH - READ KII (6/BX)

## (undated) DEVICE — CANISTER SUCTION RIGID RED 1500CC (40EA/CA)

## (undated) DEVICE — CHLORAPREP 26 ML APPLICATOR - ORANGE TINT(25/CA)

## (undated) DEVICE — TUBE E-T HI-LO CUFF 7.0MM (10EA/PK)

## (undated) DEVICE — SUCTION INSTRUMENT YANKAUER BULBOUS TIP W/O VENT (50EA/CA)

## (undated) DEVICE — SUTURE 0 PDS-2 CTX 36 INCH - (24/BX)

## (undated) DEVICE — SET EXTENSION WITH 2 PORTS (48EA/CA) ***PART #2C8610 IS A SUBSTITUTE*****

## (undated) DEVICE — SUTURE GENERAL

## (undated) DEVICE — SODIUM CHL. IRRIGATION 0.9% 3000ML (4EA/CA 65CA/PF)

## (undated) DEVICE — ELECTRODE DUAL RETURN W/ CORD - (50/PK)

## (undated) DEVICE — SYRINGE SAFETY 3 ML 18 GA X 1 1/2 BLUNT LL (100/BX 8BX/CA)

## (undated) DEVICE — CONTAINER, SPECIMEN, STERILE

## (undated) DEVICE — GLOVE, LITE (PAIR)

## (undated) DEVICE — ELECTRODE 850 FOAM ADHESIVE - HYDROGEL RADIOTRNSPRNT (50/PK)

## (undated) DEVICE — SUTURE 0 PDS CT-2 (36PK/BX)

## (undated) DEVICE — HEAD HOLDER JUNIOR/ADULT

## (undated) DEVICE — KIT ROOM DECONTAMINATION

## (undated) DEVICE — BAG RETRIEVAL 10ML (10EA/BX)

## (undated) DEVICE — SUTURE 4-0 MONOCRYL PLUS PS-2 - 27 INCH (36/BX)

## (undated) DEVICE — ELECTRODE 5MM LHK LAPSCP STERILE DISP- MEGADYNE  (5/CA)

## (undated) DEVICE — DRAPE X LONG COILED INSTRUMENT ORGANIZER EUS (20EA/BX)

## (undated) DEVICE — SPONGE GAUZE NON-STERILE 4X4 - (2000/CA 10PK/CA)

## (undated) DEVICE — BAG, SPONGE COUNT 50600

## (undated) DEVICE — JAGTOME RX 44 PRELOADED .035 X 260CM

## (undated) DEVICE — WATER IRRIGATION STERILE 1000ML (12EA/CA)

## (undated) DEVICE — SET SUCTION/IRRIGATION WITH DISPOSABLE TIP (6/CA )PART #0250-070-520 IS A SUB

## (undated) DEVICE — TUBING INSUFFLATION - (10/BX)

## (undated) DEVICE — HUMID-VENT HEAT AND MOISTURE EXCHANGE- (50/BX)

## (undated) DEVICE — GOWN SURGEONS X-LARGE - DISP. (30/CA)

## (undated) DEVICE — NEPTUNE 4 PORT MANIFOLD - (20/PK)

## (undated) DEVICE — ENDOLOOP 0 VICRYL - (12/BX)

## (undated) DEVICE — MASK ANESTHESIA ADULT  - (100/CA)

## (undated) DEVICE — LACTATED RINGERS INJ 1000 ML - (14EA/CA 60CA/PF)

## (undated) DEVICE — KIT  I.V. START (100EA/CA)

## (undated) DEVICE — GLOVE SZ 7.5 LF PROTEXIS (50PR/BX)

## (undated) DEVICE — TUBE SUCTION YANKAUER  1/4 X 6FT (20EA/CA)"

## (undated) DEVICE — SENSOR SPO2 ADULT LNCS ADTX (20/BX) ORDER ITEM #19593

## (undated) DEVICE — BALLOON RETRIEVAL EXTRACTOR PRO RX   9-12MM

## (undated) DEVICE — Device

## (undated) DEVICE — BITE BLOCK ADULT 60FR (100EA/CA)

## (undated) DEVICE — GLOVE BIOGEL INDICATOR SZ 7SURGICAL PF LTX - (50/BX 4BX/CA)

## (undated) DEVICE — GOWN WARMING STANDARD FLEX - (30/CA)

## (undated) DEVICE — SODIUM CHL IRRIGATION 0.9% 1000ML (12EA/CA)

## (undated) DEVICE — CATHETER IV SAFETY 20 GA X 1-1/4 (50/BX)

## (undated) DEVICE — TROCAR Z THREAD12MM OPTICAL - NON BLADED (6/BX)

## (undated) DEVICE — SYRINGE SAFETY 5 ML 18 GA X 1-1/2 BLUNT LL (100/BX 4BX/CA)

## (undated) DEVICE — GLOVE BIOGEL SZ 7 SURGICAL PF LTX - (50PR/BX 4BX/CA)

## (undated) DEVICE — SYRINGE DISP. 50CC LS - (40/BX)